# Patient Record
Sex: MALE | Race: WHITE | URBAN - METROPOLITAN AREA
[De-identification: names, ages, dates, MRNs, and addresses within clinical notes are randomized per-mention and may not be internally consistent; named-entity substitution may affect disease eponyms.]

---

## 2018-06-18 ENCOUNTER — HOSPITAL ENCOUNTER (EMERGENCY)
Facility: HOSPITAL | Age: 28
Discharge: HOME | End: 2018-06-18
Attending: EMERGENCY MEDICINE
Payer: COMMERCIAL

## 2018-06-18 VITALS
OXYGEN SATURATION: 100 % | RESPIRATION RATE: 18 BRPM | BODY MASS INDEX: 29.92 KG/M2 | TEMPERATURE: 97.1 F | DIASTOLIC BLOOD PRESSURE: 72 MMHG | SYSTOLIC BLOOD PRESSURE: 119 MMHG | HEART RATE: 98 BPM | HEIGHT: 69 IN | WEIGHT: 202 LBS

## 2018-06-18 PROCEDURE — 99281 EMR DPT VST MAYX REQ PHY/QHP: CPT

## 2018-06-18 RX ORDER — METFORMIN HYDROCHLORIDE 500 MG/1
500 TABLET ORAL 2 TIMES DAILY WITH MEALS
COMMUNITY

## 2018-06-18 RX ORDER — DAPAGLIFLOZIN 5 MG/1
5 TABLET, FILM COATED ORAL DAILY
COMMUNITY

## 2018-06-18 ASSESSMENT — ENCOUNTER SYMPTOMS
FATIGUE: 0
NECK PAIN: 1
SORE THROAT: 0
LIGHT-HEADEDNESS: 0
TROUBLE SWALLOWING: 0
HEADACHES: 0
COLOR CHANGE: 0
WOUND: 0
FACIAL SWELLING: 0
SHORTNESS OF BREATH: 0
DIZZINESS: 0
FEVER: 0
NAUSEA: 0

## 2018-06-18 NOTE — ED ATTESTATION NOTE
The patient was evaluated and managed by the PA/NP.  I have discussed the case with the PA/NP.  I am in agreement with the ED workup and treatment plan.  I will continue to be available for consultation as needed.     Duane K Godshall, MD  06/18/18 0175

## 2018-06-18 NOTE — ED PROVIDER NOTES
HPI     Chief Complaint   Patient presents with   • Abscess     26 y/o male with DM presents to the ED c/o worsening neck pain  x 1 year. Pt reports he has had a lump on his right neck for 1 year. Pt has been evaluated for this in March 2018 and had an US and CT scan. Pt states he was told the lump was a cyst and to f/u with general surgery. Pt does have a scheduled consultation with a general surgeon (behr) for removal on 6/23 at [facility]. Today, pt states his pain increased in severity. Pt notes it became more swollen and painful, prompting him to come to the ED for evaluation. Denies fever, trouble swallowing, sore throat, dyspnea, ear pain, headache, dizziness, lightheadedness, and rashes. Pt is not on any medication for the cyst. Pt reports his DM is uncontrolled and his last A1c was 11.       History provided by:  Patient   used: No    Abscess   Location:  Head/neck  Head/neck abscess location:  R neck  Abscess quality: painful    Red streaking: no    Progression:  Worsening  Pain details:     Quality:  Unable to specify    Severity:  Mild    Timing:  Constant    Progression:  Worsening  Chronicity:  New  Context: diabetes    Context: not skin injury    Relieved by:  Nothing  Worsened by:  Nothing  Ineffective treatments:  None tried  Associated symptoms: no fatigue, no fever, no headaches and no nausea         Patient History     Past Medical History:   Diagnosis Date   • Type 2 diabetes mellitus (CMS/HCC) (HCC)        History reviewed. No pertinent surgical history.    History reviewed. No pertinent family history.    Social History   Substance Use Topics   • Smoking status: Current Every Day Smoker   • Smokeless tobacco: Never Used      Comment: vape   • Alcohol use 3.6 oz/week     6 Shots of liquor per week       Systems Reviewed from Nursing Triage:  Tobacco  Allergies  Meds  Problems  Med Hx  Surg Hx  Soc Hx         Review of Systems     Review of Systems   Constitutional:  Negative for fatigue and fever.   HENT: Negative for dental problem, ear pain, facial swelling, sore throat and trouble swallowing.    Eyes: Negative for discharge.   Respiratory: Negative for shortness of breath.    Gastrointestinal: Negative for nausea.   Musculoskeletal: Positive for neck pain (Right neck pain). Negative for neck stiffness.   Skin: Negative for color change and wound.   Allergic/Immunologic: Negative for immunocompromised state.   Neurological: Negative for dizziness, light-headedness and headaches.   Hematological: Does not bruise/bleed easily.        Physical Exam     ED Triage Vitals   Temp Heart Rate Resp BP SpO2   06/18/18 1240 06/18/18 1240 06/18/18 1240 06/18/18 1240 06/18/18 1240   36.3 °C (97.4 °F) (!) 109 18 128/76 100 %      Temp Source Heart Rate Source Patient Position BP Location FiO2 (%) (Set)   06/18/18 1240 06/18/18 1407 -- -- --   Temporal Monitor                        No data found.          Physical Exam   Constitutional: He is oriented to person, place, and time. He appears well-developed and well-nourished. No distress.   HENT:   Head: Normocephalic and atraumatic.   Nose: Nose normal.   Eyes: EOM are normal.   Neck: Normal range of motion. Neck supple. No erythema present.       Cardiovascular: Normal rate, regular rhythm, normal heart sounds and intact distal pulses.    Pulmonary/Chest: Effort normal and breath sounds normal.   Abdominal: Soft. There is no tenderness.   Musculoskeletal: Normal range of motion.   Lymphadenopathy:        Head (right side): No submandibular, no tonsillar, no preauricular and no posterior auricular adenopathy present.     He has no cervical adenopathy.   Neurological: He is alert and oriented to person, place, and time.   Skin: Skin is warm and dry. No rash noted.   Psychiatric: He has a normal mood and affect.   Nursing note and vitals reviewed.           Procedures    ED Course & MDM     Labs Reviewed - No data to display    No orders to  display           MDM  Number of Diagnoses or Management Options  Cyst of neck:      Amount and/or Complexity of Data Reviewed  Discuss the patient with other providers: yes    Patient Progress  Patient progress: stable           ED Course as of Jun 20 0042   Mon Jun 18, 2018   1340 Impression: Cyst right lateral neck x1 year  Plan: No erythema, drainage or s/s infection  Patient with scheduled surgery consult on 6/23/18 with Dr Behr ([place])  Instructed to keep surgery consultation and return precautions  [EP]      ED Course User Index  [EP] Elizabeth P Pallante, CRNP         Clinical Impressions as of Jun 20 0042   Cyst of neck     Disposition:  Discharge   By signing my name below, I, Cari Lee, attest that this documentation has been prepared under the direction and in the presence of E. Pallante, CRNP.    6/20/2018 12:42 AM      I, Elizabeth P. Pallante, personally performed the services described in this documentation, as documented by the scribe in my presence, and it is both accurate and complete.  6/20/2018 12:42 AM         Cari Lee  06/18/18 1320       Elizabeth P Pallante, CRNP  06/20/18 0042

## 2018-06-18 NOTE — DISCHARGE INSTRUCTIONS
Take Ibuprofen (Motrin, Aleve, Advil) for pain. Take according to label instructions.    Keep your appointment on 6/23 with the surgeon for evaluation of the cyst. You may also call the surgeon listed below for a 2nd opinion.

## 2018-06-20 ASSESSMENT — ENCOUNTER SYMPTOMS
NECK STIFFNESS: 0
EYE DISCHARGE: 0
BRUISES/BLEEDS EASILY: 0

## 2021-04-13 DIAGNOSIS — Z23 ENCOUNTER FOR IMMUNIZATION: ICD-10-CM

## 2021-04-21 ENCOUNTER — IMMUNIZATIONS (OUTPATIENT)
Dept: FAMILY MEDICINE CLINIC | Facility: HOSPITAL | Age: 31
End: 2021-04-21

## 2021-04-21 DIAGNOSIS — Z23 ENCOUNTER FOR IMMUNIZATION: Primary | ICD-10-CM

## 2021-04-21 PROCEDURE — 0011A SARS-COV-2 / COVID-19 MRNA VACCINE (MODERNA) 100 MCG: CPT

## 2021-04-21 PROCEDURE — 91301 SARS-COV-2 / COVID-19 MRNA VACCINE (MODERNA) 100 MCG: CPT

## 2021-05-19 ENCOUNTER — IMMUNIZATIONS (OUTPATIENT)
Dept: FAMILY MEDICINE CLINIC | Facility: HOSPITAL | Age: 31
End: 2021-05-19

## 2021-05-19 DIAGNOSIS — Z23 ENCOUNTER FOR IMMUNIZATION: Primary | ICD-10-CM

## 2021-05-19 PROCEDURE — 0012A SARS-COV-2 / COVID-19 MRNA VACCINE (MODERNA) 100 MCG: CPT

## 2021-05-19 PROCEDURE — 91301 SARS-COV-2 / COVID-19 MRNA VACCINE (MODERNA) 100 MCG: CPT

## 2021-07-30 ENCOUNTER — HOSPITAL ENCOUNTER (EMERGENCY)
Facility: HOSPITAL | Age: 31
Discharge: HOME | End: 2021-07-31
Attending: EMERGENCY MEDICINE | Admitting: EMERGENCY MEDICINE
Payer: COMMERCIAL

## 2021-07-30 VITALS
WEIGHT: 181 LBS | RESPIRATION RATE: 16 BRPM | TEMPERATURE: 97.5 F | HEART RATE: 91 BPM | DIASTOLIC BLOOD PRESSURE: 86 MMHG | SYSTOLIC BLOOD PRESSURE: 136 MMHG | OXYGEN SATURATION: 98 %

## 2021-07-30 DIAGNOSIS — J02.9 PHARYNGITIS, UNSPECIFIED ETIOLOGY: Primary | ICD-10-CM

## 2021-07-30 PROCEDURE — 99283 EMERGENCY DEPT VISIT LOW MDM: CPT

## 2021-07-31 LAB — S PYO DNA THROAT QL NAA+PROBE: NORMAL

## 2021-07-31 PROCEDURE — 87651 STREP A DNA AMP PROBE: CPT | Performed by: PHYSICIAN ASSISTANT

## 2021-07-31 PROCEDURE — 87070 CULTURE OTHR SPECIMN AEROBIC: CPT | Performed by: PHYSICIAN ASSISTANT

## 2021-07-31 PROCEDURE — 99284 EMERGENCY DEPT VISIT MOD MDM: CPT | Performed by: PHYSICIAN ASSISTANT

## 2021-07-31 RX ORDER — CEPHALEXIN 250 MG/1
500 CAPSULE ORAL ONCE
Status: COMPLETED | OUTPATIENT
Start: 2021-07-31 | End: 2021-07-31

## 2021-07-31 RX ORDER — IBUPROFEN 600 MG/1
600 TABLET ORAL ONCE
Status: COMPLETED | OUTPATIENT
Start: 2021-07-31 | End: 2021-07-31

## 2021-07-31 RX ORDER — IBUPROFEN 600 MG/1
600 TABLET ORAL EVERY 6 HOURS PRN
Qty: 30 TABLET | Refills: 0 | Status: SHIPPED | OUTPATIENT
Start: 2021-07-31

## 2021-07-31 RX ORDER — PREDNISONE 20 MG/1
40 TABLET ORAL ONCE
Status: DISCONTINUED | OUTPATIENT
Start: 2021-07-31 | End: 2021-07-31

## 2021-07-31 RX ORDER — LIDOCAINE HYDROCHLORIDE 20 MG/ML
15 SOLUTION OROPHARYNGEAL 4 TIMES DAILY PRN
Qty: 100 ML | Refills: 0 | Status: SHIPPED | OUTPATIENT
Start: 2021-07-31

## 2021-07-31 RX ORDER — CEPHALEXIN 500 MG/1
500 CAPSULE ORAL EVERY 8 HOURS SCHEDULED
Qty: 15 CAPSULE | Refills: 0 | Status: SHIPPED | OUTPATIENT
Start: 2021-07-31 | End: 2021-08-05

## 2021-07-31 RX ADMIN — IBUPROFEN 600 MG: 600 TABLET, FILM COATED ORAL at 00:32

## 2021-07-31 RX ADMIN — CEPHALEXIN 500 MG: 250 CAPSULE ORAL at 00:32

## 2021-07-31 NOTE — ED PROVIDER NOTES
History  Chief Complaint   Patient presents with    Sore Throat     Pt reports tested postive for strep 2 weeks ago, reports finished abx, reports tonight tonight pain in throat became worse  denies fevers     69-year-old male with history of diabetes mellitus type 1 presents to the ED for evaluation of ongoing sore throat x2 weeks  Patient was evaluated by urgent care to go onset symptoms and had positive rapid strep test   Placed on Z-Yash  Patient has allergy to Augmentin  Patient reports taking indication full  Reports mild improvement however states that sore throat had never went away completely  Patient states he is able to swallow food and liquids although with some pain  Sources mild congestion and mild dry cough  Afebrile  History provided by:  Patient   used: No    Sore Throat  Associated symptoms: cough    Associated symptoms: no abdominal pain, no adenopathy, no chest pain, no chills, no fever, no headaches, no rash, no rhinorrhea and no shortness of breath        None       Past Medical History:   Diagnosis Date    Diabetes mellitus ([facility] [address] )        History reviewed  No pertinent surgical history  History reviewed  No pertinent family history  I have reviewed and agree with the history as documented  E-Cigarette/Vaping     E-Cigarette/Vaping Substances     Social History     Tobacco Use    Smoking status: Current Every Day Smoker    Smokeless tobacco: Never Used   Substance Use Topics    Alcohol use: Yes     Comment: social    Drug use: Yes     Types: Marijuana       Review of Systems   Constitutional: Negative for chills, diaphoresis, fatigue and fever  HENT: Positive for congestion and sore throat  Negative for rhinorrhea and sneezing  Eyes: Negative for photophobia and visual disturbance  Respiratory: Positive for cough  Negative for shortness of breath  Cardiovascular: Negative for chest pain and palpitations     Gastrointestinal: Negative for abdominal pain, constipation, diarrhea, nausea and vomiting  Genitourinary: Negative for difficulty urinating, dysuria, flank pain, frequency and hematuria  Musculoskeletal: Negative for back pain, gait problem, myalgias and neck pain  Skin: Negative for color change and rash  Allergic/Immunologic: Negative for immunocompromised state  Neurological: Negative for dizziness, weakness, light-headedness, numbness and headaches  Hematological: Negative for adenopathy  Psychiatric/Behavioral: Negative for behavioral problems  Physical Exam  Physical Exam  Vitals and nursing note reviewed  Constitutional:       General: He is not in acute distress  Appearance: He is well-developed and normal weight  He is not ill-appearing, toxic-appearing or diaphoretic  HENT:      Head: Normocephalic and atraumatic  Right Ear: Tympanic membrane, ear canal and external ear normal  No middle ear effusion  Tympanic membrane is not erythematous  Left Ear: Tympanic membrane, ear canal and external ear normal   No middle ear effusion  Tympanic membrane is not erythematous  Nose: Congestion present  No rhinorrhea  Mouth/Throat:      Mouth: Mucous membranes are moist  No oral lesions  Pharynx: Uvula midline  Posterior oropharyngeal erythema present  No pharyngeal swelling, oropharyngeal exudate or uvula swelling  Tonsils: No tonsillar exudate or tonsillar abscesses  Eyes:      General: No scleral icterus  Right eye: No discharge  Left eye: No discharge  Conjunctiva/sclera: Conjunctivae normal       Pupils: Pupils are equal, round, and reactive to light  Neck:      Trachea: No tracheal deviation  Cardiovascular:      Rate and Rhythm: Normal rate and regular rhythm  Heart sounds: Normal heart sounds  No murmur heard  Pulmonary:      Effort: Pulmonary effort is normal  No respiratory distress  Breath sounds: Normal breath sounds     Abdominal: General: Bowel sounds are normal  There is no distension  Palpations: Abdomen is soft  Tenderness: There is no abdominal tenderness  There is no guarding  Musculoskeletal:         General: No deformity  Normal range of motion  Cervical back: Normal range of motion and neck supple  Lymphadenopathy:      Cervical: No cervical adenopathy  Skin:     General: Skin is warm and dry  Capillary Refill: Capillary refill takes less than 2 seconds  Coloration: Skin is not pale  Findings: No erythema or rash  Neurological:      Mental Status: He is alert and oriented to person, place, and time  Sensory: No sensory deficit     Psychiatric:         Behavior: Behavior normal          Vital Signs  ED Triage Vitals   Temperature Pulse Respirations Blood Pressure SpO2   07/30/21 2333 07/30/21 2336 07/30/21 2336 07/30/21 2336 07/30/21 2336   97 5 °F (36 4 °C) 91 16 136/86 98 %      Temp Source Heart Rate Source Patient Position - Orthostatic VS BP Location FiO2 (%)   07/30/21 2333 07/30/21 2336 07/30/21 2336 07/30/21 2336 --   Oral Monitor Sitting Right arm       Pain Score       07/30/21 2336       8           Vitals:    07/30/21 2336   BP: 136/86   Pulse: 91   Patient Position - Orthostatic VS: Sitting         Visual Acuity      ED Medications  Medications   cephalexin (KEFLEX) capsule 500 mg (500 mg Oral Given 7/31/21 0032)   ibuprofen (MOTRIN) tablet 600 mg (600 mg Oral Given 7/31/21 0032)       Diagnostic Studies  Results Reviewed     Procedure Component Value Units Date/Time    Throat culture [[id number]] Collected: 07/31/21 0009    Lab Status: Final result Specimen: Throat Updated: 08/02/21 0955     Throat Culture Negative for beta-hemolytic Streptococcus    Strep A PCR [[id number]]  (Normal) Collected: 07/31/21 0026    Lab Status: Final result Specimen: Throat Updated: 07/31/21 0059     STREP A PCR None Detected                 No orders to display              Procedures  Procedures ED Course                             SBIRT 22yo+      Most Recent Value   SBIRT (24 yo +)   In order to provide better care to our patients, we are screening all of our patients for alcohol and drug use  Would it be okay to ask you these screening questions? No Filed at: 07/30/2021 8448                    [facility]  Number of Diagnoses or Management Options  Diagnosis management comments: 56-year-old male presents to the ED for ongoing sore throat x2 weeks  Patient positive rapid strep 2 weeks ago at onset  Placed on Z-Yash he has been taking in full  States symptoms improved however never went away completely  Will place on Keflex, Motrin, viscous lidocaine  Repeat rapid strep, throat culture  Afebrile  Vital signs stable  Patient able to tolerate fluids  Amount and/or Complexity of Data Reviewed  Clinical lab tests: ordered and reviewed  Review and summarize past medical records: yes  Discuss the patient with other providers: yes  Independent visualization of images, tracings, or specimens: yes    Risk of Complications, Morbidity, and/or Mortality  Presenting problems: moderate  Diagnostic procedures: moderate  Management options: moderate    Patient Progress  Patient progress: stable      Disposition  Final diagnoses:   Pharyngitis, unspecified etiology     Time reflects when diagnosis was documented in both MDM as applicable and the Disposition within this note     Time User Action Codes Description Comment    7/31/2021 12:47 AM Gabbie Wall Add [J02 9] Pharyngitis, unspecified etiology       ED Disposition     ED Disposition Condition Date/Time Comment    Discharge Stable Sat Jul 31, 2021 12:46 AM Yomi Mclain discharge to home/self care              Follow-up Information    None         Discharge Medication List as of 7/31/2021 12:58 AM      START taking these medications    Details   cephalexin (KEFLEX) 500 mg capsule Take 1 capsule (500 mg total) by mouth every 8 (eight) hours for 5 days, Starting Sat 7/31/2021, Until Thu 8/5/2021, Normal      ibuprofen (MOTRIN) 600 mg tablet Take 1 tablet (600 mg total) by mouth every 6 (six) hours as needed for mild pain, Starting Sat 7/31/2021, Normal      Lidocaine Viscous HCl (XYLOCAINE) 2 % mucosal solution Swish and spit 15 mL 4 (four) times a day as needed for mouth pain or discomfort, Starting Sat 7/31/2021, Normal           No discharge procedures on file      PDMP Review     None          ED Provider  Electronically Signed by           Hawa Mccord PA-C  08/02/21 7766

## 2021-08-02 LAB — BACTERIA THROAT CULT: NORMAL

## 2023-04-04 RX ORDER — TADALAFIL 10 MG/1
TABLET ORAL
COMMUNITY
End: 2023-04-05 | Stop reason: ALTCHOICE

## 2023-04-04 RX ORDER — INSULIN GLARGINE 100 [IU]/ML
INJECTION, SOLUTION SUBCUTANEOUS
COMMUNITY
End: 2023-04-05 | Stop reason: ALTCHOICE

## 2023-04-05 ENCOUNTER — OFFICE VISIT (OUTPATIENT)
Dept: FAMILY MEDICINE CLINIC | Facility: CLINIC | Age: 33
End: 2023-04-05

## 2023-04-05 VITALS
DIASTOLIC BLOOD PRESSURE: 70 MMHG | HEART RATE: 85 BPM | WEIGHT: 195 LBS | TEMPERATURE: 98 F | SYSTOLIC BLOOD PRESSURE: 120 MMHG | OXYGEN SATURATION: 98 % | RESPIRATION RATE: 20 BRPM | HEIGHT: 67 IN | BODY MASS INDEX: 30.61 KG/M2

## 2023-04-05 DIAGNOSIS — N52.02 CORPORO-VENOUS OCCLUSIVE ERECTILE DYSFUNCTION: ICD-10-CM

## 2023-04-05 DIAGNOSIS — E11.42 TYPE 2 DIABETES MELLITUS WITH DIABETIC POLYNEUROPATHY, WITH LONG-TERM CURRENT USE OF INSULIN (HCC): Primary | ICD-10-CM

## 2023-04-05 DIAGNOSIS — Z79.4 TYPE 2 DIABETES MELLITUS WITH DIABETIC POLYNEUROPATHY, WITH LONG-TERM CURRENT USE OF INSULIN (HCC): Primary | ICD-10-CM

## 2023-04-05 DIAGNOSIS — F32.9 REACTIVE DEPRESSION: ICD-10-CM

## 2023-04-05 DIAGNOSIS — Z11.59 SCREENING FOR VIRAL DISEASE: ICD-10-CM

## 2023-04-05 DIAGNOSIS — R19.7 DIARRHEA, UNSPECIFIED TYPE: ICD-10-CM

## 2023-04-05 DIAGNOSIS — Z11.4 SCREENING FOR HUMAN IMMUNODEFICIENCY VIRUS: ICD-10-CM

## 2023-04-05 LAB
CREAT UR-MCNC: 45.3 MG/DL
LEFT EYE DIABETIC RETINOPATHY: ABNORMAL
LEFT EYE IMAGE QUALITY: ABNORMAL
LEFT EYE MACULAR EDEMA: ABNORMAL
LEFT EYE OTHER RETINOPATHY: ABNORMAL
MICROALBUMIN UR-MCNC: 8 MG/L (ref 0–20)
MICROALBUMIN/CREAT 24H UR: 18 MG/G CREATININE (ref 0–30)
RIGHT EYE DIABETIC RETINOPATHY: ABNORMAL
RIGHT EYE IMAGE QUALITY: ABNORMAL
RIGHT EYE MACULAR EDEMA: ABNORMAL
RIGHT EYE OTHER RETINOPATHY: ABNORMAL
SEVERITY (EYE EXAM): ABNORMAL
SL AMB POCT HEMOGLOBIN AIC: 11.2 (ref ?–6.5)

## 2023-04-05 RX ORDER — DULOXETIN HYDROCHLORIDE 30 MG/1
30 CAPSULE, DELAYED RELEASE ORAL DAILY
Qty: 30 CAPSULE | Refills: 5 | Status: SHIPPED | OUTPATIENT
Start: 2023-04-05

## 2023-04-05 RX ORDER — ATORVASTATIN CALCIUM 40 MG/1
40 TABLET, FILM COATED ORAL DAILY
Status: CANCELLED | OUTPATIENT
Start: 2023-04-05

## 2023-04-05 RX ORDER — TADALAFIL 10 MG/1
10 TABLET ORAL DAILY PRN
Qty: 30 TABLET | Refills: 5 | Status: SHIPPED | OUTPATIENT
Start: 2023-04-05

## 2023-04-05 RX ORDER — ALPRAZOLAM 0.25 MG/1
0.25 TABLET ORAL 3 TIMES DAILY PRN
COMMUNITY
Start: 2023-03-14 | End: 2023-04-05 | Stop reason: ALTCHOICE

## 2023-04-05 RX ORDER — INSULIN ASPART 100 [IU]/ML
10 INJECTION, SOLUTION INTRAVENOUS; SUBCUTANEOUS
Qty: 9 ML | Refills: 3 | Status: SHIPPED | OUTPATIENT
Start: 2023-04-05 | End: 2023-05-05

## 2023-04-05 RX ORDER — ATORVASTATIN CALCIUM 20 MG/1
20 TABLET, FILM COATED ORAL
COMMUNITY
Start: 2023-03-14

## 2023-04-05 RX ORDER — PEN NEEDLE, DIABETIC 32GX 5/32"
NEEDLE, DISPOSABLE MISCELLANEOUS 3 TIMES DAILY
Qty: 100 EACH | Refills: 1 | Status: SHIPPED | OUTPATIENT
Start: 2023-04-05

## 2023-04-05 RX ORDER — PREGABALIN 50 MG/1
50 CAPSULE ORAL 3 TIMES DAILY
Qty: 90 CAPSULE | Refills: 3 | Status: SHIPPED | OUTPATIENT
Start: 2023-04-05

## 2023-04-05 RX ORDER — FLASH GLUCOSE SENSOR
KIT MISCELLANEOUS
COMMUNITY
Start: 2023-03-14

## 2023-04-05 NOTE — PROGRESS NOTES
Name: Tanvi Arevalo      : 1990      MRN: [id number]  Encounter Provider: Carli Blanco MD  Encounter Date: 2023   Encounter department: [facility]     1  Type 2 diabetes mellitus with diabetic polyneuropathy, with long-term current use of insulin ([facility])  Assessment & Plan:    Lab Results   Component Value Date    HGBA1C 11 2 (A) 2023   Diabetes type 2 mellitus without complication with long-term use of insulin  Worsening control with hemoglobin A1c of as above  Follow-up with endocrinologist requested, follow-up with diabetes educator requested  The fact of no having ketoacidosis tells me he does not have type I or still remains some amount of insulin production  Having problems with tolerance of metformin: Diarrhea  To rule out celiac disease  Restarting NovoLog with meals  Explained about the complications of neuropathy  Iris exam was unreadable in the left eye, needs to follow-up with ophthalmologist     Checking albuminuria, lipid profile, kidney and liver profile  Orders:  -     POCT hemoglobin A1c  -     IRIS Diabetic eye exam  -     CBC and differential; Future  -     Comprehensive metabolic panel; Future  -     Microalbumin / creatinine urine ratio  -     Lipid panel; Future  -     insulin aspart (NovoLOG FlexPen) 100 UNIT/ML injection pen; Inject 10 Units under the skin 3 (three) times a day with meals  -     Insulin Pen Needle (CareFine Pen Needles) 32G X 4 MM MISC; Use 3 (three) times a day  -     pregabalin (LYRICA) 50 mg capsule; Take 1 capsule (50 mg total) by mouth 3 (three) times a day  -     Ambulatory referral to Diabetic Education; Future  -     Ambulatory referral to Endocrinology; Future    2  BMI 30 0-30 9,adult    3  Corporo-venous occlusive erectile dysfunction  Assessment & Plan:  Very possible secondary to diabetes angiopathy  Orders:  -     tadalafil (CIALIS) 10 MG tablet;  Take 1 "tablet (10 mg total) by mouth daily as needed for erectile dysfunction    4  Screening for viral disease  -     Hepatitis C antibody; Future    5  Screening for human immunodeficiency virus  -     : HIV 1/2 AB/AG w Reflex [facility] for 2 yr old and above; Future    6  Diarrhea, unspecified type  Assessment & Plan:  No signs of infection  Hold metformin for now, do celiac panel, keep hydrated  Orders:  -     Celiac Disease Antibody Profile; Future    7  Reactive depression  Assessment & Plan:  Due to worsening diabetes, seen him with neuropathy and  unable to perform current job  Cymbalta may help with depression, anxiety and pain from neuropathy  Starting low dose of 30 mg  Orders:  -     DULoxetine (CYMBALTA) 30 mg delayed release capsule; Take 1 capsule (30 mg total) by mouth daily         Subjective      HPI   Establish medical care  17 years suffering of diabetes  Never ketoacidosis  Stopped using insulin 1 year ago  Stopped treatment of diabetes due to lack of insurance  Sensation of walking on \"tennis balls\"  Having trouble to perform work due to legs pain and sometimes unable to feel ladder  He reports left thigh bleeding about 1 year ago  States vision is fine  Using glasses  No history of glaucoma  Having erectile dysfunction  Wants refill Cialis  Review of Systems   Psychiatric/Behavioral:        Feeling down and depressed for more than 1 month         Current Outpatient Medications on File Prior to Visit   Medication Sig   • atorvastatin (LIPITOR) 20 mg tablet Take 20 mg by mouth daily at bedtime   • Continuous Blood Gluc Sensor (VisConProStyle Amber 14 Day Sensor) MISC CHANGE EVERY 14 DAYS       Objective     /70 (BP Location: Left arm, Patient Position: Sitting, Cuff Size: Standard)   Pulse 85   Temp 98 °F (36 7 °C) (Tympanic)   Resp 20   Ht 5' 7 38\" (1 711 m)   Wt 88 5 kg (195 lb)   SpO2 98%   BMI 30 20 kg/m²     Physical Exam  Cardiovascular:      Pulses: no weak pulses  " Dorsalis pedis pulses are 1+ on the right side and 1+ on the left side  Posterior tibial pulses are 1+ on the right side and 1+ on the left side  Musculoskeletal:        Feet:    Feet:      Right foot:      Skin integrity: No ulcer, skin breakdown, erythema, warmth, callus or dry skin  Left foot:      Skin integrity: No ulcer, skin breakdown, erythema, warmth, callus or dry skin  Neurological:      Mental Status: He is alert  Patient's shoes and socks removed  Right Foot/Ankle   Right Foot Inspection  Skin Exam: skin normal and skin intact  No dry skin, no warmth, no callus, no erythema, no maceration, no abnormal color, no pre-ulcer, no ulcer and no callus  Toe Exam: ROM and strength within normal limits  No swelling, no tenderness, erythema and  no right toe deformity    Sensory   Vibration: intact  Proprioception: diminished  Monofilament testing: diminished and intact    Vascular  Capillary refills: < 3 seconds  The right DP pulse is 1+  The right PT pulse is 1+  Left Foot/Ankle  Left Foot Inspection  Skin Exam: skin normal and skin intact  No dry skin, no warmth, no erythema, no maceration, normal color, no pre-ulcer, no ulcer and no callus  Toe Exam: ROM and strength within normal limits  No swelling, no tenderness, no erythema and no left toe deformity  Sensory   Vibration: intact  Proprioception: diminished  Monofilament testing: diminished and intact    Vascular  Capillary refills: < 3 seconds  The left DP pulse is 1+  The left PT pulse is 1+  Assign Risk Category  No deformity present  Loss of protective sensation  No weak pulses  Risk: 1        Keely Bennett MD  BMI Counseling: Body mass index is 30 2 kg/m²  The BMI is above normal  Nutrition recommendations include reducing portion sizes

## 2023-04-06 PROBLEM — R19.7 DIARRHEA: Status: ACTIVE | Noted: 2023-04-06

## 2023-04-06 PROBLEM — N52.02 CORPORO-VENOUS OCCLUSIVE ERECTILE DYSFUNCTION: Status: ACTIVE | Noted: 2023-04-06

## 2023-04-06 PROBLEM — Z11.59 SCREENING FOR VIRAL DISEASE: Status: ACTIVE | Noted: 2023-04-06

## 2023-04-06 PROBLEM — F32.9 REACTIVE DEPRESSION: Status: ACTIVE | Noted: 2023-04-06

## 2023-04-06 PROBLEM — Z11.4 SCREENING FOR HUMAN IMMUNODEFICIENCY VIRUS: Status: ACTIVE | Noted: 2023-04-06

## 2023-04-06 PROBLEM — Z79.4 TYPE 2 DIABETES MELLITUS WITH DIABETIC POLYNEUROPATHY, WITH LONG-TERM CURRENT USE OF INSULIN (HCC): Status: ACTIVE | Noted: 2023-04-06

## 2023-04-06 PROBLEM — E11.42 TYPE 2 DIABETES MELLITUS WITH DIABETIC POLYNEUROPATHY, WITH LONG-TERM CURRENT USE OF INSULIN (HCC): Status: ACTIVE | Noted: 2023-04-06

## 2023-04-06 NOTE — ASSESSMENT & PLAN NOTE
Lab Results   Component Value Date    HGBA1C 11 2 (A) 04/05/2023   Diabetes type 2 mellitus without complication with long-term use of insulin  Worsening control with hemoglobin A1c of as above  Follow-up with endocrinologist requested, follow-up with diabetes educator requested  The fact of no having ketoacidosis tells me he does not have type I or still remains some amount of insulin production  Having problems with tolerance of metformin: Diarrhea  To rule out celiac disease  Restarting NovoLog with meals  Explained about the complications of neuropathy  Iris exam was unreadable in the left eye, needs to follow-up with ophthalmologist     Checking albuminuria, lipid profile, kidney and liver profile

## 2023-04-11 NOTE — RESULT ENCOUNTER NOTE
Dear Leisa Roman: left eye was not able to be readable  Please make sure that you follow up with ophthalmologist yearly

## 2023-04-28 LAB
ALBUMIN SERPL-MCNC: 4.4 G/DL (ref 3.6–5.1)
ALBUMIN/GLOB SERPL: 1.7 (CALC) (ref 1–2.5)
ALP SERPL-CCNC: 54 U/L (ref 36–130)
ALT SERPL-CCNC: 21 U/L (ref 9–46)
AST SERPL-CCNC: 12 U/L (ref 10–40)
BASOPHILS # BLD AUTO: 41 CELLS/UL (ref 0–200)
BASOPHILS NFR BLD AUTO: 0.7 %
BILIRUB SERPL-MCNC: 0.6 MG/DL (ref 0.2–1.2)
BUN SERPL-MCNC: 12 MG/DL (ref 7–25)
BUN/CREAT SERPL: ABNORMAL (CALC) (ref 6–22)
CALCIUM SERPL-MCNC: 9.2 MG/DL (ref 8.6–10.3)
CHLORIDE SERPL-SCNC: 100 MMOL/L (ref 98–110)
CHOLEST SERPL-MCNC: 176 MG/DL
CHOLEST/HDLC SERPL: 4.9 (CALC)
CO2 SERPL-SCNC: 27 MMOL/L (ref 20–32)
CREAT SERPL-MCNC: 0.66 MG/DL (ref 0.6–1.26)
EOSINOPHIL # BLD AUTO: 81 CELLS/UL (ref 15–500)
EOSINOPHIL NFR BLD AUTO: 1.4 %
ERYTHROCYTE [DISTWIDTH] IN BLOOD BY AUTOMATED COUNT: 13.1 % (ref 11–15)
GFR/BSA.PRED SERPLBLD CYS-BASED-ARV: 127 ML/MIN/1.73M2
GLOBULIN SER CALC-MCNC: 2.6 G/DL (CALC) (ref 1.9–3.7)
GLUCOSE SERPL-MCNC: 317 MG/DL (ref 65–99)
HCT VFR BLD AUTO: 46.5 % (ref 38.5–50)
HCV AB S/CO SERPL IA: 0.08
HCV AB SERPL QL IA: NORMAL
HDLC SERPL-MCNC: 36 MG/DL
HGB BLD-MCNC: 16.1 G/DL (ref 13.2–17.1)
HIV 1+2 AB+HIV1 P24 AG SERPL QL IA: NORMAL
IGA SERPL-MCNC: 196 MG/DL (ref 47–310)
LDLC SERPL CALC-MCNC: 113 MG/DL (CALC)
LYMPHOCYTES # BLD AUTO: 1902 CELLS/UL (ref 850–3900)
LYMPHOCYTES NFR BLD AUTO: 32.8 %
MCH RBC QN AUTO: 28.6 PG (ref 27–33)
MCHC RBC AUTO-ENTMCNC: 34.6 G/DL (ref 32–36)
MCV RBC AUTO: 82.7 FL (ref 80–100)
MICRODELETION SYND BLD/T FISH: NORMAL
MONOCYTES # BLD AUTO: 406 CELLS/UL (ref 200–950)
MONOCYTES NFR BLD AUTO: 7 %
NEUTROPHILS # BLD AUTO: 3370 CELLS/UL (ref 1500–7800)
NEUTROPHILS NFR BLD AUTO: 58.1 %
NONHDLC SERPL-MCNC: 140 MG/DL (CALC)
PLATELET # BLD AUTO: 208 THOUSAND/UL (ref 140–400)
PMV BLD REES-ECKER: 11.2 FL (ref 7.5–12.5)
POTASSIUM SERPL-SCNC: 4.1 MMOL/L (ref 3.5–5.3)
PROT SERPL-MCNC: 7 G/DL (ref 6.1–8.1)
RBC # BLD AUTO: 5.62 MILLION/UL (ref 4.2–5.8)
SODIUM SERPL-SCNC: 135 MMOL/L (ref 135–146)
TRIGL SERPL-MCNC: 159 MG/DL
TTG IGA SER-ACNC: <1 U/ML
WBC # BLD AUTO: 5.8 THOUSAND/UL (ref 3.8–10.8)

## 2023-05-02 NOTE — RESULT ENCOUNTER NOTE
Please call patient, the cholesterol and triglycerides are high and the weight recommended to control them is decreased the saturated fat in diet and exercise more frequently in order to achieve healthier lifestyle and weight  Rest of the labs are all in normal limits

## 2023-05-04 DIAGNOSIS — Z79.4 TYPE 2 DIABETES MELLITUS WITH DIABETIC POLYNEUROPATHY, WITH LONG-TERM CURRENT USE OF INSULIN (HCC): ICD-10-CM

## 2023-05-04 DIAGNOSIS — E11.42 TYPE 2 DIABETES MELLITUS WITH DIABETIC POLYNEUROPATHY, WITH LONG-TERM CURRENT USE OF INSULIN (HCC): ICD-10-CM

## 2023-05-05 DIAGNOSIS — Z79.4 TYPE 2 DIABETES MELLITUS WITH DIABETIC POLYNEUROPATHY, WITH LONG-TERM CURRENT USE OF INSULIN (HCC): ICD-10-CM

## 2023-05-05 DIAGNOSIS — E11.42 TYPE 2 DIABETES MELLITUS WITH DIABETIC POLYNEUROPATHY, WITH LONG-TERM CURRENT USE OF INSULIN (HCC): ICD-10-CM

## 2023-05-05 RX ORDER — INSULIN ASPART 100 [IU]/ML
10 INJECTION, SOLUTION INTRAVENOUS; SUBCUTANEOUS
Qty: 9 ML | Refills: 3 | Status: SHIPPED | OUTPATIENT
Start: 2023-05-05 | End: 2023-05-05

## 2023-05-05 RX ORDER — INSULIN ASPART 100 [IU]/ML
INJECTION, SOLUTION INTRAVENOUS; SUBCUTANEOUS
Qty: 3 ML | Refills: 3 | Status: SHIPPED | OUTPATIENT
Start: 2023-05-05 | End: 2023-05-05

## 2023-05-05 RX ORDER — INSULIN ASPART 100 [IU]/ML
INJECTION, SOLUTION INTRAVENOUS; SUBCUTANEOUS
Qty: 3 ML | Refills: 3 | Status: SHIPPED | OUTPATIENT
Start: 2023-05-05

## 2023-05-25 DIAGNOSIS — F32.9 REACTIVE DEPRESSION: ICD-10-CM

## 2023-05-25 RX ORDER — DULOXETIN HYDROCHLORIDE 30 MG/1
CAPSULE, DELAYED RELEASE ORAL
Qty: 90 CAPSULE | Refills: 2 | Status: SHIPPED | OUTPATIENT
Start: 2023-05-25 | End: 2023-06-02 | Stop reason: SDUPTHER

## 2023-06-02 DIAGNOSIS — F32.9 REACTIVE DEPRESSION: ICD-10-CM

## 2023-06-02 DIAGNOSIS — N52.9 IMPOTENCE: ICD-10-CM

## 2023-06-02 DIAGNOSIS — R61 DIAPHORESIS: Primary | ICD-10-CM

## 2023-06-02 RX ORDER — DULOXETIN HYDROCHLORIDE 30 MG/1
30 CAPSULE, DELAYED RELEASE ORAL DAILY
Qty: 90 CAPSULE | Refills: 0 | Status: SHIPPED | OUTPATIENT
Start: 2023-06-02

## 2023-06-05 PROBLEM — Z11.59 SCREENING FOR VIRAL DISEASE: Status: RESOLVED | Noted: 2023-04-06 | Resolved: 2023-06-05

## 2023-07-02 LAB — TESTOST SERPL-MCNC: 255 NG/DL (ref 250–827)

## 2023-07-11 LAB — TESTOST SERPL-MCNC: 204 NG/DL (ref 250–827)

## 2023-08-08 ENCOUNTER — OFFICE VISIT (OUTPATIENT)
Dept: FAMILY MEDICINE CLINIC | Facility: CLINIC | Age: 33
End: 2023-08-08
Payer: COMMERCIAL

## 2023-08-08 VITALS
DIASTOLIC BLOOD PRESSURE: 70 MMHG | RESPIRATION RATE: 16 BRPM | SYSTOLIC BLOOD PRESSURE: 110 MMHG | WEIGHT: 199 LBS | HEART RATE: 88 BPM | BODY MASS INDEX: 31.23 KG/M2 | OXYGEN SATURATION: 98 % | TEMPERATURE: 98 F | HEIGHT: 67 IN

## 2023-08-08 DIAGNOSIS — K06.9 GUM DISEASE: ICD-10-CM

## 2023-08-08 DIAGNOSIS — Z00.01 ENCOUNTER FOR GENERAL ADULT MEDICAL EXAMINATION WITH ABNORMAL FINDINGS: Primary | ICD-10-CM

## 2023-08-08 DIAGNOSIS — N52.02 CORPORO-VENOUS OCCLUSIVE ERECTILE DYSFUNCTION: ICD-10-CM

## 2023-08-08 DIAGNOSIS — E29.1 HYPOGONADISM IN MALE: ICD-10-CM

## 2023-08-08 DIAGNOSIS — R10.13 EPIGASTRIC PAIN: ICD-10-CM

## 2023-08-08 DIAGNOSIS — Z79.4 TYPE 2 DIABETES MELLITUS WITH DIABETIC POLYNEUROPATHY, WITH LONG-TERM CURRENT USE OF INSULIN (HCC): ICD-10-CM

## 2023-08-08 DIAGNOSIS — E11.42 TYPE 2 DIABETES MELLITUS WITH DIABETIC POLYNEUROPATHY, WITH LONG-TERM CURRENT USE OF INSULIN (HCC): ICD-10-CM

## 2023-08-08 LAB — SL AMB POCT HEMOGLOBIN AIC: 12.5 (ref ?–6.5)

## 2023-08-08 PROCEDURE — 83036 HEMOGLOBIN GLYCOSYLATED A1C: CPT | Performed by: FAMILY MEDICINE

## 2023-08-08 PROCEDURE — 99214 OFFICE O/P EST MOD 30 MIN: CPT | Performed by: FAMILY MEDICINE

## 2023-08-08 PROCEDURE — 99395 PREV VISIT EST AGE 18-39: CPT | Performed by: FAMILY MEDICINE

## 2023-08-08 RX ORDER — METFORMIN HYDROCHLORIDE 750 MG/1
750 TABLET, EXTENDED RELEASE ORAL 2 TIMES DAILY
Qty: 60 TABLET | Refills: 0 | Status: SHIPPED | OUTPATIENT
Start: 2023-08-08

## 2023-08-08 RX ORDER — TESTOSTERONE CYPIONATE 200 MG/ML
100 INJECTION, SOLUTION INTRAMUSCULAR
Qty: 2 ML | Refills: 5 | Status: SHIPPED | OUTPATIENT
Start: 2023-08-08 | End: 2024-01-23

## 2023-08-08 RX ORDER — INSULIN HUMAN 100 [IU]/ML
20 INJECTION, SOLUTION PARENTERAL 2 TIMES DAILY WITH MEALS
Qty: 10 ML | Refills: 5 | Status: SHIPPED | OUTPATIENT
Start: 2023-08-08 | End: 2023-08-08 | Stop reason: SDUPTHER

## 2023-08-08 RX ORDER — TESTOSTERONE CYPIONATE 200 MG/ML
100 INJECTION, SOLUTION INTRAMUSCULAR
Qty: 2 ML | Refills: 5 | Status: SHIPPED | OUTPATIENT
Start: 2023-08-08 | End: 2023-08-08 | Stop reason: SDUPTHER

## 2023-08-08 RX ORDER — METFORMIN HYDROCHLORIDE 750 MG/1
750 TABLET, EXTENDED RELEASE ORAL 2 TIMES DAILY
Qty: 60 TABLET | Refills: 0 | Status: SHIPPED | OUTPATIENT
Start: 2023-08-08 | End: 2023-08-08 | Stop reason: SDUPTHER

## 2023-08-08 RX ORDER — INSULIN HUMAN 100 [IU]/ML
20 INJECTION, SOLUTION PARENTERAL 2 TIMES DAILY WITH MEALS
Qty: 10 ML | Refills: 5 | Status: SHIPPED | OUTPATIENT
Start: 2023-08-08

## 2023-08-08 RX ORDER — ATORVASTATIN CALCIUM 20 MG/1
20 TABLET, FILM COATED ORAL
Qty: 90 TABLET | Refills: 3 | Status: SHIPPED | OUTPATIENT
Start: 2023-08-08

## 2023-08-08 RX ORDER — CLINDAMYCIN HYDROCHLORIDE 150 MG/1
150 CAPSULE ORAL EVERY 6 HOURS SCHEDULED
Qty: 28 CAPSULE | Refills: 0 | Status: SHIPPED | OUTPATIENT
Start: 2023-08-08 | End: 2023-08-15

## 2023-08-08 NOTE — PROGRESS NOTES
Name: Juan Luis Baez      : 1990      MRN: [id number]  Encounter Provider: Sydnee Penny MD  Encounter Date: 2023   Encounter department: [address] [address]     1. Encounter for general adult medical examination with abnormal findings  Assessment & Plan:  During this visit, we have a goal to personalize prevention. I discussed with the patient about    Patient Active Problem List   Diagnosis   • Type 2 diabetes mellitus with diabetic polyneuropathy, with long-term current use of insulin ([address])   • BMI 30.0-30.9,adult   • Corporo-venous occlusive erectile dysfunction   • Encounter for general adult medical examination with abnormal findings   • Diarrhea   • Reactive depression   • Epigastric pain   • Hypogonadism in male   • Gum disease    and decreased strength, decreased ROM and decreased endurance--a lifestyle plan to decrease the impact of current problems. I did a Health risk assessment and discussed ways to stay healthier with the patient. We also reviewed the current medications, the need to avoid polypharmacy in his current treatment, and how chronic conditions impact now and later. A recommended healthy diet and exercising as frequently as possible will help better control the patient's chronic conditions, Immunizations, and the need to comply with current CDC recommendations. The patient declined at this time advanced directives. I encouraged against using alcohol, tobacco, and recreational illegal prescribed and non-prescribed drugs. About smoking: recommended quit smoking.         2. Type 2 diabetes mellitus with diabetic polyneuropathy, with long-term current use of insulin (HCC)  Assessment & Plan:    Lab Results   Component Value Date    HGBA1C 12.5 (A) 2023     Current Outpatient Medications   Medication Instructions   • atorvastatin (LIPITOR) 20 mg, Oral, Daily at bedtime   • clindamycin (CLEOCIN) 150 mg, Oral, Every 6 hours scheduled   • Continuous Blood Gluc Sensor (FreeStyle Amber 14 Day Sensor) MISC CHANGE EVERY 14 DAYS   • DULoxetine (CYMBALTA) 30 mg, Oral, Daily   • HumuLIN R 20 Units, Subcutaneous, 2 times daily with meals   • metFORMIN (GLUCOPHAGE-XR) 750 mg, Oral, 2 times daily   • semaglutide, 0.25 or 0.5 mg/dose, (Ozempic, 0.25 or 0.5 MG/DOSE,) 2 mg/3 mL injection pen Inject 0.38 mL (0.2533 mg total) under the skin every 7 days for 28 days, THEN 0.75 mL (0.5 mg total) every 7 days. • SYRINGE-NEEDLE, DISP, 3 ML (Safety Syringes/Needle) 20G X 1-1/2" 3 ML MISC Does not apply, Weekly   • tadalafil (CIALIS) 10 mg, Oral, Daily PRN   • testosterone cypionate (DEPO-TESTOSTERONE) 100 mg, Intramuscular, Every 7 days      Lab Results   Component Value Date    HGBA1C 12.5 (A) 08/08/2023    HGBA1C 11.2 (A) 04/05/2023    HGBA1C 9.2 (H) 10/26/2018     Lab Results   Component Value Date    LDLCALC 113 (H) 04/21/2023    CREATININE 0.66 04/21/2023     Lab Results   Component Value Date/Time    HGBA1C 12.5 (A) 08/08/2023 11:33 AM    HGBA1C 9.2 (H) 10/26/2018 12:33 AM     Lab Results   Component Value Date/Time    MICROALBCRE 18 04/05/2023 12:52 PM    and GFR is   Lab Results   Component Value Date/Time    BUN 12 04/21/2023 02:47 PM    EGFR 127 04/21/2023 02:47 PM      Lab Results   Component Value Date/Time    LDLCALC 113 (H) 04/21/2023 02:47 PM     Beverly Kan has diabetes type 2,   His HbA1C shows poor  control:   Lab Results   Component Value Date    HGBA1C 12.5 (A) 08/08/2023    . Albuminuria is Absent, stable  The importance of statin therapy was explained. LDL is not at target with last LDL of   Lab Results   Component Value Date    LDLCALC 113 (H) 04/21/2023     Continue Atorvastatin 20 mg  I explained to Beverly Kan the goals of blood sugar levels , with fasting  of less than 130 mg/dl and less than 150 mg/dl 2 hrs after meals. Discussed complication from uncontrolled Diabetes and hypoglycemia symptoms.   The importance of following with diabetes educator and life style modification also was stressed. Feet exam: Risk Category 1:  Loss of Protective Sensation (LOPS) - Moderate Risk, needs to follow up with podiatrist., patient was not referred to podiatrist.  Retin exam: Done previously and normal..   Compliance with treatment encouraged. Call if side effect with the treatment. Bring blood sugars log book at the next appointment. Orders:  -     POCT hemoglobin A1c  -     Insulin and C-Peptide, Serum; Future  -     Insulin antibody; Future  -     Glutamic acid decarboxylase; Future  -     Zinc Transporter 8 (Znt8) Antibody; Future  -     Anti-islet cell antibody; Future  -     atorvastatin (LIPITOR) 20 mg tablet; Take 1 tablet (20 mg total) by mouth daily at bedtime  -     insulin regular (HumuLIN R) 100 units/mL injection; Inject 0.2 mL (20 Units total) under the skin 2 (two) times a day with meals  -     metFORMIN (GLUCOPHAGE-XR) 750 mg 24 hr tablet; Take 1 tablet (750 mg total) by mouth 2 (two) times a day  -     semaglutide, 0.25 or 0.5 mg/dose, (Ozempic, 0.25 or 0.5 MG/DOSE,) 2 mg/3 mL injection pen; Inject 0.38 mL (0.2533 mg total) under the skin every 7 days for 28 days, THEN 0.75 mL (0.5 mg total) every 7 days. -     Insulin syringes    3. Epigastric pain  Assessment & Plan:  patient is suffering of gastritis symptoms, a PPI was recommended with the caution of side effects, a follow up will be needed in 3 weeks, if symptoms persist, a EGD is going to be offered. We discussed about diet and OTC medication as cause of current problem. Requesting H. pylori test in stool. Orders:  -     H. pylori antigen, stool; Future    4. Hypogonadism in male  Assessment & Plan:  He has decrease of testosterone less than 300 mg/dL in 3 different sets. He will start testosterone replacement. Testosterone cypionate injectable weekly. Assess levels in 3 months. We discussed about causes of Erectile dysfunction.  encourage to avoid the  use cocaine, heroin, or other illegal drugs. Some medicines can cause erection problems. Also, some medicines can have dangerous interactions with medicines that are prescribed for ED, including over-the-counter medicines and herbal products   Reduce stress by increasing amount of exercise in your daily life. Changes in your job, family, relationships, home life, and other areas can cause stress that adds to erection problems. Take time for more foreplay. Worrying about your erections may only make things worse. And last but not least to talk with  partner about this concerns:        Orders:  -     testosterone cypionate (DEPO-TESTOSTERONE) 200 mg/mL SOLN; Inject 0.5 mL (100 mg total) into a muscle every 7 days    5. Corporo-venous occlusive erectile dysfunction  Assessment & Plan:  We discussed about causes of Erectile dysfunction. encourage to avoid the  use cocaine, heroin, or other illegal drugs. Some medicines can cause erection problems. Also, some medicines can have dangerous interactions with medicines that are prescribed for ED, including over-the-counter medicines and herbal products   Reduce stress by increasing amount of exercise in your daily life. Changes in your job, family, relationships, home life, and other areas can cause stress that adds to erection problems. Take time for more foreplay. Worrying about your erections may only make things worse. And last but not least to talk with  partner about this concerns:          6. Gum disease  Assessment & Plan:  Patient will make appointment with dentist.    Antibiotic prescribed. Frequent use of dental floss and brushing teeth recommended    Orders:  -     clindamycin (CLEOCIN) 150 mg capsule; Take 1 capsule (150 mg total) by mouth every 6 (six) hours for 7 days         Subjective      HPI   Patient is here for PE, not having any acute distress.   He is a diabetic patient with poor control due to not able to get medication for lack of insurance coverage. He is having dental issues also and not able to make appointment with dentist.  Previous study showed that he has low testosterone all numbers 3 of them below 300 mg/dL. Review of Systems    Current Outpatient Medications on File Prior to Visit   Medication Sig   • Continuous Blood Gluc Sensor (FreeStyle Amber 14 Day Sensor) MISC CHANGE EVERY 14 DAYS   • DULoxetine (CYMBALTA) 30 mg delayed release capsule Take 1 capsule (30 mg total) by mouth daily   • tadalafil (CIALIS) 10 MG tablet Take 1 tablet (10 mg total) by mouth daily as needed for erectile dysfunction       Objective     /70 (BP Location: Left arm, Patient Position: Sitting, Cuff Size: Standard)   Pulse 88   Temp 98 °F (36.7 °C) (Tympanic)   Resp 16   Ht 5' 7" (1.702 m)   Wt 90.3 kg (199 lb)   SpO2 98%   BMI 31.17 kg/m²     Physical Exam  Vitals and nursing note reviewed. Neck:      Thyroid: No thyroid mass or thyromegaly. Vascular: No carotid bruit or JVD. Trachea: No tracheal tenderness. Cardiovascular:      Rate and Rhythm: Normal rate and regular rhythm. No extrasystoles are present. Pulses: Normal pulses. Heart sounds: Normal heart sounds. Heart sounds not distant. No friction rub. Pulmonary:      Effort: Pulmonary effort is normal. No tachypnea or bradypnea. Breath sounds: Normal breath sounds. No stridor. Abdominal:      General: Bowel sounds are normal. There is no abdominal bruit. Palpations: Abdomen is soft. There is no hepatomegaly or splenomegaly. Hernia: No hernia is present. Genitourinary:     Prostate: Normal.   Musculoskeletal:         General: Normal range of motion. Cervical back: No edema or rigidity. Skin:     General: Skin is warm and dry. Neurological:      Mental Status: He is oriented to person, place, and time. Sensory: Sensory deficit ( Lower legs) present. Deep Tendon Reflexes: Reflexes are normal and symmetric.    Psychiatric: Behavior: Behavior normal.         Thought Content:  Thought content normal.         Judgment: Judgment normal.       Maikol Reid MD

## 2023-08-11 DIAGNOSIS — E29.1 HYPOGONADISM IN MALE: Primary | ICD-10-CM

## 2023-08-13 PROBLEM — E29.1 HYPOGONADISM IN MALE: Status: ACTIVE | Noted: 2023-08-13

## 2023-08-13 PROBLEM — K06.9 GUM DISEASE: Status: ACTIVE | Noted: 2023-08-13

## 2023-08-13 PROBLEM — R10.13 EPIGASTRIC PAIN: Status: ACTIVE | Noted: 2023-08-13

## 2023-08-13 PROBLEM — Z00.01 ENCOUNTER FOR GENERAL ADULT MEDICAL EXAMINATION WITH ABNORMAL FINDINGS: Status: ACTIVE | Noted: 2023-04-06

## 2023-08-13 NOTE — ASSESSMENT & PLAN NOTE
He has decrease of testosterone less than 300 mg/dL in 3 different sets. He will start testosterone replacement. Testosterone cypionate injectable weekly. Assess levels in 3 months. We discussed about causes of Erectile dysfunction. encourage to avoid the  use cocaine, heroin, or other illegal drugs. Some medicines can cause erection problems. Also, some medicines can have dangerous interactions with medicines that are prescribed for ED, including over-the-counter medicines and herbal products   Reduce stress by increasing amount of exercise in your daily life. Changes in your job, family, relationships, home life, and other areas can cause stress that adds to erection problems. Take time for more foreplay. Worrying about your erections may only make things worse.    And last but not least to talk with  partner about this concerns:

## 2023-08-13 NOTE — ASSESSMENT & PLAN NOTE
We discussed about causes of Erectile dysfunction. encourage to avoid the  use cocaine, heroin, or other illegal drugs. Some medicines can cause erection problems. Also, some medicines can have dangerous interactions with medicines that are prescribed for ED, including over-the-counter medicines and herbal products   Reduce stress by increasing amount of exercise in your daily life. Changes in your job, family, relationships, home life, and other areas can cause stress that adds to erection problems. Take time for more foreplay. Worrying about your erections may only make things worse.    And last but not least to talk with  partner about this concerns:

## 2023-08-13 NOTE — ASSESSMENT & PLAN NOTE
During this visit, we have a goal to personalize prevention. I discussed with the patient about    Patient Active Problem List   Diagnosis   • Type 2 diabetes mellitus with diabetic polyneuropathy, with long-term current use of insulin ([address])   • BMI 30.0-30.9,adult   • Corporo-venous occlusive erectile dysfunction   • Encounter for general adult medical examination with abnormal findings   • Diarrhea   • Reactive depression   • Epigastric pain   • Hypogonadism in male   • Gum disease    and decreased strength, decreased ROM and decreased endurance--a lifestyle plan to decrease the impact of current problems. I did a Health risk assessment and discussed ways to stay healthier with the patient. We also reviewed the current medications, the need to avoid polypharmacy in his current treatment, and how chronic conditions impact now and later. A recommended healthy diet and exercising as frequently as possible will help better control the patient's chronic conditions, Immunizations, and the need to comply with current CDC recommendations. The patient declined at this time advanced directives. I encouraged against using alcohol, tobacco, and recreational illegal prescribed and non-prescribed drugs. About smoking: recommended quit smoking.

## 2023-08-13 NOTE — ASSESSMENT & PLAN NOTE
Lab Results   Component Value Date    HGBA1C 12.5 (A) 08/08/2023     Current Outpatient Medications   Medication Instructions   • atorvastatin (LIPITOR) 20 mg, Oral, Daily at bedtime   • clindamycin (CLEOCIN) 150 mg, Oral, Every 6 hours scheduled   • Continuous Blood Gluc Sensor (FreeStyle Amber 14 Day Sensor) MISC CHANGE EVERY 14 DAYS   • DULoxetine (CYMBALTA) 30 mg, Oral, Daily   • HumuLIN R 20 Units, Subcutaneous, 2 times daily with meals   • metFORMIN (GLUCOPHAGE-XR) 750 mg, Oral, 2 times daily   • semaglutide, 0.25 or 0.5 mg/dose, (Ozempic, 0.25 or 0.5 MG/DOSE,) 2 mg/3 mL injection pen Inject 0.38 mL (0.2533 mg total) under the skin every 7 days for 28 days, THEN 0.75 mL (0.5 mg total) every 7 days. • SYRINGE-NEEDLE, DISP, 3 ML (Safety Syringes/Needle) 20G X 1-1/2" 3 ML MISC Does not apply, Weekly   • tadalafil (CIALIS) 10 mg, Oral, Daily PRN   • testosterone cypionate (DEPO-TESTOSTERONE) 100 mg, Intramuscular, Every 7 days      Lab Results   Component Value Date    HGBA1C 12.5 (A) 08/08/2023    HGBA1C 11.2 (A) 04/05/2023    HGBA1C 9.2 (H) 10/26/2018     Lab Results   Component Value Date    LDLCALC 113 (H) 04/21/2023    CREATININE 0.66 04/21/2023     Lab Results   Component Value Date/Time    HGBA1C 12.5 (A) 08/08/2023 11:33 AM    HGBA1C 9.2 (H) 10/26/2018 12:33 AM     Lab Results   Component Value Date/Time    MICROALBCRE 18 04/05/2023 12:52 PM    and GFR is   Lab Results   Component Value Date/Time    BUN 12 04/21/2023 02:47 PM    EGFR 127 04/21/2023 02:47 PM      Lab Results   Component Value Date/Time    LDLCALC 113 (H) 04/21/2023 02:47 PM     Timbo Louis has diabetes type 2,   His HbA1C shows poor  control:   Lab Results   Component Value Date    HGBA1C 12.5 (A) 08/08/2023    . Albuminuria is Absent, stable  The importance of statin therapy was explained.  LDL is not at target with last LDL of   Lab Results   Component Value Date    LDLCALC 113 (H) 04/21/2023     Continue Atorvastatin 20 mg  I explained to Yun the goals of blood sugar levels , with fasting  of less than 130 mg/dl and less than 150 mg/dl 2 hrs after meals. Discussed complication from uncontrolled Diabetes and hypoglycemia symptoms. The importance of following with diabetes educator and life style modification also was stressed. Feet exam: Risk Category 1:  Loss of Protective Sensation (LOPS) - Moderate Risk, needs to follow up with podiatrist., patient was not referred to podiatrist.  Retin exam: Done previously and normal..   Compliance with treatment encouraged. Call if side effect with the treatment. Bring blood sugars log book at the next appointment.

## 2023-08-13 NOTE — ASSESSMENT & PLAN NOTE
Patient will make appointment with dentist.    Antibiotic prescribed.     Frequent use of dental floss and brushing teeth recommended

## 2023-08-13 NOTE — ASSESSMENT & PLAN NOTE
patient is suffering of gastritis symptoms, a PPI was recommended with the caution of side effects, a follow up will be needed in 3 weeks, if symptoms persist, a EGD is going to be offered. We discussed about diet and OTC medication as cause of current problem. Requesting H. pylori test in stool.

## 2023-09-28 DIAGNOSIS — E29.1 HYPOGONADISM IN MALE: ICD-10-CM

## 2023-09-28 DIAGNOSIS — Z79.4 TYPE 2 DIABETES MELLITUS WITH DIABETIC POLYNEUROPATHY, WITH LONG-TERM CURRENT USE OF INSULIN (HCC): ICD-10-CM

## 2023-09-28 DIAGNOSIS — E11.42 TYPE 2 DIABETES MELLITUS WITH DIABETIC POLYNEUROPATHY, WITH LONG-TERM CURRENT USE OF INSULIN (HCC): ICD-10-CM

## 2023-10-02 RX ORDER — INSULIN HUMAN 100 [IU]/ML
20 INJECTION, SOLUTION PARENTERAL 2 TIMES DAILY WITH MEALS
Qty: 10 ML | Refills: 5 | Status: SHIPPED | OUTPATIENT
Start: 2023-10-02

## 2023-10-02 RX ORDER — METFORMIN HYDROCHLORIDE 750 MG/1
750 TABLET, EXTENDED RELEASE ORAL 2 TIMES DAILY
Qty: 60 TABLET | Refills: 5 | Status: SHIPPED | OUTPATIENT
Start: 2023-10-02

## 2023-10-02 RX ORDER — TESTOSTERONE CYPIONATE 200 MG/ML
100 INJECTION, SOLUTION INTRAMUSCULAR
Qty: 2 ML | Refills: 3 | Status: SHIPPED | OUTPATIENT
Start: 2023-10-02 | End: 2024-03-18

## 2023-10-08 DIAGNOSIS — F32.9 REACTIVE DEPRESSION: ICD-10-CM

## 2023-10-08 RX ORDER — DULOXETIN HYDROCHLORIDE 30 MG/1
30 CAPSULE, DELAYED RELEASE ORAL DAILY
Qty: 90 CAPSULE | Refills: 3 | Status: SHIPPED | OUTPATIENT
Start: 2023-10-08

## 2023-12-11 ENCOUNTER — OFFICE VISIT (OUTPATIENT)
Dept: FAMILY MEDICINE CLINIC | Facility: CLINIC | Age: 33
End: 2023-12-11
Payer: COMMERCIAL

## 2023-12-11 VITALS
HEIGHT: 67 IN | BODY MASS INDEX: 31.11 KG/M2 | OXYGEN SATURATION: 99 % | WEIGHT: 198.2 LBS | SYSTOLIC BLOOD PRESSURE: 116 MMHG | TEMPERATURE: 96.5 F | HEART RATE: 87 BPM | DIASTOLIC BLOOD PRESSURE: 80 MMHG | RESPIRATION RATE: 17 BRPM

## 2023-12-11 DIAGNOSIS — R10.13 EPIGASTRIC PAIN: ICD-10-CM

## 2023-12-11 DIAGNOSIS — F33.9 RECURRENT MAJOR DEPRESSIVE DISORDER, REMISSION STATUS UNSPECIFIED (HCC): ICD-10-CM

## 2023-12-11 DIAGNOSIS — G44.209 TENSION-TYPE HEADACHE, NOT INTRACTABLE, UNSPECIFIED CHRONICITY PATTERN: Primary | ICD-10-CM

## 2023-12-11 DIAGNOSIS — Z79.4 TYPE 2 DIABETES MELLITUS WITH DIABETIC POLYNEUROPATHY, WITH LONG-TERM CURRENT USE OF INSULIN (HCC): ICD-10-CM

## 2023-12-11 DIAGNOSIS — E11.42 TYPE 2 DIABETES MELLITUS WITH DIABETIC POLYNEUROPATHY, WITH LONG-TERM CURRENT USE OF INSULIN (HCC): ICD-10-CM

## 2023-12-11 PROBLEM — R19.7 DIARRHEA: Status: RESOLVED | Noted: 2023-04-06 | Resolved: 2023-12-11

## 2023-12-11 PROBLEM — F33.40 RECURRENT MAJOR DEPRESSIVE DISORDER, IN REMISSION (HCC): Status: ACTIVE | Noted: 2023-04-06

## 2023-12-11 PROBLEM — K06.9 GUM DISEASE: Status: RESOLVED | Noted: 2023-08-13 | Resolved: 2023-12-11

## 2023-12-11 LAB — SL AMB POCT HEMOGLOBIN AIC: 11.3 (ref ?–6.5)

## 2023-12-11 PROCEDURE — 83036 HEMOGLOBIN GLYCOSYLATED A1C: CPT | Performed by: PHYSICIAN ASSISTANT

## 2023-12-11 PROCEDURE — 99214 OFFICE O/P EST MOD 30 MIN: CPT | Performed by: PHYSICIAN ASSISTANT

## 2023-12-11 RX ORDER — FLASH GLUCOSE SENSOR
KIT MISCELLANEOUS
Qty: 6 EACH | Refills: 3 | Status: SHIPPED | OUTPATIENT
Start: 2023-12-11 | End: 2023-12-12 | Stop reason: SDUPTHER

## 2023-12-11 RX ORDER — BENZONATATE 100 MG/1
CAPSULE ORAL
COMMUNITY
Start: 2023-12-04 | End: 2023-12-11 | Stop reason: ALTCHOICE

## 2023-12-11 RX ORDER — DULOXETIN HYDROCHLORIDE 60 MG/1
60 CAPSULE, DELAYED RELEASE ORAL DAILY
Qty: 90 CAPSULE | Refills: 1 | Status: SHIPPED | OUTPATIENT
Start: 2023-12-11 | End: 2023-12-12 | Stop reason: SDUPTHER

## 2023-12-11 RX ORDER — FLASH GLUCOSE SCANNING READER
EACH MISCELLANEOUS
Qty: 1 EACH | Refills: 1 | Status: SHIPPED | OUTPATIENT
Start: 2023-12-11 | End: 2023-12-12 | Stop reason: SDUPTHER

## 2023-12-11 RX ORDER — ONDANSETRON 4 MG/1
TABLET, ORALLY DISINTEGRATING ORAL
COMMUNITY
Start: 2023-12-04 | End: 2023-12-11 | Stop reason: ALTCHOICE

## 2023-12-11 RX ORDER — LANCETS 33 GAUGE
EACH MISCELLANEOUS
Qty: 100 EACH | Refills: 3 | Status: SHIPPED | OUTPATIENT
Start: 2023-12-11 | End: 2023-12-12 | Stop reason: SDUPTHER

## 2023-12-11 RX ORDER — BLOOD-GLUCOSE METER
KIT MISCELLANEOUS
Qty: 1 KIT | Refills: 0 | Status: SHIPPED | OUTPATIENT
Start: 2023-12-11 | End: 2023-12-12 | Stop reason: SDUPTHER

## 2023-12-11 RX ORDER — PEN NEEDLE, DIABETIC 32GX 5/32"
NEEDLE, DISPOSABLE MISCELLANEOUS
Qty: 100 EACH | Refills: 3 | Status: SHIPPED | OUTPATIENT
Start: 2023-12-11 | End: 2023-12-12 | Stop reason: SDUPTHER

## 2023-12-11 RX ORDER — FAMOTIDINE 20 MG/1
20 TABLET, FILM COATED ORAL 2 TIMES DAILY
Qty: 180 TABLET | Refills: 0 | Status: SHIPPED | OUTPATIENT
Start: 2023-12-11 | End: 2023-12-12 | Stop reason: SDUPTHER

## 2023-12-11 RX ORDER — INSULIN DEGLUDEC INJECTION 100 U/ML
10 INJECTION, SOLUTION SUBCUTANEOUS DAILY
Qty: 9 ML | Refills: 1 | Status: SHIPPED | OUTPATIENT
Start: 2023-12-11 | End: 2023-12-12 | Stop reason: SDUPTHER

## 2023-12-11 RX ORDER — BLOOD SUGAR DIAGNOSTIC
STRIP MISCELLANEOUS
Qty: 100 EACH | Refills: 3 | Status: SHIPPED | OUTPATIENT
Start: 2023-12-11 | End: 2023-12-12 | Stop reason: SDUPTHER

## 2023-12-11 NOTE — ASSESSMENT & PLAN NOTE
No significant weight change since last visit. BMI Counseling: Body mass index is 31.04 kg/m². The BMI is above normal. Nutrition recommendations include reducing portion sizes, decreasing overall calorie intake, and 3-5 servings of fruits/vegetables daily. Exercise recommendations include exercising 3-5 times per week and strength training exercises.
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
## 2023-12-11 NOTE — ASSESSMENT & PLAN NOTE
Very poorly controlled x several years with A1c>11 this year. Improved from previous, has not been controlled since diagnosed at age 11. Labs not completed from last visit in 8/2023 to confirm type 2 diabetes considering age. Reports some improvement since starting metformin and ozempic. Only using 0.25mg once weekly, not using insulin regular as previously prescribed. Will increase to 0.5mg once weekly for last remaining pen starting today, then patient to start 1mg once weekly. Start [place] long acting insulin 10 units. Works 3rd shift and has 3 meals daily. Discussed importance of diet and exercise. Ordered CGM and glucometer, has been using his mother's insulin pen needles and device, follow-up with home sugar log in 1 week to adjust insulin dose as needed.    Lab Results   Component Value Date    HGBA1C 11.3 (A) 12/11/2023

## 2023-12-11 NOTE — ASSESSMENT & PLAN NOTE
Patient to complete h. Pylori stool testing as previously recommended. Start famotidine 20mg BID as needed while pending results, continue Tums as needed. Avoid dietary triggers. With acid reflux.
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
## 2023-12-11 NOTE — ASSESSMENT & PLAN NOTE
PHQ-9 Depression Screening    Little interest or pleasure in doing things: 1 - several days  Feeling down, depressed, or hopeless: 1 - several days  Trouble falling or staying asleep, or sleeping too much: 3 - nearly every day  Feeling tired or having little energy: 3 - nearly every day  Poor appetite or overeating: 3 - nearly every day  Feeling bad about yourself - or that you are a failure or have let yourself or your family down: 0 - not at all  Trouble concentrating on things, such as reading the newspaper or watching television: 3 - nearly every day  Moving or speaking so slowly that other people could have noticed. Or the opposite - being so fidgety or restless that you have been moving around a lot more than usual: 1 - several days  Thoughts that you would be better off dead, or of hurting yourself in some way: 0 - not at all  PHQ-9 Score: 15  Score Interpretation: Moderately severe depression       Improved with Cymbalta, will increase dose to 60mg as is also helping with neuropathic pain. Follow-up in 6 weeks.

## 2023-12-12 DIAGNOSIS — F33.9 RECURRENT MAJOR DEPRESSIVE DISORDER, REMISSION STATUS UNSPECIFIED (HCC): ICD-10-CM

## 2023-12-12 DIAGNOSIS — E11.42 TYPE 2 DIABETES MELLITUS WITH DIABETIC POLYNEUROPATHY, WITH LONG-TERM CURRENT USE OF INSULIN (HCC): ICD-10-CM

## 2023-12-12 DIAGNOSIS — Z79.4 TYPE 2 DIABETES MELLITUS WITH DIABETIC POLYNEUROPATHY, WITH LONG-TERM CURRENT USE OF INSULIN (HCC): ICD-10-CM

## 2023-12-12 DIAGNOSIS — R10.13 EPIGASTRIC PAIN: ICD-10-CM

## 2023-12-12 RX ORDER — METFORMIN HYDROCHLORIDE 750 MG/1
750 TABLET, EXTENDED RELEASE ORAL 2 TIMES DAILY
Qty: 60 TABLET | Refills: 5 | Status: SHIPPED | OUTPATIENT
Start: 2023-12-12

## 2023-12-12 RX ORDER — LANCETS 33 GAUGE
EACH MISCELLANEOUS
Qty: 100 EACH | Refills: 3 | Status: SHIPPED | OUTPATIENT
Start: 2023-12-12

## 2023-12-12 RX ORDER — FLASH GLUCOSE SENSOR
KIT MISCELLANEOUS
Qty: 6 EACH | Refills: 3 | Status: SHIPPED | OUTPATIENT
Start: 2023-12-12

## 2023-12-12 RX ORDER — BLOOD-GLUCOSE METER
KIT MISCELLANEOUS
Qty: 1 KIT | Refills: 0 | Status: SHIPPED | OUTPATIENT
Start: 2023-12-12

## 2023-12-12 RX ORDER — PEN NEEDLE, DIABETIC 32GX 5/32"
NEEDLE, DISPOSABLE MISCELLANEOUS
Qty: 100 EACH | Refills: 3 | Status: SHIPPED | OUTPATIENT
Start: 2023-12-12

## 2023-12-12 RX ORDER — DULOXETIN HYDROCHLORIDE 60 MG/1
60 CAPSULE, DELAYED RELEASE ORAL DAILY
Qty: 90 CAPSULE | Refills: 1 | Status: SHIPPED | OUTPATIENT
Start: 2023-12-12

## 2023-12-12 RX ORDER — FAMOTIDINE 20 MG/1
20 TABLET, FILM COATED ORAL 2 TIMES DAILY
Qty: 180 TABLET | Refills: 0 | Status: SHIPPED | OUTPATIENT
Start: 2023-12-12 | End: 2024-12-06

## 2023-12-12 RX ORDER — FLASH GLUCOSE SCANNING READER
EACH MISCELLANEOUS
Qty: 1 EACH | Refills: 1 | Status: SHIPPED | OUTPATIENT
Start: 2023-12-12

## 2023-12-12 RX ORDER — ATORVASTATIN CALCIUM 20 MG/1
20 TABLET, FILM COATED ORAL
Qty: 90 TABLET | Refills: 3 | Status: SHIPPED | OUTPATIENT
Start: 2023-12-12

## 2023-12-12 RX ORDER — INSULIN DEGLUDEC INJECTION 100 U/ML
10 INJECTION, SOLUTION SUBCUTANEOUS DAILY
Qty: 9 ML | Refills: 1 | Status: SHIPPED | OUTPATIENT
Start: 2023-12-12 | End: 2024-06-09

## 2023-12-12 RX ORDER — BLOOD SUGAR DIAGNOSTIC
STRIP MISCELLANEOUS
Qty: 100 EACH | Refills: 3 | Status: SHIPPED | OUTPATIENT
Start: 2023-12-12

## 2024-01-05 DIAGNOSIS — E11.42 TYPE 2 DIABETES MELLITUS WITH DIABETIC POLYNEUROPATHY, WITH LONG-TERM CURRENT USE OF INSULIN (HCC): ICD-10-CM

## 2024-01-05 DIAGNOSIS — F33.9 RECURRENT MAJOR DEPRESSIVE DISORDER, REMISSION STATUS UNSPECIFIED (HCC): ICD-10-CM

## 2024-01-05 DIAGNOSIS — R10.13 EPIGASTRIC PAIN: ICD-10-CM

## 2024-01-05 DIAGNOSIS — Z79.4 TYPE 2 DIABETES MELLITUS WITH DIABETIC POLYNEUROPATHY, WITH LONG-TERM CURRENT USE OF INSULIN (HCC): ICD-10-CM

## 2024-01-05 RX ORDER — BLOOD SUGAR DIAGNOSTIC
STRIP MISCELLANEOUS
Qty: 100 EACH | Refills: 3 | Status: SHIPPED | OUTPATIENT
Start: 2024-01-05

## 2024-01-05 RX ORDER — PEN NEEDLE, DIABETIC 32GX 5/32"
NEEDLE, DISPOSABLE MISCELLANEOUS
Qty: 100 EACH | Refills: 3 | Status: SHIPPED | OUTPATIENT
Start: 2024-01-05

## 2024-01-05 RX ORDER — DULOXETIN HYDROCHLORIDE 60 MG/1
60 CAPSULE, DELAYED RELEASE ORAL DAILY
Qty: 90 CAPSULE | Refills: 1 | Status: SHIPPED | OUTPATIENT
Start: 2024-01-05 | End: 2024-01-05 | Stop reason: SDUPTHER

## 2024-01-05 RX ORDER — PEN NEEDLE, DIABETIC 32GX 5/32"
NEEDLE, DISPOSABLE MISCELLANEOUS
Qty: 100 EACH | Refills: 3 | Status: SHIPPED | OUTPATIENT
Start: 2024-01-05 | End: 2024-01-05 | Stop reason: SDUPTHER

## 2024-01-05 RX ORDER — LANCETS 33 GAUGE
EACH MISCELLANEOUS
Qty: 100 EACH | Refills: 3 | Status: SHIPPED | OUTPATIENT
Start: 2024-01-05

## 2024-01-05 RX ORDER — METFORMIN HYDROCHLORIDE 750 MG/1
750 TABLET, EXTENDED RELEASE ORAL 2 TIMES DAILY
Qty: 60 TABLET | Refills: 5 | Status: SHIPPED | OUTPATIENT
Start: 2024-01-05

## 2024-01-05 RX ORDER — LANCETS 33 GAUGE
EACH MISCELLANEOUS
Qty: 100 EACH | Refills: 3 | Status: SHIPPED | OUTPATIENT
Start: 2024-01-05 | End: 2024-01-05 | Stop reason: SDUPTHER

## 2024-01-05 RX ORDER — METFORMIN HYDROCHLORIDE 750 MG/1
750 TABLET, EXTENDED RELEASE ORAL 2 TIMES DAILY
Qty: 60 TABLET | Refills: 5 | Status: SHIPPED | OUTPATIENT
Start: 2024-01-05 | End: 2024-01-05 | Stop reason: SDUPTHER

## 2024-01-05 RX ORDER — DULOXETIN HYDROCHLORIDE 60 MG/1
60 CAPSULE, DELAYED RELEASE ORAL DAILY
Qty: 90 CAPSULE | Refills: 1 | Status: SHIPPED | OUTPATIENT
Start: 2024-01-05

## 2024-01-05 RX ORDER — FAMOTIDINE 20 MG/1
20 TABLET, FILM COATED ORAL 2 TIMES DAILY
Qty: 180 TABLET | Refills: 0 | Status: SHIPPED | OUTPATIENT
Start: 2024-01-05 | End: 2024-01-05 | Stop reason: SDUPTHER

## 2024-01-05 RX ORDER — INSULIN DEGLUDEC INJECTION 100 U/ML
10 INJECTION, SOLUTION SUBCUTANEOUS DAILY
Qty: 9 ML | Refills: 1 | Status: SHIPPED | OUTPATIENT
Start: 2024-01-05 | End: 2024-01-05 | Stop reason: SDUPTHER

## 2024-01-05 RX ORDER — BLOOD-GLUCOSE METER
KIT MISCELLANEOUS
Qty: 1 KIT | Refills: 0 | Status: SHIPPED | OUTPATIENT
Start: 2024-01-05 | End: 2024-01-05 | Stop reason: SDUPTHER

## 2024-01-05 RX ORDER — FAMOTIDINE 20 MG/1
20 TABLET, FILM COATED ORAL 2 TIMES DAILY
Qty: 180 TABLET | Refills: 0 | Status: SHIPPED | OUTPATIENT
Start: 2024-01-05 | End: 2024-12-30

## 2024-01-05 RX ORDER — ATORVASTATIN CALCIUM 20 MG/1
20 TABLET, FILM COATED ORAL
Qty: 90 TABLET | Refills: 3 | Status: SHIPPED | OUTPATIENT
Start: 2024-01-05 | End: 2024-01-05 | Stop reason: SDUPTHER

## 2024-01-05 RX ORDER — INSULIN DEGLUDEC INJECTION 100 U/ML
10 INJECTION, SOLUTION SUBCUTANEOUS DAILY
Qty: 9 ML | Refills: 1 | Status: SHIPPED | OUTPATIENT
Start: 2024-01-05 | End: 2024-07-03

## 2024-01-05 RX ORDER — BLOOD-GLUCOSE METER
KIT MISCELLANEOUS
Qty: 1 KIT | Refills: 0 | Status: SHIPPED | OUTPATIENT
Start: 2024-01-05

## 2024-01-05 RX ORDER — BLOOD SUGAR DIAGNOSTIC
STRIP MISCELLANEOUS
Qty: 100 EACH | Refills: 3 | Status: SHIPPED | OUTPATIENT
Start: 2024-01-05 | End: 2024-01-05 | Stop reason: SDUPTHER

## 2024-01-05 RX ORDER — ATORVASTATIN CALCIUM 20 MG/1
20 TABLET, FILM COATED ORAL
Qty: 90 TABLET | Refills: 3 | Status: SHIPPED | OUTPATIENT
Start: 2024-01-05

## 2024-01-22 DIAGNOSIS — Z79.4 TYPE 2 DIABETES MELLITUS WITH DIABETIC POLYNEUROPATHY, WITH LONG-TERM CURRENT USE OF INSULIN (HCC): Primary | ICD-10-CM

## 2024-01-22 DIAGNOSIS — E11.42 TYPE 2 DIABETES MELLITUS WITH DIABETIC POLYNEUROPATHY, WITH LONG-TERM CURRENT USE OF INSULIN (HCC): Primary | ICD-10-CM

## 2024-01-22 RX ORDER — INSULIN ASPART 100 [IU]/ML
35 INJECTION, SUSPENSION SUBCUTANEOUS
Qty: 15 ML | Refills: 5 | Status: SHIPPED | OUTPATIENT
Start: 2024-01-22 | End: 2025-01-16

## 2024-01-26 DIAGNOSIS — E29.1 HYPOGONADISM IN MALE: ICD-10-CM

## 2024-01-26 RX ORDER — TESTOSTERONE CYPIONATE 200 MG/ML
100 INJECTION, SOLUTION INTRAMUSCULAR
Qty: 2 ML | Refills: 5 | Status: SHIPPED | OUTPATIENT
Start: 2024-01-26 | End: 2024-07-12

## 2024-02-08 ENCOUNTER — TELEPHONE (OUTPATIENT)
Age: 34
End: 2024-02-08

## 2024-02-08 NOTE — TELEPHONE ENCOUNTER
Patient reports his ongoing diabetic foot and leg pain is causing him to miss a lot of days at work and he would like to be seen by or speak to Jimmy Garner to discuss short term disability for work. Patient said this is an urgent matter and sent a MyChart message as well.

## 2024-02-08 NOTE — TELEPHONE ENCOUNTER
Pt called unable to contact lvm to pt to call office back unfortunally provider not in office right now will come back next week if anything we can scheduled appt with Dominique Cooper PA-C to be seen sooner.

## 2024-02-09 ENCOUNTER — OFFICE VISIT (OUTPATIENT)
Dept: FAMILY MEDICINE CLINIC | Facility: CLINIC | Age: 34
End: 2024-02-09
Payer: COMMERCIAL

## 2024-02-09 VITALS
BODY MASS INDEX: 31.64 KG/M2 | SYSTOLIC BLOOD PRESSURE: 114 MMHG | WEIGHT: 201.6 LBS | OXYGEN SATURATION: 100 % | HEIGHT: 67 IN | TEMPERATURE: 97.8 F | HEART RATE: 105 BPM | DIASTOLIC BLOOD PRESSURE: 66 MMHG | RESPIRATION RATE: 17 BRPM

## 2024-02-09 DIAGNOSIS — F33.40 RECURRENT MAJOR DEPRESSIVE DISORDER, IN REMISSION (HCC): ICD-10-CM

## 2024-02-09 DIAGNOSIS — R10.13 EPIGASTRIC PAIN: ICD-10-CM

## 2024-02-09 DIAGNOSIS — E11.42 TYPE 2 DIABETES MELLITUS WITH DIABETIC POLYNEUROPATHY, WITH LONG-TERM CURRENT USE OF INSULIN (HCC): ICD-10-CM

## 2024-02-09 DIAGNOSIS — Z79.4 TYPE 2 DIABETES MELLITUS WITH DIABETIC POLYNEUROPATHY, WITH LONG-TERM CURRENT USE OF INSULIN (HCC): ICD-10-CM

## 2024-02-09 DIAGNOSIS — R20.9 PAINFUL AND COLD LOWER EXTREMITY: Primary | ICD-10-CM

## 2024-02-09 DIAGNOSIS — M79.606 PAINFUL AND COLD LOWER EXTREMITY: Primary | ICD-10-CM

## 2024-02-09 PROCEDURE — 99214 OFFICE O/P EST MOD 30 MIN: CPT | Performed by: PHYSICIAN ASSISTANT

## 2024-02-09 RX ORDER — INSULIN ASPART 100 [IU]/ML
40 INJECTION, SUSPENSION SUBCUTANEOUS
Qty: 15 ML | Refills: 5 | Status: SHIPPED | OUTPATIENT
Start: 2024-02-09 | End: 2025-02-03

## 2024-02-09 RX ORDER — DULOXETIN HYDROCHLORIDE 30 MG/1
30 CAPSULE, DELAYED RELEASE ORAL DAILY
Qty: 30 CAPSULE | Refills: 5 | Status: SHIPPED | OUTPATIENT
Start: 2024-02-09

## 2024-02-09 RX ORDER — FAMOTIDINE 20 MG/1
20 TABLET, FILM COATED ORAL 2 TIMES DAILY
Qty: 180 TABLET | Refills: 1 | Status: SHIPPED | OUTPATIENT
Start: 2024-02-09 | End: 2024-08-07

## 2024-02-09 RX ORDER — GABAPENTIN 100 MG/1
100 CAPSULE ORAL
Qty: 90 CAPSULE | Refills: 1 | Status: SHIPPED | OUTPATIENT
Start: 2024-02-09

## 2024-02-09 NOTE — ASSESSMENT & PLAN NOTE
Stable mood on exam, good support from partner. Restart Cymbalta 30mg as he stopped the 60mg due to concerns with drowsiness.

## 2024-02-09 NOTE — ASSESSMENT & PLAN NOTE
Did not complete h. Pylori stool testing. Did not start famotidine 20mg BID, recommend patient purchase OTC and avoid dietary triggers with acid reflux.

## 2024-02-09 NOTE — PROGRESS NOTES
"Name: Jason Blanca      : 1990      MRN: [id number]  Encounter Provider: Dominique Cooper PA-C  Encounter Date: 2024   Encounter department: [facility]IMARY [facility]    Assessment & Plan     1. Painful and cold lower extremity  Assessment & Plan:  Worsening x 2 weeks. 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 Also using medical marijuana which impairs ability to work. Provided 6 week note for leave of absence until further evaluation with arterial US and EMG. Restart Cymbalta 30mg as 60mg caused significant drowsiness but did help with pain. Start gabapentin 100mg at night. Follow-up in 4 weeks. 1+DP and PT pulses b/l. Diabetic foot exam with diminished sensation. Referred to podiatry today.      Orders:  -     VAS ARTERIAL DUPLEX- LOWER LIMB BILATERAL; Future  -     Ambulatory Referral to Podiatry; Future    2. Type 2 diabetes mellitus with diabetic polyneuropathy, with long-term current use of insulin ([facility])  Assessment & Plan:  Very poorly controlled for several years. Improved sugars 150-170s since starting mixed insulin 35 units BID. Will increase to 40 units BID today and discussed diet changes. Continue Ozempic 1mg once weekly and Metformin BID.   Lab Results   Component Value Date    HGBA1C 11.3 (A) 2023     Orders:  -     EMG 2 limb lower extremity; Future  -     insulin aspart protamine-insulin aspart (NovoLOG Mix 70/30 FlexPen) 100 Units/mL injection pen; Inject 40 Units under the skin 2 (two) times a day before meals  -     gabapentin (Neurontin) 100 mg capsule; Take 1 capsule (100 mg total) by mouth daily at bedtime  -     CK; Future  -     Lipid panel; Future  -     Hemoglobin A1C; Future    3. Epigastric pain  Assessment & Plan:  Did not complete h. Pylori stool testing. Did not start famotidine 20mg BID, recommend patient purchase " OTC and avoid dietary triggers with acid reflux.     Orders:  -     famotidine (PEPCID) 20 mg tablet; Take 1 tablet (20 mg total) by mouth 2 (two) times a day    4. Recurrent major depressive disorder, in remission ([facility])  Assessment & Plan:  Stable mood on exam, good support from partner. Restart Cymbalta 30mg as he stopped the 60mg due to concerns with drowsiness.     Orders:  -     DULoxetine (CYMBALTA) 30 mg delayed release capsule; Take 1 capsule (30 mg total) by mouth daily         Kristie Gomez is a 33 y.o. male with a h/o uncontrolled diabetes on insulin who presents for concerns for worsening LE pain. Previously diagnosed clinically with neuropathy from diabetes. Works 3rd shift and gets drowsy with medications to treat pain. Wants to get his sugars under better control. Has been better since he switched to normal acting insulin. Still not well-controlled. Smoking medical marijuana which helps with pain but can't work like that. Affects balance and ability to walk and stand. No smoking cigarettes. Not taking anything else for pain and does not want strong medication. Saw podiatry at [facility] years ago who just recommended b12. Posterior legs feel tight and painful. Decreased sensation and cold feet.     Neena, partner present for exam and helped to provide a portion of history.       Review of Systems   Constitutional:  Negative for chills, fever and unexpected weight change.   Respiratory:  Negative for shortness of breath.    Cardiovascular:  Negative for chest pain.   Genitourinary:  Negative for dysuria.   Musculoskeletal:  Positive for arthralgias and myalgias. Negative for gait problem.       Current Outpatient Medications on File Prior to Visit   Medication Sig   • atorvastatin (LIPITOR) 20 mg tablet Take 1 tablet (20 mg total) by mouth daily at bedtime   • Blood Glucose Monitoring Suppl (OneTouch Verio Reflect) w/Device KIT Check blood sugars once daily. Please substitute with  "appropriate alternative as covered by patient's insurance. Dx: E11.65   • glucose blood (OneTouch Verio) test strip Check blood sugars once daily. Please substitute with appropriate alternative as covered by patient's insurance. Dx: E11.65   • Insulin Pen Needle (BD Pen Needle [place] U/F) 32G X 4 MM MISC Use daily as directed with insulin pen   • Isopropyl Alcohol (Pharmacist Choice Alcohol) 70 % MISC Apply 200 Pads topically 3 (three) times a day   • metFORMIN (GLUCOPHAGE-XR) 750 mg 24 hr tablet Take 1 tablet (750 mg total) by mouth 2 (two) times a day   • OneTouch Delica Lancets 33G MISC Check blood sugars once daily. Please substitute with appropriate alternative as covered by patient's insurance. Dx: E11.65   • semaglutide, 1 mg/dose, (Ozempic) 4 mg/3 mL injection pen Inject 0.75 mL (1 mg total) under the skin once a week   • tadalafil (CIALIS) 10 MG tablet Take 1 tablet (10 mg total) by mouth daily as needed for erectile dysfunction   • testosterone cypionate (DEPO-TESTOSTERONE) 200 mg/mL SOLN Inject 0.5 mL (100 mg total) into a muscle every 7 days   • [DISCONTINUED] famotidine (PEPCID) 20 mg tablet Take 1 tablet (20 mg total) by mouth 2 (two) times a day   • [DISCONTINUED] insulin aspart protamine-insulin aspart (NovoLOG Mix 70/30 FlexPen) 100 Units/mL injection pen Inject 35 Units under the skin 2 (two) times a day before meals   • [DISCONTINUED] insulin degludec ([place] FlexTouch) 100 units/mL injection pen Inject 10 Units under the skin daily   • SYRINGE-NEEDLE, DISP, 3 ML (Safety Syringes/Needle) 20G X 1-1/2\" 3 ML MISC Use once a week for 12 doses   • [DISCONTINUED] DULoxetine (CYMBALTA) 60 mg delayed release capsule Take 1 capsule (60 mg total) by mouth daily (Patient not taking: Reported on 2/9/2024)       Objective     /66 (BP Location: Left arm, Patient Position: Sitting, Cuff Size: Standard)   Pulse 105   Temp 97.8 °F (36.6 °C) (Tympanic)   Resp 17   Ht 5' 7\" (1.702 m)   Wt 91.4 kg (201 lb " 9.6 oz)   SpO2 100%   BMI 31.58 kg/m²     Physical Exam  Vitals and nursing note reviewed.   Constitutional:       Appearance: Normal appearance.   HENT:      Head: Normocephalic and atraumatic.   Cardiovascular:      Rate and Rhythm: Normal rate and regular rhythm.      Pulses: no weak pulses          Dorsalis pedis pulses are 1+ on the right side and 1+ on the left side.        Posterior tibial pulses are 1+ on the right side and 1+ on the left side.      Heart sounds: Normal heart sounds.   Pulmonary:      Effort: Pulmonary effort is normal.      Breath sounds: Normal breath sounds.   Musculoskeletal:      Right lower leg: No edema.      Left lower leg: No edema.        Feet:    Feet:      Right foot:      Skin integrity: No ulcer, skin breakdown, erythema, warmth, callus or dry skin.      Left foot:      Skin integrity: No ulcer, skin breakdown, erythema, warmth, callus or dry skin.   Neurological:      Mental Status: He is alert and oriented to person, place, and time. Mental status is at baseline.     Diabetic Foot Exam    Patient's shoes and socks removed.    Right Foot/Ankle   Right Foot Inspection  Skin Exam: skin normal and skin intact. No dry skin, no warmth, no callus, no erythema, no maceration, no abnormal color, no pre-ulcer, no ulcer and no callus.     Toe Exam: ROM and strength within normal limits.     Sensory   Monofilament testing: diminished    Vascular  Capillary refills: < 3 seconds  The right DP pulse is 1+. The right PT pulse is 1+.     Left Foot/Ankle  Left Foot Inspection  Skin Exam: skin normal and skin intact. No dry skin, no warmth, no erythema, no maceration, normal color, no pre-ulcer, no ulcer and no callus.     Toe Exam: ROM and strength within normal limits.     Sensory   Monofilament testing: diminished    Vascular  Capillary refills: < 3 seconds  The left DP pulse is 1+. The left PT pulse is 1+.     Assign Risk Category  No deformity present  No loss of protective sensation  No  weak pulses  Risk: 0        Dominique Cooper PA-C

## 2024-02-09 NOTE — ASSESSMENT & PLAN NOTE
Very poorly controlled for several years. Improved sugars 150-170s since starting mixed insulin 35 units BID. Will increase to 40 units BID today and discussed diet changes. Continue Ozempic 1mg once weekly and Metformin BID.   Lab Results   Component Value Date    HGBA1C 11.3 (A) 12/11/2023

## 2024-02-09 NOTE — ASSESSMENT & PLAN NOTE
"Worsening x 2 weeks. 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 Also using medical marijuana which impairs ability to work. Provided 6 week note for leave of absence until further evaluation with arterial US and EMG. Restart Cymbalta 30mg as 60mg caused significant drowsiness but did help with pain. Start gabapentin 100mg at night. Follow-up in 4 weeks. 1+DP and PT pulses b/l. Diabetic foot exam with diminished sensation. Referred to podiatry today.    "

## 2024-02-09 NOTE — LETTER
February 9, 2024     Patient: Jason Blanca  YOB: 1990  Date of Visit: 2/9/2024      To Whom it May Concern:    Jason Blanca is under my professional care. Jason was seen in my office on 2/9/2024. Jason may return to work on March 22, 2024 . Please allow for leave of absence to evaluation and treat ongoing polyneuropathy for 6 weeks or longer pending further testing.     If you have any questions or concerns, please don't hesitate to call.         Sincerely,          Dominique Cooper PA-C        CC: No Recipients

## 2024-02-09 NOTE — PATIENT INSTRUCTIONS
Type 2 Diabetes Management for Adults   AMBULATORY CARE:   Type 2 diabetes  is a disease that affects how your body uses glucose (sugar). Either your body cannot make enough insulin, or it cannot use the insulin correctly. It is important to keep diabetes controlled to prevent damage to your heart, blood vessels, and other organs. Management will help you feel well and enjoy your daily activities. Your diabetes care team providers can help you make a plan to fit diabetes care into your schedule. Your plan can change over time to fit your needs and your family's needs.       Have someone call your local emergency number (911 in the [place]) if:   You cannot be woken.    You have signs of diabetic ketoacidosis:     confusion, fatigue    vomiting    rapid heartbeat    fruity smelling breath    extreme thirst    dry mouth and skin    You have any of the following signs of a heart attack:      Squeezing, pressure, or pain in your chest    You may  also have any of the following:     Discomfort or pain in your back, neck, jaw, stomach, or arm    Shortness of breath    Nausea or vomiting    Lightheadedness or a sudden cold sweat    You have any of the following signs of a stroke:      Numbness or drooping on one side of your face     Weakness in an arm or leg    Confusion or difficulty speaking    Dizziness, a severe headache, or vision loss    Call your doctor or diabetes care team provider if:   You have a sore or wound that will not heal.    You have a change in the amount you urinate.    Your blood sugar levels are higher than your target goals.    You often have lower blood sugar levels than your target goals.    Your skin is red, dry, warm, or swollen.    You have trouble coping with diabetes, or you feel anxious or depressed.    You have trouble following any part of your care plan, such as your meal plan.    You have questions or concerns about your condition or care.    What you need to know about high blood sugar  levels:  High blood sugar levels may not cause any symptoms. You may feel more thirsty or urinate more often than usual. Over time, high blood sugar levels can damage your nerves, blood vessels, tissues, and organs. The following can increase your blood sugar levels:  Large meals or large amounts of carbohydrates at one time    Less physical activity    Stress    Illness    A lower dose of diabetes medicine or insulin, or a late dose    What you need to know about low blood sugar levels:  Symptoms include feeling shaky, dizzy, irritable, or confused. You can prevent symptoms by keeping your blood sugar levels from going too low.  Treat a low blood sugar level right away:      Drink 4 ounces of juice or have 1 tube of glucose gel.    Check your blood sugar level again 10 to 15 minutes later.    When the level goes back to normal, eat a meal or snack to prevent another decrease.       Keep glucose gel, raisins, or hard candy with you at all times to treat a low blood sugar level.     Your blood sugar level can get too low if you take diabetes medicine or insulin and do not eat enough food.     If you use insulin, check your blood sugar level before you exercise.      If your blood sugar level is below 100 mg/dL, eat 4 crackers or 2 ounces of raisins, or drink 4 ounces of juice.    Check your level every 30 minutes if you exercise longer than 1 hour.    You may need a snack during or after exercise.    What you can do to manage your blood sugar levels:   Check your blood sugar levels as directed and as needed.  Several items are available to use to check your levels. You may need to check by testing a drop of blood in a glucose monitor. You may instead be given a continuous glucose monitoring (CGM) device. The device is worn at all times. The CGM checks your blood sugar level every 5 minutes. It sends results to an electronic device such as a smart phone. A CGM can be used with or without an insulin pump. You and your  diabetes care team providers will decide on the best method for you. The goal for blood sugar levels before meals  is between 80 and 130 mg/dL and 2 hours after eating  is lower than 180 mg/dL.            Make healthy food choices.  Work with a dietitian to create a meal plan that works for you and your schedule. A dietitian can help you learn how to eat the right amount of carbohydrates (sugar and starchy foods) during your meals and snacks. Examples of carbohydrates are breads, cereals, rice, pasta, fruit, low-fat dairy, and sweets. Carbohydrates can raise your blood sugar level if you eat too many at one time.         Eat high-fiber foods as directed.  Fiber helps improve blood sugar levels. Fiber also lowers your risk for heart disease and other problems diabetes can cause. Examples of high-fiber foods include vegetables, whole-grain bread, and beans such as carson beans. Your dietitian can tell you how much fiber to have each day.         Get regular physical activity.  Physical activity can help you get to your target blood sugar level goal and manage your weight. Get at least 150 minutes of moderate to vigorous aerobic physical activity each week. Resistance training, such as lifting weights, should be done 3 times each week. Do not miss more than 2 days of physical activity in a row. Do not sit longer than 30 minutes at a time. Your healthcare provider can help you create an activity plan. The plan can include the best activities for you and can help you build your strength and endurance.            Maintain a healthy weight.  Ask your team what a healthy weight is for you. A healthy weight can help you control diabetes and prevent heart disease. Ask your team to help you create a weight-loss plan, if needed. Even a loss of 3% to 7% of your excess body weight can help make a difference in managing diabetes. Your team will help you set a weight-loss goal, such as 10 to 15 pounds, or 5% of your extra weight.  Together you and your team can set manageable weight-loss goals.    Take your diabetes medicine or insulin as directed.  You may need diabetes medicine, insulin, or both to help control your blood sugar levels. Your healthcare provider will teach you how and when to take your diabetes medicine or insulin. You will also be taught about side effects oral diabetes medicine can cause. Insulin may be injected or given through a pump or pen. You and your providers will decide on the best method for you:    An insulin pump  is an implanted device that gives your insulin 24 hours a day. An insulin pump prevents the need for multiple insulin injections in a day.         An insulin pen  is a device prefilled with the right amount of insulin.         You and your family members will be taught how to draw up and give insulin  if this is the best method for you. Your providers will also teach you how to dispose of needles and syringes.    You will learn how much insulin you need  and when to give it. You will be taught when not to give insulin. You will also be taught what to do if your blood sugar level drops too low. This may happen if you take insulin and do not eat the right amount of carbohydrates.    More ways to manage type 2 diabetes:   Wear medical alert identification.  Wear medical alert jewelry or carry a card that says you have diabetes. Ask your provider where to get these items.         Do not smoke.  Nicotine and other chemicals in cigarettes and cigars can cause lung and blood vessel damage. It also makes it more difficult to manage your diabetes. Ask your provider for information if you currently smoke and need help to quit. Do not use e-cigarettes or smokeless tobacco in place of cigarettes or to help you quit. They still contain nicotine.    Check your feet each day for cuts, scratches, calluses, or other wounds.  Look for redness and swelling, and feel for warmth. Wear shoes that fit well. Check your shoes  for rocks or other objects that can hurt your feet. Do not walk barefoot or wear shoes without socks. Wear cotton socks to help keep your feet dry.         Ask about vaccines you may need.  You have a higher risk for serious illness if you get the flu, pneumonia, COVID-19, or hepatitis. Ask your provider if you should get vaccines to prevent these or other diseases, and when to get the vaccines.    Talk to your provider if you become stressed about diabetes care.  Sometimes being able to fit diabetes care into your life can cause increased stress. The stress can cause you not to take care of yourself properly. Your provider can help by offering tips about self-care. A mental health provider can listen and offer help with self-care issues. Other types of counseling can help you make nutrition or physical activity changes.    Have your A1c checked as directed.  Your provider may check your A1c every 3 months, or 2 times each year if your diabetes is controlled. An A1c test shows the average amount of sugar in your blood over the past 2 to 3 months. Your provider will tell you what your A1c level should be.    Have screening tests as directed.  Your provider may recommend screening for complications of diabetes and other conditions that may develop. Some screenings may begin right away and some may happen within the first 5 years of diagnosis:    Examples of diabetes complications  include kidney problems, high cholesterol, high blood pressure, blood vessel problems, eye problems, and sleep apnea.    You may be screened for a low vitamin B level  if you take oral diabetes medicine for a long time.    You may be screened for polycystic ovarian syndrome (PCOS)  if you are of childbearing age.    Follow up with your doctor or diabetes care team providers as directed:  You may need to have blood tests done before your follow-up visit. The test results will show if changes need to be made in your treatment or self-care.  Talk to your provider if you cannot afford your medicine. Write down your questions so you remember to ask them during your visits.  © Copyright [employer] 2023 Information is for End User's use only and may not be sold, redistributed or otherwise used for commercial purposes.  The above information is an  only. It is not intended as medical advice for individual conditions or treatments. Talk to your doctor, nurse or pharmacist before following any medical regimen to see if it is safe and effective for you.    Basic Carbohydrate Counting   AMBULATORY CARE:   Carbohydrate counting  is a way to plan your meals by counting the amount of carbohydrate in foods. Carbohydrates are the sugars, starches, and fiber found in fruit, grains, vegetables, and milk products. Carbohydrates increase your blood sugar levels. Carbohydrate counting can help you eat the right amount of carbohydrate to keep your blood sugar levels under control.   What you need to know about planning meals using carbohydrate counting:  A dietitian or healthcare provider will help you develop a healthy meal plan that works best for you. You will be taught how much carbohydrate to eat or drink for each meal and snack. Your meal plan will be based on your age, weight, usual food intake, and physical activity level. If you have diabetes, it will also include your blood sugar levels and diabetes medicine. Once you know how much carbohydrate you should eat, you can decide what type of food you want to eat.    You will need to know what foods contain carbohydrate and how much they contain. Keep track of the amount of carbohydrate in meals and snacks in order to follow your meal plan. Do not avoid carbohydrates or skip meals. Your blood sugar may fall too low if you do not eat enough carbohydrate or you skip meals.    Foods that contain carbohydrate:   Breads:  Each serving of food listed below contains about 15 g of carbohydrate .    1 slice of  bread (1 ounce) or 1 flour or corn tortilla (6 inch)    ½ of a hamburger bun or ¼ of a large bagel (about 1 ounce)    1 pancake (about 4 inches across and ¼ inch thick)    Cereals and grains:  Serving sizes of ready-to-eat cereals vary. Look at the serving size and the total carbohydrate amount listed on the food label. Each serving of food listed below contains about 15 g of carbohydrate .    ¾ cup of dry, unsweetened, ready-to-eat cereal or ¼ cup of low-fat granola     ½ cup of oatmeal or other cooked cereal     ? cup of cooked rice or pasta    Starchy vegetables and beans:  Each serving of food listed below contains about 15 g of carbohydrate .    ½ cup of corn, green peas, sweet potatoes, or mashed potatoes    ¼ of a large baked potato    ½ cup of beans, lentils, and peas (garbanzo, carson, kidney, white, split, black-eyed)    Crackers and snacks:  Each serving of food listed below contains about 15 g of carbohydrate .    3 argelia cracker squares or 8 animal crackers     6 saltine-type crackers    3 cups of popcorn or ¾ ounce of pretzels, potato chips, or tortilla chips    Fruit:  Each serving of food listed below contains about 15 g of carbohydrate .    1 small (4 ounce) piece of fresh fruit or ¾ to 1 cup of fresh fruit    ½ cup of canned or frozen fruit, packed in natural juice    ½ cup (4 ounces) of unsweetened fruit juice    2 tablespoons of dried fruit    Desserts or sugary foods:  Each serving of food listed below contains about 15 g of carbohydrate .    2-inch square unfrosted cake or brownie     2 small cookies    ½ cup of ice cream, frozen yogurt, or nondairy frozen yogurt    ¼ cup of sherbet or sorbet    1 tablespoon of regular syrup, jam, or jelly    2 tablespoons of light syrup    Milk and yogurt:  Foods from the milk group contain about 12 g of carbohydrate per serving.    1 cup of fat-free or low-fat milk    1 cup of soy milk    ? cup of fat-free, yogurt sweetened with artificial  sweetener    Non-starchy vegetables:  Each serving contains about 5 g of carbohydrate . Three servings of non-starch vegetables count as 1 carbohydrate serving.     ½ cup of cooked vegetables or 1 cup of raw vegetables. This includes beets, broccoli, cabbage, cauliflower, cucumber, mushrooms, tomatoes, and zucchini    ½ cup of vegetable juice    How to use carbohydrate counting to plan meals:   Count carbohydrate amounts using serving sizes:      Pasta dinner example:  You plan to have pasta, tossed salad, and an 8-ounce glass of milk. Your healthcare provider tells you that you may have 4 carbohydrate servings for dinner. One carbohydrate serving of pasta is ? cup. One cup of pasta will equal 3 carbohydrate servings. An 8-ounce glass of milk will count as 1 carbohydrate serving. These amounts of food would equal 4 carbohydrate servings. One cup of tossed salad does not count toward your carbohydrate servings.       Count carbohydrate amounts using food labels:  Find the total amount of carbohydrate in a packaged food by reading the food label. Food labels tell you the serving size of the food and the total carbohydrate amount in each serving. Find the serving size on the food label and then decide how many servings you will eat. Multiply the number of servings you plan to eat by the carbohydrate amount per serving.     Granola bar snack example:  Your meal plan allows you to have 2 carbohydrate servings (30 grams) of carbohydrate for a snack. You plan to eat 1 package of granola bars, which contains 2 bars. According to the food label, the serving size of food in this package is 1 bar. Each serving (1 bar) contains 25 grams of carbohydrate. The total amount of carbohydrate in this package of granola bars would be 50 g. Based on your meal plan, you should eat only 1 bar.      Follow up with your doctor as directed:  Write down your questions so you remember to ask them during your visits.  © Copyright [employer] 2023  Information is for End User's use only and may not be sold, redistributed or otherwise used for commercial purposes.  The above information is an  only. It is not intended as medical advice for individual conditions or treatments. Talk to your doctor, nurse or pharmacist before following any medical regimen to see if it is safe and effective for you.    Foot Care for People with Diabetes   AMBULATORY CARE:   What you need to know about foot care:  Long-term high blood sugar levels can damage the blood vessels and nerves in your legs and feet. This damage makes it hard to feel pressure, pain, temperature, and touch. You may not be able to feel a cut or sore, or shoes that are too tight. Foot care is needed to prevent serious problems, such as an infection or amputation. Diabetes may cause your toes to become crooked or curved under. These changes may affect the way you walk and can lead to increased pressure on your foot. The pressure can decrease blood flow to your feet. Lack of blood flow increases your risk for a foot ulcer.  Call your care team provider if:   Your feet become numb, weak, or hard to move.    You have pus draining from a sore on your foot.    You have a wound on your foot that gets bigger, deeper, or does not heal.    You see blisters, cuts, scratches, calluses, or sores on your foot.    You have a fever, and your feet become red, warm, and swollen.    Your toenails become thick, curled, or yellow.    You find it hard to check your feet because your vision is poor.    You have questions or concerns about your condition or care.    How to care for your feet:   Check your feet each day.  Look at your whole foot, including the bottom, and between and under your toes. Check for wounds, corns, and calluses. Use a mirror to see the bottom of your feet. The skin on your feet may be shiny, tight, or darker than normal. Your feet may also be cold and pale. Feel your feet by running your hands  along the tops, bottoms, sides, and between your toes. Redness, swelling, and warmth are signs of blood flow problems that can lead to a foot ulcer. Do not try to remove corns or calluses yourself. Do not ignore small problems, such as dry skin or small wounds. These can become life-threatening over time without proper care.         Wash your feet each day with soap and warm water.  Do not use hot water, because this can injure your foot. Dry your feet gently with a towel after you wash them. Dry between and under your toes.    Apply lotion or a moisturizer on your dry feet.  Ask your care team provider what lotions are best to use. Do not put lotion or moisturizer between your toes. Moisture between your toes could lead to skin breakdown.    Cut your toenails correctly.  File or cut your toenails straight across. Use a soft brush to clean around your toenails. If your toenails are very thick, you may need to have a care team provider or specialist cut them.     Protect your feet.  Do not walk barefoot or wear your shoes without socks. Check your shoes for rocks or other objects that can hurt your feet. Wear cotton socks to help keep your feet dry. Wear socks without toe seams, or wear them with the seams inside out. Change your socks each day. Do not wear socks that are dirty or damp.    Wear shoes that fit well.  Wear shoes that do not rub against any area of your feet. Your shoes should be ½ to ¾ inch (1 to 2 centimeters) longer than your feet. Your shoes should also have extra space around the widest part of your feet. Walking or athletic shoes with laces or straps that adjust are best. Ask your care team provider for help to choose shoes that fit you best. Ask your provider if you need to wear an insert, orthotic, or bandage on your feet.         Go to your follow-up visits.  Your care team provider will do a foot exam at least 1 time each year. You may need a foot exam more often if you have nerve damage, foot  deformities, or ulcers. Your provider will check for nerve damage and how well you can feel your feet. Your provider will check your shoes to see if they fit well.    Do not smoke.  Smoking can damage your blood vessels and put you at increased risk for foot ulcers. Ask your care team provider for information if you currently smoke and need help to quit. E-cigarettes or smokeless tobacco still contain nicotine. Talk to your care team provider before you use these products.    Follow up with your diabetes care team provider or foot specialist as directed:  You will need to have your feet checked at least 1 time each year. You may need a foot exam more often if you have nerve damage, foot deformities, or ulcers. Write down your questions so you remember to ask them during your visits.  © Copyright [employer] 2023 Information is for End User's use only and may not be sold, redistributed or otherwise used for commercial purposes.  The above information is an  only. It is not intended as medical advice for individual conditions or treatments. Talk to your doctor, nurse or pharmacist before following any medical regimen to see if it is safe and effective for you.    What to Do if Your Blood Sugar is Low   AMBULATORY CARE:   Low blood sugar levels  (hypoglycemia) can happen with Type 1 and Type 2 diabetes. Low levels are more likely to happen if you use insulin. Hypoglycemia can cause you to have falls, accidents, and injuries. A blood sugar level that gets too low can lead to seizures, coma, and death. Learn to recognize the symptoms early so you can get treatment quickly.  When your blood sugar is low you may feel:  Sweaty    Nervous or shaky    Anxious or irritable    Confused    A fast, pounding heartbeat    Extremely hungry    Have someone call your local emergency number (911 in the [place]) if:   You cannot be woken.    You have a seizure.    Call your doctor if:   You have symptoms of a low blood sugar  level, such as trouble thinking, sweating, or a pounding heartbeat.    Your blood sugar level is lower than normal and it does not improve with treatment.     You often have lower blood sugar levels than your target goals.    You have trouble coping with your illness, or you feel anxious or depressed.     You have questions or concerns about your condition or care.    What to do if you have symptoms of low blood sugar:   Check your blood sugar level, if possible.  Your blood sugar level is too low if it is at or below 70 mg/dL.     Eat or drink 15 grams of fast-acting carbohydrate. Fast-acting carbohydrates will raise your blood sugar level quickly. Examples of 15 grams of fast-acting carbohydrates:     4 ounces (½ cup) of fruit juice     4 ounces of regular soda    2 tablespoons of raisins     1 tube of glucose gel or 3 to 4 glucose tablets       Check your blood sugar level 15 minutes later.  If the level is still low (less than 100 mg/dL), eat another 15 grams of carbohydrate. When the level returns to 100 mg/dL, eat a snack or meal that contains carbohydrates. This will help prevent another drop in blood sugar.    Teach people close to you how to use your glucagon kit.  Your blood sugar may be too low for you to be awake. People need to know when and how to use your kit.    Prevent low blood sugar levels:  Prevent low blood sugar by knowing what increases your risk. Ask your healthcare provider for ways to prevent low blood sugar levels. Any of the following can increase your risk of low blood sugar:  Fasting for tests or procedures    During or after intense exercise    Late or postponed meals    Sleeping (you may need a bedtime snack)     Drinking alcohol if you use insulin or insulin releasing pills    Follow up with your doctor as directed:  Write down your questions so you remember to ask them during your visits.  © Copyright [employer] 2023 Information is for End User's use only and may not be sold,  redistributed or otherwise used for commercial purposes.  The above information is an  only. It is not intended as medical advice for individual conditions or treatments. Talk to your doctor, nurse or pharmacist before following any medical regimen to see if it is safe and effective for you.

## 2024-02-14 ENCOUNTER — OFFICE VISIT (OUTPATIENT)
Dept: PODIATRY | Facility: CLINIC | Age: 34
End: 2024-02-14
Payer: COMMERCIAL

## 2024-02-14 VITALS — DIASTOLIC BLOOD PRESSURE: 77 MMHG | HEART RATE: 105 BPM | SYSTOLIC BLOOD PRESSURE: 121 MMHG

## 2024-02-14 DIAGNOSIS — R20.9 PAINFUL AND COLD LOWER EXTREMITY: ICD-10-CM

## 2024-02-14 DIAGNOSIS — E11.42 DIABETIC POLYNEUROPATHY ASSOCIATED WITH TYPE 2 DIABETES MELLITUS (HCC): Primary | ICD-10-CM

## 2024-02-14 DIAGNOSIS — M79.606 PAINFUL AND COLD LOWER EXTREMITY: ICD-10-CM

## 2024-02-14 PROCEDURE — 99243 OFF/OP CNSLTJ NEW/EST LOW 30: CPT | Performed by: PODIATRIST

## 2024-02-14 RX ORDER — GABAPENTIN 300 MG/1
300 CAPSULE ORAL
Qty: 30 CAPSULE | Refills: 0 | Status: SHIPPED | OUTPATIENT
Start: 2024-02-14 | End: 2024-03-15

## 2024-02-14 NOTE — PROGRESS NOTES
Assessment/Plan:     Discussed principles of diabetic footcare.  There are no palpable pedal pulses and sensorium is diminished.  Patient is unable to discern tuning fork and he has poor proprioception.    On exam, there are no palpable pedal pulses.  There is no hair growth on his feet.  Both feet are extremely cold.  There are positive Tinel signs at the posterior tibial nerve, deep peroneal nerve and common peroneal nerves.    Explained to patient that he is dealing with diabetic neuropathy and possibly significant peripheral vascular disease.  It is noted the patient is having vascular studies in the near future.  On questioning, patient admits to vaping and was urged to  to discontinue.    Patient currently taking gabapentin 100 mg before bed.  He was advised to increase this dosage to 300 mg at bedtime and appropriate prescription provided.  Because patient has positive Tinel signs, he is a candidate for nerve decompression if blood sugar was under better control and we were certain that his circulatory status would allow him to heal.  Advised patient to consider seeing an endocrinologist if he is having difficulty getting blood sugar under control.    Will be reassessed in 4 weeks.        No problem-specific Assessment & Plan notes found for this encounter.       Diagnoses and all orders for this visit:    Diabetic polyneuropathy associated with type 2 diabetes mellitus (HCC)  -     gabapentin (Neurontin) 300 mg capsule; Take 1 capsule (300 mg total) by mouth daily at bedtime    Painful and cold lower extremity  -     Ambulatory Referral to Podiatry          Subjective:      Patient ID: Jason Blanca is a 33 y.o. male.    HPI    Patient, a 33-year-old type II diabetic male presents for diabetic foot exam and care.  Patient states that he has difficulty controlling his blood sugar.  He states that he was first diagnosed with diabetes at age 14 but did not take care of himself.  He is currently on insulin  but still is having difficulty getting the blood sugar down.    He complains of burning, tingling and numbness in both lower extremities.  He states that he now waddles when he walks.  He has pain both weightbearing and nonweightbearing.  He also states that his feet feel very cold.    He is taking gabapentin 100 mg before sleep.  He relates that he is now disabled but when he was working he could not take increased dosages of gabapentin as it made him too drowsy.  It is noted that Cymbalta was also prescribed but he stopped taking this as it affected his libido.      I personally reviewed an A1c dated 12/11/2023.  It was 11.3.    I personally reviewed an A1c dated 8/8/2023.  It was 12.5.    The following portions of the patient's history were reviewed and updated as appropriate: allergies, current medications, past family history, past medical history, past social history, past surgical history, and problem list.    Review of Systems   Musculoskeletal:  Positive for gait problem.   Neurological:  Positive for numbness.        Paresthesia   Psychiatric/Behavioral: Negative.               Objective:      /77 (BP Location: Right arm, Patient Position: Sitting, Cuff Size: Standard)   Pulse 105          Physical Exam  Cardiovascular:      Pulses: Pulses are weak.           Dorsalis pedis pulses are 0 on the right side and 0 on the left side.        Posterior tibial pulses are 0 on the right side and 0 on the left side.   Feet:      Right foot:      Skin integrity: No ulcer, skin breakdown, erythema, warmth, callus or dry skin.      Left foot:      Skin integrity: No ulcer, skin breakdown, erythema, warmth, callus or dry skin.           Diabetic Foot Exam    Patient's shoes and socks removed.    Right Foot/Ankle   Right Foot Inspection  Skin Exam: skin normal and skin intact. No dry skin, no warmth, no callus, no erythema, no maceration, no abnormal color, no pre-ulcer, no ulcer and no callus.     Toe Exam:  no  right toe deformity    Sensory   Vibration: absent  Proprioception: absent  Monofilament testing: diminished    Vascular  Capillary refills: elevated  The right DP pulse is 0. The right PT pulse is 0.     Right Toe  - Comprehensive Exam  Ecchymosis: none  Arch: normal  Hammertoes: absent  Claw Toes: absent  Swelling: none   Tenderness: lesser metatarsophalangeal joints       Left Foot/Ankle  Left Foot Inspection  Skin Exam: skin normal and skin intact. No dry skin, no warmth, no erythema, no maceration, normal color, no pre-ulcer, no ulcer and no callus.     Toe Exam: No left toe deformity.     Sensory   Vibration: absent  Proprioception: absent  Monofilament testing: diminished    Vascular  Capillary refills: elevated  The left DP pulse is 0. The left PT pulse is 0.     Left Toe  - Comprehensive Exam  Ecchymosis: none  Arch: normal  Hammertoes: absent  Claw toes: absent  Swelling: none   Tenderness: lesser metatarsophalangeal joints       Assign Risk Category  No deformity present  Loss of protective sensation  Weak pulses  Risk: 2

## 2024-02-20 ENCOUNTER — TELEPHONE (OUTPATIENT)
Dept: FAMILY MEDICINE CLINIC | Facility: CLINIC | Age: 34
End: 2024-02-20

## 2024-02-20 DIAGNOSIS — Z79.4 TYPE 2 DIABETES MELLITUS WITH DIABETIC POLYNEUROPATHY, WITH LONG-TERM CURRENT USE OF INSULIN (HCC): Primary | ICD-10-CM

## 2024-02-20 DIAGNOSIS — E11.42 TYPE 2 DIABETES MELLITUS WITH DIABETIC POLYNEUROPATHY, WITH LONG-TERM CURRENT USE OF INSULIN (HCC): Primary | ICD-10-CM

## 2024-02-20 DIAGNOSIS — E29.1 HYPOGONADISM IN MALE: ICD-10-CM

## 2024-02-28 DIAGNOSIS — E29.1 HYPOGONADISM IN MALE: ICD-10-CM

## 2024-02-28 DIAGNOSIS — N52.02 CORPORO-VENOUS OCCLUSIVE ERECTILE DYSFUNCTION: ICD-10-CM

## 2024-02-28 RX ORDER — TESTOSTERONE CYPIONATE 200 MG/ML
100 INJECTION, SOLUTION INTRAMUSCULAR
Qty: 2 ML | Refills: 5 | Status: SHIPPED | OUTPATIENT
Start: 2024-02-28 | End: 2024-08-14

## 2024-02-28 RX ORDER — TADALAFIL 10 MG/1
10 TABLET ORAL DAILY PRN
Qty: 30 TABLET | Refills: 5 | Status: SHIPPED | OUTPATIENT
Start: 2024-02-28

## 2024-03-20 ENCOUNTER — TELEPHONE (OUTPATIENT)
Age: 34
End: 2024-03-20

## 2024-03-20 NOTE — TELEPHONE ENCOUNTER
Call from patient regarding FMLA paperwork. Stated Dr Marquez was supposed to correct the return to work date on form. His employer is requesting the corrected form tomorrow. Warm transfer to [facility].

## 2024-03-21 ENCOUNTER — TELEMEDICINE (OUTPATIENT)
Dept: FAMILY MEDICINE CLINIC | Facility: CLINIC | Age: 34
End: 2024-03-21
Payer: COMMERCIAL

## 2024-03-21 DIAGNOSIS — Z79.4 TYPE 2 DIABETES MELLITUS WITH DIABETIC POLYNEUROPATHY, WITH LONG-TERM CURRENT USE OF INSULIN (HCC): Primary | ICD-10-CM

## 2024-03-21 DIAGNOSIS — E11.42 TYPE 2 DIABETES MELLITUS WITH DIABETIC POLYNEUROPATHY, WITH LONG-TERM CURRENT USE OF INSULIN (HCC): Primary | ICD-10-CM

## 2024-03-21 PROCEDURE — 99214 OFFICE O/P EST MOD 30 MIN: CPT | Performed by: FAMILY MEDICINE

## 2024-03-21 RX ORDER — TESTOSTERONE ENANTHATE 100 MG/.5ML
100 INJECTION SUBCUTANEOUS
COMMUNITY
Start: 2024-03-19

## 2024-03-21 NOTE — PROGRESS NOTES
Name: Jason Blanca      : 1990      MRN: [id number]  Encounter Provider: Jaren Marquez MD  Encounter Date: 3/21/2024   Encounter department: [facility] PRIMARY CARE [facility]    Subjective   Chief Complaint  Persistent leg pain and numbness due to diabetic neuropathy.    History of Present Illness  Patient reports ongoing leg pain and numbness, which has prevented a return to work. Recently visited an endocrinologist and received new medications that are proving effective. Patient is seeking employment in an office setting due to the inability to perform current job duties.    Medications  Testosterone injections, Metformin, Cingardine XR, Rosuvastatin 20 mg, Freestyle Amber 3, and a Secura Simplicity insulin patch.    Allergies   Allergen Reactions    Amoxicillin Other (See Comments)     Other reaction(s): augmentin    Augmentin [Amoxicillin-Pot Clavulanate] Hives    Sulfa Antibiotics Swelling       Current Outpatient Medications:     SEMAGLUTIDE, 2 MG/DOSE, SC, Inject 2 mg under the skin once a week, Disp: , Rfl:     Xyosted 100 MG/0.5ML SOAJ, Inject 100 mg under the skin every 7 days, Disp: , Rfl:     Blood Glucose Monitoring Suppl (OneTouch Verio Reflect) w/Device KIT, Check blood sugars once daily. Please substitute with appropriate alternative as covered by patient's insurance. Dx: E11.65, Disp: 1 kit, Rfl: 0    Continuous Blood Gluc  (FreeStyle Amber 2 [place]) LAUREN, Use 1 each every 14 (fourteen) days, Disp: , Rfl:     Continuous Blood Gluc Sensor (FreeStyle Amber 3 Sensor) MISC, , Disp: , Rfl:     Empagliflozin-metFORMIN HCl ER (Synjardy XR) 12.5-1000 MG TB24, Take 2 tablets by mouth daily, Disp: , Rfl:     famotidine (PEPCID) 20 mg tablet, Take 1 tablet (20 mg total) by mouth 2 (two) times a day, Disp: 180 tablet, Rfl: 1    gabapentin (Neurontin) 300 mg capsule, Take 1 capsule (300 mg total) by mouth daily at bedtime, Disp: 30 capsule, Rfl: 0    glucose blood  "(OneTouch Verio) test strip, Check blood sugars once daily. Please substitute with appropriate alternative as covered by patient's insurance. Dx: E11.65, Disp: 100 each, Rfl: 3    Injection Device for Insulin (CeQur Simplicity 2U) LAUREN, Use 1 each every 3 (three) days, Disp: , Rfl:     Insulin Pen Needle (BD Pen Needle Ratna U/F) 32G X 4 MM MISC, Use daily as directed with insulin pen, Disp: 100 each, Rfl: 3    Isopropyl Alcohol (Pharmacist Choice Alcohol) 70 % MISC, Apply 200 Pads topically 3 (three) times a day, Disp: 200 each, Rfl: 5    NovoLOG 100 UNIT/ML injection, Use 200u sc every 2 days via Cequr Simplicity, Disp: , Rfl:     OneTouch Delica Lancets 33G MISC, Check blood sugars once daily. Please substitute with appropriate alternative as covered by patient's insurance. Dx: E11.65, Disp: 100 each, Rfl: 3    rosuvastatin (CRESTOR) 20 MG tablet, Take 20 mg by mouth daily, Disp: , Rfl:     SYRINGE-NEEDLE, DISP, 3 ML (Safety Syringes/Needle) 20G X 1-1/2\" 3 ML MISC, Use once a week for 12 doses, Disp: 12 each, Rfl: 1    tadalafil (CIALIS) 10 MG tablet, Take 1 tablet (10 mg total) by mouth daily as needed for erectile dysfunction, Disp: 30 tablet, Rfl: 5    Toujeo SoloStar 300 units/mL CONCENTRATED U-300 injection pen (1-unit dial), Inject 35u sc every morning., Disp: , Rfl:       Review of Systems  No additional systems reviewed during this encounter.          Assessment and Plan  Diabetic neuropathy: Continue current treatment regimen as prescribed by the endocrinologist. Monitor symptoms and efficacy of the new medication regimen.  Work status: Patient is advised to remain out of work until 05/24/2024. Next appointment scheduled for 05/20/2024 to reassess the ability to return to work and discuss potential release for new employment.  Administrative: A letter confirming the patient's work status will be placed in the chart for the patient to download from [employer] or to be picked up in person. If a fax number is " provided, the letter will be faxed directly.          Jaren Marquez MD   [facility] PRIMARY CARE [facility]

## 2024-03-21 NOTE — LETTER
March 21, 2024     Patient: Jason Blanca  YOB: 1990  Date of Visit: 3/21/2024      To Whom it May Concern:    Jason Blanca is under my professional care. Jason was seen on 3/21/2024.    He is now able to return to work. He is ongoing treatment to help with his conditions.    I expect him to return to work May 24th 2024.     If you have any questions or concerns, please don't hesitate to call.          Sincerely,          Jaren Marquez MD

## 2024-04-06 LAB
CREAT ?TM UR-SCNC: 158 UMOL/L
EXT ALBUMIN URINE RANDOM: 101
MICROALBUMIN/CREAT UR: 0.1 MG/G{CREAT}

## 2024-04-08 ENCOUNTER — OFFICE VISIT (OUTPATIENT)
Dept: UROLOGY | Facility: CLINIC | Age: 34
End: 2024-04-08
Payer: COMMERCIAL

## 2024-04-08 VITALS
BODY MASS INDEX: 32.36 KG/M2 | HEIGHT: 67 IN | OXYGEN SATURATION: 99 % | DIASTOLIC BLOOD PRESSURE: 72 MMHG | RESPIRATION RATE: 17 BRPM | SYSTOLIC BLOOD PRESSURE: 118 MMHG | WEIGHT: 206.2 LBS | HEART RATE: 108 BPM

## 2024-04-08 DIAGNOSIS — N52.02 CORPORO-VENOUS OCCLUSIVE ERECTILE DYSFUNCTION: ICD-10-CM

## 2024-04-08 DIAGNOSIS — E29.1 HYPOGONADISM IN MALE: Primary | ICD-10-CM

## 2024-04-08 PROCEDURE — 99204 OFFICE O/P NEW MOD 45 MIN: CPT | Performed by: UROLOGY

## 2024-04-08 RX ORDER — HYDROXYZINE HYDROCHLORIDE 25 MG/1
TABLET, FILM COATED ORAL
COMMUNITY
Start: 2024-04-03

## 2024-04-08 RX ORDER — TADALAFIL 20 MG/1
20 TABLET ORAL DAILY PRN
Qty: 30 TABLET | Refills: 3 | Status: SHIPPED | OUTPATIENT
Start: 2024-04-08 | End: 2024-04-08

## 2024-04-08 RX ORDER — SEMAGLUTIDE 2.68 MG/ML
INJECTION, SOLUTION SUBCUTANEOUS
COMMUNITY
Start: 2024-04-03

## 2024-04-08 RX ORDER — TADALAFIL 20 MG/1
20 TABLET ORAL DAILY PRN
Qty: 30 TABLET | Refills: 3 | Status: SHIPPED | OUTPATIENT
Start: 2024-04-08

## 2024-04-08 RX ORDER — PEN NEEDLE, DIABETIC 33 GX5/32"
NEEDLE, DISPOSABLE MISCELLANEOUS
COMMUNITY
Start: 2024-04-06

## 2024-04-08 RX ORDER — LAMOTRIGINE 25 MG/1
TABLET ORAL
COMMUNITY
Start: 2024-04-03

## 2024-04-08 NOTE — PROGRESS NOTES
ASSESSMENT:     33 y.o. male  with longstanding diabetes, erectile dysfunction, hypogonadism    PLAN:     Patient understands that his diabetes is the reason for progressive erectile dysfunction and neuropathic changes.  He has tried multiple different PDE 5 inhibitors including maximum dose Viagra.  He does have side effects.  He is currently using Cialis 10 mg but if he would be interested in moving up to 20 mg.  This medication has been prescribed.  Note the patient is taking testosterone supplementation at the discretion of his endocrinologist, he may have better effect from the PDE 5 inhibitors.    We discussed adding vacuum erection devices, silicone occlusive rings or even sexual or ejaculatory trainers.  Regular blood flow may help to reduce fibrosis and stimulate neural pathways.  Additionally, vacuum erection devices and silicone occlusive rings can be used to maintain erections for intercourse.    Patient has used Trimix in the past but felt this was painful.  He understands he may need to progress to this again at some point in time.  We discussed prosthetic surgery and we will plan to avoid this unless all other options have failed.    With regard to fertility, the patient may be interested in future fertility with his new partner.  We would consider semen testing and possible discussion of transition off testosterone injection, potentially using Clomid to stimulate testosterone during periods of fertility attempts.  Patient understands if there is significant fertility issues, he would need to see a fertility specialist.    [facility] - [email or web address]        ----          UROLOGY NEW CONSULT NOTE     CHIEF COMPLAINT   Jason Blanca is a 33 y.o. male with a complaint of No chief complaint on file.      History of Present Illness:   Jason Blanca is a 33 y.o. male here for evaluation of erectile dysfunction.  Patient has a longstanding history of poorly controlled diabetes.  Unfortunately  he is dealing with sequela including lower extremity neuropathy below the knee.  Has longstanding erectile dysfunction.  Previously saw an outside urologist in [place].  Has been on multiple different PDE 5 inhibitors including maximum dose Viagra.  He does have visual changes, facial flushing and palpitations from high-dose Viagra.  Is currently on Cialis 10 mg with some benefit.  Is not all to maintain erections with every activity.  Also notes diminished orgasm.  Patient has previously used Trimix but felt the injections were extremely painful.  Is now following with endocrinology at [facility] and is on testosterone supplementation injections.  Patient has 1 son at home but may be interested in future fertility.    4/6/24 HgA1C 7.5 (prior trend 11.3<--12.5<--11.2<--9.2)  4/6/24 Testosterone 462  4/6/24 TSH 1.55    Past Medical History:     Past Medical History:   Diagnosis Date    Anxiety     Diabetes mellitus (HCC)     Fatty liver     Obesity        PAST SURGICAL HISTORY:     Past Surgical History:   Procedure Laterality Date    HERNIA REPAIR         CURRENT MEDICATIONS:     Current Outpatient Medications   Medication Sig Dispense Refill    Blood Glucose Monitoring Suppl (OneTouch Verio Reflect) w/Device KIT Check blood sugars once daily. Please substitute with appropriate alternative as covered by patient's insurance. Dx: E11.65 1 kit 0    Continuous Blood Gluc  (FreeStyle Amber 2 [place]) LAUREN Use 1 each every 14 (fourteen) days      Continuous Blood Gluc Sensor (FreeStyle Amber 3 Sensor) MISC       Empagliflozin-metFORMIN HCl ER (Synjardy XR) 12.5-1000 MG TB24 Take 2 tablets by mouth daily      famotidine (PEPCID) 20 mg tablet Take 1 tablet (20 mg total) by mouth 2 (two) times a day 180 tablet 1    gabapentin (Neurontin) 300 mg capsule Take 1 capsule (300 mg total) by mouth daily at bedtime 30 capsule 0    glucose blood (OneTouch Verio) test strip Check blood sugars once daily. Please substitute  "with appropriate alternative as covered by patient's insurance. Dx: E11.65 100 each 3    Injection Device for Insulin (CeQur Simplicity 2U) LAUREN Use 1 each every 3 (three) days      Insulin Pen Needle (BD Pen Needle Ratna U/F) 32G X 4 MM MISC Use daily as directed with insulin pen 100 each 3    Isopropyl Alcohol (Pharmacist Choice Alcohol) 70 % MISC Apply 200 Pads topically 3 (three) times a day 200 each 5    NovoLOG 100 UNIT/ML injection Use 200u sc every 2 days via Cequr Simplicity      OneTouch Delica Lancets 33G MISC Check blood sugars once daily. Please substitute with appropriate alternative as covered by patient's insurance. Dx: E11.65 100 each 3    rosuvastatin (CRESTOR) 20 MG tablet Take 20 mg by mouth daily      SEMAGLUTIDE, 2 MG/DOSE, SC Inject 2 mg under the skin once a week      SYRINGE-NEEDLE, DISP, 3 ML (Safety Syringes/Needle) 20G X 1-1/2\" 3 ML MISC Use once a week for 12 doses 12 each 1    tadalafil (CIALIS) 10 MG tablet Take 1 tablet (10 mg total) by mouth daily as needed for erectile dysfunction 30 tablet 5    Toujeo SoloStar 300 units/mL CONCENTRATED U-300 injection pen (1-unit dial) Inject 35u sc every morning.      Xyosted 100 MG/0.5ML SOAJ Inject 100 mg under the skin every 7 days       No current facility-administered medications for this visit.       ALLERGIES:     Allergies   Allergen Reactions    Amoxicillin Other (See Comments)     Other reaction(s): augmentin    Augmentin [Amoxicillin-Pot Clavulanate] Hives    Sulfa Antibiotics Swelling       SOCIAL HISTORY:     Social History     Socioeconomic History    Marital status: Single     Spouse name: Not on file    Number of children: Not on file    Years of education: Not on file    Highest education level: Not on file   Occupational History    Not on file   Tobacco Use    Smoking status: Former     Current packs/day: 0.00     Types: Cigarettes     Quit date: 2018     Years since quittin.2    Smokeless tobacco: Never   Vaping Use    Vaping " status: Every Day    Substances: Nicotine   Substance and Sexual Activity    Alcohol use: Yes     Comment: social    Drug use: Yes     Types: Marijuana    Sexual activity: Yes     Partners: Female   Other Topics Concern    Not on file   Social History Narrative    Not on file     Social Determinants of Health     Financial Resource Strain: Not on file   Food Insecurity: Not on file   Transportation Needs: Not on file   Physical Activity: Not on file   Stress: Not on file   Social Connections: Not on file   Intimate Partner Violence: Unknown (11/21/2020)    Received from [facility]    Intimate Partner Violence     Within the last year, have you been afraid of your partner, ex-partner or family member?: Not on file     Within the last year, have you been humiliated or emotionally abused in other ways by your partner, ex-partner or family member?: Not on file     Within the last year, have you been kicked, hit, slapped, or otherwise physically hurt by your partner, ex-partner or family member?: Not on file     Within the last year, have you been raped or forced to have any kind of sexual activity by your partner, ex-partner or family member?: Not on file   Housing Stability: Not on file       SOCIAL HISTORY:     Family History   Problem Relation Age of Onset    Diabetes Mother     Prostate cancer Paternal Grandfather        REVIEW OF SYSTEMS:     Review of Systems   Constitutional:  Negative for chills and fever.   HENT:  Negative for ear pain and sore throat.    Eyes:  Negative for pain and visual disturbance.   Respiratory:  Negative for cough and shortness of breath.    Cardiovascular:  Negative for chest pain and palpitations.   Gastrointestinal:  Negative for abdominal pain and vomiting.   Genitourinary:  Negative for dysuria and hematuria.   Musculoskeletal:  Negative for arthralgias and back pain.   Skin:  Negative for color change and rash.   Neurological:  Negative for seizures and syncope.   All  other systems reviewed and are negative.        PHYSICAL EXAM:     There were no vitals taken for this visit.    Physical Exam  Vitals reviewed.   Constitutional:       General: He is not in acute distress.     Appearance: He is well-developed.   HENT:      Head: Normocephalic and atraumatic.   Eyes:      Pupils: Pupils are equal, round, and reactive to light.   Cardiovascular:      Rate and Rhythm: Normal rate.   Pulmonary:      Effort: Pulmonary effort is normal. No respiratory distress.      Breath sounds: Normal breath sounds.   Abdominal:      General: There is no distension.      Palpations: Abdomen is soft.      Tenderness: There is no abdominal tenderness.   Musculoskeletal:         General: Normal range of motion.      Cervical back: Normal range of motion and neck supple.   Skin:     General: Skin is warm and dry.   Neurological:      Mental Status: He is alert and oriented to person, place, and time.   Psychiatric:         Behavior: Behavior normal.         LABS:     CBC:   Lab Results   Component Value Date    WBC 5.8 04/21/2023    HGB 16.1 04/21/2023    HCT 46.5 04/21/2023    MCV 82.7 04/21/2023     04/21/2023       BMP:   Lab Results   Component Value Date    CALCIUM 9.5 04/06/2024    K 3.9 04/06/2024    CO2 27 04/06/2024     04/06/2024    BUN 13 04/06/2024    CREATININE 0.85 04/06/2024

## 2024-04-21 DIAGNOSIS — E11.42 DIABETIC POLYNEUROPATHY ASSOCIATED WITH TYPE 2 DIABETES MELLITUS (HCC): ICD-10-CM

## 2024-04-22 RX ORDER — GABAPENTIN 300 MG/1
300 CAPSULE ORAL
Qty: 30 CAPSULE | Refills: 5 | Status: SHIPPED | OUTPATIENT
Start: 2024-04-22 | End: 2024-05-08 | Stop reason: SDUPTHER

## 2024-04-29 ENCOUNTER — HOSPITAL ENCOUNTER (OUTPATIENT)
Dept: NON INVASIVE DIAGNOSTICS | Facility: HOSPITAL | Age: 34
Discharge: HOME | End: 2024-04-29
Payer: COMMERCIAL

## 2024-04-29 DIAGNOSIS — R20.9 PAINFUL AND COLD LOWER EXTREMITY: ICD-10-CM

## 2024-04-29 DIAGNOSIS — M79.606 PAINFUL AND COLD LOWER EXTREMITY: ICD-10-CM

## 2024-04-29 PROCEDURE — 93925 LOWER EXTREMITY STUDY: CPT | Performed by: SURGERY

## 2024-04-29 PROCEDURE — 93923 UPR/LXTR ART STDY 3+ LVLS: CPT

## 2024-04-29 PROCEDURE — 93925 LOWER EXTREMITY STUDY: CPT

## 2024-04-29 PROCEDURE — 93923 UPR/LXTR ART STDY 3+ LVLS: CPT | Performed by: SURGERY

## 2024-05-07 ENCOUNTER — TELEPHONE (OUTPATIENT)
Dept: ADMINISTRATIVE | Facility: OTHER | Age: 34
End: 2024-05-07

## 2024-05-07 RX ORDER — ERGOCALCIFEROL 1.25 MG/1
1 CAPSULE ORAL WEEKLY
COMMUNITY
Start: 2024-04-08

## 2024-05-07 RX ORDER — BOLUS INSULIN PUMP, 200 UNIT 2 UNIT
1 EACH MISCELLANEOUS
COMMUNITY
Start: 2024-04-08

## 2024-05-07 NOTE — TELEPHONE ENCOUNTER
----- Message from Kasie Marquez RN sent at 5/7/2024  6:56 AM EDT -----  Regarding: [facility]  05/07/24 6:56 AM    Hello, our patient Jason Blanca has had Urine Albumin/Creatinine Ratio completed/performed. Please assist in updating the patient chart by pulling the document from lab Tab within Chart Review. The date of service is 4/2024.     Thank you,  Kasie Marquez RN  [facility] PRIMARY CARE [facility]

## 2024-05-07 NOTE — TELEPHONE ENCOUNTER
Upon review of the In Basket request we have found/obtained the documentation. After careful review of the document we are unable to complete this request for Microalbumin Creatinine Urine Ratio OR Albumin Creatinine Urine Ratio  because the documentation does not have the result(s) needed to close the requested care gap(s).    Any additional questions or concerns should be emailed to the Practice Liaisons via the appropriate education email address, please do not reply via In Basket.    Thank you  Donita Mahan MA

## 2024-05-08 ENCOUNTER — OFFICE VISIT (OUTPATIENT)
Dept: FAMILY MEDICINE CLINIC | Facility: CLINIC | Age: 34
End: 2024-05-08
Payer: COMMERCIAL

## 2024-05-08 VITALS
TEMPERATURE: 97.9 F | SYSTOLIC BLOOD PRESSURE: 110 MMHG | OXYGEN SATURATION: 98 % | WEIGHT: 196 LBS | HEIGHT: 67 IN | HEART RATE: 104 BPM | BODY MASS INDEX: 30.76 KG/M2 | DIASTOLIC BLOOD PRESSURE: 70 MMHG | RESPIRATION RATE: 16 BRPM

## 2024-05-08 DIAGNOSIS — Z79.4 TYPE 2 DIABETES MELLITUS WITH DIABETIC POLYNEUROPATHY, WITH LONG-TERM CURRENT USE OF INSULIN (HCC): Primary | ICD-10-CM

## 2024-05-08 DIAGNOSIS — F90.0 ADHD, PREDOMINANTLY INATTENTIVE TYPE: ICD-10-CM

## 2024-05-08 DIAGNOSIS — E11.42 DIABETIC POLYNEUROPATHY ASSOCIATED WITH TYPE 2 DIABETES MELLITUS (HCC): ICD-10-CM

## 2024-05-08 DIAGNOSIS — E11.42 TYPE 2 DIABETES MELLITUS WITH DIABETIC POLYNEUROPATHY, WITH LONG-TERM CURRENT USE OF INSULIN (HCC): Primary | ICD-10-CM

## 2024-05-08 LAB
LEFT EYE DIABETIC RETINOPATHY: ABNORMAL
LEFT EYE IMAGE QUALITY: ABNORMAL
LEFT EYE MACULAR EDEMA: ABNORMAL
LEFT EYE OTHER RETINOPATHY: ABNORMAL
RIGHT EYE DIABETIC RETINOPATHY: ABNORMAL
RIGHT EYE IMAGE QUALITY: ABNORMAL
RIGHT EYE MACULAR EDEMA: ABNORMAL
RIGHT EYE OTHER RETINOPATHY: ABNORMAL
SEVERITY (EYE EXAM): ABNORMAL

## 2024-05-08 PROCEDURE — 99214 OFFICE O/P EST MOD 30 MIN: CPT | Performed by: FAMILY MEDICINE

## 2024-05-08 RX ORDER — METHYLPHENIDATE HYDROCHLORIDE 18 MG/1
18 TABLET ORAL DAILY
Qty: 30 TABLET | Refills: 0 | Status: SHIPPED | OUTPATIENT
Start: 2024-05-08 | End: 2024-05-09 | Stop reason: ALTCHOICE

## 2024-05-08 RX ORDER — GABAPENTIN 300 MG/1
300 CAPSULE ORAL 3 TIMES DAILY
Qty: 270 CAPSULE | Refills: 3 | Status: SHIPPED | OUTPATIENT
Start: 2024-05-08 | End: 2025-05-03

## 2024-05-08 NOTE — RESULT ENCOUNTER NOTE
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 Please make an appointment with Ophthalmologist. I placed a referral to one, will be in your chart. If you al;ready some body please let me know.

## 2024-05-08 NOTE — LETTER
May 8, 2024     Patient: Jason Blanca  YOB: 1990  Date of Visit: 5/8/2024      To Whom it May Concern:    Jason Blanca is under my professional care. Jason was seen in my office on 5/8/2024.      Jason continue disabled to work. Expecting nerve studies and titration his treatment.    I will reassess patient for fit to duty on 08/08/2024.      If you have any questions or concerns, please don't hesitate to call.          Sincerely,          Jaren Marquez MD

## 2024-05-08 NOTE — PROGRESS NOTES
Name: Jason Blanca      : 1990      MRN: [id number]  Encounter Provider: Jaren Marquez MD  Encounter Date: 2024   Encounter department: [facility] PRIMARY CARE [facility]   Chartnote:  Follow-up Consultation for Diabetic Neuropathy and Depression    Chief Complaint:  33-year-old male with leg pain due to diabetic neuropathy, depression, and difficulty focusing.    History of Present Illness:  Patient is a 33-year-old male with a history of type 2 diabetes mellitus presenting for follow-up of leg pain attributed to diabetic neuropathy. He reports excessive sweating and a pins and needles sensation in his extremities. The patient is also experiencing depressive symptoms and has difficulty focusing, which is impacting his daily functioning and ability to pursue education. He has been under the care of an endocrinologist for diabetes and low testosterone. His last hemoglobin A1c was 7.5% on 2024.      Current Outpatient Medications:     gabapentin (Neurontin) 300 mg capsule, Take 1 capsule (300 mg total) by mouth 3 (three) times a day, Disp: 270 capsule, Rfl: 3    Injection Device for Insulin (CeQur Simplicity 2U) LAUREN, Use 1 each every 3 (three) days, Disp: , Rfl:     methylphenidate (CONCERTA) 18 mg ER tablet, Take 1 tablet (18 mg total) by mouth daily Max Daily Amount: 18 mg, Disp: 30 tablet, Rfl: 0    Vitamin D, Ergocalciferol, 97527 units CAPS, Take 1 capsule by mouth once a week, Disp: , Rfl:     Blood Glucose Monitoring Suppl (OneTouch Verio Reflect) w/Device KIT, Check blood sugars once daily. Please substitute with appropriate alternative as covered by patient's insurance. Dx: E11.65, Disp: 1 kit, Rfl: 0    [place] Choice Comfort EZ 33G X 4 MM MISC, USE WITH SOLOSTAR PEN DAILY., Disp: , Rfl:     Continuous Blood Gluc Sensor (FreeStyle Amber 3 Sensor) MISC, , Disp: , Rfl:     Empagliflozin-metFORMIN HCl ER (Synjardy XR) 12.5-1000 MG TB24, Take 2  "tablets by mouth daily, Disp: , Rfl:     famotidine (PEPCID) 20 mg tablet, Take 1 tablet (20 mg total) by mouth 2 (two) times a day, Disp: 180 tablet, Rfl: 1    glucose blood (OneTouch Verio) test strip, Check blood sugars once daily. Please substitute with appropriate alternative as covered by patient's insurance. Dx: E11.65, Disp: 100 each, Rfl: 3    hydrOXYzine HCL (ATARAX) 25 mg tablet, TAKE 1-2 TABLETS ORALLY TWICE DAILY AS NEEDED FOR ANXIETY AND SLEEPLESSNESS, Disp: , Rfl:     Injection Device for Insulin (CeQur Simplicity 2U) LAUREN, Use 1 each every 3 (three) days, Disp: , Rfl:     Insulin Pen Needle (BD Pen Needle Ratna U/F) 32G X 4 MM MISC, Use daily as directed with insulin pen, Disp: 100 each, Rfl: 3    Isopropyl Alcohol (Pharmacist Choice Alcohol) 70 % MISC, Apply 200 Pads topically 3 (three) times a day, Disp: 200 each, Rfl: 5    NovoLOG 100 UNIT/ML injection, Use 200u sc every 2 days via Cequr Simplicity, Disp: , Rfl:     OneTouch Delica Lancets 33G MISC, Check blood sugars once daily. Please substitute with appropriate alternative as covered by patient's insurance. Dx: E11.65, Disp: 100 each, Rfl: 3    Ozempic, 2 MG/DOSE, 8 MG/3ML injection pen, INJECT 2 MG UNDER THE SKIN ONCE A WEEK., Disp: , Rfl:     rosuvastatin (CRESTOR) 20 MG tablet, Take 20 mg by mouth daily, Disp: , Rfl:     SYRINGE-NEEDLE, DISP, 3 ML (Safety Syringes/Needle) 20G X 1-1/2\" 3 ML MISC, Use once a week for 12 doses, Disp: 12 each, Rfl: 1    tadalafil (CIALIS) 20 MG tablet, Take 1 tablet (20 mg total) by mouth daily as needed for erectile dysfunction, Disp: 30 tablet, Rfl: 3    Toujeo SoloStar 300 units/mL CONCENTRATED U-300 injection pen (1-unit dial), Inject 35u sc every morning., Disp: , Rfl:     Xyosted 100 MG/0.5ML SOAJ, Inject 100 mg under the skin every 7 days, Disp: , Rfl:           Allergies   Allergen Reactions    Amoxicillin Other (See Comments)     Other reaction(s): augmentin    Augmentin [Amoxicillin-Pot Clavulanate] " "Hives    Sulfa Antibiotics Swelling         Past Medical History:  Type 2 diabetes mellitus, diabetic neuropathy, low testosterone, history of sleep apnea (resolved with weight loss).    Past Surgical History:  No significant surgical history.    Social History:  Currently on disability, pursuing education in [employer], non-smoker, alcohol use not specified.    Family History:  Not provided.    Review of Systems:  Reports excessive sweating, pins and needles sensation in hands, and depressive symptoms.    Vital Signs:  /70 (BP Location: Left arm, Patient Position: Sitting, Cuff Size: Standard)   Pulse 104   Temp 97.9 °F (36.6 °C) (Tympanic)   Resp 16   Ht 5' 7\" (1.702 m)   Wt 88.9 kg (196 lb)   SpO2 98%   BMI 30.70 kg/m²     Non distress, normal respiratory effort. Pulse is regular. Heart without murmurs.   Legs tenderness to exam. Blanching and week pulses.    Lab Results:  Last hemoglobin A1c was 7.5%.    Imaging and Other Relevant Results:  Ultrasound of leg arteries showed no major blockages.      Assessment and Plan     1. Type 2 diabetes mellitus with diabetic polyneuropathy, with long-term current use of insulin (HCC)  -     IRIS Diabetic eye exam    2. Diabetic polyneuropathy associated with type 2 diabetes mellitus (HCC)  -     gabapentin (Neurontin) 300 mg capsule; Take 1 capsule (300 mg total) by mouth 3 (three) times a day    3. ADHD, predominantly inattentive type        Diabetic Neuropathy: Continue gabapentin 300 mg three times daily. Schedule EMG to assess nerve function. Consider nerve biopsy as recommended by podiatrist.  Depression: Explore non-pharmacological interventions such as psychotherapy. Monitor for any changes in mood or behavior.  Possible ADHD: Start Concerta 18 mg once daily for attention deficit disorder. Monitor for effectiveness and side effects.  Diabetes Management: Continue current regimen as prescribed by endocrinologist. Encourage adherence to diet and " exercise plan to optimize glycemic control.  Disability: Support patient's transition to long-term disability and provide necessary documentation for insurance purposes. Schedule follow-up in three months or sooner if needed.    Additional Notes:  Patient is currently on disability and has expressed a desire to transition to long-term disability. He is also pursuing education in [employer] with the goal of finding remote work. Patient has been experiencing excessive sweating and pins and needles sensation in extremities. He is under treatment for diabetes and low testosterone. Patient has reported depressive symptoms and difficulty focusing. His today test for ADHD is indicative of ADHD. He has had a negative experience with bipolar medication and is currently not taking it.          Jaren Marquez MD   [facility] PRIMARY CARE [facility]

## 2024-05-09 ENCOUNTER — TELEPHONE (OUTPATIENT)
Age: 34
End: 2024-05-09

## 2024-05-09 DIAGNOSIS — F90.0 ADHD, PREDOMINANTLY INATTENTIVE TYPE: Primary | ICD-10-CM

## 2024-05-09 DIAGNOSIS — F90.0 ADHD, PREDOMINANTLY INATTENTIVE TYPE: ICD-10-CM

## 2024-05-09 RX ORDER — DEXTROAMPHETAMINE SACCHARATE, AMPHETAMINE ASPARTATE, DEXTROAMPHETAMINE SULFATE AND AMPHETAMINE SULFATE 2.5; 2.5; 2.5; 2.5 MG/1; MG/1; MG/1; MG/1
10 TABLET ORAL
Qty: 60 TABLET | Refills: 0 | Status: SHIPPED | OUTPATIENT
Start: 2024-05-09

## 2024-05-09 RX ORDER — DEXTROAMPHETAMINE SACCHARATE, AMPHETAMINE ASPARTATE, DEXTROAMPHETAMINE SULFATE AND AMPHETAMINE SULFATE 2.5; 2.5; 2.5; 2.5 MG/1; MG/1; MG/1; MG/1
10 TABLET ORAL
Qty: 60 TABLET | Refills: 0 | Status: SHIPPED | OUTPATIENT
Start: 2024-05-09 | End: 2024-05-09 | Stop reason: CLARIF

## 2024-05-09 NOTE — TELEPHONE ENCOUNTER
Pt called and said that none of the pharmacies are carrying Concerta. Pt stated that Dr. Marquez also told him about Adderall. Pt not sure if it is covered by his insurance. Pt needs to find out the pricing but he wants to know exactly what Dr. Marquez is going to put in so he can get pricing. Please advise [phone] thank you.   If it works, he would want it send to [facility] - [place] - [address] [facility] [address] [[id number]]

## 2024-05-22 ENCOUNTER — OFFICE VISIT (OUTPATIENT)
Dept: PODIATRY | Facility: CLINIC | Age: 34
End: 2024-05-22
Payer: COMMERCIAL

## 2024-05-22 DIAGNOSIS — E11.42 DIABETIC POLYNEUROPATHY ASSOCIATED WITH TYPE 2 DIABETES MELLITUS (HCC): Primary | ICD-10-CM

## 2024-05-22 DIAGNOSIS — I70.90 ATHEROSCLEROSIS: ICD-10-CM

## 2024-05-22 DIAGNOSIS — R61 HYPERHIDROSIS: ICD-10-CM

## 2024-05-22 PROCEDURE — 99213 OFFICE O/P EST LOW 20 MIN: CPT | Performed by: PODIATRIST

## 2024-05-23 NOTE — PROGRESS NOTES
Ambulatory Visit  Name: Jason Blanca      : 1990      MRN: [id number]  Encounter Provider: Brian Pino DPM  Encounter Date: 2024   Encounter department: [facility] PODIATRY [place]      Explained to patient that his weakness and numbness is all due to diabetic neuropathy.  He was urged to continue taking the gabapentin as prescribed.  This medication dosage could be increased if necessary.    The patient was referred to physical therapy to work on balance.    Advised patient to discuss possibility of thyroid issues with his medical doctor as it relates to hyperhidrosis.  Ditropan XL is a treatment option.    Reappoint as needed    Assessment & Plan   1. Diabetic polyneuropathy associated with type 2 diabetes mellitus (HCC)  -     Ambulatory referral to Physical Therapy; Future  2. Atherosclerosis  3. Hyperhidrosis      History of Present Illness     Jason Blanca is a 33 y.o. male who presents for pedal assessment.  Patient has a known type II diabetic with severe neuropathy.  He has profound numbness and weakness in his lower extremities.  His feet are very numb and his balance is poor.  Patient recently had arterial Doppler studies performed as he has very cold feet with nonpalpable pulses.    The patient is currently taking gabapentin 300 mg 3 times daily.  I initially placed the patient on a daily dosage of gabapentin 300 mg but is increased by his medical doctor.  He finds this helpful.    Relatively new complaint is profound sweating that occurs throughout his day.  He states that he frequently is soaking wet the perspiration.    I personally reviewed results of arterial Doppler studies dated 2024.  They reveal diffuse hardening of the arteries.  The OCTAVIA right foot was 1.15.  OCTAVIA of the left foot was 1.1.  Each OCTAVIA was within normal limits.    I personally reviewed an A1c dated 2024.  It was 7.5.  Prior A1c was 11.5.        Review of Systems   Cardiovascular:          Cold feet   Neurological:  Positive for weakness and numbness.           Objective     There were no vitals taken for this visit.    Physical Exam  Constitutional:       Appearance: Normal appearance.   Cardiovascular:      Comments: No palpable pedal pulses.  Musculoskeletal:         General: Normal range of motion.   Skin:     Comments: Integument is cold.   Neurological:      Mental Status: He is oriented to person, place, and time.      Sensory: Sensory deficit present.      Comments: Both feet are numb; poor balance upon arising and trying to walk           Administrative Statements

## 2024-05-30 ENCOUNTER — TELEPHONE (OUTPATIENT)
Dept: FAMILY MEDICINE CLINIC | Facility: CLINIC | Age: 34
End: 2024-05-30

## 2024-05-30 DIAGNOSIS — F90.0 ADHD, PREDOMINANTLY INATTENTIVE TYPE: ICD-10-CM

## 2024-05-30 RX ORDER — DEXTROAMPHETAMINE SACCHARATE, AMPHETAMINE ASPARTATE, DEXTROAMPHETAMINE SULFATE AND AMPHETAMINE SULFATE 2.5; 2.5; 2.5; 2.5 MG/1; MG/1; MG/1; MG/1
10 TABLET ORAL
Qty: 60 TABLET | Refills: 0 | Status: SHIPPED | OUTPATIENT
Start: 2024-05-30

## 2024-05-30 NOTE — TELEPHONE ENCOUNTER
Patient came into the office states that ADD medication is out of stock in his pharmacy will like to switch to Addirall.  He will like that particular medication to be send to :  [place]  [facility]  [address] [facility] [address]    [place] [id number]

## 2024-06-05 ENCOUNTER — TELEPHONE (OUTPATIENT)
Dept: FAMILY MEDICINE CLINIC | Facility: CLINIC | Age: 34
End: 2024-06-05

## 2024-06-25 DIAGNOSIS — F90.0 ADHD, PREDOMINANTLY INATTENTIVE TYPE: ICD-10-CM

## 2024-06-26 RX ORDER — DEXTROAMPHETAMINE SACCHARATE, AMPHETAMINE ASPARTATE, DEXTROAMPHETAMINE SULFATE AND AMPHETAMINE SULFATE 2.5; 2.5; 2.5; 2.5 MG/1; MG/1; MG/1; MG/1
10 TABLET ORAL
Qty: 60 TABLET | Refills: 0 | Status: SHIPPED | OUTPATIENT
Start: 2024-06-26

## 2024-06-26 NOTE — TELEPHONE ENCOUNTER
Patient requesting 20mg tablet? No documentation suggesting that you were going to increase dose - please review

## 2024-07-09 ENCOUNTER — OFFICE VISIT (OUTPATIENT)
Dept: FAMILY MEDICINE CLINIC | Facility: CLINIC | Age: 34
End: 2024-07-09
Payer: COMMERCIAL

## 2024-07-09 ENCOUNTER — TELEPHONE (OUTPATIENT)
Dept: ADMINISTRATIVE | Facility: OTHER | Age: 34
End: 2024-07-09

## 2024-07-09 VITALS
BODY MASS INDEX: 31.04 KG/M2 | HEART RATE: 97 BPM | TEMPERATURE: 96.6 F | SYSTOLIC BLOOD PRESSURE: 120 MMHG | WEIGHT: 197.8 LBS | HEIGHT: 67 IN | OXYGEN SATURATION: 100 % | RESPIRATION RATE: 17 BRPM | DIASTOLIC BLOOD PRESSURE: 80 MMHG

## 2024-07-09 DIAGNOSIS — I73.9 PAD (PERIPHERAL ARTERY DISEASE) (HCC): ICD-10-CM

## 2024-07-09 DIAGNOSIS — F90.0 ADHD, PREDOMINANTLY INATTENTIVE TYPE: ICD-10-CM

## 2024-07-09 DIAGNOSIS — Z79.4 TYPE 2 DIABETES MELLITUS WITH DIABETIC POLYNEUROPATHY, WITH LONG-TERM CURRENT USE OF INSULIN (HCC): Primary | ICD-10-CM

## 2024-07-09 DIAGNOSIS — E29.1 HYPOGONADISM IN MALE: ICD-10-CM

## 2024-07-09 DIAGNOSIS — R61 EXCESSIVE SWEATING: ICD-10-CM

## 2024-07-09 DIAGNOSIS — E11.42 TYPE 2 DIABETES MELLITUS WITH DIABETIC POLYNEUROPATHY, WITH LONG-TERM CURRENT USE OF INSULIN (HCC): Primary | ICD-10-CM

## 2024-07-09 PROBLEM — G44.209 TENSION-TYPE HEADACHE, NOT INTRACTABLE: Status: RESOLVED | Noted: 2023-12-11 | Resolved: 2024-07-09

## 2024-07-09 PROBLEM — F52.21 ERECTILE DYSFUNCTION OF NONORGANIC ORIGIN: Status: ACTIVE | Noted: 2024-03-11

## 2024-07-09 PROBLEM — R10.13 EPIGASTRIC PAIN: Status: RESOLVED | Noted: 2023-08-13 | Resolved: 2024-07-09

## 2024-07-09 PROBLEM — E11.65 TYPE 2 DIABETES MELLITUS WITH HYPERGLYCEMIA, WITH LONG-TERM CURRENT USE OF INSULIN (HCC): Status: ACTIVE | Noted: 2023-04-06

## 2024-07-09 PROBLEM — E55.9 VITAMIN D DEFICIENCY: Status: ACTIVE | Noted: 2024-04-08

## 2024-07-09 PROBLEM — E78.2 MIXED DYSLIPIDEMIA: Status: ACTIVE | Noted: 2024-03-11

## 2024-07-09 PROBLEM — K76.0 FATTY LIVER: Status: ACTIVE | Noted: 2024-03-11

## 2024-07-09 LAB — SL AMB POCT HEMOGLOBIN AIC: 9.5 (ref ?–6.5)

## 2024-07-09 PROCEDURE — 99214 OFFICE O/P EST MOD 30 MIN: CPT | Performed by: PHYSICIAN ASSISTANT

## 2024-07-09 PROCEDURE — 83036 HEMOGLOBIN GLYCOSYLATED A1C: CPT | Performed by: PHYSICIAN ASSISTANT

## 2024-07-09 NOTE — TELEPHONE ENCOUNTER
----- Message from Kasie MORENO sent at 7/8/2024  7:37 AM EDT -----  Regarding: [facility]  07/08/24 7:37 AM    Hello, our patient Jason Blanca has had Urine Albumin/Creatinine Ratio completed/performed. Please assist in updating the patient chart by pulling the document from lab Tab within Chart Review. The date of service is 4/2024.     Thank you,  Kasie Marquez, RN  [facility]  PRIMARY CARE [facility]

## 2024-07-09 NOTE — ASSESSMENT & PLAN NOTE
Continue statin and diabetes management.  Reviewed arterial duplex from 4/2024.  Continue follow-up podiatry.

## 2024-07-09 NOTE — ASSESSMENT & PLAN NOTE
Adult diagnosis per Dr. Marquez.  On Adderall 10 mg twice daily with improvement.  Blood pressure stable.

## 2024-07-09 NOTE — TELEPHONE ENCOUNTER
Upon review of the In Basket request we have found/obtained the documentation. After careful review of the document we are unable to complete this request for Microalbumin Creatinine Urine Ratio OR Albumin Creatinine Urine Ratio  because the documentation does not have the result(s) needed to close the requested care gap(s).    Any additional questions or concerns should be emailed to the Practice Liaisons via the appropriate education email address, please do not reply via In Basket.    Thank you  Annemarie Singleton   [facility]

## 2024-07-09 NOTE — PATIENT INSTRUCTIONS
"Patient Education     Type 2 diabetes   The Basics   Written by the doctors and editors at [facility]   What is type 2 diabetes? -- This is a disorder that disrupts the way the body uses sugar. It is sometimes called type 2 diabetes mellitus.  All of the cells in the body need sugar to work normally. Sugar gets into the cells with the help of a hormone called insulin. Insulin is made by the pancreas, an organ in the belly. If there is not enough insulin, or if cells in the body don't respond normally to insulin, sugar builds up in the blood. That is what happens to people with diabetes.  There are 2 different types of diabetes:   In type 1 diabetes, the pancreas makes little or no insulin.   In type 2 diabetes, the pancreas still makes some insulin, but the cells in the body stop responding normally. Eventually, the pancreas cannot make enough insulin to keep up.  Having excess body weight or obesity increases a person's risk of developing type 2 diabetes. But people without excess body weight can get diabetes, too.  What are the symptoms of type 2 diabetes? -- Type 2 diabetes usually causes no symptoms. When symptoms do happen, they include:   Needing to urinate often   Intense thirst   Blurry vision  Can diabetes lead to other health problems? -- Yes. Type 2 diabetes might not make you feel sick. But if it is not managed, it can lead to serious problems over time, such as:   Heart attacks   Strokes   Kidney disease   Vision problems (or even blindness)   Pain or loss of feeling in the hands and feet   Needing to have fingers, toes, or other body parts removed (amputated)  How do I know if I have type 2 diabetes? -- Your doctor or nurse can do a blood test. There are 2 tests that can be used for this. Both involve measuring the amount of sugar in your blood, called your \"blood sugar\" or \"blood glucose\":   One of the tests measures your blood sugar at the time the blood sample is taken. This test is done in the " "morning. You can't eat or drink anything except water for at least 8 hours before the test.   The other test shows what your average blood sugar has been for the past 2 to 3 months. This blood test is called \"hemoglobin A1C\" or just \"A1C.\" It can be checked at any time of the day, even if you have recently eaten.  How is type 2 diabetes treated? -- The goals of treatment are to manage your blood sugar and lower the risk of future problems that can happen in people with diabetes.  Treatment might include:   Lifestyle changes - This is an important part of managing diabetes. It includes eating healthy foods and getting plenty of physical activity.   Medicines - There are a few medicines that help lower blood sugar. Some people need to take pills that help the body make more insulin or that help insulin do its job. Others need insulin shots.  Depending on what medicines you take, you might need to check your blood sugar regularly at home. But not everyone with type 2 diabetes needs to do this. Your doctor or nurse will tell you if you should be checking your blood sugar, and when and how to do this.  Sometimes, people with type 2 diabetes also need medicines to help prevent problems caused by the disease. For instance, medicines used to lower blood pressure can reduce the chances of a heart attack or stroke.   General medical care - It's also important to take care of other areas of your health. This includes watching your blood pressure and cholesterol levels. You should also get certain vaccines, such as vaccines to protect against the flu and coronavirus disease 2019 (\"COVID-19\"). Some people also need a vaccine to prevent pneumonia.  Can type 2 diabetes be prevented? -- Yes. To lower your chances of getting type 2 diabetes, the most important thing you can do is eat a healthy diet and get plenty of physical activity. This can help you lose weight if you are overweight. But eating well and being active are also good " for your overall health. Even gentle activity, like walking, has benefits.  If you smoke, quitting can also lower your risk of type 2 diabetes. Quitting smoking can be difficult, but your doctor or nurse can help.  All topics are updated as new evidence becomes available and our peer review process is complete.  This topic retrieved from [employer] on: Apr 24, 2024.  Topic 64172 Version 23.0  Release: 32.3.2 - C32.113  © 2024 UpToDate, Inc. and/or its affiliates. All rights reserved.  Consumer Information Use and Disclaimer   Disclaimer: This generalized information is a limited summary of diagnosis, treatment, and/or medication information. It is not meant to be comprehensive and should be used as a tool to help the user understand and/or assess potential diagnostic and treatment options. It does NOT include all information about conditions, treatments, medications, side effects, or risks that may apply to a specific patient. It is not intended to be medical advice or a substitute for the medical advice, diagnosis, or treatment of a health care provider based on the health care provider's examination and assessment of a patient's specific and unique circumstances. Patients must speak with a health care provider for complete information about their health, medical questions, and treatment options, including any risks or benefits regarding use of medications. This information does not endorse any treatments or medications as safe, effective, or approved for treating a specific patient. UpToDate, Inc. and its affiliates disclaim any warranty or liability relating to this information or the use thereof.The use of this information is governed by the Terms of Use, available at https://www.wolterskluwer.com/en/know/clinical-effectiveness-terms. 2024© UpToDate, Inc. and its affiliates and/or licensors. All rights reserved.  Copyright   © 2024 UpToDate, Inc. and/or its affiliates. All rights reserved.    Patient Education    "  Carb counting for adults with diabetes   The Basics   Written by the doctors and editors at [facility]   What is carb counting? -- This is a type of meal planning that many people with diabetes use. It is a way to figure out how many carbohydrates, or \"carbs,\" you eat.  The body breaks down the food we eat into 3 main types of nutrients: carbs, proteins, and fats. Carbs are sugars and starches that come from food. The body uses carbs for energy.  Why do I need to count carbs? -- People with diabetes need to pay attention to how many carbs they eat. This is because carbs raise your blood sugar level.  Carb counting helps you:   Choose the right amount of insulin to take before meals and snacks - If you take insulin before meals, the dose depends on several things, including how many carbs you plan to eat. (It also depends on how much you plan to exercise and your blood sugar level.)   Plan your meals and snacks for the day - You can use carb counting to figure out how many carbs to eat at each meal and snack. This helps you make sure that you eat the right amount over the entire day.   Keep your blood sugar levels well managed - Spreading out the carbs you eat over a whole day can help keep your blood sugar from getting too high. If you take insulin or another diabetes medicine that can cause low blood sugar, eating about the same amount of carbs at each meal every day also helps keep your blood sugar from getting too low. Reducing the amount of carbs you eat can help you manage your diabetes better and prevent medical problems that diabetes can cause.  Your doctor, nurse, or dietitian (food expert) can help you figure out how many carbs to try to eat each day. This will depend on your eating habits, weight, activity level, and which diabetes medicines you take.  People who take insulin before meals might need to be very careful when they count the carbs in every meal and snack. This is so they can give themselves " "the right amount of insulin. If the insulin dose doesn't match the amount of carbs, their blood sugar might get too low or too high. Other people might be able to be a little more flexible as long as they get about the same amount of carbs at each meal or throughout the day.  Which foods have carbs? -- Foods with a lot of carbs include:   Grains - These include bread, pasta, rice, and cereal.   Fruits and starchy vegetables - Starchy vegetables include potatoes, corn, and squash.   Milk and other dairy products - Dairy products include cheese and yogurt.   Foods with added sugar - These include sweets and baked goods likes cookies and cakes, as well as sugary drinks like juice and soda.  It is best to get most of your carbs from fruits, vegetables, whole grains (like whole-wheat bread, whole-grain cereals, and brown rice), and low-fat milk and dairy products.  How do I count carbs? -- To count carbs in packaged foods, check the food's nutrition label (if it has one).  On the label (figure 1), check for:   \"Total Carbohydrate\" number - This tells you how many carbs are in 1 serving size of the food. If you eat 1 serving, then the number of carbs you eat is the same as the number of total carbohydrates.   \"Serving size\" - This tells you how much food is in 1 serving. If you have 2 servings, the number of carbs will be 2 times the number of carbohydrates listed.   \"Dietary Fiber\" - Fiber is a carb that is not digested, which means that it does not raise blood sugar. Foods with a lot of fiber can help manage your blood sugar. If a food has more than 5 grams (g) of fiber, you need less insulin to cover the total carbs in that food. So, if you are calculating an insulin dose, only count the carbs that are not from fiber (figure 1).  What is exchange planning? -- Exchange planning, or the \"exchange system,\" is a way for people to plan their meals without reading labels. This can be helpful since many foods don't come with " "a nutrition label.  The exchange system involves knowing how much of different foods have about 15 grams of carbs (table 1 and table 2 and table 3). Your doctor, nurse, or dietitian gives you a certain number of \"carb choices\" to eat with each meal and snack (table 4). Each \"choice\" is a portion of food that has about 15 grams of carbs. Knowing your options makes it easier to \"exchange\" 1 carb choice for another as you plan your meals and snacks. For example, 1 small apple could be exchanged for 1/3 cup of pasta.  How can I plan my meals? -- First, make sure that you know how many carbs you should be eating each day. Ask your doctor, nurse, or dietitian if you are not sure.  Here are some tips that might help:   Spread out your carbs over 4 to 6 small meals each day instead of 3 big ones.   Eat a similar number of carbs at each meal, for example, at each dinner.   Eat your meals at a similar time each day.   Plan your meals ahead of time.   Use the \"plate method.\" This is a simpler way to make sure that you get a good balance of carbs and other nutrients with each meal. It is not as exact as counting all of your carbs, but it can be helpful for people who prefer a simpler approach. If you take insulin before meals, it is generally better to adjust your insulin dose by counting how many carbs you plan to eat or using the exchange planning strategy.  For the plate method, you start with a plate about 9 inches (23 cm) across. Fill it with (figure 2):   1/2 non-starchy vegetables   1/4 protein   1/4 carbs   Follow your doctor's instructions for how and when to check your blood sugar. This can help you learn how certain foods affect your blood sugar.   Keep track of your meals and blood sugar levels. Show this to your doctor or nurse so they can adjust your treatment if needed. If you take insulin, you will also need to keep track of your exercise patterns and how much insulin you give yourself with each dose.   If you " "take insulin, make sure that you understand how to use it. This includes knowing how to adjust the dose based on your blood sugar level and what you plan to eat. Foods that have a lot of protein or fat also can affect your blood sugar level. Some people need to adjust their insulin doses when they eat these foods.   Remember that other things besides carbs can raise or lower your blood sugar level. These things can include exercise, getting sick, drinking alcohol, traveling, and stress. If you take insulin, make sure that you know how and when to adjust your dose in these situations.  If you are having trouble counting carbs or managing your blood sugar, talk to your doctor or nurse. They can help. A dietitian can also help you plan specific menus that will give you the right amount of carbs each day.  For more information, you can also get a book on counting carbs or check the American Diabetes Association website (www.diabetes.org).  All topics are updated as new evidence becomes available and our peer review process is complete.  This topic retrieved from [employer] on: Mar 27, 2024.  Topic 80886 Version 11.0  Release: 32.2.4 - C32.85  © 2024 UpToDate, Inc. and/or its affiliates. All rights reserved.  figure 1: Counting carbohydrates     To figure out the \"carb count\" in 1 serving, start with the number of grams of total carbohydrates (46 grams), then subtract the number of grams of dietary fiber (7 grams). It's also important to look at the serving size. In this example, the carb count is 39 grams. You can use this number when counting carbs for your insulin dose.  Graphic 55411 Version 8.0  table 1: Bread and grains with 15 grams of carbs*  Bread    Food  Serving size    Bagel 1/4 large bagel (1 oz)   Biscuit 1 biscuit (2.5 inches across)   Bread, reduced calorie, light 2 slices (1.5 oz)   Cornbread 1.75 inch cube (1.5 oz)   English muffin 1/2 muffin   Hot dog or hamburger bun 1/2 bun (3/4 oz)   Naan, chapati, or " "roti 1 oz   Pancake 1 pancake (4 inches across, 1/4 inch thick)   Taylor (6 inches across) 1/2 taylor   Tortilla, corn 1 small tortilla (6 inches across)   Tortilla, flour (white or whole wheat) 1 small tortilla (6 inches across) or 1/3 large tortilla (10 inches across)   Waffle 1 waffle (4-inch square or 4 inches across)   Cereals and grains (including pasta and rice)    Food  Serving size (cooked)    Barley, couscous, millet, pasta (white or whole wheat, all shapes and sizes), polenta, quinoa (all colors), or rice (white, brown, and other colors and types) 1/3 cup   Bran cereal (twigs, buds, or flakes), shredded wheat (plain), or sugar-coated cereal 1/2 cup   Bulgur, kasha, tabbouleh (tabouli), or wild rice 1/2 cup   Granola cereal 1/4 cup   Hot cereal (oats, oatmeal, grits) 1/2 cup   Unsweetened, ready-to-eat cereal 3/4 cup   * For bread and grains, 15 grams of carbs is considered 1 serving or \"choice\" for people who need to count carbs.  Graphic 565185 Version 1.0  table 2: Fruits with 15 grams of carbs*  Food  Serving size    Applesauce, unsweetened 1/2 cup   Banana 1 extra small banana, about 4 inches long (4 oz)   Blueberries 3/4 cup   Dried fruits (blueberries, cherries, cranberries, mixed fruit, raisins) 2 tbsp   Fruit, canned 1/2 cup   Fruit, whole, small (apple) 1 small fruit (4 oz)   Fruit, whole, medium (nectarine, orange, pear, tangerine) 1 medium fruit (6 oz)   Fruit juice, unsweetened 1/2 cup   Grapes 17 small grapes (3 oz)   Melon, diced 1 cup   Strawberries, whole 1 and 1/4 cups   When listed, weight (oz) includes skin and seeds. If you are not sure if your fruit is the right size for 1 serving, you can use a food scale to check the weight.  * For fruits, 15 grams of carbs is considered 1 serving or \"choice\" for people who need to count carbs.  Graphic 752982 Version 1.0  table 3: Starchy vegetables with 15 grams of carbs*  Food  Serving size (cooked)    Cassava, dasheen, or plantain 1/3 cup   Corn, " "green peas, mixed vegetables, or parsnips 1/2 cup   Marinara, pasta, or spaghetti sauce 1/2 cup   Mixed vegetables (with corn or peas) 1 cup   Potato, baked with skin 1/4 large (3 oz)   Potato, Lithuanian-fried (oven-baked) 1 cup (2 oz)   Potato, mashed with milk and fat 1/2 cup   Squash, winter (acorn, butternut) 1 cup   Yam or sweet potato, plain 1/2 cup (3 and 1/2 oz)   If you are not sure if your vegetable is the right size for 1 serving, you can use a food scale to check the weight.  * For starchy vegetables, 15 grams of carbs is considered 1 serving or \"choice\" for people who need to count carbs.  Graphic 704772 Version 1.0  table 4: Sample exchange system meal plan  Time  Exchange pattern  Sample menu  Carbohydrate count (g)    8 am 3 carbohydrate group    2 starch 1 English muffin 30    1 fruit 1 1/4 c strawberries 15    1 protein group 1/4 c cottage cheese -    1 fat group 1 tsp margarine -      Total: 45    12 noon 4 carbohydrate group    2 starch 2 slices of bread 30    1 fruit 1 orange 15    1 vegetable 1 c salad -    1 milk 8 oz skim milk 12    3 protein group 3 oz chicken -    1 fat group 1 tbsp low fat bragg -      Total: 57    3 pm 1 carbohydrate group    1 fruit or 1 starch 1 apple or 6 crackers 15      Total: 15    6 pm 4 carbohydrate group    2 starch 1 c potato 30    1 fruit 1/2 c fruit salad 15    1 vegetable 1 c salad -    1 milk 8 oz skim milk 12    6 protein group 6 oz fish -    1 fat group 2 tbsp low fat salad dressing -      Total: 57    9 pm 1 carbohydrate group    1 starch 6 crackers 15    1 protein 2 tbsp peanut butter -      Total: 15    Graphic 35738 Version 3.0  figure 2: The \"plate method\"     For the plate method, you start with a plate about 9 inches (23 cm) across. Then fill it with 1/2 non-starchy vegetables, 1/4 protein, and 1/4 carbs.  Graphic 597152 Version 2.0  Consumer Information Use and Disclaimer   Disclaimer: This generalized information is a limited summary of diagnosis, " treatment, and/or medication information. It is not meant to be comprehensive and should be used as a tool to help the user understand and/or assess potential diagnostic and treatment options. It does NOT include all information about conditions, treatments, medications, side effects, or risks that may apply to a specific patient. It is not intended to be medical advice or a substitute for the medical advice, diagnosis, or treatment of a health care provider based on the health care provider's examination and assessment of a patient's specific and unique circumstances. Patients must speak with a health care provider for complete information about their health, medical questions, and treatment options, including any risks or benefits regarding use of medications. This information does not endorse any treatments or medications as safe, effective, or approved for treating a specific patient. UpToDate, Inc. and its affiliates disclaim any warranty or liability relating to this information or the use thereof.The use of this information is governed by the Terms of Use, available at https://www.Mingle360.com/en/know/clinical-effectiveness-terms. 2024© UpToDate, Inc. and its affiliates and/or licensors. All rights reserved.  Copyright   © 2024 UpToDate, Inc. and/or its affiliates. All rights reserved.    Patient Education     Treatment for type 2 diabetes   The Basics   Written by the doctors and editors at [employer]   What are the goals of type 2 diabetes treatment? -- The goals of treatment for type 2 diabetes are to:   Manage your blood sugar   Prevent future health problems that can happen in people with diabetes  How is type 2 diabetes treated? -- Type 2 diabetes can be treated with:   Diet changes   Lifestyle changes   Medicines  Your doctor or nurse will work with you to make a treatment plan that is right for you.  What diet and lifestyle changes might be part of my treatment? -- As part of your treatment, your  "doctor or nurse might recommend that you:   Eat healthy foods   Lose weight if you have excess body weight   Get plenty of physical activity   Avoid smoking  Making these lifestyle changes is as important as taking your medicines. They will also help improve your overall health.  What medicines are used to treat type 2 diabetes? -- Different medicines can be used to treat type 2 diabetes. The first medicine that most people with type 2 diabetes take is a pill called metformin (brand name: Glucophage).  How do I know if my treatment is working? -- Your doctor can do a blood test called an \"A1C.\" This test shows what your blood sugar level has been over the past 2 to 3 months.  Another way to know if your treatment is working is to check your blood sugar level yourself. Many people with type 2 diabetes do not need to do this, but some do. It usually involves using a device called a \"blood glucose monitor.\" If your doctor recommends doing this, they will explain how and when to use the device.  What if my blood sugar level is still higher than normal? -- If your blood sugar level is still higher than normal after taking metformin for 2 to 3 months, your doctor might increase your dose. If you are already taking the highest possible dose, your doctor might suggest adding a second medicine.  Which second medicine will I take? -- There are different medicines that your doctor can prescribe. The second medicine could be another pill that you take every day, or a shot that you give yourself once a day or once a week. The choice depends on different things, including how high your blood sugar is, your weight, your other health problems, and whether you are comfortable giving yourself a shot.  A few of these medicines can cause low blood sugar as a side effect. Symptoms of low blood sugar can include:   Sweating and shaking   Feeling hungry   Feeling worried  Low blood sugar should be treated quickly because it can cause you " to pass out. Your doctor or nurse will tell you if your medicine can cause low blood sugar and how to treat it.  What is insulin? -- Insulin is a hormone normally made by the pancreas, an organ in the belly. It helps sugar get into your body's cells. In most people with type 2 diabetes, the body does not respond to insulin normally. Then, over time, the pancreas stops making enough insulin.  Most people with type 2 diabetes can manage their blood sugar with healthy eating, physical activity, and medicines. Some people need to take insulin as part of their treatment plan.  Insulin might be prescribed as a second medicine, or as the only medicine. It usually comes as a shot that people give themselves (figure 1).  If your doctor prescribes insulin, they will tell you:   Which type to use - There are different types of insulin. Some types work faster or last longer than others.   How much to use   When to use it   How to give yourself the shot   When to check your blood sugar level   How to avoid low blood sugar  If you take insulin, your dose will need to change if you get sick, have surgery, travel, or eat out. Your doctor or nurse will talk to you about when and how to change your dose.  What other treatments might I need? -- Sometimes, people with type 2 diabetes need medicines to treat or prevent other health problems. For example, if you have high blood pressure, you might take medicine to lower your blood pressure. This can reduce your chances of having a heart attack or stroke.  When should I see my doctor or nurse? -- Most people with diabetes see their doctor or nurse every 3 or 4 months. During these visits, they will talk with you about your medicines and blood sugar levels. If your blood sugar levels are not where they should be, your doctor or nurse might make changes to your treatment plan.  Taking care of diabetes can be hard, and some people feel sad, stressed, or anxious. Let your doctor or nurse know  if you are struggling, so they can help.  All topics are updated as new evidence becomes available and our peer review process is complete.  This topic retrieved from [employer] on: Feb 26, 2024.  Topic 60042 Version 11.0  Release: 32.2.4 - C32.56  © 2024 UpToDate, Inc. and/or its affiliates. All rights reserved.  figure 1: Giving insulin with a needle and syringe     To give yourself insulin using a needle and syringe:  Pinch up some skin, and quickly insert the needle. Keep the skin pinched to avoid having the insulin go into the muscle.  Push the plunger down all of the way.  Let go of the skin, and remove the needle. If you can see blood or clear fluid (insulin) where the shot went in, press on the area for 5 to 8 seconds, but do not rub.  Graphic 02122 Version 14.0  Consumer Information Use and Disclaimer   Disclaimer: This generalized information is a limited summary of diagnosis, treatment, and/or medication information. It is not meant to be comprehensive and should be used as a tool to help the user understand and/or assess potential diagnostic and treatment options. It does NOT include all information about conditions, treatments, medications, side effects, or risks that may apply to a specific patient. It is not intended to be medical advice or a substitute for the medical advice, diagnosis, or treatment of a health care provider based on the health care provider's examination and assessment of a patient's specific and unique circumstances. Patients must speak with a health care provider for complete information about their health, medical questions, and treatment options, including any risks or benefits regarding use of medications. This information does not endorse any treatments or medications as safe, effective, or approved for treating a specific patient. UpToDate, Inc. and its affiliates disclaim any warranty or liability relating to this information or the use thereof.The use of this information is  "governed by the Terms of Use, available at https://www.woltersOpsonauwer.com/en/know/clinical-effectiveness-terms. 2024© UpToDate, Inc. and its affiliates and/or licensors. All rights reserved.  Copyright   © 2024 UpToDate, Inc. and/or its affiliates. All rights reserved.    Patient Education     Foot care for people with diabetes   The Basics   Written by the doctors and editors at [employer]   Why is foot care important if I have diabetes? -- Diabetes can cause nerve damage if your blood sugar is high for a long time. The medical term for this is \"diabetic neuropathy.\"  If you have problems with the nerves in your feet, you might not be able to feel pain in your foot. Normally, people feel pain when they get a cut or a blister on their foot. The pain tells them that they need to treat their cut so it can heal. But people with nerve damage might not feel any pain when their feet get hurt. They might not even know that they have a cut, so they might not treat it. Problems that aren't treated right away can get much worse. For example, an untreated cut can get infected and turn into an open sore.  High blood sugar can also damage blood vessels and decrease blood flow to your feet. This can weaken your skin and make wounds take longer to heal. You are also more likely to get an infection if you have high blood sugar.  How do I take care of my feet? -- Taking good care of your feet can help prevent foot problems. You should:   Wash your feet every day with soap and warm water. Pat your feet dry, and be sure to dry the skin between your toes.   Keep your feet moisturized. Put lotion on the tops and bottoms of your feet, but not between your toes.   Check your feet every day (figure 1). Look for cuts, blisters, redness, or swelling. Use a mirror, or ask someone to help you check the bottoms of your feet. Check all parts of the foot, especially between the toes. Look for broken skin, ulcers, blisters, or redness.   Trim your " toenails straight across when needed (figure 2). Do not cut the corners of your toenails. File rough edges. Do not cut your cuticles. Ask for help if you cannot see well or have problems reaching your feet.   Ask your doctor or nurse to check your feet at each visit. Take your shoes and socks off for these checks.   See a foot care provider (such as a podiatrist) if you have an ingrown toenail, corn, or callus. Do not try to remove corns and calluses yourself.  How do I protect my feet from injury? -- There are several ways to protect your feet. You can:   Wear shoes and socks at all times, even at home. Do not walk barefoot. Wear swim shoes if you go to the beach or a swimming pool.   Choose shoes that fit well. They should not be not too tight or too loose. Your shoes should have plenty of room for your toes (figure 3). Your doctor might give you a prescription for special shoes. Check to see if they are covered by your insurance.   Check your shoes each time before you put them on to make sure that the lining is smooth. Also check to make sure that there is nothing inside the shoes before putting them on.   Do not wear shoes that expose any part of the foot, like sandals, thongs, or clogs.   Wear cotton socks that fit loosely. Do not wear shoes without socks.   Protect your feet from heat and cold. Test bath water before putting your feet in it to make sure that it is not too hot. Do not walk barefoot on hot ground. Take extra care when going outside in the cold and wear warm socks.  What else should I know? -- You can lower your risk for foot problems by keeping your blood sugar levels as close to your goal as possible. Other things you can do include:   Move your ankles and toes often to help with blood flow. You can wear a support stocking to help with swelling.   Walk often. Regular walking helps blood flow.   If you smoke, try to quit. Your doctor or nurse can help. Smoking causes poor blood flow to your  feet and can damage your nerves.  When should I call the doctor? -- Call your doctor or nurse for advice if you have:   A fever of 100.4°F (38°C) or higher, chills, or a wound that will not heal   Swelling, redness, warmth around a wound, a foul smell coming from a wound, or yellowish, greenish, or bloody discharge   Sores or blisters on your feet that hurt more or less than you would expect   Numbness or tingling in your foot or leg   Corns, calluses, blisters, or new sores on your foot   Very dry, scaly, or cracked skin on your feet   Changes in the way your foot joints or arch look  All topics are updated as new evidence becomes available and our peer review process is complete.  This topic retrieved from [employer] on: Mar 13, 2024.  Topic 342844 Version 2.0  Release: 32.2.4 - C32.71  © 2024 UpToDate, Inc. and/or its affiliates. All rights reserved.  figure 1: Foot check for people with diabetes     People with diabetes should check both of their feet every day. It is important to check your feet all over, including in between your toes. If you can't see the bottom of your foot, use a mirror or ask another person to check for you. Let your doctor or nurse know if you find any:  Redness   Cuts or cracks in the skin   Blisters   Swelling   Graphic 98986 Version 3.0  figure 2: Trim your toenails     Trim your toenails straight across and smooth them with a nail file.  Graphic 50198 Version 2.0  figure 3: Correct shoe shape     Choose shoes that fit the right way and are not too tight or too loose. Your shoes should have plenty of room for your toes.  Graphic 34375 Version 2.0  Consumer Information Use and Disclaimer   Disclaimer: This generalized information is a limited summary of diagnosis, treatment, and/or medication information. It is not meant to be comprehensive and should be used as a tool to help the user understand and/or assess potential diagnostic and treatment options. It does NOT include all information  about conditions, treatments, medications, side effects, or risks that may apply to a specific patient. It is not intended to be medical advice or a substitute for the medical advice, diagnosis, or treatment of a health care provider based on the health care provider's examination and assessment of a patient's specific and unique circumstances. Patients must speak with a health care provider for complete information about their health, medical questions, and treatment options, including any risks or benefits regarding use of medications. This information does not endorse any treatments or medications as safe, effective, or approved for treating a specific patient. UpToDate, Inc. and its affiliates disclaim any warranty or liability relating to this information or the use thereof.The use of this information is governed by the Terms of Use, available at https://www.woltersTradition Midstreamuwer.com/en/know/clinical-effectiveness-terms. 2024© UpToDate, Inc. and its affiliates and/or licensors. All rights reserved.  Copyright   © 2024 UpToDate, Inc. and/or its affiliates. All rights reserved.

## 2024-07-09 NOTE — PROGRESS NOTES
Ambulatory Visit  Name: Jason Blanca      : 1990      MRN: [id number]  Encounter Provider: Dominique Cooper PA-C  Encounter Date: 2024   Encounter department: [facility]    Assessment & Plan   1. Type 2 diabetes mellitus with diabetic polyneuropathy, with long-term current use of insulin ([facility])  Assessment & Plan:  Increased from 7.5 in 2024. Improved with insulin patch per endo. Continue Toujeo 35 units daily. Previously on Ozempic but felt flu-like and nausea with 2mg dose, decreased dose to 1mg weekly with improvement.  Follow-up with Endo as scheduled this month for continued diabetes management.  Lab Results   Component Value Date    HGBA1C 9.5 (A) 2024     Orders:  -     POCT hemoglobin A1c  2. Excessive sweating  Assessment & Plan:  Recent worsening, consider Drysol, follow-up endocrinology.  Suspect due to hyperglycemia?   3. Hypogonadism in male  Assessment & Plan:  On testosterone replacement once weekly.  4. PAD (peripheral artery disease) ([facility])  Assessment & Plan:  Continue statin and diabetes management.  Reviewed arterial duplex from 2024.  Continue follow-up podiatry.  5. ADHD, predominantly inattentive type  Assessment & Plan:  Adult diagnosis per Dr. Marquez.  On Adderall 10 mg twice daily with improvement.  Blood pressure stable.       History of Present Illness   {Disappearing Hyperlinks I Encounters * My Last Note * Since Last Visit * History :70417}  Jason is a 33 y.o. male with a h/o uncontrolled diabetes on insulin who presents for follow-up for Adderall was started on last visit.  Has noticed improvement with attention and mood since starting.  No palpitations or chest pain.  Has appointment with endo this month.  Concerned about lump present for 3 days on abdomen at location of previous insulin patch.  Did try warm compress.  Slightly tender.  No drainage or pus.            Review of Systems   Constitutional:  Negative  "for fever and unexpected weight change.   Respiratory:  Negative for shortness of breath.    Cardiovascular:  Negative for chest pain.       Objective   {Disappearing Hyperlinks   Review Vitals * Enter New Vitals * Results Review * Labs * Imaging * Cardiology * Procedures * Lung Cancer Screening :15545}  /80 (BP Location: Left arm, Patient Position: Sitting, Cuff Size: Large)   Pulse 97   Temp (!) 96.6 °F (35.9 °C) (Tympanic)   Resp 17   Ht 5' 7\" (1.702 m)   Wt 89.7 kg (197 lb 12.8 oz)   SpO2 100%   BMI 30.98 kg/m²     Physical Exam  Vitals reviewed.   Constitutional:       Appearance: Normal appearance.   HENT:      Head: Normocephalic and atraumatic.   Cardiovascular:      Rate and Rhythm: Normal rate and regular rhythm.   Pulmonary:      Effort: Pulmonary effort is normal.      Breath sounds: Normal breath sounds.   Abdominal:      General: Bowel sounds are normal.      Palpations: Abdomen is soft.       Neurological:      Mental Status: He is alert and oriented to person, place, and time. Mental status is at baseline.       Administrative Statements {Disappearing Hyperlinks I  Level of Service * [facility]/PCSP:[id number]}          "

## 2024-07-09 NOTE — ASSESSMENT & PLAN NOTE
Increased from 7.5 in 4/2024. Improved with insulin patch per endo. Continue Toujeo 35 units daily. Previously on Ozempic but felt flu-like and nausea with 2mg dose, decreased dose to 1mg weekly with improvement.  Follow-up with Endo as scheduled this month for continued diabetes management.  Lab Results   Component Value Date    HGBA1C 9.5 (A) 07/09/2024

## 2024-07-24 ENCOUNTER — TELEPHONE (OUTPATIENT)
Age: 34
End: 2024-07-24

## 2024-07-24 NOTE — TELEPHONE ENCOUNTER
Please call patient, the form had Dr Sanchez in it. If he gets a new one I will not have problem doing it.

## 2024-07-24 NOTE — TELEPHONE ENCOUNTER
Patient states he was called yesterday from the office and told Dr. Marquez will not do his Short Term Disability Forms and tht his endocrinologist needs to do them. He spoke to his endo physician today and was told his PCP needs to do them. He is asking if Dr. Marquez can do them for him. He states he dropped them off at his last appt.

## 2024-08-03 DIAGNOSIS — F90.0 ADHD, PREDOMINANTLY INATTENTIVE TYPE: ICD-10-CM

## 2024-08-05 RX ORDER — DEXTROAMPHETAMINE SACCHARATE, AMPHETAMINE ASPARTATE, DEXTROAMPHETAMINE SULFATE AND AMPHETAMINE SULFATE 2.5; 2.5; 2.5; 2.5 MG/1; MG/1; MG/1; MG/1
10 TABLET ORAL
Qty: 60 TABLET | Refills: 0 | Status: SHIPPED | OUTPATIENT
Start: 2024-08-05

## 2024-08-06 ENCOUNTER — EVALUATION (OUTPATIENT)
Dept: PHYSICAL THERAPY | Facility: CLINIC | Age: 34
End: 2024-08-06
Payer: COMMERCIAL

## 2024-08-06 DIAGNOSIS — R26.89 BALANCE PROBLEM: Primary | ICD-10-CM

## 2024-08-06 DIAGNOSIS — E11.42 DIABETIC POLYNEUROPATHY ASSOCIATED WITH TYPE 2 DIABETES MELLITUS (HCC): ICD-10-CM

## 2024-08-06 PROCEDURE — 97110 THERAPEUTIC EXERCISES: CPT | Performed by: PHYSICAL THERAPIST

## 2024-08-06 PROCEDURE — 97161 PT EVAL LOW COMPLEX 20 MIN: CPT | Performed by: PHYSICAL THERAPIST

## 2024-08-06 NOTE — PROGRESS NOTES
PT Evaluation     Today's date: 2024  Patient name: Jason Blanca  : 1990  MRN: [id number]  Referring provider: Brian Pino DPM  Dx:   Encounter Diagnosis     ICD-10-CM    1. Balance problem  R26.89       2. Diabetic polyneuropathy associated with type 2 diabetes mellitus (HCC)  E11.42 Ambulatory referral to Physical Therapy                     Assessment  Impairments: abnormal or restricted ROM, abnormal movement, impaired balance, impaired physical strength, lacks appropriate home exercise program, pain with function and safety issue    Assessment details: Jason Blanca is a 33 y.o. male presenting to physical therapy with balance deficits.  Patient presents with pain, decreased core and hip strength, decreased range of motion and flexibility, balance deficits and decreased tolerance to activity. Due to these impairments patient has difficulty performing activities of daily living, recreational activities and engaging in social activities. Patient would benefit from skilled physical therapy services to address these impairments and to maximize function. Thank you for the referral.     Goals  Impairment Goals:  1.) Pt will have decreased sx first thing waking up by 50% in 2-4 weeks.  2.) Pt will have improved hamstring flexibility by 20 degrees in 3-4 weeks.  3.) Pt will have improved hip strength by 1/2 MMT in 4-6 weeks.    Functional Goals:  1.) Pt will be independent in their home exercise program in 1 week.  2.) Pt will be able to hike and walk his dog without difficulty in 4-6 weeks.  3.) Pt will be independent in all ADL's without difficulty in 6-8 weeks.  4.) Pt will be able to walk for 30 minutes without difficulty in 3-4 weeks.  5.) Pt will have an improved FOTO score of 75/100 in 6-8 weeks.      Plan  Patient would benefit from: skilled PT    Planned therapy interventions: joint mobilization, neuromuscular re-education, balance, activity modification, abdominal trunk  stabilization, body mechanics training, therapeutic exercise, strengthening, stretching, postural training, patient education, home exercise program, graded activity and graded exercise    Frequency: 1x week  Duration in weeks: 8  Plan of Care beginning date: 2024  Plan of Care expiration date: 10/1/2024        Subjective Evaluation    History of Present Illness  Mechanism of injury: Angelo is a 33 year old male presenting to physical therapy with balance issues secondary to diabetic neuropathy. He was diagnosed with Type II DM when he was 14 y.o., noting he never took care of it and is now experiencing some vision issues and balance has been a problem. He wakes up feeling extremely stiff in the morning to the point where he is on short term disability. He notes that he started yoga and this seems to help his day 20 fold, started doing it routinely about a month ago.     He feels a lot of stiffness if he doesn't do his yoga, No falls recently, sometimes feels like he is walking on tennis balls. He can feel the surfaces he is walking on but it just doesn't feel normal. He enjoys exercising, walking his dog and wants to get back to a normal routine and get back to work without as much stiffness and LE pain.     Eases: Gabapentin, rest, yoga, movement  Aggs: first thing in the morning  Patient Goals  Patient goals for therapy: decreased pain, improved balance and increased strength    Pain  Current pain rating: 3  At best pain rating: 3  At worst pain ratin  Quality: tight, dull ache and discomfort          Objective     Concurrent Complaints  Negative for night pain, disturbed sleep, bladder dysfunction, bowel dysfunction and saddle (S4) numbness    Static Posture   General Observations  Symmetrical weight bearing.     Ankle/Foot   Ankle/Foot (Left): Pes planus.   Ankle/Foot (Right): Pes planus.     Neurological Testing     Sensation     Lumbar   Left   Diminished: light touch    Right   Diminished: light  touch    Active Range of Motion     Lumbar   Flexion: Active lumbar flexion: Hamstring length limiting ability to flex.  Restriction level: moderate  Extension:  Restriction level: minimal  Left lateral flexion:  Restriction level: minimal  Right lateral flexion:  Restriction level: minimal  Left rotation:  WFL  Right rotation:  WFL    Strength/Myotome Testing     Left Hip   Planes of Motion   Flexion: 4+  Extension: 4  Abduction: 4  Adduction: 4+  External rotation: 4+  Internal rotation: 4+    Right Hip   Planes of Motion   Flexion: 4+  Extension: 4  Abduction: 4  Adduction: 4+  External rotation: 4+  Internal rotation: 4+    Left Knee   Flexion: 4+  Extension: 4+    Right Knee   Flexion: 4+  Extension: 4+    Left Ankle/Foot   Dorsiflexion: 4+  Plantar flexion: 4+  Great toe extension: 4    Right Ankle/Foot   Dorsiflexion: 4+  Plantar flexion: 4+  Great toe extension: 4    Tests     Lumbar     Left   Negative crossed SLR and passive SLR.     Right   Negative crossed SLR and passive SLR.     Left Pelvic Girdle/Sacrum   Negative: active SLR test.     Right Pelvic Girdle/Sacrum   Negative: active SLR test.     Left Hip   Negative NOELLE and FADIR.     Right Hip   Negative NOELLE and FADIR.     Additional Tests Details  HS 90/90 lacking 45 degrees    Ambulation     Observational Gait   Gait: within functional limits     Functional Assessment      Squat    Left within functional limits and right within functional limits.     Single Leg Stance   Left: 15 seconds  Right: 15 seconds      Educated on proper footwear, potential benefit from seeing a nutrition specialist as well and gave handout.        Precautions: PAD, Type II DM, Depression, ADHD      Manuals 8/6                                                                Neuro Re-Ed             Single leg stance 10x10''            Single leg bridges 2x10            Tandem walking             Sidestepping with resistance                                                     Ther Ex             Nalinihells 2x10 GTB            Supine active HS s' 10x10''            Seated HS s' 10x10''            Bike (Cardiovascular Endurance)                                                                   Ther Activity                                       Gait Training                                       Modalities

## 2024-08-07 ENCOUNTER — TELEPHONE (OUTPATIENT)
Age: 34
End: 2024-08-07

## 2024-08-07 NOTE — TELEPHONE ENCOUNTER
Patient called wanting to reschedule his physical appointment that is scheduled for 9/5/24 at 9:40 am with Dr. Marquez.  Patient has a neurology appointment that day at 10:15 am that he has been waiting for 6 months for an he cannot miss.  No available physical appointments with Dr. Marquez until 2025.     Patient would like to know if he can please reschedule this appointment for either September or October.  Please call him back to reschedule this.

## 2024-08-13 ENCOUNTER — APPOINTMENT (OUTPATIENT)
Dept: PHYSICAL THERAPY | Facility: CLINIC | Age: 34
End: 2024-08-13
Payer: COMMERCIAL

## 2024-08-14 ENCOUNTER — OFFICE VISIT (OUTPATIENT)
Dept: PHYSICAL THERAPY | Facility: CLINIC | Age: 34
End: 2024-08-14
Payer: COMMERCIAL

## 2024-08-14 DIAGNOSIS — R26.89 BALANCE PROBLEM: Primary | ICD-10-CM

## 2024-08-14 DIAGNOSIS — E11.42 DIABETIC POLYNEUROPATHY ASSOCIATED WITH TYPE 2 DIABETES MELLITUS (HCC): ICD-10-CM

## 2024-08-14 PROCEDURE — 97112 NEUROMUSCULAR REEDUCATION: CPT | Performed by: PHYSICAL THERAPIST

## 2024-08-14 PROCEDURE — 97110 THERAPEUTIC EXERCISES: CPT | Performed by: PHYSICAL THERAPIST

## 2024-08-14 NOTE — PROGRESS NOTES
Daily Note     Today's date: 2024  Patient name: Jason Blanca  : 1990  MRN: [id number]  Referring provider: Brian Pino DPM  Dx:   Encounter Diagnosis     ICD-10-CM    1. Balance problem  R26.89       2. Diabetic polyneuropathy associated with type 2 diabetes mellitus (HCC)  E11.42                      Subjective: Angelo explains that his exercises have been going alright, his knee continues to bother him on the medial side but the stretches seem to be helping.      Objective: See treatment diary below      Assessment: Tolerated treatment well. Patient demonstrated fatigue post treatment and exhibited good technique with therapeutic exercises. Added in standing board balance as well as single leg bridges with good challenge during completion. Furnished with TB for use during clamshells. Updated HEP to reflect changes made this session.      Plan: Continue per plan of care.      Precautions: PAD, Type II DM, Depression, ADHD      Manuals                                                                Neuro Re-Ed             Single leg stance 10x10'' 10x10''           Single leg bridges 2x10 2x10           Tandem walking  nv           Sidestepping with resistance             Wobble board  2x10 fwd/side                                     Ther Ex             Clamshells 2x10 GTB 3x10 GTB           Supine active HS s' 10x10'' 10x10''           Seated HS s' 10x10'' 10x10''           Bike (Cardiovascular Endurance)    5' lvl 1                                                               Ther Activity                                       Gait Training                                       Modalities

## 2024-08-15 NOTE — TELEPHONE ENCOUNTER
Forms were refaxed today and if not received patient will need to provide a different fax number

## 2024-08-19 NOTE — TELEPHONE ENCOUNTER
Please mail out [employer] form since they are not receiving it by fax and patient will also  a copy.please advise

## 2024-08-20 ENCOUNTER — APPOINTMENT (OUTPATIENT)
Dept: PHYSICAL THERAPY | Facility: CLINIC | Age: 34
End: 2024-08-20
Payer: COMMERCIAL

## 2024-08-26 ENCOUNTER — TELEPHONE (OUTPATIENT)
Dept: FAMILY MEDICINE CLINIC | Facility: CLINIC | Age: 34
End: 2024-08-26

## 2024-08-27 ENCOUNTER — OFFICE VISIT (OUTPATIENT)
Dept: PHYSICAL THERAPY | Facility: CLINIC | Age: 34
End: 2024-08-27
Payer: COMMERCIAL

## 2024-08-27 DIAGNOSIS — E11.42 DIABETIC POLYNEUROPATHY ASSOCIATED WITH TYPE 2 DIABETES MELLITUS (HCC): ICD-10-CM

## 2024-08-27 DIAGNOSIS — R26.89 BALANCE PROBLEM: Primary | ICD-10-CM

## 2024-08-27 PROCEDURE — 97112 NEUROMUSCULAR REEDUCATION: CPT | Performed by: PHYSICAL THERAPIST

## 2024-08-27 PROCEDURE — 97110 THERAPEUTIC EXERCISES: CPT | Performed by: PHYSICAL THERAPIST

## 2024-08-27 NOTE — PROGRESS NOTES
Daily Note     Today's date: 2024  Patient name: Jason Blanca  : 1990  MRN: [id number]  Referring provider: Brian Pino DPM  Dx:   Encounter Diagnosis     ICD-10-CM    1. Balance problem  R26.89       2. Diabetic polyneuropathy associated with type 2 diabetes mellitus (HCC)  E11.42                      Subjective: Angelo explains that his balance has been okay, he thinks the foam roller and exercises have been helping.      Objective: See treatment diary below      Assessment: Tolerated treatment well. Patient demonstrated fatigue post treatment and exhibited good technique with therapeutic exercises. Angelo responded well to all balance TE added today, introduced foam balance pad and also adjusted vision to work on proprioceptive techniques, educated on potential benefit for home use. Continue to progress balance and stability nv.      Plan: Continue per plan of care.      Precautions: PAD, Type II DM, Depression, ADHD      Manuals                                                               Neuro Re-Ed             Single leg stance 10x10'' 10x10'' 10'' x5 ea on foam          Single leg bridges 2x10 2x10           Tandem walking  nv 5 laps 10'          Sidestepping with resistance             Wobble board  2x10 fwd/side 2x10 fwd/side          NBOS foam   10''x5 head turns, EC          Ball toss on foam             Ther Ex             Clamshells 2x10 GTB 3x10 GTB           Supine active HS s' 10x10'' 10x10''           Seated HS s' 10x10'' 10x10'' 10x10''          Bike (Cardiovascular Endurance)    5' lvl 1 5' lvl 1                                                              Ther Activity                                       Gait Training                                       Modalities

## 2024-09-05 ENCOUNTER — HOSPITAL ENCOUNTER (OUTPATIENT)
Dept: NEUROLOGY | Facility: CLINIC | Age: 34
End: 2024-09-05
Payer: COMMERCIAL

## 2024-09-05 DIAGNOSIS — Z79.4 TYPE 2 DIABETES MELLITUS WITH DIABETIC POLYNEUROPATHY, WITH LONG-TERM CURRENT USE OF INSULIN (HCC): ICD-10-CM

## 2024-09-05 DIAGNOSIS — E11.42 TYPE 2 DIABETES MELLITUS WITH DIABETIC POLYNEUROPATHY, WITH LONG-TERM CURRENT USE OF INSULIN (HCC): ICD-10-CM

## 2024-09-05 PROCEDURE — 95886 MUSC TEST DONE W/N TEST COMP: CPT | Performed by: PSYCHIATRY & NEUROLOGY

## 2024-09-05 PROCEDURE — 95913 NRV CNDJ TEST 13/> STUDIES: CPT | Performed by: PSYCHIATRY & NEUROLOGY

## 2024-09-06 PROBLEM — G62.89 AXONAL SENSORIMOTOR NEUROPATHY: Status: ACTIVE | Noted: 2024-09-06

## 2024-09-24 ENCOUNTER — TELEPHONE (OUTPATIENT)
Age: 34
End: 2024-09-24

## 2024-09-24 NOTE — TELEPHONE ENCOUNTER
Anabel from [employer] was calling stated they only received some of the forms that were faxed and were missing attending physician statement.    Media showed scans from 8/7 and 8/22.    Warm transferred to [employer] to assist.

## 2024-10-01 RX ORDER — BOLUS INSULIN PUMP, 200 UNIT 2 UNIT
1 EACH MISCELLANEOUS
COMMUNITY
Start: 2024-07-23

## 2024-10-01 RX ORDER — SILDENAFIL 50 MG/1
50 TABLET, FILM COATED ORAL DAILY PRN
COMMUNITY
Start: 2024-07-23 | End: 2024-10-03 | Stop reason: SDUPTHER

## 2024-10-02 ENCOUNTER — OFFICE VISIT (OUTPATIENT)
Dept: FAMILY MEDICINE CLINIC | Facility: CLINIC | Age: 34
End: 2024-10-02
Payer: COMMERCIAL

## 2024-10-02 ENCOUNTER — TELEPHONE (OUTPATIENT)
Dept: ADMINISTRATIVE | Facility: OTHER | Age: 34
End: 2024-10-02

## 2024-10-02 VITALS
TEMPERATURE: 97.8 F | WEIGHT: 187 LBS | HEIGHT: 67 IN | HEART RATE: 102 BPM | DIASTOLIC BLOOD PRESSURE: 80 MMHG | OXYGEN SATURATION: 100 % | BODY MASS INDEX: 29.35 KG/M2 | SYSTOLIC BLOOD PRESSURE: 110 MMHG | RESPIRATION RATE: 16 BRPM

## 2024-10-02 DIAGNOSIS — E29.1 HYPOGONADISM IN MALE: ICD-10-CM

## 2024-10-02 DIAGNOSIS — F32.0 MILD MAJOR DEPRESSION, SINGLE EPISODE (HCC): ICD-10-CM

## 2024-10-02 DIAGNOSIS — E11.42 DIABETIC POLYNEUROPATHY ASSOCIATED WITH TYPE 2 DIABETES MELLITUS (HCC): ICD-10-CM

## 2024-10-02 DIAGNOSIS — R06.81 WITNESSED EPISODE OF APNEA: ICD-10-CM

## 2024-10-02 DIAGNOSIS — E11.42 TYPE 2 DIABETES MELLITUS WITH DIABETIC POLYNEUROPATHY, WITH LONG-TERM CURRENT USE OF INSULIN (HCC): ICD-10-CM

## 2024-10-02 DIAGNOSIS — Z79.4 TYPE 2 DIABETES MELLITUS WITH DIABETIC POLYNEUROPATHY, WITH LONG-TERM CURRENT USE OF INSULIN (HCC): ICD-10-CM

## 2024-10-02 DIAGNOSIS — Z00.01 ENCOUNTER FOR GENERAL ADULT MEDICAL EXAMINATION WITH ABNORMAL FINDINGS: Primary | ICD-10-CM

## 2024-10-02 DIAGNOSIS — F33.40 RECURRENT MAJOR DEPRESSIVE DISORDER, IN REMISSION (HCC): ICD-10-CM

## 2024-10-02 DIAGNOSIS — E04.1 RIGHT THYROID NODULE: ICD-10-CM

## 2024-10-02 DIAGNOSIS — F90.0 ADHD, PREDOMINANTLY INATTENTIVE TYPE: ICD-10-CM

## 2024-10-02 PROBLEM — N52.02 CORPORO-VENOUS OCCLUSIVE ERECTILE DYSFUNCTION: Status: RESOLVED | Noted: 2023-04-06 | Resolved: 2024-10-02

## 2024-10-02 PROCEDURE — 99215 OFFICE O/P EST HI 40 MIN: CPT | Performed by: FAMILY MEDICINE

## 2024-10-02 PROCEDURE — 99395 PREV VISIT EST AGE 18-39: CPT | Performed by: FAMILY MEDICINE

## 2024-10-02 RX ORDER — INSULIN GLARGINE 300 U/ML
25 INJECTION, SOLUTION SUBCUTANEOUS
Qty: 7.5 ML | Refills: 3 | Status: SHIPPED | OUTPATIENT
Start: 2024-10-02 | End: 2025-09-27

## 2024-10-02 RX ORDER — DEXTROAMPHETAMINE SACCHARATE, AMPHETAMINE ASPARTATE, DEXTROAMPHETAMINE SULFATE AND AMPHETAMINE SULFATE 2.5; 2.5; 2.5; 2.5 MG/1; MG/1; MG/1; MG/1
10 TABLET ORAL
Qty: 60 TABLET | Refills: 0 | Status: SHIPPED | OUTPATIENT
Start: 2024-10-02

## 2024-10-02 RX ORDER — SEMAGLUTIDE 1.34 MG/ML
INJECTION, SOLUTION SUBCUTANEOUS
COMMUNITY
Start: 2024-09-30

## 2024-10-02 RX ORDER — GABAPENTIN 600 MG/1
600 TABLET ORAL 3 TIMES DAILY
Qty: 90 TABLET | Refills: 5 | Status: SHIPPED | OUTPATIENT
Start: 2024-10-02

## 2024-10-02 NOTE — TELEPHONE ENCOUNTER
Upon review of the In Basket request we were able to locate, review, and update the patient chart as requested for Microalbumin Creatinine Urine Ratio OR Albumin Creatinine Urine Ratio .    Any additional questions or concerns should be emailed to the Practice Liaisons via the appropriate education email address, please do not reply via In Basket.    Thank you  Donita Mahan MA   [facility]

## 2024-10-02 NOTE — ASSESSMENT & PLAN NOTE
. ADHD: Continue Adderall as prescribed. Patient reports good control of symptoms with current regimen.

## 2024-10-02 NOTE — PROGRESS NOTES
Ambulatory Visit  Name: Jason Blanca      : 1990      MRN: [id number]  Encounter Provider: Jaren Marquez MD  Encounter Date: 10/2/2024   Encounter department: [facility] CARE [facility]    Assessment & Plan  Encounter for general adult medical examination with abnormal findings  Jason is here for a physical exam. I found him without any distress. The patient has the following chronic conditions   Patient Active Problem List   Diagnosis    Type 2 diabetes mellitus with hyperglycemia, with long-term current use of insulin ([facility])    BMI 31.0-31.9,adult    Recurrent major depressive disorder, in remission ([facility])    Hypogonadism in male    Painful and cold lower extremity    PAD (peripheral artery disease) ([facility])    Excessive sweating    ADHD, predominantly inattentive type    Erectile dysfunction of nonorganic origin    Fatty liver    Mixed dyslipidemia    Vitamin D deficiency    Axonal sensorimotor neuropathy    Diabetic polyneuropathy associated with type 2 diabetes mellitus ([facility])    Encounter for general adult medical examination with abnormal findings    Witnessed episode of apnea    Right thyroid nodule   .   I recommended exercising at least 3 1/2  hours per week and maintaining a healthy diet with low carbohydrates and saturated fat.    I gave him  information about lifestyle modification.    The patient understands the need for an annual physical exam.           Type 2 diabetes mellitus with hyperglycemia, with long-term current use of insulin ([facility])    Lab Results   Component Value Date    HGBA1C 9.5 (A) 2024   Type 2 Diabetes Mellitus with Hypoglycemia and Polyneuropathy: Continue current oral medications and Ozempic 1 mg weekly. Monitor blood glucose levels closely. Follow up with endocrinologist Dr. Sanchez in two weeks. Obtain blood work to reassess HbA1c.  He also has diabetic Retinopathy: Continue monthly ophthalmology follow-ups and intravitreal  injections. Emphasize the importance of adherence to treatment to prevent further vision loss.   Dental Issues: Advise patient to seek dental care for tooth extractions. Emphasize the importance of dental health in diabetes management.             Diabetic polyneuropathy associated with type 2 diabetes mellitus (HCC)    Lab Results   Component Value Date    HGBA1C 9.5 (A) 07/09/2024            ADHD, predominantly inattentive type  . ADHD: Continue Adderall as prescribed. Patient reports good control of symptoms with current regimen.         Hypogonadism in male  Hypogonadism: Continue weekly testosterone injections. Monitor testosterone levels with upcoming blood work.         Witnessed episode of apnea  Suspect of obstructive sleep Apnea: Recommend evaluation by a sleep specialist. Consider sleep study to confirm diagnosis and determine appropriate treatment.         Right thyroid nodule  Order ultrasound of the neck to evaluate for possible thyroid nodule or other pathology. Follow up with ENT as needed based on results.       Medication Refills: Increased gabapentin 600 mg TID, continue same dose of Adderall, and other necessary medications.  Recurrent major depressive disorder, in remission (HCC)  Depression Screening Follow-up Plan: Patient's depression screening was positive with a PHQ-9 score of 7. Patient declines further evaluation by mental health professional and/or medications. They have no active suicidal ideations. Brief counseling provided and recommend additional follow-up/re-evaluation at next office visit.           Mild major depression, single episode (HCC)  Depression Screening Follow-up Plan: Patient's depression screening was positive with a PHQ-9 score of 7. Patient declines further evaluation by mental health professional and/or medications. They have no active suicidal ideations. Brief counseling provided and recommend additional follow-up/re-evaluation at next office visit.             History of Present Illness     HPI  The patient presents for a yearly medical exam. He has a history of type 2 diabetes mellitus with hyperglycemia complicated by polyneuropathy. His last HbA1c on July 7 was 9.5%. He follows up with Dr. Sanchez, an endocrinologist, and has an appointment in two weeks. The patient reports that his blood sugars have been stable with oral medications and Ozempic 1 mg weekly, but he experienced significant hypoglycemia when using insulin. He has adjusted his diet and reduced the Ozempic dose from 2 mg to 1 mg due to side effects. The patient also reports improvement in neuropathy symptoms with increased gabapentin dosage (600 mg TID). He experiences anxiety relief with gabapentin but notes pain recurrence if a dose is missed. The patient has peripheral artery disease, which affects his ability to work. He reports variable pain levels in his legs, impacting his driving ability. He is aware of the risks associated with neuropathy and driving. The patient has diabetic retinopathy and receives monthly intravitreal injections, which he finds unpleasant and has noticed worsening vision post-injection. He is scheduled for follow-up with the ophthalmologist. The patient has ADHD, managed well with Adderall, and reports improved attention and daily functioning. He also has hypogonadism and continues weekly testosterone injections, finding them effective. The patient mentions a recurring neck mass previously drained, now growing back. He experiences excessive sweating and cold sweats, particularly in the summer. He has dental issues requiring extractions but faces insurance coverage challenges. The patient is concerned about potential sleep apnea, as reported by his partner. He follows a healthy diet, avoiding sugary drinks and high-fructose foods, and engages in regular exercise.  Review of Systems  Past Medical History:   Diagnosis Date    Anxiety     Diabetes mellitus (HCC)     Fatty liver   "   Obesity      Past Surgical History:   Procedure Laterality Date    HERNIA REPAIR             Objective     /80 (BP Location: Left arm, Patient Position: Sitting, Cuff Size: Standard)   Pulse 102   Temp 97.8 °F (36.6 °C) (Tympanic)   Resp 16   Ht 5' 7\" (1.702 m)   Wt 84.8 kg (187 lb)   SpO2 100%   BMI 29.29 kg/m²     Physical Exam  Cardiovascular:      Pulses: no weak pulses.           Dorsalis pedis pulses are 2+ on the right side and 2+ on the left side.        Posterior tibial pulses are 2+ on the right side and 2+ on the left side.   Feet:      Right foot:      Skin integrity: No ulcer, skin breakdown, erythema, warmth, callus or dry skin.      Left foot:      Skin integrity: No ulcer, skin breakdown, erythema, warmth, callus or dry skin.       Patient appears well-nourished and in no acute distress.  Neurological exam: Sensory nerve action potential absent in superficial peroneal nerve, consistent with diabetic neuropathy.  Musculoskeletal exam: No significant abnormalities noted.  Ophthalmologic exam: Reports of worsening vision post-intravitreal injections.  ENT exam: Noted neck mass, likely requiring further evaluation.  Cardiovascular exam: Peripheral pulses present, no edema noted.  Respiratory exam: No abnormalities noted.  Dermatologic exam: Dry skin, no rashes or lesions observed.    Patient's shoes and socks removed.    Right Foot/Ankle   Right Foot Inspection  Skin Exam: skin normal and skin intact. No dry skin, no warmth, no callus, no erythema, no maceration, no abnormal color, no pre-ulcer, no ulcer and no callus.     Toe Exam: ROM and strength within normal limits. No swelling, no tenderness, erythema and  no right toe deformity    Sensory   Vibration: diminished  Proprioception: diminished  Monofilament testing: diminished    Vascular  Capillary refills: < 3 seconds  The right DP pulse is 2+. The right PT pulse is 2+.     Left Foot/Ankle  Left Foot Inspection  Skin Exam: skin " normal and skin intact. No dry skin, no warmth, no erythema, no maceration, normal color, no pre-ulcer, no ulcer and no callus.     Toe Exam: ROM and strength within normal limits. No swelling, no tenderness, no erythema and no left toe deformity.     Sensory   Vibration: diminished  Proprioception: diminished  Monofilament testing: diminished    Vascular  Capillary refills: < 3 seconds  The left DP pulse is 2+. The left PT pulse is 2+.     Assign Risk Category  No deformity present  No loss of protective sensation  No weak pulses  Risk: 1        I have spent 35 minutes with Jason today in which greater than 50% of this time was spent in counseling/coordination of care regarding Diagnostic results, Prognosis, Risks and benefits of tx options, Counseling / Coordination of care, Reviewing / ordering tests, medicine, procedures.  Please note this time includes cumulative time on the day of encounter, including reviewing medical records and/or coordinating care among the patient's other specialists.  Depression Screening Follow-up Plan: Patient's depression screening was positive with a PHQ-2 score of . Their PHQ-9 score was 7. Patient assessed for underlying major depression. They have no active suicidal ideations. Brief counseling provided and recommend additional follow-up/re-evaluation next office visit.  Depression Screening Follow-up Plan: Patient's depression screening was positive with a PHQ-2 score of . Their PHQ-9 score was 7. Clinically patient does not have depression. No treatment is required. Patient assessed for underlying major depression. They have no active suicidal ideations. Brief counseling provided and recommend additional follow-up/re-evaluation next office visit. Patient declines further evaluation by mental health professional and/or medications. They have no active suicidal ideations. Brief counseling provided and recommend additional follow-up/re-evaluation at next office visit.   score of . Their PHQ-9 score was 7. Clinically patient does not have depression. No treatment is required. Patient assessed for underlying major depression. They have no active suicidal ideations. Brief counseling provided and recommend additional follow-up/re-evaluation next office visit. Patient declines further evaluation by mental health professional and/or medications. They have no active suicidal ideations. Brief counseling provided and recommend additional follow-up/re-evaluation at next office visit.

## 2024-10-02 NOTE — ASSESSMENT & PLAN NOTE
Lab Results   Component Value Date    HGBA1C 9.5 (A) 07/09/2024   Type 2 Diabetes Mellitus with Hypoglycemia and Polyneuropathy: Continue current oral medications and Ozempic 1 mg weekly. Monitor blood glucose levels closely. Follow up with endocrinologist Dr. Sanchez in two weeks. Obtain blood work to reassess HbA1c.  He also has diabetic Retinopathy: Continue monthly ophthalmology follow-ups and intravitreal injections. Emphasize the importance of adherence to treatment to prevent further vision loss.   Dental Issues: Advise patient to seek dental care for tooth extractions. Emphasize the importance of dental health in diabetes management.

## 2024-10-02 NOTE — TELEPHONE ENCOUNTER
----- Message from Kasie MORENO sent at 10/1/2024  7:08 AM EDT -----  Regarding: mcr  10/01/24 7:08 AM    Hello, our patient Jason Blanca has had Urine Albumin/Creatinine Ratio completed/performed. Please assist in updating the patient chart by pulling the document from lab Tab within Chart Review. The date of service is 4/2024.     Thank you,  Kasie Marquez, RN  [facility]  PRIMARY CARE [facility]

## 2024-10-02 NOTE — ASSESSMENT & PLAN NOTE
Order ultrasound of the neck to evaluate for possible thyroid nodule or other pathology. Follow up with ENT as needed based on results.       Medication Refills: Increased gabapentin 600 mg TID, continue same dose of Adderall, and other necessary medications.

## 2024-10-02 NOTE — ASSESSMENT & PLAN NOTE
Jason is here for a physical exam. I found him without any distress. The patient has the following chronic conditions   Patient Active Problem List   Diagnosis    Type 2 diabetes mellitus with hyperglycemia, with long-term current use of insulin ([facility])    BMI 31.0-31.9,adult    Recurrent major depressive disorder, in remission ([facility])    Hypogonadism in male    Painful and cold lower extremity    PAD (peripheral artery disease) ([facility])    Excessive sweating    ADHD, predominantly inattentive type    Erectile dysfunction of nonorganic origin    Fatty liver    Mixed dyslipidemia    Vitamin D deficiency    Axonal sensorimotor neuropathy    Diabetic polyneuropathy associated with type 2 diabetes mellitus ([facility])    Encounter for general adult medical examination with abnormal findings    Witnessed episode of apnea    Right thyroid nodule   .   I recommended exercising at least 3 1/2  hours per week and maintaining a healthy diet with low carbohydrates and saturated fat.    I gave him  information about lifestyle modification.    The patient understands the need for an annual physical exam.

## 2024-10-02 NOTE — ASSESSMENT & PLAN NOTE
Hypogonadism: Continue weekly testosterone injections. Monitor testosterone levels with upcoming blood work.

## 2024-10-02 NOTE — ASSESSMENT & PLAN NOTE
Suspect of obstructive sleep Apnea: Recommend evaluation by a sleep specialist. Consider sleep study to confirm diagnosis and determine appropriate treatment.

## 2024-10-03 ENCOUNTER — PATIENT MESSAGE (OUTPATIENT)
Dept: FAMILY MEDICINE CLINIC | Facility: CLINIC | Age: 34
End: 2024-10-03

## 2024-10-03 DIAGNOSIS — N52.02 CORPORO-VENOUS OCCLUSIVE ERECTILE DYSFUNCTION: Primary | ICD-10-CM

## 2024-10-03 RX ORDER — SILDENAFIL 50 MG/1
50 TABLET, FILM COATED ORAL DAILY PRN
Qty: 10 TABLET | Refills: 5 | Status: SHIPPED | OUTPATIENT
Start: 2024-10-03 | End: 2025-10-03

## 2024-10-03 NOTE — ASSESSMENT & PLAN NOTE
Denies suicide ideations  Continue current treatment and follow up with Mental Health team.

## 2024-10-03 NOTE — ASSESSMENT & PLAN NOTE
Depression Screening Follow-up Plan: Patient's depression screening was positive with a PHQ-9 score of 7. Patient declines further evaluation by mental health professional and/or medications. They have no active suicidal ideations. Brief counseling provided and recommend additional follow-up/re-evaluation at next office visit.

## 2024-10-29 DIAGNOSIS — F90.0 ADHD, PREDOMINANTLY INATTENTIVE TYPE: ICD-10-CM

## 2024-10-30 RX ORDER — DEXTROAMPHETAMINE SACCHARATE, AMPHETAMINE ASPARTATE, DEXTROAMPHETAMINE SULFATE AND AMPHETAMINE SULFATE 2.5; 2.5; 2.5; 2.5 MG/1; MG/1; MG/1; MG/1
10 TABLET ORAL
Qty: 60 TABLET | Refills: 0 | Status: SHIPPED | OUTPATIENT
Start: 2024-10-30

## 2024-11-01 PROBLEM — Z00.01 ENCOUNTER FOR GENERAL ADULT MEDICAL EXAMINATION WITH ABNORMAL FINDINGS: Status: RESOLVED | Noted: 2024-10-02 | Resolved: 2024-11-01

## 2024-11-20 DIAGNOSIS — E11.42 DIABETIC POLYNEUROPATHY ASSOCIATED WITH TYPE 2 DIABETES MELLITUS (HCC): ICD-10-CM

## 2024-11-21 RX ORDER — GABAPENTIN 600 MG/1
600 TABLET ORAL 3 TIMES DAILY
Qty: 90 TABLET | Refills: 5 | Status: SHIPPED | OUTPATIENT
Start: 2024-11-21

## 2024-12-14 DIAGNOSIS — E11.42 DIABETIC POLYNEUROPATHY ASSOCIATED WITH TYPE 2 DIABETES MELLITUS (HCC): ICD-10-CM

## 2024-12-14 DIAGNOSIS — F90.0 ADHD, PREDOMINANTLY INATTENTIVE TYPE: ICD-10-CM

## 2024-12-14 DIAGNOSIS — N52.02 CORPORO-VENOUS OCCLUSIVE ERECTILE DYSFUNCTION: ICD-10-CM

## 2024-12-16 RX ORDER — EMPAGLIFLOZIN, METFORMIN HYDROCHLORIDE 12.5; 1 MG/1; MG/1
2 TABLET, EXTENDED RELEASE ORAL DAILY
Qty: 180 TABLET | Refills: 3 | Status: SHIPPED | OUTPATIENT
Start: 2024-12-16 | End: 2025-12-11

## 2024-12-16 RX ORDER — INSULIN ASPART 100 [IU]/ML
100 INJECTION, SOLUTION INTRAVENOUS; SUBCUTANEOUS DAILY
Qty: 30 ML | Refills: 11 | Status: SHIPPED | OUTPATIENT
Start: 2024-12-16 | End: 2025-12-11

## 2024-12-16 RX ORDER — DEXTROAMPHETAMINE SACCHARATE, AMPHETAMINE ASPARTATE, DEXTROAMPHETAMINE SULFATE AND AMPHETAMINE SULFATE 2.5; 2.5; 2.5; 2.5 MG/1; MG/1; MG/1; MG/1
10 TABLET ORAL
Qty: 60 TABLET | Refills: 0 | Status: SHIPPED | OUTPATIENT
Start: 2024-12-16

## 2024-12-16 RX ORDER — GABAPENTIN 600 MG/1
600 TABLET ORAL 3 TIMES DAILY
Qty: 90 TABLET | Refills: 0 | Status: SHIPPED | OUTPATIENT
Start: 2024-12-16

## 2024-12-16 RX ORDER — SILDENAFIL 50 MG/1
50 TABLET, FILM COATED ORAL DAILY PRN
Qty: 10 TABLET | Refills: 0 | Status: SHIPPED | OUTPATIENT
Start: 2024-12-16 | End: 2025-12-16

## 2024-12-16 RX ORDER — INSULIN GLARGINE 300 U/ML
25 INJECTION, SOLUTION SUBCUTANEOUS
Qty: 7.5 ML | Refills: 1 | Status: SHIPPED | OUTPATIENT
Start: 2024-12-16 | End: 2025-12-11

## 2024-12-16 RX ORDER — ROSUVASTATIN CALCIUM 20 MG/1
20 TABLET, COATED ORAL DAILY
Qty: 30 TABLET | Refills: 0 | Status: SHIPPED | OUTPATIENT
Start: 2024-12-16 | End: 2025-12-16

## 2025-01-18 DIAGNOSIS — N52.02 CORPORO-VENOUS OCCLUSIVE ERECTILE DYSFUNCTION: ICD-10-CM

## 2025-01-20 RX ORDER — SILDENAFIL 50 MG/1
TABLET, FILM COATED ORAL
Qty: 10 TABLET | Refills: 1 | Status: SHIPPED | OUTPATIENT
Start: 2025-01-20

## 2025-02-04 ENCOUNTER — HOSPITAL ENCOUNTER (EMERGENCY)
Facility: HOSPITAL | Age: 35
Discharge: HOME | End: 2025-02-04
Attending: INTERNAL MEDICINE | Admitting: INTERNAL MEDICINE
Payer: OTHER MISCELLANEOUS

## 2025-02-04 ENCOUNTER — APPOINTMENT (EMERGENCY)
Dept: CT IMAGING | Facility: HOSPITAL | Age: 35
End: 2025-02-04
Payer: OTHER MISCELLANEOUS

## 2025-02-04 VITALS
TEMPERATURE: 97.9 F | DIASTOLIC BLOOD PRESSURE: 83 MMHG | WEIGHT: 192.9 LBS | HEART RATE: 96 BPM | OXYGEN SATURATION: 98 % | RESPIRATION RATE: 18 BRPM | SYSTOLIC BLOOD PRESSURE: 132 MMHG | BODY MASS INDEX: 30.21 KG/M2

## 2025-02-04 DIAGNOSIS — L03.90 CELLULITIS: Primary | ICD-10-CM

## 2025-02-04 LAB
ALBUMIN SERPL BCG-MCNC: 4.4 G/DL (ref 3.5–5)
ALP SERPL-CCNC: 36 U/L (ref 34–104)
ALT SERPL W P-5'-P-CCNC: 11 U/L (ref 7–52)
ANION GAP SERPL CALCULATED.3IONS-SCNC: 7 MMOL/L (ref 4–13)
AST SERPL W P-5'-P-CCNC: 11 U/L (ref 13–39)
BASOPHILS # BLD AUTO: 0.07 THOUSANDS/ΜL (ref 0–0.1)
BASOPHILS NFR BLD AUTO: 1 % (ref 0–1)
BILIRUB SERPL-MCNC: 0.58 MG/DL (ref 0.2–1)
BUN SERPL-MCNC: 14 MG/DL (ref 5–25)
CALCIUM SERPL-MCNC: 9.4 MG/DL (ref 8.4–10.2)
CHLORIDE SERPL-SCNC: 102 MMOL/L (ref 96–108)
CO2 SERPL-SCNC: 27 MMOL/L (ref 21–32)
CREAT SERPL-MCNC: 0.7 MG/DL (ref 0.6–1.3)
EOSINOPHIL # BLD AUTO: 0.1 THOUSAND/ΜL (ref 0–0.61)
EOSINOPHIL NFR BLD AUTO: 1 % (ref 0–6)
ERYTHROCYTE [DISTWIDTH] IN BLOOD BY AUTOMATED COUNT: 13.2 % (ref 11.6–15.1)
GFR SERPL CREATININE-BSD FRML MDRD: 122 ML/MIN/1.73SQ M
GLUCOSE SERPL-MCNC: 128 MG/DL (ref 65–140)
GLUCOSE SERPL-MCNC: 151 MG/DL (ref 65–140)
HCT VFR BLD AUTO: 45.3 % (ref 36.5–49.3)
HGB BLD-MCNC: 15.4 G/DL (ref 12–17)
IMM GRANULOCYTES # BLD AUTO: 0.03 THOUSAND/UL (ref 0–0.2)
IMM GRANULOCYTES NFR BLD AUTO: 0 % (ref 0–2)
LYMPHOCYTES # BLD AUTO: 1.06 THOUSANDS/ΜL (ref 0.6–4.47)
LYMPHOCYTES NFR BLD AUTO: 14 % (ref 14–44)
MCH RBC QN AUTO: 29.6 PG (ref 26.8–34.3)
MCHC RBC AUTO-ENTMCNC: 34 G/DL (ref 31.4–37.4)
MCV RBC AUTO: 87 FL (ref 82–98)
MONOCYTES # BLD AUTO: 0.69 THOUSAND/ΜL (ref 0.17–1.22)
MONOCYTES NFR BLD AUTO: 9 % (ref 4–12)
NEUTROPHILS # BLD AUTO: 5.47 THOUSANDS/ΜL (ref 1.85–7.62)
NEUTS SEG NFR BLD AUTO: 75 % (ref 43–75)
NRBC BLD AUTO-RTO: 0 /100 WBCS
PLATELET # BLD AUTO: 238 THOUSANDS/UL (ref 149–390)
PMV BLD AUTO: 10.6 FL (ref 8.9–12.7)
POTASSIUM SERPL-SCNC: 4.2 MMOL/L (ref 3.5–5.3)
PROT SERPL-MCNC: 7.4 G/DL (ref 6.4–8.4)
RBC # BLD AUTO: 5.21 MILLION/UL (ref 3.88–5.62)
SODIUM SERPL-SCNC: 136 MMOL/L (ref 135–147)
WBC # BLD AUTO: 7.42 THOUSAND/UL (ref 4.31–10.16)

## 2025-02-04 PROCEDURE — 85025 COMPLETE CBC W/AUTO DIFF WBC: CPT

## 2025-02-04 PROCEDURE — 73701 CT LOWER EXTREMITY W/DYE: CPT

## 2025-02-04 PROCEDURE — 80053 COMPREHEN METABOLIC PANEL: CPT

## 2025-02-04 PROCEDURE — 99283 EMERGENCY DEPT VISIT LOW MDM: CPT

## 2025-02-04 PROCEDURE — 96365 THER/PROPH/DIAG IV INF INIT: CPT

## 2025-02-04 PROCEDURE — 82948 REAGENT STRIP/BLOOD GLUCOSE: CPT

## 2025-02-04 PROCEDURE — 36415 COLL VENOUS BLD VENIPUNCTURE: CPT

## 2025-02-04 RX ORDER — CIPROFLOXACIN 2 MG/ML
400 INJECTION, SOLUTION INTRAVENOUS ONCE
Status: COMPLETED | OUTPATIENT
Start: 2025-02-04 | End: 2025-02-04

## 2025-02-04 RX ADMIN — IOHEXOL 85 ML: 350 INJECTION, SOLUTION INTRAVENOUS at 12:13

## 2025-02-04 RX ADMIN — CIPROFLOXACIN 400 MG: 400 INJECTION, SOLUTION INTRAVENOUS at 11:06

## 2025-02-04 NOTE — ED PROVIDER NOTES
Time reflects when diagnosis was documented in both MDM as applicable and the Disposition within this note       Time User Action Codes Description Comment    2/4/2025  1:47 PM Ricardo Saravia Add [L03.90] Cellulitis           ED Disposition       ED Disposition   Discharge    Condition   Stable    Date/Time   Tue Feb 4, 2025  1:47 PM    Comment   Jason Blanca discharge to home/self care.                   Assessment & Plan       Medical Decision Making  Patient is a 34-year-old male coming in for evaluation of cellulitis of his right great toe.  Lab work is overall within normal limits, no sign of systemic infection at this time.  CT does not show any sign of osteomyelitis, or gangrene.  Patient was given Levaquin here, and patient was encouraged to  his Keflex and doxycycline at discharge.  Patient understands that he needs to His antibiotics, and taking appropriately before this progresses into osteomyelitis.  Patient was given a work note to allow him to rest for couple days.    Amount and/or Complexity of Data Reviewed  Labs: ordered.  Radiology: ordered. Decision-making details documented in ED Course.    Risk  Prescription drug management.        ED Course as of 02/04/25 1854   Tue Feb 04, 2025   1336 CT lower extremity w contrast right  Mild subcutaneous stranding and skin thickening of the first digit, which can be seen with cellulitis. No fluid collection. No soft tissue gas.     No CT evidence of osteomyelitis.            Medications   ciprofloxacin (CIPRO) IVPB (premix in 5% dextrose) 400 mg 200 mL (400 mg Intravenous New Bag 2/4/25 1106)       ED Risk Strat Scores                                              History of Present Illness       Chief Complaint   Patient presents with    Toe Pain     States that recently got new work boots that have now caused a blister on his Rt big toe. 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 States he was seen in  the ED yesterday as well where he was given prescriptions for Keflex and doxycycline but he was unable to pick them up       Past Medical History:   Diagnosis Date    Anxiety     Depression     Diabetes mellitus (HCC)     Fatty liver     Obesity       Past Surgical History:   Procedure Laterality Date    HERNIA REPAIR        Family History   Problem Relation Age of Onset    Diabetes Mother     Prostate cancer Paternal Grandfather       Social History     Tobacco Use    Smoking status: Former     Current packs/day: 0.00     Types: Cigarettes     Quit date:      Years since quittin.0    Smokeless tobacco: Never   Vaping Use    Vaping status: Every Day    Substances: Nicotine   Substance Use Topics    Alcohol use: Not Currently     Comment: social    Drug use: Yes     Types: Marijuana     Comment: medical card      E-Cigarette/Vaping    E-Cigarette Use Current Every Day User       E-Cigarette/Vaping Substances    Nicotine Yes       I have reviewed and agree with the history as documented.     Patient is a 34-year-old male coming in for evaluation of right great toe pain, and discomfort.  Patient does have a history significant for diabetes.  Reports that started with a blister couple days ago, however it started become discolored, erythema, including the erythema creeping up his foot. Saw a different emergency department yesterday, given a prescription for Keflex and doxycycline, but did not pick them up.  Patient denies any systemic symptoms of fever, fatigue, or any other symptoms      Toe Pain  Associated symptoms: no abdominal pain, no fatigue, no fever, no nausea, no shortness of breath and no vomiting        Review of Systems   Constitutional:  Negative for fatigue and fever.   Respiratory:  Negative for shortness of breath.    Gastrointestinal:  Negative for abdominal pain, nausea and vomiting.   Musculoskeletal:  Positive for arthralgias and joint swelling.           Objective       ED Triage Vitals  [02/04/25 1021]   Temperature Pulse Blood Pressure Respirations SpO2 Patient Position - Orthostatic VS   97.9 °F (36.6 °C) 96 132/83 18 98 % Sitting      Temp Source Heart Rate Source BP Location FiO2 (%) Pain Score    Oral Monitor Right arm -- --      Vitals      Date and Time Temp Pulse SpO2 Resp BP Pain Score FACES Pain Rating User   02/04/25 1021 97.9 °F (36.6 °C) 96 98 % 18 132/83 -- --             Physical Exam  Vitals reviewed.   Constitutional:       Appearance: Normal appearance. He is normal weight.   HENT:      Head: Normocephalic and atraumatic.      Right Ear: External ear normal.      Left Ear: External ear normal.      Nose: Nose normal.   Eyes:      Conjunctiva/sclera: Conjunctivae normal.   Cardiovascular:      Rate and Rhythm: Normal rate.   Pulmonary:      Effort: Pulmonary effort is normal.   Musculoskeletal:         General: Swelling present. Normal range of motion.      Cervical back: Normal range of motion.      Comments: Erythema, swelling, discoloration of right great toe.  No crepitus noted on exam.  Does have erythema creeping up along the first metatarsal of the dorsum of the right foot.  Pulses able to be found on Doppler, strong.   Skin:     General: Skin is warm and dry.   Neurological:      Mental Status: He is alert.         Results Reviewed       Procedure Component Value Units Date/Time    Comprehensive metabolic panel [[id number]]  (Abnormal) Collected: 02/04/25 1056    Lab Status: Final result Specimen: Blood from Arm, Right Updated: 02/04/25 1122     Sodium 136 mmol/L      Potassium 4.2 mmol/L      Chloride 102 mmol/L      CO2 27 mmol/L      ANION GAP 7 mmol/L      BUN 14 mg/dL      Creatinine 0.70 mg/dL      Glucose 128 mg/dL      Calcium 9.4 mg/dL      AST 11 U/L      ALT 11 U/L      Alkaline Phosphatase 36 U/L      Total Protein 7.4 g/dL      Albumin 4.4 g/dL      Total Bilirubin 0.58 mg/dL      eGFR 122 ml/min/1.73sq m     Narrative:      [employer]  guidelines for Chronic Kidney Disease (CKD):     Stage 1 with normal or high GFR (GFR > 90 mL/min/1.73 square meters)    Stage 2 Mild CKD (GFR = 60-89 mL/min/1.73 square meters)    Stage 3A Moderate CKD (GFR = 45-59 mL/min/1.73 square meters)    Stage 3B Moderate CKD (GFR = 30-44 mL/min/1.73 square meters)    Stage 4 Severe CKD (GFR = 15-29 mL/min/1.73 square meters)    Stage 5 End Stage CKD (GFR <15 mL/min/1.73 square meters)  Note: GFR calculation is accurate only with a steady state creatinine    CBC and differential [[id number]] Collected: 02/04/25 1056    Lab Status: Final result Specimen: Blood from Arm, Right Updated: 02/04/25 1107     WBC 7.42 Thousand/uL      RBC 5.21 Million/uL      Hemoglobin 15.4 g/dL      Hematocrit 45.3 %      MCV 87 fL      MCH 29.6 pg      MCHC 34.0 g/dL      RDW 13.2 %      MPV 10.6 fL      Platelets 238 Thousands/uL      nRBC 0 /100 WBCs      Segmented % 75 %      Immature Grans % 0 %      Lymphocytes % 14 %      Monocytes % 9 %      Eosinophils Relative 1 %      Basophils Relative 1 %      Absolute Neutrophils 5.47 Thousands/µL      Absolute Immature Grans 0.03 Thousand/uL      Absolute Lymphocytes 1.06 Thousands/µL      Absolute Monocytes 0.69 Thousand/µL      Eosinophils Absolute 0.10 Thousand/µL      Basophils Absolute 0.07 Thousands/µL     Fingerstick Glucose (POCT) [[id number]]  (Abnormal) Collected: 02/04/25 1025    Lab Status: Final result Specimen: Blood Updated: 02/04/25 1027     POC Glucose 151 mg/dl             CT lower extremity w contrast right   Final Interpretation by Low Faria MD (02/04 1236)      Mild subcutaneous stranding and skin thickening of the first digit, which can be seen with cellulitis. No fluid collection. No soft tissue gas.      No CT evidence of osteomyelitis.         Workstation performed: [id number]             Procedures    ED Medication and Procedure Management   Prior to Admission Medications   Prescriptions Last Dose Informant  "Patient Reported? Taking?   Blood Glucose Monitoring Suppl (OneTouch Verio Reflect) w/Device KIT  Self No No   Sig: Check blood sugars once daily. Please substitute with appropriate alternative as covered by patient's insurance. Dx: E11.65   [place] Choice Comfort EZ 33G X 4 MM MISC  Self Yes No   Sig: USE WITH SOLOSTAR PEN DAILY.   Continuous Blood Gluc Sensor (FreeStyle Amber 3 Sensor) MISC  Self Yes No   Empagliflozin-metFORMIN HCl ER (Synjardy XR) 12.5-1000 MG TB24   No No   Sig: Take 2 tablets by mouth daily   Injection Device for Insulin (CeQur Simplicity 2U) LAUREN   Yes No   Sig: Use 1 each every 3 (three) days   Injection Device for Insulin (CeQur Simplicity 2U) LAUREN   Yes No   Sig: Use 1 each every 3 (three) days   Insulin Pen Needle (BD Pen Needle Ratna U/F) 32G X 4 MM MISC  Self No No   Sig: Use daily as directed with insulin pen   Isopropyl Alcohol (Pharmacist Choice Alcohol) 70 % MISC  Self No No   Sig: Apply 200 Pads topically 3 (three) times a day   NovoLOG 100 UNIT/ML injection   No No   Sig: Inject 100 Units under the skin in the morning Via insuline pump   OneTouch Delica Lancets 33G MISC  Self No No   Sig: Check blood sugars once daily. Please substitute with appropriate alternative as covered by patient's insurance. Dx: E11.65   Ozempic, 1 MG/DOSE, 4 MG/3ML injection pen   Yes No   Sig: INJECT 1 MG UNDER THE SKIN EVERY 7 DAYS.   SYRINGE-NEEDLE, DISP, 3 ML (Safety Syringes/Needle) 20G X 1-1/2\" 3 ML MISC  Self No No   Sig: Use once a week for 12 doses   Toujeo SoloStar 300 units/mL CONCENTRATED U-300 injection pen (1-unit dial)   No No   Sig: Inject 25 Units under the skin daily at bedtime   Vitamin D, Ergocalciferol, 98938 units CAPS   Yes No   Sig: Take 1 capsule by mouth once a week   Xyosted 100 MG/0.5ML SOAJ  Self Yes No   Sig: Inject 100 mg under the skin every 7 days   amphetamine-dextroamphetamine (ADDERALL, 10MG,) 10 mg tablet   No No   Sig: Take 1 tablet (10 mg total) by mouth 2 (two) times " a day Max Daily Amount: 20 mg   famotidine (PEPCID) 20 mg tablet  Self No No   Sig: Take 1 tablet (20 mg total) by mouth 2 (two) times a day   gabapentin (Neurontin) 600 MG tablet   No No   Sig: Take 1 tablet (600 mg total) by mouth 3 (three) times a day   glucose blood (OneTouch Verio) test strip  Self No No   Sig: Check blood sugars once daily. Please substitute with appropriate alternative as covered by patient's insurance. Dx: E11.65   rosuvastatin (CRESTOR) 20 MG tablet   No No   Sig: Take 1 tablet (20 mg total) by mouth daily   semaglutide, 1 mg/dose, (Ozempic, 1 MG/DOSE,) 2 mg/1.5 mL injection pen   Yes No   Sig: Inject 1 mg under the skin every 7 days   sildenafil (VIAGRA) 50 MG tablet   No No   Sig: Take 1 tablet by mouth daily as needed for erectile dysfunction   tadalafil (CIALIS) 20 MG tablet   No No   Sig: Take 1 tablet (20 mg total) by mouth daily as needed for erectile dysfunction      Facility-Administered Medications: None     Discharge Medication List as of 2/4/2025  1:51 PM        CONTINUE these medications which have NOT CHANGED    Details   amphetamine-dextroamphetamine (ADDERALL, 10MG,) 10 mg tablet Take 1 tablet (10 mg total) by mouth 2 (two) times a day Max Daily Amount: 20 mg, Starting Mon 12/16/2024, Normal      Blood Glucose Monitoring Suppl (OneTouch Verio Reflect) w/Device KIT Check blood sugars once daily. Please substitute with appropriate alternative as covered by patient's insurance. Dx: E11.65, Normal      !! [place] Choice Comfort EZ 33G X 4 MM MISC USE WITH SOLOSTAR PEN DAILY., Historical Med      Continuous Blood Gluc Sensor (FreeStyle Amber 3 Sensor) MISC Historical Med      Empagliflozin-metFORMIN HCl ER (Synjardy XR) 12.5-1000 MG TB24 Take 2 tablets by mouth daily, Starting Mon 12/16/2024, Until Thu 12/11/2025, Normal      famotidine (PEPCID) 20 mg tablet Take 1 tablet (20 mg total) by mouth 2 (two) times a day, Starting Fri 2/9/2024, Until Wed 8/7/2024, Normal      gabapentin  "(Neurontin) 600 MG tablet Take 1 tablet (600 mg total) by mouth 3 (three) times a day, Starting Mon 12/16/2024, Normal      glucose blood (OneTouch Verio) test strip Check blood sugars once daily. Please substitute with appropriate alternative as covered by patient's insurance. Dx: E11.65, Normal      !! Injection Device for Insulin (CeQur Simplicity 2U) LAUREN Use 1 each every 3 (three) days, Starting Mon 4/8/2024, Historical Med      !! Injection Device for Insulin (CeQur Simplicity 2U) LAUREN Use 1 each every 3 (three) days, Starting Tue 7/23/2024, Historical Med      !! Insulin Pen Needle (BD Pen Needle Ratna U/F) 32G X 4 MM MISC Use daily as directed with insulin pen, Normal      Isopropyl Alcohol (Pharmacist Choice Alcohol) 70 % MISC Apply 200 Pads topically 3 (three) times a day, Starting Fri 1/5/2024, Normal      NovoLOG 100 UNIT/ML injection Inject 100 Units under the skin in the morning Via insuline pump, Starting Mon 12/16/2024, Until Thu 12/11/2025, Normal      OneTouch Delica Lancets 33G MISC Check blood sugars once daily. Please substitute with appropriate alternative as covered by patient's insurance. Dx: E11.65, Normal      Ozempic, 1 MG/DOSE, 4 MG/3ML injection pen INJECT 1 MG UNDER THE SKIN EVERY 7 DAYS., Historical Med      rosuvastatin (CRESTOR) 20 MG tablet Take 1 tablet (20 mg total) by mouth daily, Starting Mon 12/16/2024, Until Tue 12/16/2025, Normal      semaglutide, 1 mg/dose, (Ozempic, 1 MG/DOSE,) 2 mg/1.5 mL injection pen Inject 1 mg under the skin every 7 days, Historical Med      sildenafil (VIAGRA) 50 MG tablet Take 1 tablet by mouth daily as needed for erectile dysfunction, Normal      SYRINGE-NEEDLE, DISP, 3 ML (Safety Syringes/Needle) 20G X 1-1/2\" 3 ML MISC Use once a week for 12 doses, Starting Wed 2/28/2024, Until Thu 5/16/2024, Normal      tadalafil (CIALIS) 20 MG tablet Take 1 tablet (20 mg total) by mouth daily as needed for erectile dysfunction, Starting Mon 4/8/2024, Print    "   Toujeo SoloStar 300 units/mL CONCENTRATED U-300 injection pen (1-unit dial) Inject 25 Units under the skin daily at bedtime, Starting Mon 12/16/2024, Until Thu 12/11/2025, Normal      Vitamin D, Ergocalciferol, 62824 units CAPS Take 1 capsule by mouth once a week, Starting Mon 4/8/2024, Historical Med      Xyosted 100 MG/0.5ML SOAJ Inject 100 mg under the skin every 7 days, Starting Tue 3/19/2024, Historical Med       !! - Potential duplicate medications found. Please discuss with provider.        No discharge procedures on file.  ED SEPSIS DOCUMENTATION   Time reflects when diagnosis was documented in both MDM as applicable and the Disposition within this note       Time User Action Codes Description Comment    2/4/2025  1:47 PM Ricardo Saravia Add [L03.90] Cellulitis                  Ricardo Saravia PA-C  02/04/25 1854

## 2025-02-04 NOTE — Clinical Note
Jason Blanca was seen and treated in our emergency department on 2/4/2025.    No restrictions            Diagnosis:     Jason  .    He may return on this date: 02/07/2025         If you have any questions or concerns, please don't hesitate to call.      Ricardo Saravia PA-C    ______________________________           _______________          _______________  Hospital Representative                              Date                                Time

## 2025-02-11 ENCOUNTER — OFFICE VISIT (OUTPATIENT)
Dept: FAMILY MEDICINE CLINIC | Facility: CLINIC | Age: 35
End: 2025-02-11
Payer: COMMERCIAL

## 2025-02-11 VITALS
SYSTOLIC BLOOD PRESSURE: 138 MMHG | RESPIRATION RATE: 16 BRPM | DIASTOLIC BLOOD PRESSURE: 80 MMHG | BODY MASS INDEX: 31.08 KG/M2 | OXYGEN SATURATION: 98 % | HEIGHT: 67 IN | TEMPERATURE: 99.1 F | HEART RATE: 100 BPM | WEIGHT: 198 LBS

## 2025-02-11 DIAGNOSIS — F33.1 MODERATE EPISODE OF RECURRENT MAJOR DEPRESSIVE DISORDER (HCC): ICD-10-CM

## 2025-02-11 DIAGNOSIS — F90.0 ADHD, PREDOMINANTLY INATTENTIVE TYPE: ICD-10-CM

## 2025-02-11 DIAGNOSIS — Z79.4 TYPE 2 DIABETES MELLITUS WITH HYPERGLYCEMIA, WITH LONG-TERM CURRENT USE OF INSULIN (HCC): Primary | ICD-10-CM

## 2025-02-11 DIAGNOSIS — L97.511 ULCER OF RIGHT FOOT, LIMITED TO BREAKDOWN OF SKIN (HCC): ICD-10-CM

## 2025-02-11 DIAGNOSIS — Z79.4 TYPE 2 DIABETES MELLITUS WITH DIABETIC POLYNEUROPATHY, WITH LONG-TERM CURRENT USE OF INSULIN (HCC): ICD-10-CM

## 2025-02-11 DIAGNOSIS — E29.1 HYPOGONADISM IN MALE: ICD-10-CM

## 2025-02-11 DIAGNOSIS — E11.42 TYPE 2 DIABETES MELLITUS WITH DIABETIC POLYNEUROPATHY, WITH LONG-TERM CURRENT USE OF INSULIN (HCC): ICD-10-CM

## 2025-02-11 DIAGNOSIS — E11.65 TYPE 2 DIABETES MELLITUS WITH HYPERGLYCEMIA, WITH LONG-TERM CURRENT USE OF INSULIN (HCC): Primary | ICD-10-CM

## 2025-02-11 DIAGNOSIS — N52.02 CORPORO-VENOUS OCCLUSIVE ERECTILE DYSFUNCTION: ICD-10-CM

## 2025-02-11 PROCEDURE — 99215 OFFICE O/P EST HI 40 MIN: CPT | Performed by: FAMILY MEDICINE

## 2025-02-11 RX ORDER — BUPROPION HYDROCHLORIDE 100 MG/1
100 TABLET, EXTENDED RELEASE ORAL
COMMUNITY
Start: 2025-01-16 | End: 2025-02-11 | Stop reason: SDUPTHER

## 2025-02-11 RX ORDER — INSULIN LISPRO 100 [IU]/ML
0-12 INJECTION, SOLUTION INTRAVENOUS; SUBCUTANEOUS
COMMUNITY
Start: 2025-01-16 | End: 2025-02-11 | Stop reason: ALTCHOICE

## 2025-02-11 RX ORDER — BUPROPION HYDROCHLORIDE 100 MG/1
100 TABLET, EXTENDED RELEASE ORAL 2 TIMES DAILY
Qty: 60 TABLET | Refills: 5 | Status: SHIPPED | OUTPATIENT
Start: 2025-02-11 | End: 2025-03-13

## 2025-02-11 RX ORDER — POLYETHYLENE GLYCOL 3350 17 G
2 POWDER IN PACKET (EA) ORAL
COMMUNITY
Start: 2025-01-16 | End: 2025-02-11 | Stop reason: ALTCHOICE

## 2025-02-11 RX ORDER — TADALAFIL 20 MG/1
20 TABLET ORAL DAILY PRN
Qty: 30 TABLET | Refills: 3 | Status: SHIPPED | OUTPATIENT
Start: 2025-02-11

## 2025-02-11 RX ORDER — DOXYCYCLINE 100 MG/1
100 TABLET ORAL 2 TIMES DAILY
COMMUNITY
Start: 2025-02-03 | End: 2025-02-13

## 2025-02-11 RX ORDER — CEPHALEXIN 500 MG/1
500 CAPSULE ORAL 4 TIMES DAILY
COMMUNITY
Start: 2025-02-03 | End: 2025-02-13

## 2025-02-11 RX ORDER — DEXTROAMPHETAMINE SACCHARATE, AMPHETAMINE ASPARTATE MONOHYDRATE, DEXTROAMPHETAMINE SULFATE AND AMPHETAMINE SULFATE 5; 5; 5; 5 MG/1; MG/1; MG/1; MG/1
20 CAPSULE, EXTENDED RELEASE ORAL EVERY MORNING
Qty: 30 CAPSULE | Refills: 0 | Status: SHIPPED | OUTPATIENT
Start: 2025-02-11

## 2025-02-11 NOTE — ASSESSMENT & PLAN NOTE
Continue care with endocrinologist.  Orders:    tadalafil (CIALIS) 20 MG tablet; Take 1 tablet (20 mg total) by mouth daily as needed for erectile dysfunction

## 2025-02-11 NOTE — PROGRESS NOTES
Name: Jason Blanca      : 1990      MRN: [id number]  Encounter Provider: Jaren Marquez MD  Encounter Date: 2025   Encounter department: [facility] C[facility]    Assessment & Plan  Type 2 diabetes mellitus with hyperglycemia, with long-term current use of insulin ([facility])    Lab Results   Component Value Date    HGBA1C 8.3 (H) 2025     - Most recent HbA1c 8.3% ()  - Goal HbA1c < 7.0%  - Currently using NovoLog insulin pump  - Continue current diabetes management  - Emphasize importance of strict glycemic control for wound healing         ADHD, predominantly inattentive type  Doing well in school with current treatment.  Adderall is lasting 3 to 4 hours   - Increasing Adderall to 20mg XR daily  Orders:    amphetamine-dextroamphetamine (ADDERALL XR, 20MG,) 20 MG 24 hr capsule; Take 1 capsule (20 mg total) by mouth every morning Max Daily Amount: 20 mg    Type 2 diabetes mellitus with diabetic polyneuropathy, with long-term current use of insulin ([facility])    Lab Results   Component Value Date    HGBA1C 8.3 (H) 2025   Continue the use of gabapentin.  Importance of good protection and understanding polyneuropathy is very important for his health and care.         Hypogonadism in male  Continue care with endocrinologist.  Orders:    tadalafil (CIALIS) 20 MG tablet; Take 1 tablet (20 mg total) by mouth daily as needed for erectile dysfunction    Corporo-venous occlusive erectile dysfunction  Patient is good with of the of daily.  Medication sent to the pharmacy.  Orders:    tadalafil (CIALIS) 20 MG tablet; Take 1 tablet (20 mg total) by mouth daily as needed for erectile dysfunction    Moderate episode of recurrent major depressive disorder (HCC)  He does not have suicidal ideas.  Continue same treatment.  - Continue bupropion  - Increased stress due to current medical situation and work transition  Orders:    buPROPion (WELLBUTRIN SR) 100 mg 12 hr tablet;  Take 1 tablet (100 mg total) by mouth 2 (two) times a day    Ulcer of right foot, limited to breakdown of skin (HCC)  Infected Diabetic Foot Ulcer with Work-Related Blister  Currently on Keflex.  - Currently on dual antibiotic therapy with doxycycline and cephalexin  - Recent podiatric intervention with debridement  - CT scan negative for osteomyelitis  - Continue current antibiotic regimen  - Follow up with podiatrist as scheduled next Tuesday            - Advised daily foot checks with mirror  - Discussed proper footwear and preventive measures  - Encouraged continuation of Mediterranean diet modifications              Subjective       35-year-old male presents for follow-up of infected right great toe blister that developed while working as an . Patient reports blister formation after wearing work boots, which subsequently became infected. Initially noticed discomfort and monitored the area before it progressed to infection. Despite having diabetic neuropathy, patient maintains some sensation in the affected area. Currently under podiatric care with recent debridement performed. Work situation complicated by workers' compensation claim, which employer is attempting to deny based on pre-existing diabetes. 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 Currently taking doxycycline and cephalexin for infection. Reports compliance with diabetes management despite recent insurance challenges, utilizing [facility] for medications. Patient also reports increased depression (rates 3/10) and anxiety related to current situation, denies suicidal ideation. Additionally requests medication adjustments for ADHD and ED management.    Objective:  IMAGING/STUDIES:  - CT scan: No evidence of osteomyelitis  - Recent podiatric debridement of right great toe with evidence of underlying infection    LABORATORY:  - HbA1c (1/13/2025): 8.3%    PHYSICAL EXAM:/80 (BP  "Location: Left arm, Patient Position: Sitting, Cuff Size: Standard)   Pulse 100   Temp 99.1 °F (37.3 °C) (Tympanic)   Resp 16   Ht 5' 7\" (1.702 m)   Wt 89.8 kg (198 lb)   SpO2 98%   BMI 31.01 kg/m²     - Right great toe with recent debridement and dressing  - Evidence of prior podiatric intervention  - Current bandage in place          Jaren Marquez MD   [facility] PRIMARY CARE [facility]    "

## 2025-02-11 NOTE — ASSESSMENT & PLAN NOTE
Doing well in school with current treatment.  Adderall is lasting 3 to 4 hours   - Increasing Adderall to 20mg XR daily  Orders:    amphetamine-dextroamphetamine (ADDERALL XR, 20MG,) 20 MG 24 hr capsule; Take 1 capsule (20 mg total) by mouth every morning Max Daily Amount: 20 mg

## 2025-02-11 NOTE — ASSESSMENT & PLAN NOTE
Lab Results   Component Value Date    HGBA1C 8.3 (H) 01/13/2025     - Most recent HbA1c 8.3% (January 13)  - Goal HbA1c < 7.0%  - Currently using NovoLog insulin pump  - Continue current diabetes management  - Emphasize importance of strict glycemic control for wound healing

## 2025-02-16 ENCOUNTER — APPOINTMENT (EMERGENCY)
Dept: RADIOLOGY | Facility: HOSPITAL | Age: 35
DRG: 295 | End: 2025-02-16
Payer: OTHER MISCELLANEOUS

## 2025-02-16 ENCOUNTER — HOSPITAL ENCOUNTER (INPATIENT)
Facility: HOSPITAL | Age: 35
LOS: 3 days | Discharge: HOME | DRG: 295 | End: 2025-02-19
Attending: EMERGENCY MEDICINE | Admitting: INTERNAL MEDICINE
Payer: OTHER MISCELLANEOUS

## 2025-02-16 DIAGNOSIS — E11.628 DIABETIC FOOT INFECTION  (HCC): Primary | ICD-10-CM

## 2025-02-16 DIAGNOSIS — L08.9 DIABETIC FOOT INFECTION  (HCC): Primary | ICD-10-CM

## 2025-02-16 DIAGNOSIS — T14.8XXA NONHEALING NONSURGICAL WOUND: ICD-10-CM

## 2025-02-16 DIAGNOSIS — L08.9 INFECTION OF GREAT TOE: ICD-10-CM

## 2025-02-16 PROBLEM — F33.2 MDD (MAJOR DEPRESSIVE DISORDER), RECURRENT SEVERE, WITHOUT PSYCHOSIS (HCC): Status: ACTIVE | Noted: 2025-01-13

## 2025-02-16 PROBLEM — L97.519 SKIN ULCER OF RIGHT GREAT TOE (HCC): Status: ACTIVE | Noted: 2025-02-16

## 2025-02-16 LAB
ALBUMIN SERPL BCG-MCNC: 4 G/DL (ref 3.5–5)
ALP SERPL-CCNC: 44 U/L (ref 34–104)
ALT SERPL W P-5'-P-CCNC: 22 U/L (ref 7–52)
ANION GAP SERPL CALCULATED.3IONS-SCNC: 7 MMOL/L (ref 4–13)
AST SERPL W P-5'-P-CCNC: 15 U/L (ref 13–39)
BASOPHILS # BLD AUTO: 0.07 THOUSANDS/ΜL (ref 0–0.1)
BASOPHILS NFR BLD AUTO: 1 % (ref 0–1)
BILIRUB SERPL-MCNC: 0.39 MG/DL (ref 0.2–1)
BUN SERPL-MCNC: 14 MG/DL (ref 5–25)
CALCIUM SERPL-MCNC: 8.5 MG/DL (ref 8.4–10.2)
CHLORIDE SERPL-SCNC: 101 MMOL/L (ref 96–108)
CO2 SERPL-SCNC: 25 MMOL/L (ref 21–32)
CREAT SERPL-MCNC: 0.73 MG/DL (ref 0.6–1.3)
CRP SERPL QL: 4.9 MG/L
EOSINOPHIL # BLD AUTO: 0.12 THOUSAND/ΜL (ref 0–0.61)
EOSINOPHIL NFR BLD AUTO: 1 % (ref 0–6)
ERYTHROCYTE [DISTWIDTH] IN BLOOD BY AUTOMATED COUNT: 13.2 % (ref 11.6–15.1)
ERYTHROCYTE [SEDIMENTATION RATE] IN BLOOD: 12 MM/HOUR (ref 0–14)
GFR SERPL CREATININE-BSD FRML MDRD: 120 ML/MIN/1.73SQ M
GLUCOSE SERPL-MCNC: 294 MG/DL (ref 65–140)
HCT VFR BLD AUTO: 42.6 % (ref 36.5–49.3)
HGB BLD-MCNC: 14.3 G/DL (ref 12–17)
IMM GRANULOCYTES # BLD AUTO: 0.09 THOUSAND/UL (ref 0–0.2)
IMM GRANULOCYTES NFR BLD AUTO: 1 % (ref 0–2)
LACTATE SERPL-SCNC: 1.4 MMOL/L (ref 0.5–2)
LYMPHOCYTES # BLD AUTO: 1.86 THOUSANDS/ΜL (ref 0.6–4.47)
LYMPHOCYTES NFR BLD AUTO: 22 % (ref 14–44)
MCH RBC QN AUTO: 29.8 PG (ref 26.8–34.3)
MCHC RBC AUTO-ENTMCNC: 33.6 G/DL (ref 31.4–37.4)
MCV RBC AUTO: 89 FL (ref 82–98)
MONOCYTES # BLD AUTO: 0.53 THOUSAND/ΜL (ref 0.17–1.22)
MONOCYTES NFR BLD AUTO: 6 % (ref 4–12)
NEUTROPHILS # BLD AUTO: 5.72 THOUSANDS/ΜL (ref 1.85–7.62)
NEUTS SEG NFR BLD AUTO: 69 % (ref 43–75)
NRBC BLD AUTO-RTO: 0 /100 WBCS
PLATELET # BLD AUTO: 283 THOUSANDS/UL (ref 149–390)
PMV BLD AUTO: 10.1 FL (ref 8.9–12.7)
POTASSIUM SERPL-SCNC: 3.9 MMOL/L (ref 3.5–5.3)
PROT SERPL-MCNC: 6.7 G/DL (ref 6.4–8.4)
RBC # BLD AUTO: 4.8 MILLION/UL (ref 3.88–5.62)
SODIUM SERPL-SCNC: 133 MMOL/L (ref 135–147)
WBC # BLD AUTO: 8.39 THOUSAND/UL (ref 4.31–10.16)

## 2025-02-16 PROCEDURE — 99285 EMERGENCY DEPT VISIT HI MDM: CPT | Performed by: PHYSICIAN ASSISTANT

## 2025-02-16 PROCEDURE — 96365 THER/PROPH/DIAG IV INF INIT: CPT

## 2025-02-16 PROCEDURE — 85652 RBC SED RATE AUTOMATED: CPT | Performed by: PHYSICIAN ASSISTANT

## 2025-02-16 PROCEDURE — 83605 ASSAY OF LACTIC ACID: CPT | Performed by: PHYSICIAN ASSISTANT

## 2025-02-16 PROCEDURE — 87040 BLOOD CULTURE FOR BACTERIA: CPT | Performed by: PHYSICIAN ASSISTANT

## 2025-02-16 PROCEDURE — 80053 COMPREHEN METABOLIC PANEL: CPT | Performed by: PHYSICIAN ASSISTANT

## 2025-02-16 PROCEDURE — 86140 C-REACTIVE PROTEIN: CPT | Performed by: PHYSICIAN ASSISTANT

## 2025-02-16 PROCEDURE — 36415 COLL VENOUS BLD VENIPUNCTURE: CPT | Performed by: PHYSICIAN ASSISTANT

## 2025-02-16 PROCEDURE — 99223 1ST HOSP IP/OBS HIGH 75: CPT | Performed by: STUDENT IN AN ORGANIZED HEALTH CARE EDUCATION/TRAINING PROGRAM

## 2025-02-16 PROCEDURE — 85025 COMPLETE CBC W/AUTO DIFF WBC: CPT | Performed by: PHYSICIAN ASSISTANT

## 2025-02-16 PROCEDURE — 73660 X-RAY EXAM OF TOE(S): CPT

## 2025-02-16 PROCEDURE — 99284 EMERGENCY DEPT VISIT MOD MDM: CPT

## 2025-02-16 RX ORDER — INSULIN GLARGINE 100 [IU]/ML
15 INJECTION, SOLUTION SUBCUTANEOUS
Status: DISCONTINUED | OUTPATIENT
Start: 2025-02-17 | End: 2025-02-17

## 2025-02-16 RX ORDER — BUPROPION HYDROCHLORIDE 150 MG/1
150 TABLET ORAL DAILY
Status: DISCONTINUED | OUTPATIENT
Start: 2025-02-17 | End: 2025-02-16

## 2025-02-16 RX ORDER — ATORVASTATIN CALCIUM 40 MG/1
40 TABLET, FILM COATED ORAL
Status: DISCONTINUED | OUTPATIENT
Start: 2025-02-17 | End: 2025-02-19 | Stop reason: HOSPADM

## 2025-02-16 RX ORDER — BUPROPION HYDROCHLORIDE 100 MG/1
100 TABLET ORAL 2 TIMES DAILY
Status: DISCONTINUED | OUTPATIENT
Start: 2025-02-17 | End: 2025-02-19 | Stop reason: HOSPADM

## 2025-02-16 RX ORDER — FAMOTIDINE 20 MG/1
20 TABLET, FILM COATED ORAL 2 TIMES DAILY
Status: DISCONTINUED | OUTPATIENT
Start: 2025-02-16 | End: 2025-02-19 | Stop reason: HOSPADM

## 2025-02-16 RX ORDER — ONDANSETRON 2 MG/ML
4 INJECTION INTRAMUSCULAR; INTRAVENOUS EVERY 6 HOURS PRN
Status: DISCONTINUED | OUTPATIENT
Start: 2025-02-16 | End: 2025-02-19 | Stop reason: HOSPADM

## 2025-02-16 RX ORDER — CEFEPIME HYDROCHLORIDE 2 G/50ML
2000 INJECTION, SOLUTION INTRAVENOUS EVERY 12 HOURS
Status: DISCONTINUED | OUTPATIENT
Start: 2025-02-17 | End: 2025-02-18

## 2025-02-16 RX ORDER — INSULIN GLARGINE 100 [IU]/ML
25 INJECTION, SOLUTION SUBCUTANEOUS
Status: DISCONTINUED | OUTPATIENT
Start: 2025-02-16 | End: 2025-02-16

## 2025-02-16 RX ORDER — INSULIN LISPRO 100 [IU]/ML
4-20 INJECTION, SOLUTION INTRAVENOUS; SUBCUTANEOUS
Status: DISCONTINUED | OUTPATIENT
Start: 2025-02-16 | End: 2025-02-19 | Stop reason: HOSPADM

## 2025-02-16 RX ORDER — DEXTROAMPHETAMINE SACCHARATE, AMPHETAMINE ASPARTATE, DEXTROAMPHETAMINE SULFATE AND AMPHETAMINE SULFATE 2.5; 2.5; 2.5; 2.5 MG/1; MG/1; MG/1; MG/1
10 TABLET ORAL
Status: DISCONTINUED | OUTPATIENT
Start: 2025-02-17 | End: 2025-02-19 | Stop reason: HOSPADM

## 2025-02-16 RX ORDER — ACETAMINOPHEN 325 MG/1
650 TABLET ORAL EVERY 6 HOURS PRN
Status: DISCONTINUED | OUTPATIENT
Start: 2025-02-16 | End: 2025-02-19 | Stop reason: HOSPADM

## 2025-02-16 RX ORDER — GABAPENTIN 300 MG/1
600 CAPSULE ORAL 3 TIMES DAILY
Status: DISCONTINUED | OUTPATIENT
Start: 2025-02-16 | End: 2025-02-19 | Stop reason: HOSPADM

## 2025-02-16 RX ORDER — CEFEPIME HYDROCHLORIDE 2 G/50ML
2000 INJECTION, SOLUTION INTRAVENOUS EVERY 12 HOURS
Status: DISCONTINUED | OUTPATIENT
Start: 2025-02-16 | End: 2025-02-16

## 2025-02-16 RX ORDER — CEFEPIME HYDROCHLORIDE 2 G/50ML
2000 INJECTION, SOLUTION INTRAVENOUS ONCE
Status: COMPLETED | OUTPATIENT
Start: 2025-02-16 | End: 2025-02-16

## 2025-02-16 RX ADMIN — INSULIN LISPRO 12 UNITS: 100 INJECTION, SOLUTION INTRAVENOUS; SUBCUTANEOUS at 23:02

## 2025-02-16 RX ADMIN — GABAPENTIN 600 MG: 300 CAPSULE ORAL at 22:22

## 2025-02-16 RX ADMIN — FAMOTIDINE 20 MG: 20 TABLET, FILM COATED ORAL at 22:22

## 2025-02-16 RX ADMIN — CEFEPIME HYDROCHLORIDE 2000 MG: 2 INJECTION, SOLUTION INTRAVENOUS at 19:50

## 2025-02-16 RX ADMIN — ACETAMINOPHEN 650 MG: 325 TABLET, FILM COATED ORAL at 22:23

## 2025-02-16 RX ADMIN — VANCOMYCIN HYDROCHLORIDE 1500 MG: 1 INJECTION, POWDER, LYOPHILIZED, FOR SOLUTION INTRAVENOUS at 20:26

## 2025-02-17 ENCOUNTER — APPOINTMENT (INPATIENT)
Dept: MRI IMAGING | Facility: HOSPITAL | Age: 35
DRG: 295 | End: 2025-02-17
Payer: OTHER MISCELLANEOUS

## 2025-02-17 LAB
ANION GAP SERPL CALCULATED.3IONS-SCNC: 6 MMOL/L (ref 4–13)
BUN SERPL-MCNC: 12 MG/DL (ref 5–25)
CALCIUM SERPL-MCNC: 8.1 MG/DL (ref 8.4–10.2)
CHLORIDE SERPL-SCNC: 105 MMOL/L (ref 96–108)
CO2 SERPL-SCNC: 25 MMOL/L (ref 21–32)
CREAT SERPL-MCNC: 0.61 MG/DL (ref 0.6–1.3)
ERYTHROCYTE [DISTWIDTH] IN BLOOD BY AUTOMATED COUNT: 13.5 % (ref 11.6–15.1)
GFR SERPL CREATININE-BSD FRML MDRD: 129 ML/MIN/1.73SQ M
GLUCOSE SERPL-MCNC: 166 MG/DL (ref 65–140)
GLUCOSE SERPL-MCNC: 168 MG/DL (ref 65–140)
GLUCOSE SERPL-MCNC: 216 MG/DL (ref 65–140)
GLUCOSE SERPL-MCNC: 218 MG/DL (ref 65–140)
GLUCOSE SERPL-MCNC: 253 MG/DL (ref 65–140)
HCT VFR BLD AUTO: 41 % (ref 36.5–49.3)
HGB BLD-MCNC: 13.4 G/DL (ref 12–17)
MCH RBC QN AUTO: 29.1 PG (ref 26.8–34.3)
MCHC RBC AUTO-ENTMCNC: 32.7 G/DL (ref 31.4–37.4)
MCV RBC AUTO: 89 FL (ref 82–98)
PLATELET # BLD AUTO: 244 THOUSANDS/UL (ref 149–390)
PMV BLD AUTO: 10.2 FL (ref 8.9–12.7)
POTASSIUM SERPL-SCNC: 4.1 MMOL/L (ref 3.5–5.3)
RBC # BLD AUTO: 4.61 MILLION/UL (ref 3.88–5.62)
SODIUM SERPL-SCNC: 136 MMOL/L (ref 135–147)
WBC # BLD AUTO: 6.33 THOUSAND/UL (ref 4.31–10.16)

## 2025-02-17 PROCEDURE — 73718 MRI LOWER EXTREMITY W/O DYE: CPT

## 2025-02-17 PROCEDURE — 80048 BASIC METABOLIC PNL TOTAL CA: CPT | Performed by: PHYSICIAN ASSISTANT

## 2025-02-17 PROCEDURE — 85027 COMPLETE CBC AUTOMATED: CPT | Performed by: PHYSICIAN ASSISTANT

## 2025-02-17 PROCEDURE — 97163 PT EVAL HIGH COMPLEX 45 MIN: CPT

## 2025-02-17 PROCEDURE — 82948 REAGENT STRIP/BLOOD GLUCOSE: CPT

## 2025-02-17 PROCEDURE — 99232 SBSQ HOSP IP/OBS MODERATE 35: CPT | Performed by: STUDENT IN AN ORGANIZED HEALTH CARE EDUCATION/TRAINING PROGRAM

## 2025-02-17 RX ORDER — INSULIN GLARGINE 100 [IU]/ML
17 INJECTION, SOLUTION SUBCUTANEOUS
Status: DISCONTINUED | OUTPATIENT
Start: 2025-02-17 | End: 2025-02-19 | Stop reason: HOSPADM

## 2025-02-17 RX ORDER — SODIUM CHLORIDE 9 MG/ML
125 INJECTION, SOLUTION INTRAVENOUS CONTINUOUS
Status: DISCONTINUED | OUTPATIENT
Start: 2025-02-17 | End: 2025-02-17

## 2025-02-17 RX ORDER — IBUPROFEN 600 MG/1
600 TABLET, FILM COATED ORAL EVERY 6 HOURS PRN
Status: DISCONTINUED | OUTPATIENT
Start: 2025-02-17 | End: 2025-02-18

## 2025-02-17 RX ADMIN — VANCOMYCIN HYDROCHLORIDE 1750 MG: 1 INJECTION, POWDER, LYOPHILIZED, FOR SOLUTION INTRAVENOUS at 19:42

## 2025-02-17 RX ADMIN — GABAPENTIN 600 MG: 300 CAPSULE ORAL at 17:12

## 2025-02-17 RX ADMIN — BUPROPION HYDROCHLORIDE 100 MG: 100 TABLET, FILM COATED ORAL at 08:17

## 2025-02-17 RX ADMIN — INSULIN LISPRO 4 UNITS: 100 INJECTION, SOLUTION INTRAVENOUS; SUBCUTANEOUS at 17:14

## 2025-02-17 RX ADMIN — INSULIN GLARGINE 17 UNITS: 100 INJECTION, SOLUTION SUBCUTANEOUS at 21:54

## 2025-02-17 RX ADMIN — GABAPENTIN 600 MG: 300 CAPSULE ORAL at 21:54

## 2025-02-17 RX ADMIN — ACETAMINOPHEN 650 MG: 325 TABLET, FILM COATED ORAL at 08:32

## 2025-02-17 RX ADMIN — INSULIN LISPRO 8 UNITS: 100 INJECTION, SOLUTION INTRAVENOUS; SUBCUTANEOUS at 12:31

## 2025-02-17 RX ADMIN — NICOTINE 7 MG: 7 PATCH, EXTENDED RELEASE TRANSDERMAL at 11:53

## 2025-02-17 RX ADMIN — GABAPENTIN 600 MG: 300 CAPSULE ORAL at 08:17

## 2025-02-17 RX ADMIN — FAMOTIDINE 20 MG: 20 TABLET, FILM COATED ORAL at 08:17

## 2025-02-17 RX ADMIN — BUPROPION HYDROCHLORIDE 100 MG: 100 TABLET, FILM COATED ORAL at 17:12

## 2025-02-17 RX ADMIN — FAMOTIDINE 20 MG: 20 TABLET, FILM COATED ORAL at 17:12

## 2025-02-17 RX ADMIN — ATORVASTATIN CALCIUM 40 MG: 40 TABLET, FILM COATED ORAL at 17:12

## 2025-02-17 RX ADMIN — INSULIN LISPRO 4 UNITS: 100 INJECTION, SOLUTION INTRAVENOUS; SUBCUTANEOUS at 21:55

## 2025-02-17 RX ADMIN — DEXTROAMPHETAMINE SACCHARATE, AMPHETAMINE ASPARTATE, DEXTROAMPHETAMINE SULFATE AND AMPHETAMINE SULFATE 10 MG: 2.5; 2.5; 2.5; 2.5 TABLET ORAL at 08:17

## 2025-02-17 RX ADMIN — CEFEPIME HYDROCHLORIDE 2000 MG: 2 INJECTION, SOLUTION INTRAVENOUS at 22:15

## 2025-02-17 RX ADMIN — IBUPROFEN 600 MG: 600 TABLET, FILM COATED ORAL at 19:53

## 2025-02-17 RX ADMIN — IBUPROFEN 600 MG: 600 TABLET, FILM COATED ORAL at 11:49

## 2025-02-17 RX ADMIN — CEFEPIME HYDROCHLORIDE 2000 MG: 2 INJECTION, SOLUTION INTRAVENOUS at 08:20

## 2025-02-17 RX ADMIN — DEXTROAMPHETAMINE SACCHARATE, AMPHETAMINE ASPARTATE, DEXTROAMPHETAMINE SULFATE AND AMPHETAMINE SULFATE 10 MG: 2.5; 2.5; 2.5; 2.5 TABLET ORAL at 11:49

## 2025-02-17 NOTE — DISCHARGE SUMMARY
Discharge Summary - [facility]    Patient Information: Jason Blanca 34 y.o. male MRN: [id number]  Unit/Bed#: 7[facility] 708-01 Encounter: [id number]    Discharging Physician / Practitioner: Arik  PCP: Jaren Marquez MD  Admission Date:   Admission Orders (From admission, onward)       Ordered        02/16/25 2006  INPATIENT ADMISSION  Once                          Discharge Date: 2/19/25    Reason for Admission: non-healing ulcer requiring IV antibiotics    Discharge Diagnoses:     Principal Problem:    Nonhealing wound of right great toe  Active Problems:    Type 2 diabetes mellitus with hyperglycemia, with neuropathic pain    Recurrent major depressive disorder, in remission (HCC)    ADHD, predominantly inattentive type    Mixed dyslipidemia  Resolved Problems:    * No resolved hospital problems. *        * Nonhealing wound of right great toe  Assessment & Plan  X-ray: No obvious sign of osteomyelitis   MRI 2/17/25 showed plantar/medial skin ulceration at the level of the first interphalangeal joint without marrow change to indicate osteomyelitis at this time   Blood cultures negative at 24hrs    Plan:  Continue PO Doxycycline 100mg BID and Levofloxacin 750mg daily to complete 7 days  Follow up outpatient with Podiatry   Surgical offloading shoe     Mixed dyslipidemia  Assessment & Plan  History of dyslipidemia Home medication.  Home meds rosuvastatin 20 mg p.o. daily    Plan:  Continue home meds    ADHD, predominantly inattentive type  Assessment & Plan  Stable  On Adderall 10 mg twice daily    Plan:  Continue current regimen    Recurrent major depressive disorder, in remission (HCC)  Assessment & Plan  Stable.  No suicidal homicidal ideation currently  Home medication: Wellbutrin 100 mg twice daily    Plan:  Continue current regimen    Type 2 diabetes mellitus with hyperglycemia, with neuropathic pain  Assessment & Plan  Lab Results   Component Value Date    HGBA1C 8.3 (H)  01/13/2025       Recent Labs     02/17/25  1051 02/17/25  1532 02/17/25  2046 02/18/25  0603   POCGLU 216* 166* 168* 175*       Home regimen: Toujeo 25 units at bedtime, lispro via insulin pump, Synjardy XR  mg qd     Plan:  Continue home regimen at discharge         Consultations During Hospital Stay:  Podiatry  PT  CM    Procedures Performed:   none    Significant Findings / Test Results:   Glucose 298>>>184  CRP-4.9  Xray-No significant abnormality on wet red, pending official read  MRI 2/17/25 showed plantar/medial skin ulceration at the level of the first interphalangeal joint without marrow change to indicate osteomyelitis at this time  Negative blood cultures    Test Results Pending at Discharge (will require follow up):   none     Outpatient Tests Requested:  none    Outpatient follow-up Requested:  Podiatry   PCP    Complications:  none    Hospital Course:     Jason Blanca is a 34 y.o. male patient with PMH of type II DM, ADHD, MDD who originally presented to the hospital on 2/16/2025 due to pain and burning sensation on his right great toe. 3 weeks prior he developed a blister at workplace and he went to see a physician at [facility] and they gave him antibiotics (doxycycline and cephalexin). In the ED vital signs were stable, no leukocytosis, normal sed rate and lactic acid, but CRP elevated at 4.9. X-ray and MRI showed no obvious sign of osteomyelitis. He was admitted requirement of IV antibiotics for non-healing wound ulcer. Was started on IV vancomycin and cefepime and then transitioned to PO Doxycycline 100mg BID and Levofloxacin 750mg daily. Was seen by podiatry while inpatient who recommended debridement outpatient. Improvement of pain, erythema and swelling. Appropriate dressing was applied. Patient is stable to be discharged with close follow up with his PCP and podiatry.     Review of Systems   Constitutional:  Negative for chills and fever.   HENT:  Negative for ear pain and  "sore throat.    Eyes:  Negative for pain and visual disturbance.   Respiratory:  Negative for cough and shortness of breath.    Cardiovascular:  Negative for chest pain and palpitations.   Gastrointestinal:  Negative for abdominal pain and vomiting.   Genitourinary:  Negative for dysuria and hematuria.   Musculoskeletal:  Negative for arthralgias and back pain.   Skin:  Negative for color change and rash.   Neurological:  Negative for seizures and syncope.   All other systems reviewed and are negative.      Condition at Discharge: stable     Discharge Day Visit / Exam:     Vitals: Blood Pressure: 142/87 (02/19/25 0748)  Pulse: 88 (02/19/25 0748)  Temperature: (!) 96.7 °F (35.9 °C) (02/19/25 0748)  Temp Source: Temporal (02/19/25 0748)  Respirations: 20 (02/19/25 0748)  Height: 5' 8\" (172.7 cm) (02/16/25 2059)  Weight - Scale: 95.6 kg (210 lb 12.2 oz) (02/16/25 2059)  SpO2: 97 % (02/19/25 0748)    Exam:   Physical Exam  Vitals reviewed.   Constitutional:       General: He is not in acute distress.     Appearance: Normal appearance.   HENT:      Head: Normocephalic.      Right Ear: External ear normal.      Left Ear: External ear normal.      Nose: Nose normal.      Mouth/Throat:      Pharynx: Oropharynx is clear.   Eyes:      Extraocular Movements: Extraocular movements intact.      Conjunctiva/sclera: Conjunctivae normal.   Cardiovascular:      Rate and Rhythm: Normal rate and regular rhythm.      Pulses: Normal pulses.      Heart sounds: Normal heart sounds. No murmur heard.     No friction rub. No gallop.   Pulmonary:      Effort: Pulmonary effort is normal.      Breath sounds: Normal breath sounds. No wheezing.   Abdominal:      General: Bowel sounds are normal.      Palpations: There is no mass.      Tenderness: There is no abdominal tenderness.   Musculoskeletal:         General: No swelling.      Cervical back: Normal range of motion.      Right lower leg: No edema.      Left lower leg: No edema.        " Feet:    Feet:      Right foot:      Skin integrity: Ulcer (right great toe) present. Healing     Left foot:      Skin integrity: Skin integrity normal.   Skin:     General: Skin is warm.      Capillary Refill: Capillary refill takes less than 2 seconds.   Neurological:      General: No focal deficit present.      Mental Status: He is alert and oriented to person, place, and time. Mental status is at baseline.      Sensory: No sensory deficit.      Motor: No weakness.   Psychiatric:         Mood and Affect: Mood normal.     Discharge instructions/Information to patient and family:   See after visit summary for information provided to patient and family.      Discharge Medications:  Amphetamine-Dextroamphetamine 20 MG 20 mg Oral Every morning    buPROPion HCl 100 mg Oral 2 times daily    Continuous Glucose Sensor    Doxycycline Hyclate 100 mg Oral Every 12 hours scheduled    Empagliflozin-metFORMIN HCl 12.5-1000 MG 2 tablets Oral Daily    Ergocalciferol 06265 units 1 capsule Weekly    Famotidine 20 mg Oral 2 times daily    Gabapentin 600 mg Oral 3 times daily    Insulin Aspart 100 Units Subcutaneous Daily, Via insuline pump    Insulin Glargine 25 Units Subcutaneous Daily at bedtime    levoFLOXacin 750 mg Oral Every 24 hours    Rosuvastatin Calcium 20 mg Oral Daily    Tadalafil 20 mg Oral Daily PRN    Testosterone Enanthate 100 mg Every 7 days    Provisions for Follow-Up Care:  See after visit summary for information related to follow-up care and any pertinent home health orders.      Disposition:     Home    For Discharges to [facility]:   Not Applicable to this Patient - Not Applicable to this Patient    Planned Readmission: no     Discharge Statement:  I spent 30 minutes discharging the patient. This time was spent on the day of discharge. I had direct contact with the patient on the day of discharge. Greater than 50% of the total time was spent examining patient, answering all patient questions,  arranging and discussing plan of care with patient as well as directly providing post-discharge instructions.  Additional time then spent on discharge activities.    ** Please Note: This note has been constructed using a voice recognition system **    Ling Felix MD  02/19/25  11:04 AM

## 2025-02-17 NOTE — ASSESSMENT & PLAN NOTE
Stable.  No suicidal homicidal ideation currently  Home medication: Wellbutrin 100 mg twice daily    Plan:  Continue current regimen

## 2025-02-17 NOTE — ASSESSMENT & PLAN NOTE
Worsening despite 2 weeks of oral antibiotics (doxycycline and Keflex)  Stable vital signs, no leukocytosis, normal sed rate and lactic acid.  CRP elevated at 4.9  X-ray: No obvious sign of osteomyelitis on wet read.  Pending official report  Status post vancomycin in the ED  Podiatry recommended admission for IV antibiotics and possible surgery tomorrow  MRI 2/17/25 showed plantar/medial skin ulceration at the level of the first interphalangeal joint without marrow change to indicate osteomyelitis at this time     Plan:  Continue IV vancomycin and cefepime  CBC in the morning  Monitor temperature curve  Podiatry consult  Blood cultures pending

## 2025-02-17 NOTE — PLAN OF CARE
Problem: PAIN - ADULT  Goal: Verbalizes/displays adequate comfort level or baseline comfort level  Description: Interventions:  - Encourage patient to monitor pain and request assistance  - Assess pain using appropriate pain scale  - Administer analgesics based on type and severity of pain and evaluate response  - Implement non-pharmacological measures as appropriate and evaluate response  - Consider cultural and social influences on pain and pain management  - Notify physician/advanced practitioner if interventions unsuccessful or patient reports new pain  Outcome: Progressing     Problem: INFECTION - ADULT  Goal: Absence or prevention of progression during hospitalization  Description: INTERVENTIONS:  - Assess and monitor for signs and symptoms of infection  - Monitor lab/diagnostic results  - Monitor all insertion sites, i.e. indwelling lines, tubes, and drains  - Monitor endotracheal if appropriate and nasal secretions for changes in amount and color  - Equality appropriate cooling/warming therapies per order  - Administer medications as ordered  - Instruct and encourage patient and family to use good hand hygiene technique  - Identify and instruct in appropriate isolation precautions for identified infection/condition  Outcome: Progressing  Goal: Absence of fever/infection during neutropenic period  Description: INTERVENTIONS:  - Monitor WBC    Outcome: Progressing     Problem: SAFETY ADULT  Goal: Patient will remain free of falls  Description: INTERVENTIONS:  - Educate patient/family on patient safety including physical limitations  - Instruct patient to call for assistance with activity   - Consult OT/PT to assist with strengthening/mobility   - Keep Call bell within reach  - Keep bed low and locked with side rails adjusted as appropriate  - Keep care items and personal belongings within reach  - Initiate and maintain comfort rounds  - Make Fall Risk Sign visible to staff  - - Apply yellow socks and  bracelet for high fall risk patients  - Consider moving patient to room near nurses station  Outcome: Progressing  Goal: Maintain or return to baseline ADL function  Description: INTERVENTIONS:  -  Assess patient's ability to carry out ADLs; assess patient's baseline for ADL function and identify physical deficits which impact ability to perform ADLs (bathing, care of mouth/teeth, toileting, grooming, dressing, etc.)  - Assess/evaluate cause of self-care deficits   - Assess range of motion  - Assess patient's mobility; develop plan if impaired  - Assess patient's need for assistive devices and provide as appropriate  - Encourage maximum independence but intervene and supervise when necessary  - Involve family in performance of ADLs  - Assess for home care needs following discharge   - Consider OT consult to assist with ADL evaluation and planning for discharge  - Provide patient education as appropriate  Outcome: Progressing  Goal: Maintains/Returns to pre admission functional level  Description: INTERVENTIONS:  - Perform AM-PAC 6 Click Basic Mobility/ Daily Activity assessment daily.  - Set and communicate daily mobility goal to care team and patient/family/caregiver.   - Collaborate with rehabilitation services on mobility goals if consulted  - Perday  - Out of bed for toileting  - Record patient progress and toleration of activity level   Outcome: Progressing     Problem: DISCHARGE PLANNING  Goal: Discharge to home or other facility with appropriate resources  Description: INTERVENTIONS:  - Identify barriers to discharge w/patient and caregiver  - Arrange for needed discharge resources and transportation as appropriate  - Identify discharge learning needs (meds, wound care, etc.)  - Arrange for interpretive services to assist at discharge as needed  - Refer to Case Management Department for coordinating discharge planning if the patient needs post-hospital services based on physician/advanced practitioner order  or complex needs related to functional status, cognitive ability, or social support system  Outcome: Progressing

## 2025-02-17 NOTE — ASSESSMENT & PLAN NOTE
Lab Results   Component Value Date    HGBA1C 8.3 (H) 01/13/2025       Recent Labs     02/17/25  0601 02/17/25  1051   POCGLU 218* 216*       Blood Sugar Average: Last 72 hrs:  (P) 217

## 2025-02-17 NOTE — PHYSICAL THERAPY NOTE
Physical Therapy Evaluation    Patient's Name: Jason Blanca    Admitting Diagnosis  Toe pain [M79.676]  Diabetic foot infection  (HCC) [E11.628, L08.9]  Infection of great toe [L08.9]    Problem List  Patient Active Problem List   Diagnosis    Type 2 diabetes mellitus with hyperglycemia, with neuropathic pain    BMI 31.0-31.9,adult    Recurrent major depressive disorder, in remission (HCC)    Hypogonadism in male    Painful and cold lower extremity    PAD (peripheral artery disease) (HCC)    Excessive sweating    ADHD, predominantly inattentive type    Erectile dysfunction of nonorganic origin    Fatty liver    Mixed dyslipidemia    Vitamin D deficiency    Axonal sensorimotor neuropathy    Diabetic polyneuropathy associated with type 2 diabetes mellitus (HCC)    Witnessed episode of apnea    Right thyroid nodule    MDD (major depressive disorder), recurrent severe, without psychosis (HCC)    Nonhealing wound of right great toe       Past Medical History  Past Medical History:   Diagnosis Date    Anxiety     Depression     Diabetes mellitus (HCC)     Fatty liver     Obesity        Past Surgical History  Past Surgical History:   Procedure Laterality Date    HERNIA REPAIR         Recent Imaging  MRI foot/forefoot toest right wo contrast   Final Result by Shukri Huang MD (02/17 0849)   Plantar/medial skin ulceration at the level of the first interphalangeal joint without marrow change to indicate osteomyelitis at this time.         Workstation performed: [id number]         XR toe great min 2 view RIGHT   ED Interpretation by Erik Aponte PA-C (02/16 2009)   No acute finding      Final Result by Jason Shell MD (02/17 1310)      Atherosclerosis. No acute abnormality seen         Computerized Assisted Algorithm (CAA) may have been used to analyze all applicable images.            Workstation performed: [id number]             Recent Vital Signs  Vitals:    02/16/25 2059 02/16/25 2300 02/17/25  "0739 02/17/25 1515   BP: 116/84 145/84 127/78 153/96   BP Location: Left arm Right arm Left arm Left arm   Pulse: 85 99 99 90   Resp: 18 18 18 19   Temp: 98.3 °F (36.8 °C) 97.9 °F (36.6 °C) 97.6 °F (36.4 °C) (!) 97.2 °F (36.2 °C)   TempSrc: Temporal Temporal Temporal Temporal   SpO2: 95% 97% 98% 99%   Weight: 95.6 kg (210 lb 12.2 oz)      Height: 5' 8\" (1.727 m)             02/17/25 1530   PT Last Visit   PT Visit Date 02/17/25   Note Type   Note type Evaluation   Pain Assessment   Pain Assessment Tool 0-10   Pain Score 6   Pain Location/Orientation Orientation: Right;Location: Leg;Location: Knee;Location: Foot   Restrictions/Precautions   Weight Bearing Precautions Per Order No   Other Precautions Pain   Home Living   Type of Home House   Home Layout Two level;1/2 bath on main level;Bed/bath upstairs;Stairs to enter with rails   Bathroom Shower/Tub Walk-in shower   Bathroom Equipment Grab bars in shower;Shower chair   Bathroom Accessibility Accessible   Prior Function   Level of [place] Independent with ADLs   Lives With Spouse   Receives Help From Family   Falls in the last 6 months 0   General   Family/Caregiver Present No   Cognition   Overall Cognitive Status WFL   Arousal/Participation Alert   Following Commands Follows all commands and directions without difficulty   RLE Assessment   RLE Assessment WFL   LLE Assessment   LLE Assessment WFL   Coordination   Movements are Fluid and Coordinated 1   Sensation WFL  (pt reports toes on R foot are occasionally numb)   Light Touch   RLE Light Touch Grossly intact   LLE Light Touch Grossly intact   Bed Mobility   Supine to Sit 7  Independent   Additional items Increased time required;HOB elevated   Sit to Supine 7  Independent   Additional items Increased time required;HOB elevated   Transfers   Sit to Stand 7  Independent   Stand to Sit 7  Independent   Ambulation/Elevation   Gait pattern Step through pattern;Antalgic;Improper Weight shift  (pt supinates to " ambulate on lateral foot to avoid weight bearing on Great toe wound)   Gait Assistance 7  Independent   Assistive Device None   Distance 250ft   Balance   Static Sitting Good   Dynamic Sitting Good   Static Standing Good   Dynamic Standing Good   Ambulatory Good   Endurance Deficit   Endurance Deficit Yes   Endurance Deficit Description Foot pain   Activity Tolerance   Activity Tolerance Patient tolerated treatment well;Patient limited by pain   Medical Staff Made Aware Spoke to CM   Nurse Made Aware Spoke to RN   Assessment   Prognosis Good   Problem List Decreased strength;Decreased range of motion;Decreased endurance;Impaired balance;Decreased mobility;Pain;Decreased skin integrity   Barriers to Discharge None   Discharge Recommendation   Rehab Resource Intensity Level, PT III (Minimum Resource Intensity)   Equipment Recommended Other (Comment)  (Surgical shoe)   AM-PAC Basic Mobility Inpatient   Turning in Flat Bed Without Bedrails 4   Lying on Back to Sitting on Edge of Flat Bed Without Bedrails 4   Moving Bed to Chair 4   Standing Up From Chair Using Arms 4   Walk in Room 4   Climb 3-5 Stairs With Railing 4   Basic Mobility Inpatient Raw Score 24   Basic Mobility Standardized Score 57.68   [facility] Highest Level Of Mobility   -HL Goal 8: Walk 250 feet or more   -HLM Achieved 8: Walk 250 feet ot more   End of Consult   Patient Position at End of Consult Supine;All needs within reach       ASSESSMENT                                                                                                                     Jason Blanca is a 34 y.o. male admitted to [facility] on 2/16/2025 for Nonhealing nonsurgical wound. Pt  has a past medical history of Anxiety, Depression, Diabetes mellitus (HCC), Fatty liver, and Obesity.. PT was consulted and pt was seen on 2/17/2025 for mobility assessment and d/c planning.   Pt presents supine in bed, but awake and alert. Pt notes 6/10 pain in the entire R leg and foot.  He states that sensation in intact but notes stiffness and tightness in the R leg muscles. He in independent for bed mobility, transfers, and ambulation. He states that he was recently in PT for balance training and would like to continue PT. He notes that the mornings are hardest for the stiffness and that it can prevent him from ambulating on that leg. When ambulating during evaluation he was supinating the R foot in order to avoid putting pressure on his great toe wound. Pt was returned to supine in bed upon end of evaluation and provided a surgical shoe to avoid putting pressure on would and return to a normal gait pattern.   Impairments limiting pt at this time include weakness, decreased ROM, impaired balance, decreased endurance, pain, decreased activity tolerance, and decreased strength. Pt is currently functioning at a independent level for bed mobility, independent level for transfers, independent level for ambulation with no assistive device. The patient's AM-PAC Basic Mobility Inpatient Short Form Raw Score is 24. A Raw score of greater than 16 suggests the patient may benefit from discharge to home. Please also refer to the recommendation of the Physical Therapist for safe discharge planning.       Recommendations                                                                                                                   DME: Surgical Shoe    Discharge Disposition:  Home with Outpatient Physical Therapy       Quentin Mendes, SPT

## 2025-02-17 NOTE — UTILIZATION REVIEW
Initial Clinical Review    Admission: Date/Time/Statement:   Admission Orders (From admission, onward)       Ordered        02/16/25 2006  INPATIENT ADMISSION  Once                          Orders Placed This Encounter   Procedures    INPATIENT ADMISSION     Standing Status:   Standing     Number of Occurrences:   1     Level of Care:   Med Surg [16]     Estimated length of stay:   More than 2 Midnights     Certification:   I certify that inpatient services are medically necessary for this patient for a duration of greater than two midnights. See H&P and MD Progress Notes for additional information about the patient's course of treatment.     ED Arrival Information       Expected   -    Arrival   2/16/2025 18:45    Acuity   Urgent              Means of arrival   Walk-In    Escorted by   Self    Service   Hospitalist    Admission type   Emergency              Arrival complaint   Toe pain             Chief Complaint   Patient presents with    Toe Pain     Pt was seen two weeks ago for the same issue, was given antibiotics and saw podiatry last week. Pt states he is almost done his course of antibiotics but his big right toe is still swollen, sore and discolored.        Initial Presentation: 34 y.o. male  with PMH of type II DM, ADHD, MDD came to the ED complaining of pain and burning sensation on his right great toe. He states that 3 weeks ago he developed a blister at workplace and he went to see a physician at [facility] and they gave him antibiotics i.e. doxycycline and cephalexin. He was taking the antibiotics for more than 2 weeks and recently his toe turning color and that he is afraid it is going to be infected and that is why he came to the ED. Plan: Inpatient admission for evaluation and treatment of right great toe non healing wound, dyslipidemia, ADHD, MDD, DM: IV vanco and cefepime, CBC in am, Podiatry consult, PT/OT eval, continue home rosuvastatin, Adderall, Wellbutrin, Lantus at HS, hold oral  antihyperglycemics and start SSI.     Anticipated Length of Stay/Certification Statement: Patient will be admitted on an Inpatient basis with an anticipated length of stay of  more than 2 midnights.     Justification for Hospital Stay: for IV medication requirement    Date: 2/17   Day 2:     Internal medicine: continue IV vanco and cefepime, CBC in am, Podiatry consult, blood cultures pending, continue home rosuvastatin, Adderall, Wellbutrin, Lantus at HS, diabetic diet and SSI.     Podiatry consult: X-ray and MRI negative for Osteomyelitis. Continue IV antibiotics.     ED Treatment-Medication Administration from 02/16/2025 1845 to 02/16/2025 2059         Date/Time Order Dose Route Action     02/16/2025 1950 cefepime (MAXIPIME) IVPB (premix in dextrose) 2,000 mg 50 mL 2,000 mg Intravenous New Bag     02/16/2025 2026 vancomycin (VANCOCIN) 1,500 mg in sodium chloride 0.9 % 500 mL IVPB 1,500 mg Intravenous New Bag            Scheduled Medications:  amphetamine-dextroamphetamine, 10 mg, Oral, BID before breakfast/lunch  atorvastatin, 40 mg, Oral, Daily With Dinner  buPROPion, 100 mg, Oral, BID  cefepime, 2,000 mg, Intravenous, Q12H  famotidine, 20 mg, Oral, BID  gabapentin, 600 mg, Oral, TID  insulin glargine, 17 Units, Subcutaneous, HS  insulin lispro, 4-20 Units, Subcutaneous, 4x Daily (AC & HS)  nicotine, 7 mg, Transdermal, Daily  vancomycin, 1,750 mg, Intravenous, Q12H      Continuous IV Infusions:     PRN Meds:  acetaminophen, 650 mg, Oral, Q6H PRN  ibuprofen, 600 mg, Oral, Q6H PRN  ondansetron, 4 mg, Intravenous, Q6H PRN      ED Triage Vitals   Temperature Pulse Respirations Blood Pressure SpO2 Pain Score   02/16/25 1904 02/16/25 1904 02/16/25 1904 02/16/25 1904 02/16/25 1904 02/16/25 2119   97.9 °F (36.6 °C) (!) 109 16 152/83 97 % 7     Weight (last 2 days)       Date/Time Weight    02/16/25 2059 95.6 (210.76)    02/16/25 1904 95.6 (210.76)            Vital Signs (last 3 days)       Date/Time Temp Pulse Resp BP  MAP (mmHg) SpO2 O2 Device Patient Position - Orthostatic VS Pain    02/17/25 0832 -- -- -- -- -- -- -- -- 3    02/17/25 0739 97.6 °F (36.4 °C) 99 18 127/78 93 98 % None (Room air) -- --    02/16/25 2300 97.9 °F (36.6 °C) 99 18 145/84 106 97 % None (Room air) Lying --    02/16/25 2223 -- -- -- -- -- -- -- -- 7    02/16/25 2119 -- -- -- -- -- -- -- -- 7    02/16/25 2059 98.3 °F (36.8 °C) 85 18 116/84 -- 95 % None (Room air) Lying --    02/16/25 2030 -- 98 18 134/76 -- 100 % None (Room air) Lying --    02/16/25 1904 97.9 °F (36.6 °C) 109 16 152/83 -- 97 % None (Room air) Sitting --              Pertinent Labs/Diagnostic Test Results:   Radiology:  MRI foot/forefoot toest right wo contrast   Final Interpretation by Shukri Huang MD (02/17 0849)   Plantar/medial skin ulceration at the level of the first interphalangeal joint without marrow change to indicate osteomyelitis at this time.         Workstation performed: [id number]         XR toe great min 2 view RIGHT   ED Interpretation by Erik Aponte PA-C (02/16 2009)   No acute finding        Cardiology:  No orders to display     GI:  No orders to display           Results from last 7 days   Lab Units 02/17/25  0530 02/16/25  1937   WBC Thousand/uL 6.33 8.39   HEMOGLOBIN g/dL 13.4 14.3   HEMATOCRIT % 41.0 42.6   PLATELETS Thousands/uL 244 283   TOTAL NEUT ABS Thousands/µL  --  5.72         Results from last 7 days   Lab Units 02/17/25  0530 02/16/25 1937   SODIUM mmol/L 136 133*   POTASSIUM mmol/L 4.1 3.9   CHLORIDE mmol/L 105 101   CO2 mmol/L 25 25   ANION GAP mmol/L 6 7   BUN mg/dL 12 14   CREATININE mg/dL 0.61 0.73   EGFR ml/min/1.73sq m 129 120   CALCIUM mg/dL 8.1* 8.5     Results from last 7 days   Lab Units 02/16/25  1937   AST U/L 15   ALT U/L 22   ALK PHOS U/L 44   TOTAL PROTEIN g/dL 6.7   ALBUMIN g/dL 4.0   TOTAL BILIRUBIN mg/dL 0.39     Results from last 7 days   Lab Units 02/17/25  1051 02/17/25  0601   POC GLUCOSE mg/dl 216* 218*     Results from  last 7 days   Lab Units 02/17/25  0530 02/16/25 1937   GLUCOSE RANDOM mg/dL 253* 294*         Results from last 7 days   Lab Units 02/16/25  1943   LACTIC ACID mmol/L 1.4           Results from last 7 days   Lab Units 02/16/25  1937   CRP mg/L 4.9*   SED RATE mm/hour 12         Past Medical History:   Diagnosis Date    Anxiety     Depression     Diabetes mellitus (HCC)     Fatty liver     Obesity      Present on Admission:   Recurrent major depressive disorder, in remission (HCC)   ADHD, predominantly inattentive type   Mixed dyslipidemia      Admitting Diagnosis: Toe pain [M79.676]  Diabetic foot infection  (HCC) [E11.628, L08.9]  Infection of great toe [L08.9]  Age/Sex: 34 y.o. male    Network Utilization Review Department  ATTENTION: Please call with any questions or concerns to [phone] and carefully listen to the prompts so that you are directed to the right person. All voicemails are confidential.   For Discharge needs, contact Care Management DC Support Team at [phone] opt. 2  Send all requests for admission clinical reviews, approved or denied determinations and any other requests to dedicated fax number below belonging to the campus where the patient is receiving treatment. List of dedicated fax numbers for the Facilities:  FACILITY NAME UR FAX NUMBER   ADMISSION DENIALS (Administrative/Medical Necessity) [phone]   DISCHARGE SUPPORT TEAM (NETWORK) [phone]   PARENT CHILD HEALTH (Maternity/NICU/Pediatrics) [phone]   [facility] [phone]   [facility] [phone]   [facility] [phone]   [facility] [phone]   [facility] [phone]   [facility] [phone]   [facility] [phone]   [facility] [phone]   [facility]  [phone]   [facility] [phone]   [facility] [phone]   [facility] [phone]

## 2025-02-17 NOTE — ASSESSMENT & PLAN NOTE
"Lab Results   Component Value Date    HGBA1C 8.3 (H) 01/13/2025       No results for input(s): \"POCGLU\" in the last 72 hours.    Home regimen: Toujeo 25 units at bedtime, lispro via insulin pump, Synjardy XR  mg qd     Plan:  Lantus 17 units at bedtime  Hold oral meds  ISS and ACHS Accu-Cheks  Diabetic diet  "

## 2025-02-17 NOTE — ASSESSMENT & PLAN NOTE
Lab Results   Component Value Date    HGBA1C 8.3 (H) 01/13/2025       Recent Labs     02/17/25  1051 02/17/25  1532 02/17/25  2046 02/18/25  0603   POCGLU 216* 166* 168* 175*       Home regimen: Toujeo 25 units at bedtime, lispro via insulin pump, Synjardy XR  mg qd     Plan:  Continue home regimen at discharge

## 2025-02-17 NOTE — PROGRESS NOTES
Jason Blanca is a 34 y.o. male who is currently ordered Vancomycin IV with management by the Pharmacy Consult service.  Relevant clinical data and objective / subjective history reviewed.  Vancomycin Assessment:  Indication and Goal AUC/Trough: Bone/joint infection (goal -600, trough >10)  Clinical Status:  new consult  Micro:   Blood cultures obtained  Renal Function:  SCr: 0.73 mg/dL  CrCl: 155.3 mL/min  Days of Therapy: 1  Current Dose: 1500 mg IV every 12 hours  Vancomycin Plan:  New Dosin mg IV every 12 hours  Estimated AUC: 494 mcg*hr/mL  Estimated Trough: 11.7 mcg/mL  Next Level: 25 with morning labs  Renal Function Monitoring: Daily BMP and UOP  Pharmacy will continue to follow closely for s/sx of nephrotoxicity, infusion reactions and appropriateness of therapy.  BMP and CBC will be ordered per protocol. We will continue to follow the patient’s culture results and clinical progress daily.    Danya Tong, Pharmacist

## 2025-02-17 NOTE — ASSESSMENT & PLAN NOTE
X-ray: No obvious sign of osteomyelitis   MRI 2/17/25 showed plantar/medial skin ulceration at the level of the first interphalangeal joint without marrow change to indicate osteomyelitis at this time   Blood cultures negative at 24hrs    Plan:  Continue PO Doxycycline 100mg BID and Levofloxacin 750mg daily to complete 7 days  Follow up outpatient with Podiatry   Surgical offloading shoe

## 2025-02-17 NOTE — ASSESSMENT & PLAN NOTE
History of dyslipidemia Home medication.  Home meds rosuvastatin 20 mg p.o. daily    Plan:  Continue home meds   Cimetidine Counseling:  I discussed with the patient the risks of Cimetidine including but not limited to gynecomastia, headache, diarrhea, nausea, drowsiness, arrhythmias, pancreatitis, skin rashes, psychosis, bone marrow suppression and kidney toxicity.

## 2025-02-17 NOTE — PROGRESS NOTES
Jason Blanca is a 34 y.o. male who is currently ordered Vancomycin IV with management by the Pharmacy Consult service.  Relevant clinical data and objective / subjective history reviewed.  Vancomycin Assessment:  Indication and Goal AUC/Trough: Bone/joint infection (goal -600, trough >10)  Clinical Status: stable  Micro:   Blood cultures obtained  Renal Function:  SCr: 0.73 mg/dL  CrCl: 158.4 mL/min  Renal replacement: Not on dialysis  Days of Therapy: 1  Current Dose: 1500 mg IV every 12 hours  Vancomycin Plan:  New Dosin mg IV every 12 hours  Estimated AUC: 494 mcg*hr/mL  Estimated Trough: 11.7 mcg/mL  Next Level: 18 w/ am labs  Renal Function Monitoring: Daily BMP and UOP  Pharmacy will continue to follow closely for s/sx of nephrotoxicity, infusion reactions and appropriateness of therapy.  BMP and CBC will be ordered per protocol. We will continue to follow the patient’s culture results and clinical progress daily.    Ricardo Lopez, Pharmacist

## 2025-02-17 NOTE — H&P
"  History and Physical - [facility]    Patient Information: Jason Blanca 34 y.o. male MRN: [id number]  Unit/Bed#: 7T [facility] 708-01 Encounter: [id number]  Admitting Physician: Krissy Mendoza MD  PCP: Jaren Marquez MD  Date of Admission:  02/16/25    Assessment and Plan    * Nonhealing wound of right great toe  Assessment & Plan  Chronic, worsening despite 2 weeks of oral antibiotics (doxycycline and Keflex)  Stable vital signs, no leukocytosis, normal sed rate and lactic acid.  CRP elevated at 4.9  X-ray: No obvious sign of osteomyelitis on wet read.  Pending official report  Status post vancomycin in the ED  Podiatry recommended admission for IV antibiotics and possible surgery tomorrow    Plan:  IV vancomycin and cefepime and pharmacy consult  CBC in the morning  Monitor temperature curve  Podiatry consult  PT OT evaluation        Mixed dyslipidemia  Assessment & Plan  History of dyslipidemia Home medication.  Home meds rosuvastatin 20 mg p.o. daily    Plan:  Continue home meds    ADHD, predominantly inattentive type  Assessment & Plan  Stable  On Adderall 10 mg twice daily    Plan:  Continue current regimen    Recurrent major depressive disorder, in remission (HCC)  Assessment & Plan  Stable.  No suicidal homicidal ideation currently  Home medication: Wellbutrin 100 mg twice daily    Plan:  Continue current regimen    Type 2 diabetes mellitus with hyperglycemia, with neuropathic pain  Assessment & Plan  Lab Results   Component Value Date    HGBA1C 8.3 (H) 01/13/2025       No results for input(s): \"POCGLU\" in the last 72 hours.    Home regimen: Toujeo 25 units at bedtime, lispro via insulin pump, Synjardy XR  mg qd     Plan:  Lantus 15 units at bedtime  Hold oral meds  ISS and ACHS Accu-Cheks  Diabetic diet, NPO after mid night due to possible surgery tomorrow.         VTE Prophylaxis: VTE Score: 0 Low Risk (Score 0-2) - Encourage Ambulation.  Code Status: Level 1 - Full " Code  Anticipated Length of Stay:  Patient will be admitted on an Inpatient basis with an anticipated length of stay of  more than 2 midnights.     Justification for Hospital Stay: for IV medication requirement  Total Time for Visit, including Counseling / Coordination of Care: 60 mins. Greater than 50% of this total time spent on direct patient counseling and coordination of care.      Chief Complaint:     Chief Complaint   Patient presents with    Toe Pain     Pt was seen two weeks ago for the same issue, was given antibiotics and saw podiatry last week. Pt states he is almost done his course of antibiotics but his big right toe is still swollen, sore and discolored.      History of Present Illness:    Jason Blanca is a 34 y.o. male with PMH of type II DM, ADHD, MDD came to the ED complaining of pain and burning sensation on his right great toe.  He states that 3 weeks ago he developed a blister at workplace and he went to see a physician at [facility] and they gave him antibiotics i.e. doxycycline and cephalexin.  He was taking the antibiotics for more than 2 weeks and recently his toe turning color and that he is afraid it is going to be infected and that is why he came to the ED.  He denies fever, headache, SOB, palpitation or chest pain.  No other complaints at this time.      ED course:   VS on arrival-temperature 97.9, pulse 109, SpO2 97%, /83, respiratory rate 16.  Labs-CMP (glucose 294, corrected sodium 136), CBC, ESR, lactic acid-WNL, CRP 4.9  EKG-none  Imaging-x-ray pending official report  Medications/interventions -cefepime 2 g IV, vancomycin 1500 mg IV  Patient will be admitted to inpatient family medicine floor for requirement of IV antibiotics for non-healing wound ulcer.    Review of Systems:  Review of Systems   Constitutional:  Negative for chills and fever.   HENT:  Negative for ear pain and sore throat.    Eyes:  Negative for pain and visual disturbance.   Respiratory:  Negative  for cough and shortness of breath.    Cardiovascular:  Negative for chest pain and palpitations.   Gastrointestinal:  Negative for abdominal pain and vomiting.   Genitourinary:  Negative for dysuria and hematuria.   Musculoskeletal:  Negative for arthralgias and back pain.   Skin:  Positive for wound (right great toe). Negative for color change and rash.   Neurological:  Negative for seizures and syncope.   All other systems reviewed and are negative.      Past Medical and Surgical History:   Past Medical History:   Diagnosis Date    Anxiety     Depression     Diabetes mellitus (HCC)     Fatty liver     Obesity      Past Surgical History:   Procedure Laterality Date    HERNIA REPAIR       Meds/Allergies:  Allergies:   Allergies   Allergen Reactions    Amoxicillin Other (See Comments)     Other reaction(s): augmentin    Augmentin [Amoxicillin-Pot Clavulanate] Hives    Sulfa Antibiotics Swelling     Prior to Admission Medications   Prescriptions Last Dose Informant Patient Reported? Taking?   Blood Glucose Monitoring Suppl (OneTouch Verio Reflect) w/Device KIT 2/16/2025 Self No Yes   Sig: Check blood sugars once daily. Please substitute with appropriate alternative as covered by patient's insurance. Dx: E11.65   [place] Choice Comfort EZ 33G X 4 MM MISC 2/16/2025 Self Yes Yes   Sig: USE WITH SOLOSTAR PEN DAILY.   Continuous Blood Gluc Sensor (FreeStyle Amber 3 Sensor) MISC 2/16/2025 Self Yes Yes   Empagliflozin-metFORMIN HCl ER (Synjardy XR) 12.5-1000 MG TB24 2/16/2025  No Yes   Sig: Take 2 tablets by mouth daily   Injection Device for Insulin (CeQur Simplicity 2U) LAUREN 2/16/2025  Yes Yes   Sig: Use 1 each every 3 (three) days   Injection Device for Insulin (CeQur Simplicity 2U) LAUREN 2/16/2025  Yes Yes   Sig: Use 1 each every 3 (three) days   Insulin Pen Needle (BD Pen Needle Ratna U/F) 32G X 4 MM MISC 2/16/2025 Self No Yes   Sig: Use daily as directed with insulin pen   Isopropyl Alcohol (Pharmacist Choice Alcohol) 70  "% MISC 2/16/2025 Self No Yes   Sig: Apply 200 Pads topically 3 (three) times a day   NovoLOG 100 UNIT/ML injection 2/16/2025  No Yes   Sig: Inject 100 Units under the skin in the morning Via insuline pump   OneTouch Delica Lancets 33G MISC 2/16/2025 Self No Yes   Sig: Check blood sugars once daily. Please substitute with appropriate alternative as covered by patient's insurance. Dx: E11.65   SYRINGE-NEEDLE, DISP, 3 ML (Safety Syringes/Needle) 20G X 1-1/2\" 3 ML MISC  Self No No   Sig: Use once a week for 12 doses   Toujeo SoloStar 300 units/mL CONCENTRATED U-300 injection pen (1-unit dial) 2/16/2025  No Yes   Sig: Inject 25 Units under the skin daily at bedtime   Vitamin D, Ergocalciferol, 01185 units CAPS Past Week  Yes Yes   Sig: Take 1 capsule by mouth once a week   Xyosted 100 MG/0.5ML SOAJ Past Month Self Yes Yes   Sig: Inject 100 mg under the skin every 7 days   amphetamine-dextroamphetamine (ADDERALL XR, 20MG,) 20 MG 24 hr capsule Past Month  No Yes   Sig: Take 1 capsule (20 mg total) by mouth every morning Max Daily Amount: 20 mg   buPROPion (WELLBUTRIN SR) 100 mg 12 hr tablet 2/16/2025  No Yes   Sig: Take 1 tablet (100 mg total) by mouth 2 (two) times a day   famotidine (PEPCID) 20 mg tablet  Self No No   Sig: Take 1 tablet (20 mg total) by mouth 2 (two) times a day   gabapentin (Neurontin) 600 MG tablet 2/16/2025  No Yes   Sig: Take 1 tablet (600 mg total) by mouth 3 (three) times a day   glucose blood (OneTouch Verio) test strip 2/16/2025 Self No Yes   Sig: Check blood sugars once daily. Please substitute with appropriate alternative as covered by patient's insurance. Dx: E11.65   rosuvastatin (CRESTOR) 20 MG tablet 2/16/2025  No Yes   Sig: Take 1 tablet (20 mg total) by mouth daily   tadalafil (CIALIS) 20 MG tablet 2/16/2025  No Yes   Sig: Take 1 tablet (20 mg total) by mouth daily as needed for erectile dysfunction      Facility-Administered Medications: None     Social History:     Social History "     Socioeconomic History    Marital status: Single     Spouse name: Not on file    Number of children: Not on file    Years of education: Not on file    Highest education level: Not on file   Occupational History    Not on file   Tobacco Use    Smoking status: Former     Current packs/day: 0.00     Types: Cigarettes     Quit date:      Years since quittin.1    Smokeless tobacco: Never   Vaping Use    Vaping status: Every Day    Substances: Nicotine   Substance and Sexual Activity    Alcohol use: Not Currently     Comment: social    Drug use: Yes     Types: Marijuana     Comment: medical card    Sexual activity: Yes     Partners: Female   Other Topics Concern    Not on file   Social History Narrative    Not on file     Social Drivers of Health     Financial Resource Strain: Not on file   Food Insecurity: No Food Insecurity (2025)    Nursing - Inadequate Food Risk Classification     Worried About Running Out of Food in the Last Year: Not on file     Ran Out of Food in the Last Year: Not on file     Ran Out of Food in the Last Year: Never true   Transportation Needs: No Transportation Needs (2025)    Nursing - Transportation Risk Classification     Lack of Transportation: Not on file     Lack of Transportation: No   Physical Activity: Not on file   Stress: Not on file   Social Connections: Not on file   Intimate Partner Violence: Unknown (2025)    Nursing IPS     Feels Physically and Emotionally Safe: Not on file     Physically Hurt by Someone: Not on file     Humiliated or Emotionally Abused by Someone: Not on file     Physically Hurt by Someone: No     Hurt or Threatened by Someone: No   Housing Stability: At Risk (2025)    Nursing: Inadequate Housing Risk Classification     Has Housing: Not on file     Worried About Losing Housing: Not on file     Unable to Get Utilities: Not on file     Unable to Pay for Housing in the Last Year: Yes     Has Housin     Patient Pre-hospital Living  "Situation: with his wife  Patient Pre-hospital Level of Mobility: mobile without restrictions  Patient Pre-hospital Diet Restrictions: diabetic diet    Family History:  Family History   Problem Relation Age of Onset    Diabetes Mother     Prostate cancer Paternal Grandfather        Physical Exam:   Vitals:   Blood Pressure: 145/84 (02/16/25 2300)  Pulse: 99 (02/16/25 2300)  Temperature: 97.9 °F (36.6 °C) (02/16/25 2300)  Temp Source: Temporal (02/16/25 2300)  Respirations: 18 (02/16/25 2300)  Height: 5' 8\" (172.7 cm) (02/16/25 2059)  Weight - Scale: 95.6 kg (210 lb 12.2 oz) (02/16/25 2059)  SpO2: 97 % (02/16/25 2300)    Physical Exam  Vitals reviewed.   Constitutional:       General: He is not in acute distress.     Appearance: Normal appearance.   HENT:      Head: Normocephalic.      Right Ear: External ear normal.      Left Ear: External ear normal.      Nose: Nose normal.      Mouth/Throat:      Pharynx: Oropharynx is clear.   Eyes:      Extraocular Movements: Extraocular movements intact.      Conjunctiva/sclera: Conjunctivae normal.   Cardiovascular:      Rate and Rhythm: Normal rate and regular rhythm.      Pulses: Normal pulses.      Heart sounds: Normal heart sounds. No murmur heard.     No friction rub. No gallop.   Pulmonary:      Effort: Pulmonary effort is normal.      Breath sounds: Normal breath sounds. No wheezing.   Abdominal:      General: Bowel sounds are normal.      Palpations: There is no mass.      Tenderness: There is no abdominal tenderness.   Musculoskeletal:         General: No swelling.      Cervical back: Normal range of motion.      Right lower leg: No edema.      Left lower leg: No edema.        Feet:    Feet:      Right foot:      Skin integrity: Ulcer (right great toe) present.      Left foot:      Skin integrity: Skin integrity normal.      Comments: Please see media  Skin:     General: Skin is warm.      Capillary Refill: Capillary refill takes less than 2 seconds.   Neurological: " "     General: No focal deficit present.      Mental Status: He is alert and oriented to person, place, and time. Mental status is at baseline.      Sensory: No sensory deficit.      Motor: No weakness.   Psychiatric:         Mood and Affect: Mood normal.         Lab Results:   Results from last 7 days   Lab Units 02/16/25  1937   WBC Thousand/uL 8.39   HEMOGLOBIN g/dL 14.3   HEMATOCRIT % 42.6   PLATELETS Thousands/uL 283   SEGS PCT % 69   LYMPHO PCT % 22   MONO PCT % 6   EOS PCT % 1     Results from last 7 days   Lab Units 02/16/25  1937   POTASSIUM mmol/L 3.9   CHLORIDE mmol/L 101   CO2 mmol/L 25   BUN mg/dL 14   CREATININE mg/dL 0.73   CALCIUM mg/dL 8.5   ALK PHOS U/L 44   ALT U/L 22   AST U/L 15   EGFR ml/min/1.73sq m 120             Results from last 7 days   Lab Units 02/16/25  1943   LACTIC ACID mmol/L 1.4                    Invalid input(s): \"URIBILINOGEN\"          Imaging:   CT lower extremity w contrast right  Result Date: 2/4/2025  Narrative: CT right lower extremity with IV contrast INDICATION: great toe ceelulits, diabetic. r/o osteo. COMPARISON: None. TECHNIQUE: CT examination of the above was performed.  This examination, like all CT scans performed in the [facility], was performed utilizing techniques to minimize radiation dose exposure, including the use of iterative reconstruction  and automated exposure control software.  Multiplanar 2D reformatted images were created from the source data. IV Contrast: 85 mL of iohexol Rad dose  47 mGy-cm FINDINGS: OSSEOUS STRUCTURES:  No fracture, dislocation or destructive osseous lesion. VISUALIZED MUSCULATURE:  Unremarkable. SOFT TISSUES: Mild subcutaneous stranding and skin thickening of the first digit. No fluid collection. No soft tissue gas. OTHER PERTINENT FINDINGS:  None.     Impression: Mild subcutaneous stranding and skin thickening of the first digit, which can be seen with cellulitis. No fluid collection. No soft tissue gas. No CT " evidence of osteomyelitis. Workstation performed: [id number]     XR foot 3+ vw right  Result Date: 2/3/2025  Narrative: PROCEDURE INFORMATION: Exam: XR Right Foot Exam date and time: 2/3/2025 9:08 PM Age: 34 years old Clinical indication: Pain; Foot; Right; Additional info: Great toe infection cellulitis TECHNIQUE: Imaging protocol: Radiologic exam of the right foot. Views: 3 or more views. COMPARISON: No relevant prior studies available. FINDINGS: Bones/joints: No focal areas of periosteal thickening, lytic or erosive areas, endosteal scalloping, focal osteopenia or loss of trabecular architecture. No acute fracture demonstrated. Soft tissues: Soft tissue swelling in the great toe. Vasculature: Vascular calcifications are noted.     Impression: IMPRESSION: 1.   No radiographic findings of osteomyelitis. 2.   Soft tissue swelling without acute fracture.       EKG, Pathology, and Other Studies Reviewed on Admission:   EKG  Result Date: 02/16/25  Impression:  None    Entire H&P was discussed with Dr. Elise who agreed to what is noted above.    Krissy Mendoza MD  02/16/25  11:28 PM

## 2025-02-17 NOTE — CASE MANAGEMENT
Case Management Assessment & Discharge Planning Note    Patient name Jason Blanca  Location 7T [facility] 708/7T [facility] 708-01 MRN [id number]  : 1990 Date 2025       Current Admission Date: 2025  Current Admission Diagnosis:Nonhealing wound of right great toe   Patient Active Problem List    Diagnosis Date Noted Date Diagnosed    Nonhealing wound of right great toe 2025     MDD (major depressive disorder), recurrent severe, without psychosis ([facility]) 2025     Diabetic polyneuropathy associated with type 2 diabetes mellitus ([facility]) 10/02/2024     Witnessed episode of apnea 10/02/2024     Right thyroid nodule 10/02/2024     Axonal sensorimotor neuropathy 2024     PAD (peripheral artery disease) ([facility]) 2024     Excessive sweating 2024     ADHD, predominantly inattentive type 2024     Vitamin D deficiency 2024     Erectile dysfunction of nonorganic origin 2024     Fatty liver 2024     Mixed dyslipidemia 2024     Painful and cold lower extremity 2024     Hypogonadism in male 2023     Type 2 diabetes mellitus with hyperglycemia, with neuropathic pain 2023     BMI 31.0-31.9,adult 2023     Recurrent major depressive disorder, in remission ([facility]) 2023       LOS (days): 1  Geometric Mean LOS (GMLOS) (days):   Days to GMLOS:     OBJECTIVE:    Risk of Unplanned Readmission Score: 6.85       Current admission status: Inpatient     Preferred Pharmacy:   [id number] [address] [place] [address]  [address] [facility] [address]  Phone: [phone] Fax: [phone]    Primary Care Provider: Jaren Marquez MD    Primary Insurance: WORKERS COMPENSATION  Secondary Insurance:     ASSESSMENT:  Active Health Care Proxies    There are no active Health Care Proxies on file.         Readmission Root Cause  30 Day Readmission: No    Patient Information  Admitted from:: Home  Mental Status: Alert  During  Assessment patient was accompanied by: Not accompanied during assessment  Assessment information provided by:: Patient  Primary Caregiver: Self  Support Systems: Family members, Self, Spouse/significant other  County of Residence: [place]  What [facility] do you live in?: [place]  Type of Current Residence: 2 story home  Upon entering residence, is there a bedroom on the main floor (no further steps)?: No  A bedroom is located on the following floor levels of residence (select all that apply):: 2nd Floor  Upon entering residence, is there a bathroom on the main floor (no further steps)?: Yes  Number of steps to 2nd floor from main floor: One Flight  Living Arrangements: Lives w/ Family members, Lives w/ Spouse/significant other  Is patient a ?: No    Activities of Daily Living Prior to Admission  Functional Status: Independent  Completes ADLs independently?: Yes  Ambulates independently?: Yes  Does patient use assisted devices?: No  Does patient currently own DME?: No  Does patient have a history of Outpatient Therapy (PT/OT)?: No  Does the patient have a history of Short-Term Rehab?: No  Does patient have a history of HHC?: No  Does patient currently have HHC?: No    Patient Information Continued  Income Source: Employed  Does patient have prescription coverage?: No  Does patient receive dialysis treatments?: No  Does patient have a history of substance abuse?: No  Does patient have a history of Mental Health Diagnosis?: Yes (Recurrent major depressive ADHD)  Is patient receiving treatment for mental health?: Yes  Has patient received inpatient treatment related to mental health in the last 2 years?: No      Means of Transportation  Means of Transport to Appts:: Drives Self          DISCHARGE DETAILS:    Discharge planning discussed with:: Patient  Freedom of Choice: Yes     CM contacted family/caregiver?: No- see comments (AAOx3)  Were Treatment Team discharge recommendations reviewed with  patient/caregiver?: Yes  Did patient/caregiver verbalize understanding of patient care needs?: Yes  Were patient/caregiver advised of the risks associated with not following Treatment Team discharge recommendations?: Yes    Would you like to participate in our [facility] service program?  : No - Declined    Treatment Team Recommendation: Home        Additional Comments: Met with patient at bedside.  Reports his insurance thru his employeer doesn't go active until 3/1 however he is [facility].  Email to [facility] with [facility] info and foor PATHS referral.  Patient states meds are affordable at [facility] pharmacy.  States he has his insulin and meds at home.  CM department following for discharge needs

## 2025-02-17 NOTE — ASSESSMENT & PLAN NOTE
History of dyslipidemia Home medication.  Home meds rosuvastatin 20 mg p.o. daily    Plan:  Continue home meds

## 2025-02-17 NOTE — ASSESSMENT & PLAN NOTE
- Currently on IV antibiotics.  - Labs stable.  - X-ray and MRI negative for Osteomyelitis.  - May discharge at this time  - Patient has appointment to follow up with Podiatry outpatient on 2/18/25.

## 2025-02-17 NOTE — PLAN OF CARE
Problem: PAIN - ADULT  Goal: Verbalizes/displays adequate comfort level or baseline comfort level  Description: Interventions:  - Encourage patient to monitor pain and request assistance  - Assess pain using appropriate pain scale  - Administer analgesics based on type and severity of pain and evaluate response  - Implement non-pharmacological measures as appropriate and evaluate response  - Consider cultural and social influences on pain and pain management  - Notify physician/advanced practitioner if interventions unsuccessful or patient reports new pain  Outcome: Progressing     Problem: INFECTION - ADULT  Goal: Absence or prevention of progression during hospitalization  Description: INTERVENTIONS:  - Assess and monitor for signs and symptoms of infection  - Monitor lab/diagnostic results  - Monitor all insertion sites, i.e. indwelling lines, tubes, and drains  - Monitor endotracheal if appropriate and nasal secretions for changes in amount and color  - [place] appropriate cooling/warming therapies per order  - Administer medications as ordered  - Instruct and encourage patient and family to use good hand hygiene technique  - Identify and instruct in appropriate isolation precautions for identified infection/condition  Outcome: Progressing  Goal: Absence of fever/infection during neutropenic period  Description: INTERVENTIONS:  - Monitor WBC    Outcome: Progressing     Problem: NEUROSENSORY - ADULT  Goal: Achieves stable or improved neurological status  Description: INTERVENTIONS  - Monitor and report changes in neurological status  - Monitor vital signs such as temperature, blood pressure, glucose, and any other labs ordered   - Initiate measures to prevent increased intracranial pressure  - Monitor for seizure activity and implement precautions if appropriate      Outcome: Progressing  Goal: Remains free of injury related to seizures activity  Description: INTERVENTIONS  - Maintain airway, patient safety   and administer oxygen as ordered  - Monitor patient for seizure activity, document and report duration and description of seizure to physician/advanced practitioner  - If seizure occurs,  ensure patient safety during seizure  - Reorient patient post seizure  - Seizure pads on all 4 side rails  - Instruct patient/family to notify RN of any seizure activity including if an aura is experienced  - Instruct patient/family to call for assistance with activity based on nursing assessment  - Administer anti-seizure medications if ordered    Outcome: Progressing     Problem: METABOLIC, FLUID AND ELECTROLYTES - ADULT  Goal: Electrolytes maintained within normal limits  Description: INTERVENTIONS:  - Monitor labs and assess patient for signs and symptoms of electrolyte imbalances  - Administer electrolyte replacement as ordered  - Monitor response to electrolyte replacements, including repeat lab results as appropriate  - Instruct patient on fluid and nutrition as appropriate  Outcome: Progressing  Goal: Fluid balance maintained  Description: INTERVENTIONS:  - Monitor labs   - Monitor I/O and WT  - Instruct patient on fluid and nutrition as appropriate  - Assess for signs & symptoms of volume excess or deficit  Outcome: Progressing  Goal: Glucose maintained within target range  Description: INTERVENTIONS:  - Monitor Blood Glucose as ordered  - Assess for signs and symptoms of hyperglycemia and hypoglycemia  - Administer ordered medications to maintain glucose within target range  - Assess nutritional intake and initiate nutrition service referral as needed  Outcome: Progressing

## 2025-02-17 NOTE — PROGRESS NOTES
"Progress Note    Jason Blanca 34 y.o. male MRN: [id number]  Unit/Bed#: 7T [facility] 708-01 Encounter: [id number]  Admitting Physician: Ling Felix MD  PCP: Jaren Marquez MD  Date of Admission:  2/16/2025  7:02 PM    Assessment and Plan    * Nonhealing wound of right great toe  Assessment & Plan  Worsening despite 2 weeks of oral antibiotics (doxycycline and Keflex)  Stable vital signs, no leukocytosis, normal sed rate and lactic acid.  CRP elevated at 4.9  X-ray: No obvious sign of osteomyelitis on wet read.  Pending official report  Status post vancomycin in the ED  Podiatry recommended admission for IV antibiotics and possible surgery tomorrow  MRI 2/17/25 showed plantar/medial skin ulceration at the level of the first interphalangeal joint without marrow change to indicate osteomyelitis at this time     Plan:  Continue IV vancomycin and cefepime  CBC in the morning  Monitor temperature curve  Podiatry consult  Blood cultures pending    Mixed dyslipidemia  Assessment & Plan  History of dyslipidemia Home medication.  Home meds rosuvastatin 20 mg p.o. daily    Plan:  Continue home meds    ADHD, predominantly inattentive type  Assessment & Plan  Stable  On Adderall 10 mg twice daily    Plan:  Continue current regimen    Recurrent major depressive disorder, in remission (HCC)  Assessment & Plan  Stable.  No suicidal homicidal ideation currently  Home medication: Wellbutrin 100 mg twice daily    Plan:  Continue current regimen    Type 2 diabetes mellitus with hyperglycemia, with neuropathic pain  Assessment & Plan  Lab Results   Component Value Date    HGBA1C 8.3 (H) 01/13/2025       No results for input(s): \"POCGLU\" in the last 72 hours.    Home regimen: Toujeo 25 units at bedtime, lispro via insulin pump, Synjardy XR  mg qd     Plan:  Lantus 17 units at bedtime  Hold oral meds  ISS and ACHS Accu-Cheks  Diabetic diet        VTE Prophylaxis: VTE Score: 0 Low Risk (Score 0-2) - Encourage " "Ambulation.    Code Status: Level 1 - Full Code      Subjective:   Patient was seen and examined at bedside. Denies chest pain, palpitations, abdominal pain, n/v/d/c. O overnight events. Reached out to podiatry and they believe they could do an outpatient debridement. Advanced diet this morning. No other concerns. Assessment and plan discussed, all questions answered.     Objective:     Vitals:   Temp (24hrs), Av.9 °F (36.6 °C), Min:97.6 °F (36.4 °C), Max:98.3 °F (36.8 °C)    Temp:  [97.6 °F (36.4 °C)-98.3 °F (36.8 °C)] 97.6 °F (36.4 °C)  HR:  [] 99  Resp:  [16-18] 18  BP: (116-152)/(76-84) 127/78  SpO2:  [95 %-100 %] 98 %  Body mass index is 32.05 kg/m².     Input and Output Summary (last 24 hours):       Intake/Output Summary (Last 24 hours) at 2025 1051  Last data filed at 2025 2223  Gross per 24 hour   Intake 250 ml   Output --   Net 250 ml       Physical Exam:     Physical Exam:   Vitals:   Blood Pressure: 145/84 (25)  Pulse: 99 (25)  Temperature: 97.9 °F (36.6 °C) (25)  Temp Source: Temporal (25)  Respirations: 18 (25)  Height: 5' 8\" (172.7 cm) (25)  Weight - Scale: 95.6 kg (210 lb 12.2 oz) (25)  SpO2: 97 % (25)     Physical Exam  Vitals reviewed.   Constitutional:       General: He is not in acute distress.     Appearance: Normal appearance.   HENT:      Head: Normocephalic.      Right Ear: External ear normal.      Left Ear: External ear normal.      Nose: Nose normal.      Mouth/Throat:      Pharynx: Oropharynx is clear.   Eyes:      Extraocular Movements: Extraocular movements intact.      Conjunctiva/sclera: Conjunctivae normal.   Cardiovascular:      Rate and Rhythm: Normal rate and regular rhythm.      Pulses: Normal pulses.      Heart sounds: Normal heart sounds. No murmur heard.     No friction rub. No gallop.   Pulmonary:      Effort: Pulmonary effort is normal.      Breath sounds: Normal " breath sounds. No wheezing.   Abdominal:      General: Bowel sounds are normal.      Palpations: There is no mass.      Tenderness: There is no abdominal tenderness.   Musculoskeletal:         General: No swelling.      Cervical back: Normal range of motion.      Right lower leg: No edema.      Left lower leg: No edema.        Feet:    Feet:      Right foot:      Skin integrity: Ulcer (right great toe) present.      Left foot:      Skin integrity: Skin integrity normal.      Comments: Please see media  Skin:     General: Skin is warm.      Capillary Refill: Capillary refill takes less than 2 seconds.   Neurological:      General: No focal deficit present.      Mental Status: He is alert and oriented to person, place, and time. Mental status is at baseline.      Sensory: No sensory deficit.      Motor: No weakness.   Psychiatric:         Mood and Affect: Mood normal.     Additional Data:     Labs:    Results from last 7 days   Lab Units 02/17/25  0530 02/16/25  1937   WBC Thousand/uL 6.33 8.39   HEMOGLOBIN g/dL 13.4 14.3   HEMATOCRIT % 41.0 42.6   PLATELETS Thousands/uL 244 283   SEGS PCT %  --  69   LYMPHO PCT %  --  22   MONO PCT %  --  6   EOS PCT %  --  1     Results from last 7 days   Lab Units 02/17/25  0530 02/16/25  1937   POTASSIUM mmol/L 4.1 3.9   CHLORIDE mmol/L 105 101   CO2 mmol/L 25 25   BUN mg/dL 12 14   CREATININE mg/dL 0.61 0.73   CALCIUM mg/dL 8.1* 8.5   ALK PHOS U/L  --  44   ALT U/L  --  22   AST U/L  --  15         Results from last 7 days   Lab Units 02/17/25  0601   POC GLUCOSE mg/dl 218*             * I Have Reviewed All Lab Data Listed Above.  * Additional Pertinent Lab Tests Reviewed: All Labs For Current Hospital Admission Reviewed    Imaging:    Imaging Reports Reviewed Today Include: MRI    Recent Cultures (last 7 days):           Last 24 Hours Medication List:   Current Facility-Administered Medications   Medication Dose Route Frequency Provider Last Rate    acetaminophen  650 mg Oral  Q6H PRN Donal Kramer PA-C      amphetamine-dextroamphetamine  10 mg Oral BID before breakfast/lunch Donal Kramer PA-C      atorvastatin  40 mg Oral Daily With Dinner Donal Kramer PA-C      buPROPion  100 mg Oral BID Krissy Mendoza MD      cefepime  2,000 mg Intravenous Q12H Krissy Mendoza MD 2,000 mg (02/17/25 0820)    famotidine  20 mg Oral BID Donal Kramer PA-C      gabapentin  600 mg Oral TID Donal Kramer PA-C      insulin glargine  17 Units Subcutaneous HS Roxanne Aj MD      insulin lispro  4-20 Units Subcutaneous 4x Daily (AC & HS) Krissy Mendoza MD      ondansetron  4 mg Intravenous Q6H PRN Donal Kramer PA-C      vancomycin  1,750 mg Intravenous Q12H Ling Felix MD          ** Please Note: Dictation voice to text software may have been used in the creation of this document. **    Ling Felix MD  02/17/25  10:51 AM

## 2025-02-17 NOTE — ED PROVIDER NOTES
Time reflects when diagnosis was documented in both MDM as applicable and the Disposition within this note       Time User Action Codes Description Comment    2/16/2025  8:05 PM Erik Gray Add [L08.9] Infection of great toe     2/16/2025  8:05 PM Erik Gray Modify [L08.9] Infection of great toe Right          ED Disposition       ED Disposition   Admit    Condition   Stable    Date/Time   Sun Feb 16, 2025  8:04 PM    Comment   Case was discussed with Donal Kramer PA-C and the patient's admission status was agreed to be Admission Status: inpatient status to the service of Dr. Salinas .               Assessment & Plan       Medical Decision Making  34-year-old male patient with a chronic wound on his right great toe he is a diabetic and has been on antibiotics for more than 2 weeks the toe recently turned color and he is afraid he is going to lose his toe.  He has neuropathy but is starting to feel pain.  No fever no chills currently taking cephalexin and Doxy.  Differential diagnose includes not limited to nonhealing wound, osteomyelitis, gangrene    Problems Addressed:  Infection of great toe: acute illness or injury     Details: Because the patient has been finding this infection is getting worse on antibiotics he will be in for IV antibiotics and podiatry consultation    Amount and/or Complexity of Data Reviewed  External Data Reviewed: notes.     Details: I did review previous notes for comparison  Labs: ordered. Decision-making details documented in ED Course.     Details: All labs were reviewed only abnormality was a C-reactive protein of 4.9  Radiology: ordered and independent interpretation performed.     Details: I personally interpreted the x-ray of the right great toe there is no sign of osteomyelitis  Discussion of management or test interpretation with external provider(s): Using joint decision-making with Donal Quinteros and podiatry patient be admitted to the hospital and be on cefepime  and vancomycin and be evaluated by podiatry in the morning    Risk  Prescription drug management.  Decision regarding hospitalization.             Medications   vancomycin (VANCOCIN) 1,500 mg in sodium chloride 0.9 % 500 mL IVPB (1,500 mg Intravenous New Bag 25)   cefepime (MAXIPIME) IVPB (premix in dextrose) 2,000 mg 50 mL (0 mg Intravenous Stopped 25)       ED Risk Strat Scores                            SBIRT 20yo+      Flowsheet Row Most Recent Value   Initial Alcohol Screen: US AUDIT-C     1. How often do you have a drink containing alcohol? 0 Filed at: 2025   2. How many drinks containing alcohol do you have on a typical day you are drinking?  0 Filed at: 2025   3a. Male UNDER 65: How often do you have five or more drinks on one occasion? 0 Filed at: 2025   Audit-C Score 0 Filed at: 2025   DALY: How many times in the past year have you...    Used an illegal drug or used a prescription medication for non-medical reasons? Never Filed at: 2025                            History of Present Illness       Chief Complaint   Patient presents with    Toe Pain     Pt was seen two weeks ago for the same issue, was given antibiotics and saw podiatry last week. Pt states he is almost done his course of antibiotics but his big right toe is still swollen, sore and discolored.        Past Medical History:   Diagnosis Date    Anxiety     Depression     Diabetes mellitus (HCC)     Fatty liver     Obesity       Past Surgical History:   Procedure Laterality Date    HERNIA REPAIR        Family History   Problem Relation Age of Onset    Diabetes Mother     Prostate cancer Paternal Grandfather       Social History     Tobacco Use    Smoking status: Former     Current packs/day: 0.00     Types: Cigarettes     Quit date:      Years since quittin.1    Smokeless tobacco: Never   Vaping Use    Vaping status: Every Day    Substances: Nicotine   Substance  Use Topics    Alcohol use: Not Currently     Comment: social    Drug use: Yes     Types: Marijuana     Comment: medical card      E-Cigarette/Vaping    E-Cigarette Use Current Every Day User       E-Cigarette/Vaping Substances    Nicotine Yes       I have reviewed and agree with the history as documented.     HPI    Review of Systems        Objective       ED Triage Vitals [02/16/25 1904]   Temperature Pulse Blood Pressure Respirations SpO2 Patient Position - Orthostatic VS   97.9 °F (36.6 °C) (!) 109 152/83 16 97 % Sitting      Temp Source Heart Rate Source BP Location FiO2 (%) Pain Score    Oral Monitor Left arm -- --      Vitals      Date and Time Temp Pulse SpO2 Resp BP Pain Score FACES Pain Rating User   02/16/25 1904 97.9 °F (36.6 °C) 109 97 % 16 152/83 -- --             Physical Exam    Results Reviewed       Procedure Component Value Units Date/Time    Comprehensive metabolic panel [[id number]]  (Abnormal) Collected: 02/16/25 1937    Lab Status: Final result Specimen: Blood from Arm, Right Updated: 02/16/25 2008     Sodium 133 mmol/L      Potassium 3.9 mmol/L      Chloride 101 mmol/L      CO2 25 mmol/L      ANION GAP 7 mmol/L      BUN 14 mg/dL      Creatinine 0.73 mg/dL      Glucose 294 mg/dL      Calcium 8.5 mg/dL      AST 15 U/L      ALT 22 U/L      Alkaline Phosphatase 44 U/L      Total Protein 6.7 g/dL      Albumin 4.0 g/dL      Total Bilirubin 0.39 mg/dL      eGFR 120 ml/min/1.73sq m     Narrative:      National Kidney Disease Foundation guidelines for Chronic Kidney Disease (CKD):     Stage 1 with normal or high GFR (GFR > 90 mL/min/1.73 square meters)    Stage 2 Mild CKD (GFR = 60-89 mL/min/1.73 square meters)    Stage 3A Moderate CKD (GFR = 45-59 mL/min/1.73 square meters)    Stage 3B Moderate CKD (GFR = 30-44 mL/min/1.73 square meters)    Stage 4 Severe CKD (GFR = 15-29 mL/min/1.73 square meters)    Stage 5 End Stage CKD (GFR <15 mL/min/1.73 square meters)  Note: GFR calculation is accurate only  with a steady state creatinine    C-reactive protein [[id number]]  (Abnormal) Collected: 02/16/25 1937    Lab Status: Final result Specimen: Blood from Arm, Right Updated: 02/16/25 2008     CRP 4.9 mg/L     Lactic acid, plasma (w/reflex if result > 2.0) [[id number]]  (Normal) Collected: 02/16/25 1943    Lab Status: Final result Specimen: Blood from Line, Venous Updated: 02/16/25 2005     LACTIC ACID 1.4 mmol/L     Narrative:      Result may be elevated if tourniquet was used during collection.    Sedimentation rate, automated [[id number]]  (Normal) Collected: 02/16/25 1937    Lab Status: Final result Specimen: Blood from Arm, Right Updated: 02/16/25 1955     Sed Rate 12 mm/hour     CBC and differential [[id number]] Collected: 02/16/25 1937    Lab Status: Final result Specimen: Blood from Arm, Right Updated: 02/16/25 1951     WBC 8.39 Thousand/uL      RBC 4.80 Million/uL      Hemoglobin 14.3 g/dL      Hematocrit 42.6 %      MCV 89 fL      MCH 29.8 pg      MCHC 33.6 g/dL      RDW 13.2 %      MPV 10.1 fL      Platelets 283 Thousands/uL      nRBC 0 /100 WBCs      Segmented % 69 %      Immature Grans % 1 %      Lymphocytes % 22 %      Monocytes % 6 %      Eosinophils Relative 1 %      Basophils Relative 1 %      Absolute Neutrophils 5.72 Thousands/µL      Absolute Immature Grans 0.09 Thousand/uL      Absolute Lymphocytes 1.86 Thousands/µL      Absolute Monocytes 0.53 Thousand/µL      Eosinophils Absolute 0.12 Thousand/µL      Basophils Absolute 0.07 Thousands/µL     Blood culture #2 [[id number]] Collected: 02/16/25 1938    Lab Status: In process Specimen: Blood from Arm, Left Updated: 02/16/25 1948    Blood culture #1 [[id number]] Collected: 02/16/25 1937    Lab Status: In process Specimen: Blood from Arm, Right Updated: 02/16/25 1948            XR toe great min 2 view RIGHT   ED Interpretation by Erik Aponte PA-C (02/16 2009)   No acute finding          Procedures    ED Medication and Procedure Management   Prior to  "Admission Medications   Prescriptions Last Dose Informant Patient Reported? Taking?   Blood Glucose Monitoring Suppl (OneTouch Verio Reflect) w/Device KIT  Self No No   Sig: Check blood sugars once daily. Please substitute with appropriate alternative as covered by patient's insurance. Dx: E11.65   [place] Choice Comfort EZ 33G X 4 MM MISC  Self Yes No   Sig: USE WITH SOLOSTAR PEN DAILY.   Continuous Blood Gluc Sensor (FreeStyle Amber 3 Sensor) MISC  Self Yes No   Empagliflozin-metFORMIN HCl ER (Synjardy XR) 12.5-1000 MG TB24   No No   Sig: Take 2 tablets by mouth daily   Injection Device for Insulin (CeQur Simplicity 2U) LAUREN   Yes No   Sig: Use 1 each every 3 (three) days   Injection Device for Insulin (CeQur Simplicity 2U) LAUREN   Yes No   Sig: Use 1 each every 3 (three) days   Insulin Pen Needle (BD Pen Needle Ratna U/F) 32G X 4 MM MISC  Self No No   Sig: Use daily as directed with insulin pen   Isopropyl Alcohol (Pharmacist Choice Alcohol) 70 % MISC  Self No No   Sig: Apply 200 Pads topically 3 (three) times a day   NovoLOG 100 UNIT/ML injection   No No   Sig: Inject 100 Units under the skin in the morning Via insuline pump   OneTouch Delica Lancets 33G MISC  Self No No   Sig: Check blood sugars once daily. Please substitute with appropriate alternative as covered by patient's insurance. Dx: E11.65   SYRINGE-NEEDLE, DISP, 3 ML (Safety Syringes/Needle) 20G X 1-1/2\" 3 ML MISC  Self No No   Sig: Use once a week for 12 doses   Toujeo SoloStar 300 units/mL CONCENTRATED U-300 injection pen (1-unit dial)   No No   Sig: Inject 25 Units under the skin daily at bedtime   Vitamin D, Ergocalciferol, 89137 units CAPS   Yes No   Sig: Take 1 capsule by mouth once a week   Xyosted 100 MG/0.5ML SOAJ  Self Yes No   Sig: Inject 100 mg under the skin every 7 days   amphetamine-dextroamphetamine (ADDERALL XR, 20MG,) 20 MG 24 hr capsule   No No   Sig: Take 1 capsule (20 mg total) by mouth every morning Max Daily Amount: 20 mg "   buPROPion (WELLBUTRIN SR) 100 mg 12 hr tablet   No No   Sig: Take 1 tablet (100 mg total) by mouth 2 (two) times a day   famotidine (PEPCID) 20 mg tablet  Self No No   Sig: Take 1 tablet (20 mg total) by mouth 2 (two) times a day   gabapentin (Neurontin) 600 MG tablet   No No   Sig: Take 1 tablet (600 mg total) by mouth 3 (three) times a day   glucose blood (OneTouch Verio) test strip  Self No No   Sig: Check blood sugars once daily. Please substitute with appropriate alternative as covered by patient's insurance. Dx: E11.65   rosuvastatin (CRESTOR) 20 MG tablet   No No   Sig: Take 1 tablet (20 mg total) by mouth daily   tadalafil (CIALIS) 20 MG tablet   No No   Sig: Take 1 tablet (20 mg total) by mouth daily as needed for erectile dysfunction      Facility-Administered Medications: None     Patient's Medications   Discharge Prescriptions    No medications on file     No discharge procedures on file.  ED SEPSIS DOCUMENTATION   Time reflects when diagnosis was documented in both MDM as applicable and the Disposition within this note       Time User Action Codes Description Comment    2/16/2025  8:05 PM Erik Aponte Add [L08.9] Infection of great toe     2/16/2025  8:05 PM Erik Aponte Modify [L08.9] Infection of great toe Right                 Erik Aponte PA-C  02/16/25 2035

## 2025-02-18 ENCOUNTER — TELEPHONE (OUTPATIENT)
Dept: PODIATRY | Facility: CLINIC | Age: 35
End: 2025-02-18

## 2025-02-18 LAB
ANION GAP SERPL CALCULATED.3IONS-SCNC: 10 MMOL/L (ref 4–13)
BASOPHILS # BLD AUTO: 0.05 THOUSANDS/ΜL (ref 0–0.1)
BASOPHILS NFR BLD AUTO: 1 % (ref 0–1)
BUN SERPL-MCNC: 9 MG/DL (ref 5–25)
CALCIUM SERPL-MCNC: 8.1 MG/DL (ref 8.4–10.2)
CHLORIDE SERPL-SCNC: 104 MMOL/L (ref 96–108)
CO2 SERPL-SCNC: 24 MMOL/L (ref 21–32)
CREAT SERPL-MCNC: 0.51 MG/DL (ref 0.6–1.3)
EOSINOPHIL # BLD AUTO: 0.09 THOUSAND/ΜL (ref 0–0.61)
EOSINOPHIL NFR BLD AUTO: 1 % (ref 0–6)
ERYTHROCYTE [DISTWIDTH] IN BLOOD BY AUTOMATED COUNT: 13.2 % (ref 11.6–15.1)
GFR SERPL CREATININE-BSD FRML MDRD: 139 ML/MIN/1.73SQ M
GLUCOSE SERPL-MCNC: 133 MG/DL (ref 65–140)
GLUCOSE SERPL-MCNC: 147 MG/DL (ref 65–140)
GLUCOSE SERPL-MCNC: 175 MG/DL (ref 65–140)
GLUCOSE SERPL-MCNC: 179 MG/DL (ref 65–140)
GLUCOSE SERPL-MCNC: 194 MG/DL (ref 65–140)
HCT VFR BLD AUTO: 42.1 % (ref 36.5–49.3)
HGB BLD-MCNC: 13.8 G/DL (ref 12–17)
IMM GRANULOCYTES # BLD AUTO: 0.03 THOUSAND/UL (ref 0–0.2)
IMM GRANULOCYTES NFR BLD AUTO: 0 % (ref 0–2)
LYMPHOCYTES # BLD AUTO: 1.45 THOUSANDS/ΜL (ref 0.6–4.47)
LYMPHOCYTES NFR BLD AUTO: 16 % (ref 14–44)
MCH RBC QN AUTO: 28.9 PG (ref 26.8–34.3)
MCHC RBC AUTO-ENTMCNC: 32.8 G/DL (ref 31.4–37.4)
MCV RBC AUTO: 88 FL (ref 82–98)
MONOCYTES # BLD AUTO: 0.62 THOUSAND/ΜL (ref 0.17–1.22)
MONOCYTES NFR BLD AUTO: 7 % (ref 4–12)
NEUTROPHILS # BLD AUTO: 6.91 THOUSANDS/ΜL (ref 1.85–7.62)
NEUTS SEG NFR BLD AUTO: 75 % (ref 43–75)
NRBC BLD AUTO-RTO: 0 /100 WBCS
PLATELET # BLD AUTO: 270 THOUSANDS/UL (ref 149–390)
PMV BLD AUTO: 10.3 FL (ref 8.9–12.7)
POTASSIUM SERPL-SCNC: 4.1 MMOL/L (ref 3.5–5.3)
RBC # BLD AUTO: 4.78 MILLION/UL (ref 3.88–5.62)
SODIUM SERPL-SCNC: 138 MMOL/L (ref 135–147)
VANCOMYCIN SERPL-MCNC: 6.5 UG/ML (ref 10–20)
WBC # BLD AUTO: 9.15 THOUSAND/UL (ref 4.31–10.16)

## 2025-02-18 PROCEDURE — 82948 REAGENT STRIP/BLOOD GLUCOSE: CPT

## 2025-02-18 PROCEDURE — 99232 SBSQ HOSP IP/OBS MODERATE 35: CPT | Performed by: STUDENT IN AN ORGANIZED HEALTH CARE EDUCATION/TRAINING PROGRAM

## 2025-02-18 PROCEDURE — 85025 COMPLETE CBC W/AUTO DIFF WBC: CPT

## 2025-02-18 PROCEDURE — 80048 BASIC METABOLIC PNL TOTAL CA: CPT | Performed by: PHYSICIAN ASSISTANT

## 2025-02-18 PROCEDURE — 80202 ASSAY OF VANCOMYCIN: CPT | Performed by: PHYSICIAN ASSISTANT

## 2025-02-18 RX ORDER — DOXYCYCLINE 100 MG/1
100 CAPSULE ORAL EVERY 12 HOURS SCHEDULED
Status: DISCONTINUED | OUTPATIENT
Start: 2025-02-18 | End: 2025-02-19 | Stop reason: HOSPADM

## 2025-02-18 RX ORDER — LEVOFLOXACIN 750 MG/1
750 TABLET, FILM COATED ORAL EVERY 24 HOURS
Status: DISCONTINUED | OUTPATIENT
Start: 2025-02-18 | End: 2025-02-19 | Stop reason: HOSPADM

## 2025-02-18 RX ORDER — IBUPROFEN 600 MG/1
600 TABLET, FILM COATED ORAL EVERY 6 HOURS PRN
Status: DISCONTINUED | OUTPATIENT
Start: 2025-02-18 | End: 2025-02-19 | Stop reason: HOSPADM

## 2025-02-18 RX ADMIN — DOXYCYCLINE 100 MG: 100 CAPSULE ORAL at 20:43

## 2025-02-18 RX ADMIN — LEVOFLOXACIN 750 MG: 750 TABLET, FILM COATED ORAL at 20:43

## 2025-02-18 RX ADMIN — FAMOTIDINE 20 MG: 20 TABLET, FILM COATED ORAL at 17:11

## 2025-02-18 RX ADMIN — FAMOTIDINE 20 MG: 20 TABLET, FILM COATED ORAL at 08:49

## 2025-02-18 RX ADMIN — GABAPENTIN 600 MG: 300 CAPSULE ORAL at 17:10

## 2025-02-18 RX ADMIN — ATORVASTATIN CALCIUM 40 MG: 40 TABLET, FILM COATED ORAL at 17:11

## 2025-02-18 RX ADMIN — INSULIN LISPRO 4 UNITS: 100 INJECTION, SOLUTION INTRAVENOUS; SUBCUTANEOUS at 06:10

## 2025-02-18 RX ADMIN — NICOTINE 7 MG: 7 PATCH, EXTENDED RELEASE TRANSDERMAL at 08:49

## 2025-02-18 RX ADMIN — IBUPROFEN 600 MG: 600 TABLET, FILM COATED ORAL at 16:49

## 2025-02-18 RX ADMIN — VANCOMYCIN HYDROCHLORIDE 1750 MG: 1 INJECTION, POWDER, LYOPHILIZED, FOR SOLUTION INTRAVENOUS at 06:10

## 2025-02-18 RX ADMIN — BUPROPION HYDROCHLORIDE 100 MG: 100 TABLET, FILM COATED ORAL at 08:22

## 2025-02-18 RX ADMIN — INSULIN LISPRO 4 UNITS: 100 INJECTION, SOLUTION INTRAVENOUS; SUBCUTANEOUS at 11:18

## 2025-02-18 RX ADMIN — GABAPENTIN 600 MG: 300 CAPSULE ORAL at 21:36

## 2025-02-18 RX ADMIN — CEFEPIME HYDROCHLORIDE 2000 MG: 2 INJECTION, SOLUTION INTRAVENOUS at 08:22

## 2025-02-18 RX ADMIN — GABAPENTIN 600 MG: 300 CAPSULE ORAL at 08:49

## 2025-02-18 RX ADMIN — INSULIN GLARGINE 17 UNITS: 100 INJECTION, SOLUTION SUBCUTANEOUS at 21:36

## 2025-02-18 RX ADMIN — BUPROPION HYDROCHLORIDE 100 MG: 100 TABLET, FILM COATED ORAL at 17:11

## 2025-02-18 RX ADMIN — DEXTROAMPHETAMINE SACCHARATE, AMPHETAMINE ASPARTATE, DEXTROAMPHETAMINE SULFATE AND AMPHETAMINE SULFATE 10 MG: 2.5; 2.5; 2.5; 2.5 TABLET ORAL at 06:09

## 2025-02-18 RX ADMIN — DEXTROAMPHETAMINE SACCHARATE, AMPHETAMINE ASPARTATE, DEXTROAMPHETAMINE SULFATE AND AMPHETAMINE SULFATE 10 MG: 2.5; 2.5; 2.5; 2.5 TABLET ORAL at 11:18

## 2025-02-18 NOTE — CONSULTS
Vancomycin therapy has been discontinued. Thank you for this consult. Pharmacy will sign-off now.

## 2025-02-18 NOTE — TELEPHONE ENCOUNTER
Hello,    Please advise if a forced appointment can be accommodated for the patient:    Call back #: [phone]    Insurance: Workmen's Comp    Reason for appointment: right foot, great toe has a black spot and he is an inpatient in [place] with an open wound.  He is a patient of Dr. Borges in [place] and prefers to see him as he has had such a good experience with Dr. Pino and he trust him.  He is being released from the hospital tomorrow.      Requested doctor and/or location: Dr Brian Pino, [place]      Thank you.

## 2025-02-18 NOTE — PLAN OF CARE
Problem: PAIN - ADULT  Goal: Verbalizes/displays adequate comfort level or baseline comfort level  Description: Interventions:  - Encourage patient to monitor pain and request assistance  - Assess pain using appropriate pain scale  - Administer analgesics based on type and severity of pain and evaluate response  - Implement non-pharmacological measures as appropriate and evaluate response  - Consider cultural and social influences on pain and pain management  - Notify physician/advanced practitioner if interventions unsuccessful or patient reports new pain  Outcome: Progressing     Problem: INFECTION - ADULT  Goal: Absence or prevention of progression during hospitalization  Description: INTERVENTIONS:  - Assess and monitor for signs and symptoms of infection  - Monitor lab/diagnostic results  - Monitor all insertion sites, i.e. indwelling lines, tubes, and drains  - [place] appropriate cooling/warming therapies per order  - Administer medications as ordered  - Instruct and encourage patient and family to use good hand hygiene technique  Outcome: Progressing    Problem: METABOLIC, FLUID AND ELECTROLYTES - ADULT  Goal: Glucose maintained within target range  Description: INTERVENTIONS:  - Monitor Blood Glucose as ordered  - Assess for signs and symptoms of hyperglycemia and hypoglycemia  - Administer ordered medications to maintain glucose within target range  - Assess nutritional intake and initiate nutrition service referral as needed  Outcome: Progressing     Problem: SAFETY ADULT  Goal: Maintain or return to baseline ADL function  Description: INTERVENTIONS:  -  Assess patient's ability to carry out ADLs; assess patient's baseline for ADL function and identify physical deficits which impact ability to perform ADLs (bathing, care of mouth/teeth, toileting, grooming, dressing, etc.)  - Assess/evaluate cause of self-care deficits   - Assess range of motion  - Assess patient's mobility; develop plan if  impaired  - Assess patient's need for assistive devices and provide as appropriate  - Encourage maximum independence but intervene and supervise when necessary  - Involve family in performance of ADLs  - Assess for home care needs following discharge   - Consider OT consult to assist with ADL evaluation and planning for discharge  - Provide patient education as appropriate  Outcome: Progressing     Problem: DISCHARGE PLANNING  Goal: Discharge to home or other facility with appropriate resources  Description: INTERVENTIONS:  - Identify barriers to discharge w/patient and caregiver  - Arrange for needed discharge resources and transportation as appropriate  - Identify discharge learning needs (meds, wound care, etc.)  - Arrange for interpretive services to assist at discharge as needed  - Refer to Case Management Department for coordinating discharge planning if the patient needs post-hospital services based on physician/advanced practitioner order or complex needs related to functional status, cognitive ability, or social support system  Outcome: Progressing

## 2025-02-18 NOTE — PLAN OF CARE
Problem: INFECTION - ADULT  Goal: Absence or prevention of progression during hospitalization  Description: INTERVENTIONS:  - Assess and monitor for signs and symptoms of infection  - Monitor lab/diagnostic results  - Monitor all insertion sites, i.e. indwelling lines, tubes, and drains  - Monitor endotracheal if appropriate and nasal secretions for changes in amount and color  - [place] appropriate cooling/warming therapies per order  - Administer medications as ordered  - Instruct and encourage patient and family to use good hand hygiene technique  - Identify and instruct in appropriate isolation precautions for identified infection/condition  Outcome: Progressing     Problem: PAIN - ADULT  Goal: Verbalizes/displays adequate comfort level or baseline comfort level  Description: Interventions:  - Encourage patient to monitor pain and request assistance  - Assess pain using appropriate pain scale  - Administer analgesics based on type and severity of pain and evaluate response  - Implement non-pharmacological measures as appropriate and evaluate response  - Consider cultural and social influences on pain and pain management  - Notify physician/advanced practitioner if interventions unsuccessful or patient reports new pain  Outcome: Progressing     Problem: SAFETY ADULT  Goal: Patient will remain free of falls  Description: INTERVENTIONS:  - Educate patient/family on patient safety including physical limitations  - Instruct patient to call for assistance with activity   - Consult OT/PT to assist with strengthening/mobility   - Keep Call bell within reach  - Keep bed low and locked with side rails adjusted as appropriate  - Keep care items and personal belongings within reach  - Initiate and maintain comfort rounds  - Make Fall Risk Sign visible to staff  - Apply yellow socks and bracelet for high fall risk patients  - Consider moving patient to room near nurses station  Outcome: Progressing

## 2025-02-18 NOTE — PROGRESS NOTES
Progress Note- Podiatry   Jason Blanca 34 y.o. male MRN: [id number]  Unit/Bed#: 7T [facility] 708-01 Encounter: [id number]    Assessment & Plan     Assessment:  1. R foot wound  2. R hallux cellulitis, clearing  3. DM c DN     Plan:  - -pt eval and managed    - Number and complexity of problems addressed:  1 chronic problem with worsening   as shown    - Amount/complexity of data reviewed and analyzed:     Category 1: prior patient notes were analyzed today before evaluating and managing patient. All PMH were discussed with pt today.       - Risk of complications: moderate risk of morbidity from additional testing or treatment involved with this patient, which includes but not limited to:    - discussed anatomy, condition, treatment plan and options. They were instructed on proper foot care. The patient was seen today for greater than total of  45-59 minutes   . This is total time spent today involving both face-to-face time and non face-to-face time. This time spent includes  reviewing their past medical history  , performing a medically appropriate examination and evaluation of the patient, counseling and educating the patient,  documenting all findings in EMR, and independently interpreting results and communicating results with  patient     and discussing their condition and treatment options, risks, and potential complications. I have discussed the findings of this examination with the patient. The discussion included a complete verbal explanation of the examination results, diagnosis and planned treatment(s). A schedule for future care needs was explained. The patient has verbalized the understanding of these instructions at this time. If any questions should arise after returning home I have encouraged the patient to feel free to call the office.    - right hallux clinically stable  - dressing to be applied by nursing as ordered. Pt to resume topical management as outpt. Pt has topical medication that was sent  by me last week after being seen in the office  - continue course of oral antibiotics as outpt  - continue with surgical offloading shoe  - I will see patient in the office in 1 week. Pt to call and schedule his appointment.   - All questions and concerns addressed.  - d/w Primary team in detail  - pt clear for d/c today  - stressed that patient runs risk of limb/toe loss secondary to smoking and DM, although this started from shoe work shoe gear     History of Present Illness     HPI:  Jason Blanca is a 34 y.o. male who presents with right big toe wound. Started from shoe gear rubbing from work boots. Pt states he is doing better. Seen by me once last week in office. Pt had blister that we drained. Pt has surgical offloading shoe.Pt is a smoker        NVS unchanged. Right hallux wound medially, measuring 1.5cmx1.0cmx0.2cm, dry eschar to wound bed, there is serous drainage, diminished erythema, lessened pain          Lab Results:   Admission on 02/16/2025   Component Date Value    WBC 02/16/2025 8.39     RBC 02/16/2025 4.80     Hemoglobin 02/16/2025 14.3     Hematocrit 02/16/2025 42.6     MCV 02/16/2025 89     MCH 02/16/2025 29.8     MCHC 02/16/2025 33.6     RDW 02/16/2025 13.2     MPV 02/16/2025 10.1     Platelets 02/16/2025 283     nRBC 02/16/2025 0     Segmented % 02/16/2025 69     Immature Grans % 02/16/2025 1     Lymphocytes % 02/16/2025 22     Monocytes % 02/16/2025 6     Eosinophils Relative 02/16/2025 1     Basophils Relative 02/16/2025 1     Absolute Neutrophils 02/16/2025 5.72     Absolute Immature Grans 02/16/2025 0.09     Absolute Lymphocytes 02/16/2025 1.86     Absolute Monocytes 02/16/2025 0.53     Eosinophils Absolute 02/16/2025 0.12     Basophils Absolute 02/16/2025 0.07     Sodium 02/16/2025 133 (L)     Potassium 02/16/2025 3.9     Chloride 02/16/2025 101     CO2 02/16/2025 25     ANION GAP 02/16/2025 7     BUN 02/16/2025 14     Creatinine 02/16/2025 0.73     Glucose 02/16/2025 294 (H)      Calcium 02/16/2025 8.5     AST 02/16/2025 15     ALT 02/16/2025 22     Alkaline Phosphatase 02/16/2025 44     Total Protein 02/16/2025 6.7     Albumin 02/16/2025 4.0     Total Bilirubin 02/16/2025 0.39     eGFR 02/16/2025 120     Sed Rate 02/16/2025 12     CRP 02/16/2025 4.9 (H)     LACTIC ACID 02/16/2025 1.4     Sodium 02/17/2025 136     Potassium 02/17/2025 4.1     Chloride 02/17/2025 105     CO2 02/17/2025 25     ANION GAP 02/17/2025 6     BUN 02/17/2025 12     Creatinine 02/17/2025 0.61     Glucose 02/17/2025 253 (H)     Calcium 02/17/2025 8.1 (L)     eGFR 02/17/2025 129     WBC 02/17/2025 6.33     RBC 02/17/2025 4.61     Hemoglobin 02/17/2025 13.4     Hematocrit 02/17/2025 41.0     MCV 02/17/2025 89     MCH 02/17/2025 29.1     MCHC 02/17/2025 32.7     RDW 02/17/2025 13.5     Platelets 02/17/2025 244     MPV 02/17/2025 10.2     POC Glucose 02/17/2025 218 (H)     POC Glucose 02/17/2025 216 (H)     POC Glucose 02/17/2025 166 (H)     POC Glucose 02/17/2025 168 (H)     Sodium 02/18/2025 138     Potassium 02/18/2025 4.1     Chloride 02/18/2025 104     CO2 02/18/2025 24     ANION GAP 02/18/2025 10     BUN 02/18/2025 9     Creatinine 02/18/2025 0.51 (L)     Glucose 02/18/2025 179 (H)     Calcium 02/18/2025 8.1 (L)     eGFR 02/18/2025 139     Vancomycin Rm 02/18/2025 6.5 (L)     WBC 02/18/2025 9.15     RBC 02/18/2025 4.78     Hemoglobin 02/18/2025 13.8     Hematocrit 02/18/2025 42.1     MCV 02/18/2025 88     MCH 02/18/2025 28.9     MCHC 02/18/2025 32.8     RDW 02/18/2025 13.2     MPV 02/18/2025 10.3     Platelets 02/18/2025 270     nRBC 02/18/2025 0     Segmented % 02/18/2025 75     Immature Grans % 02/18/2025 0     Lymphocytes % 02/18/2025 16     Monocytes % 02/18/2025 7     Eosinophils Relative 02/18/2025 1     Basophils Relative 02/18/2025 1     Absolute Neutrophils 02/18/2025 6.91     Absolute Immature Grans 02/18/2025 0.03     Absolute Lymphocytes 02/18/2025 1.45     Absolute Monocytes 02/18/2025 0.62      Eosinophils Absolute 02/18/2025 0.09     Basophils Absolute 02/18/2025 0.05     POC Glucose 02/18/2025 175 (H)      Imaging: I have personally reviewed pertinent films in PACS  EKG, Pathology, and Other Studies: I have personally reviewed pertinent reports.      Code Status: Level 1 - Full Code  Advance Directive and Living Will:      Power of :    POLST:

## 2025-02-18 NOTE — PROGRESS NOTES
Progress Note    Jason Blanca 34 y.o. male MRN: [id number]  Unit/Bed#: 7T [facility] 708-01 Encounter: [id number]  Admitting Physician: Ling Felix MD  PCP: Jaren Marquez MD  Date of Admission:  2/16/2025  7:02 PM    Assessment and Plan    * Nonhealing wound of right great toe  Assessment & Plan  Worsening despite 2 weeks of oral antibiotics (doxycycline and Keflex)  Stable vital signs, no leukocytosis, normal sed rate and lactic acid.  CRP elevated at 4.9  X-ray: No obvious sign of osteomyelitis on wet read.  Pending official report  Status post vancomycin in the ED  Podiatry recommended admission for IV antibiotics and possible intervention  MRI 2/17/25 showed plantar/medial skin ulceration at the level of the first interphalangeal joint without marrow change to indicate osteomyelitis at this time     Plan:  Continue IV vancomycin and cefepime this morning  Transition to PO Doxycycline 100mg BID and Levofloxacin 750mg daily in the afternoon for possible discharge tomorrow  Will need to follow up outpatient with Podiatry   Blood cultures pending  Surgical offloading shoe     Mixed dyslipidemia  Assessment & Plan  History of dyslipidemia Home medication.  Home meds rosuvastatin 20 mg p.o. daily    Plan:  Continue home meds    ADHD, predominantly inattentive type  Assessment & Plan  Stable  On Adderall 10 mg twice daily    Plan:  Continue current regimen    Recurrent major depressive disorder, in remission (HCC)  Assessment & Plan  Stable.  No suicidal homicidal ideation currently  Home medication: Wellbutrin 100 mg twice daily    Plan:  Continue current regimen    Type 2 diabetes mellitus with hyperglycemia, with neuropathic pain  Assessment & Plan  Lab Results   Component Value Date    HGBA1C 8.3 (H) 01/13/2025       Recent Labs     02/17/25  1051 02/17/25  1532 02/17/25  2046 02/18/25  0603   POCGLU 216* 166* 168* 175*       Home regimen: Toujeo 25 units at bedtime, lispro via insulin pump,  "Synjardy XR  mg qd   (P) 188.6  Plan:  Lantus 17 units at bedtime  Hold oral meds  ISS and ACHS Accu-Cheks  Diabetic diet        VTE Prophylaxis: VTE Score: 0 Low Risk (Score 0-2) - Encourage Ambulation.    Code Status: Level 1 - Full Code      Subjective:   Patient was seen and examined at bedside with attending physician. Reports improvement of his symptoms today. No overnight events. Voices no other concerns. Assessment and plan discussed. Patient would like to stay in the hospital for another dose of IV antibiotics and feels more comfortable being discharged tomorrow.  all questions answered.     Objective:     Vitals:   Temp (24hrs), Av.1 °F (36.2 °C), Min:96.6 °F (35.9 °C), Max:97.6 °F (36.4 °C)    Temp:  [96.6 °F (35.9 °C)-97.6 °F (36.4 °C)] 96.6 °F (35.9 °C)  HR:  [90-96] 96  Resp:  [18-20] 20  BP: (145-165)/(88-96) 145/88  SpO2:  [97 %-99 %] 97 %  Body mass index is 32.05 kg/m².     Input and Output Summary (last 24 hours):       Intake/Output Summary (Last 24 hours) at 2025 1030  Last data filed at 2025 0853  Gross per 24 hour   Intake 880 ml   Output --   Net 880 ml       Physical Exam:     Physical Exam:   Vitals:   Blood Pressure: 145/84 (25)  Pulse: 99 (25)  Temperature: 97.9 °F (36.6 °C) (25)  Temp Source: Temporal (25)  Respirations: 18 (25)  Height: 5' 8\" (172.7 cm) (25)  Weight - Scale: 95.6 kg (210 lb 12.2 oz) (25)  SpO2: 97 % (25)     Physical Exam  Vitals reviewed.   Constitutional:       General: He is not in acute distress.     Appearance: Normal appearance.   HENT:      Head: Normocephalic.      Right Ear: External ear normal.      Left Ear: External ear normal.      Nose: Nose normal.      Mouth/Throat:      Pharynx: Oropharynx is clear.   Eyes:      Extraocular Movements: Extraocular movements intact.      Conjunctiva/sclera: Conjunctivae normal.   Cardiovascular:      Rate and " Rhythm: Normal rate and regular rhythm.      Pulses: Normal pulses.      Heart sounds: Normal heart sounds. No murmur heard.     No friction rub. No gallop.   Pulmonary:      Effort: Pulmonary effort is normal.      Breath sounds: Normal breath sounds. No wheezing.   Abdominal:      General: Bowel sounds are normal.      Palpations: There is no mass.      Tenderness: There is no abdominal tenderness.   Musculoskeletal:         General: No swelling.      Cervical back: Normal range of motion.      Right lower leg: No edema.      Left lower leg: No edema.        Feet:    Feet:      Right foot:      Skin integrity: Ulcer (right great toe) present.      Left foot:      Skin integrity: Skin integrity normal.      Comments: Please see media  Skin:     General: Skin is warm.      Capillary Refill: Capillary refill takes less than 2 seconds.   Neurological:      General: No focal deficit present.      Mental Status: He is alert and oriented to person, place, and time. Mental status is at baseline.      Sensory: No sensory deficit.      Motor: No weakness.   Psychiatric:         Mood and Affect: Mood normal.             Additional Data:     Labs:    Results from last 7 days   Lab Units 02/18/25  0513   WBC Thousand/uL 9.15   HEMOGLOBIN g/dL 13.8   HEMATOCRIT % 42.1   PLATELETS Thousands/uL 270   SEGS PCT % 75   LYMPHO PCT % 16   MONO PCT % 7   EOS PCT % 1     Results from last 7 days   Lab Units 02/18/25  0513 02/17/25  0530 02/16/25  1937   POTASSIUM mmol/L 4.1   < > 3.9   CHLORIDE mmol/L 104   < > 101   CO2 mmol/L 24   < > 25   BUN mg/dL 9   < > 14   CREATININE mg/dL 0.51*   < > 0.73   CALCIUM mg/dL 8.1*   < > 8.5   ALK PHOS U/L  --   --  44   ALT U/L  --   --  22   AST U/L  --   --  15    < > = values in this interval not displayed.         Results from last 7 days   Lab Units 02/18/25  0603 02/17/25  2046 02/17/25  1532 02/17/25  1051 02/17/25  0601   POC GLUCOSE mg/dl 175* 168* 166* 216* 218*             * I Have  Reviewed All Lab Data Listed Above.  * Additional Pertinent Lab Tests Reviewed: All Labs For Current Hospital Admission Reviewed    Imaging:    Imaging Reports Reviewed Today Include: MRI    Recent Cultures (last 7 days):           Last 24 Hours Medication List:   Current Facility-Administered Medications   Medication Dose Route Frequency Provider Last Rate    acetaminophen  650 mg Oral Q6H PRN Donal Kramer PA-C      amphetamine-dextroamphetamine  10 mg Oral BID before breakfast/lunch Donal Kramer PA-C      atorvastatin  40 mg Oral Daily With Dinner Donal Kramer PA-C      buPROPion  100 mg Oral BID Krissy Mendoza MD      doxycycline hyclate  100 mg Oral Q12H [facility] Ling Felix MD      famotidine  20 mg Oral BID Donal Kramer PA-C      gabapentin  600 mg Oral TID Donal Kramer PA-C      ibuprofen  600 mg Oral Q6H PRN Roxanne jA MD      insulin glargine  17 Units Subcutaneous HS Roxanne Aj MD      insulin lispro  4-20 Units Subcutaneous 4x Daily (AC & HS) Krissy Mendoza MD      levofloxacin  750 mg Oral Q24H Ling Felix MD      nicotine  7 mg Transdermal Daily Roxanne Aj MD      ondansetron  4 mg Intravenous Q6H PRN Donal Kramer PA-C          ** Please Note: Dictation voice to text software may have been used in the creation of this document. **    Ling Felix MD  02/18/25  10:30 AM

## 2025-02-19 VITALS
SYSTOLIC BLOOD PRESSURE: 152 MMHG | BODY MASS INDEX: 31.94 KG/M2 | TEMPERATURE: 97.6 F | DIASTOLIC BLOOD PRESSURE: 99 MMHG | HEIGHT: 68 IN | WEIGHT: 210.76 LBS | OXYGEN SATURATION: 98 % | HEART RATE: 99 BPM | RESPIRATION RATE: 20 BRPM

## 2025-02-19 LAB
GLUCOSE SERPL-MCNC: 184 MG/DL (ref 65–140)
GLUCOSE SERPL-MCNC: 212 MG/DL (ref 65–140)
GLUCOSE SERPL-MCNC: 220 MG/DL (ref 65–140)

## 2025-02-19 PROCEDURE — 99238 HOSP IP/OBS DSCHRG MGMT 30/<: CPT | Performed by: STUDENT IN AN ORGANIZED HEALTH CARE EDUCATION/TRAINING PROGRAM

## 2025-02-19 PROCEDURE — 82948 REAGENT STRIP/BLOOD GLUCOSE: CPT

## 2025-02-19 RX ORDER — LEVOFLOXACIN 750 MG/1
750 TABLET, FILM COATED ORAL EVERY 24 HOURS
Qty: 6 TABLET | Refills: 0 | Status: SHIPPED | OUTPATIENT
Start: 2025-02-19 | End: 2025-02-25

## 2025-02-19 RX ORDER — DOXYCYCLINE 100 MG/1
100 CAPSULE ORAL EVERY 12 HOURS SCHEDULED
Qty: 28 CAPSULE | Refills: 0 | Status: SHIPPED | OUTPATIENT
Start: 2025-02-19 | End: 2025-03-05

## 2025-02-19 RX ADMIN — GABAPENTIN 600 MG: 300 CAPSULE ORAL at 16:00

## 2025-02-19 RX ADMIN — FAMOTIDINE 20 MG: 20 TABLET, FILM COATED ORAL at 08:13

## 2025-02-19 RX ADMIN — DEXTROAMPHETAMINE SACCHARATE, AMPHETAMINE ASPARTATE, DEXTROAMPHETAMINE SULFATE AND AMPHETAMINE SULFATE 10 MG: 2.5; 2.5; 2.5; 2.5 TABLET ORAL at 11:27

## 2025-02-19 RX ADMIN — ATORVASTATIN CALCIUM 40 MG: 40 TABLET, FILM COATED ORAL at 16:00

## 2025-02-19 RX ADMIN — INSULIN LISPRO 8 UNITS: 100 INJECTION, SOLUTION INTRAVENOUS; SUBCUTANEOUS at 11:28

## 2025-02-19 RX ADMIN — DEXTROAMPHETAMINE SACCHARATE, AMPHETAMINE ASPARTATE, DEXTROAMPHETAMINE SULFATE AND AMPHETAMINE SULFATE 10 MG: 2.5; 2.5; 2.5; 2.5 TABLET ORAL at 06:01

## 2025-02-19 RX ADMIN — INSULIN LISPRO 8 UNITS: 100 INJECTION, SOLUTION INTRAVENOUS; SUBCUTANEOUS at 16:00

## 2025-02-19 RX ADMIN — BUPROPION HYDROCHLORIDE 100 MG: 100 TABLET, FILM COATED ORAL at 08:13

## 2025-02-19 RX ADMIN — NICOTINE 7 MG: 7 PATCH, EXTENDED RELEASE TRANSDERMAL at 08:14

## 2025-02-19 RX ADMIN — GABAPENTIN 600 MG: 300 CAPSULE ORAL at 08:13

## 2025-02-19 RX ADMIN — DOXYCYCLINE 100 MG: 100 CAPSULE ORAL at 08:14

## 2025-02-19 RX ADMIN — INSULIN LISPRO 4 UNITS: 100 INJECTION, SOLUTION INTRAVENOUS; SUBCUTANEOUS at 06:01

## 2025-02-19 NOTE — NURSING NOTE
Patient given discharge instructions and verbalized understanding. Belongings given to patient. Patient in no distress and has no concerns at this time. Patient discharged with family member.

## 2025-02-19 NOTE — PLAN OF CARE
Problem: PAIN - ADULT  Goal: Verbalizes/displays adequate comfort level or baseline comfort level  Description: Interventions:  - Encourage patient to monitor pain and request assistance  - Assess pain using appropriate pain scale  - Administer analgesics based on type and severity of pain and evaluate response  - Implement non-pharmacological measures as appropriate and evaluate response  - Consider cultural and social influences on pain and pain management  - Notify physician/advanced practitioner if interventions unsuccessful or patient reports new pain  Outcome: Adequate for Discharge     Problem: INFECTION - ADULT  Goal: Absence or prevention of progression during hospitalization  Description: INTERVENTIONS:  - Assess and monitor for signs and symptoms of infection  - Monitor lab/diagnostic results  - Monitor all insertion sites, i.e. indwelling lines, tubes, and drains  - [place] appropriate cooling/warming therapies per order  - Administer medications as ordered  - Instruct and encourage patient and family to use good hand hygiene technique  - Identify and instruct in appropriate isolation precautions for identified infection/condition  Outcome: Adequate for Discharge  Goal: Absence of fever/infection during neutropenic period  Description: INTERVENTIONS:  - Monitor WBC    Outcome: Adequate for Discharge     Problem: SAFETY ADULT  Goal: Patient will remain free of falls  Description: INTERVENTIONS:  - Educate patient/family on patient safety including physical limitations  - Instruct patient to call for assistance with activity   - Consult OT/PT to assist with strengthening/mobility   - Keep Call bell within reach  - Keep bed low and locked with side rails adjusted as appropriate  - Keep care items and personal belongings within reach  - Initiate and maintain comfort rounds  - Make Fall Risk Sign visible to staff  - Apply yellow socks and bracelet for high fall risk patients  - Consider moving patient to  room near nurses station  Outcome: Adequate for Discharge  Goal: Maintain or return to baseline ADL function  Description: INTERVENTIONS:  -  Assess patient's ability to carry out ADLs; assess patient's baseline for ADL function and identify physical deficits which impact ability to perform ADLs (bathing, care of mouth/teeth, toileting, grooming, dressing, etc.)  - Assess/evaluate cause of self-care deficits   - Assess range of motion  - Assess patient's mobility; develop plan if impaired  - Assess patient's need for assistive devices and provide as appropriate  - Encourage maximum independence but intervene and supervise when necessary  - Involve family in performance of ADLs  - Assess for home care needs following discharge   - Consider OT consult to assist with ADL evaluation and planning for discharge  - Provide patient education as appropriate  Outcome: Adequate for Discharge  Goal: Maintains/Returns to pre admission functional level  Description: INTERVENTIONS:  - Perform AM-PAC 6 Click Basic Mobility/ Daily Activity assessment daily.  - Set and communicate daily mobility goal to care team and patient/family/caregiver.   - Collaborate with rehabilitation services on mobility goals if consulted  - Out of bed for toileting  - Record patient progress and toleration of activity level   Outcome: Adequate for Discharge     Problem: DISCHARGE PLANNING  Goal: Discharge to home or other facility with appropriate resources  Description: INTERVENTIONS:  - Identify barriers to discharge w/patient and caregiver  - Arrange for needed discharge resources and transportation as appropriate  - Identify discharge learning needs (meds, wound care, etc.)  - Arrange for interpretive services to assist at discharge as needed  - Refer to Case Management Department for coordinating discharge planning if the patient needs post-hospital services based on physician/advanced practitioner order or complex needs related to functional  status, cognitive ability, or social support system  Outcome: Adequate for Discharge     Problem: Knowledge Deficit  Goal: Patient/family/caregiver demonstrates understanding of disease process, treatment plan, medications, and discharge instructions  Description: Complete learning assessment and assess knowledge base.  Interventions:  - Provide teaching at level of understanding  - Provide teaching via preferred learning methods  Outcome: Adequate for Discharge     Problem: NEUROSENSORY - ADULT  Goal: Achieves stable or improved neurological status  Description: INTERVENTIONS  - Monitor and report changes in neurological status  - Monitor vital signs such as temperature, blood pressure, glucose, and any other labs ordered   - Initiate measures to prevent increased intracranial pressure  - Monitor for seizure activity and implement precautions if appropriate      Outcome: Adequate for Discharge  Goal: Remains free of injury related to seizures activity  Description: INTERVENTIONS  - Maintain airway, patient safety  and administer oxygen as ordered  - Monitor patient for seizure activity, document and report duration and description of seizure to physician/advanced practitioner  - If seizure occurs,  ensure patient safety during seizure  - Reorient patient post seizure  - Seizure pads on all 4 side rails  - Instruct patient/family to notify RN of any seizure activity including if an aura is experienced  - Instruct patient/family to call for assistance with activity based on nursing assessment  - Administer anti-seizure medications if ordered    Outcome: Adequate for Discharge  Goal: Achieves maximal functionality and self care  Description: INTERVENTIONS  - Monitor swallowing and airway patency with patient fatigue and changes in neurological status  - Encourage and assist patient to increase activity and self care.   - Encourage visually impaired, hearing impaired and aphasic patients to use assistive/communication  devices  Outcome: Adequate for Discharge     Problem: METABOLIC, FLUID AND ELECTROLYTES - ADULT  Goal: Electrolytes maintained within normal limits  Description: INTERVENTIONS:  - Monitor labs and assess patient for signs and symptoms of electrolyte imbalances  - Administer electrolyte replacement as ordered  - Monitor response to electrolyte replacements, including repeat lab results as appropriate  - Instruct patient on fluid and nutrition as appropriate  Outcome: Adequate for Discharge  Goal: Fluid balance maintained  Description: INTERVENTIONS:  - Monitor labs   - Monitor I/O and WT  - Instruct patient on fluid and nutrition as appropriate  - Assess for signs & symptoms of volume excess or deficit  Outcome: Adequate for Discharge  Goal: Glucose maintained within target range  Description: INTERVENTIONS:  - Monitor Blood Glucose as ordered  - Assess for signs and symptoms of hyperglycemia and hypoglycemia  - Administer ordered medications to maintain glucose within target range  - Assess nutritional intake and initiate nutrition service referral as needed  Outcome: Adequate for Discharge

## 2025-02-19 NOTE — PLAN OF CARE
Problem: PAIN - ADULT  Goal: Verbalizes/displays adequate comfort level or baseline comfort level  Description: Interventions:  - Encourage patient to monitor pain and request assistance  - Assess pain using appropriate pain scale  - Administer analgesics based on type and severity of pain and evaluate response  - Implement non-pharmacological measures as appropriate and evaluate response  - Consider cultural and social influences on pain and pain management  - Notify physician/advanced practitioner if interventions unsuccessful or patient reports new pain  Outcome: Progressing     Problem: INFECTION - ADULT  Goal: Absence or prevention of progression during hospitalization  Description: INTERVENTIONS:  - Assess and monitor for signs and symptoms of infection  - Monitor lab/diagnostic results  - Monitor all insertion sites, i.e. indwelling lines, tubes, and drains  - [place] appropriate cooling/warming therapies per order  - Administer medications as ordered  - Instruct and encourage patient and family to use good hand hygiene technique  Outcome: Progressing      Problem: METABOLIC, FLUID AND ELECTROLYTES - ADULT  Goal: Glucose maintained within target range  Description: INTERVENTIONS:  - Monitor Blood Glucose as ordered  - Assess for signs and symptoms of hyperglycemia and hypoglycemia  - Administer ordered medications to maintain glucose within target range  - Assess nutritional intake and initiate nutrition service referral as needed  Outcome: Progressing       Problem: DISCHARGE PLANNING  Goal: Discharge to home or other facility with appropriate resources  Description: INTERVENTIONS:  - Identify barriers to discharge w/patient and caregiver  - Arrange for needed discharge resources and transportation as appropriate  - Identify discharge learning needs (meds, wound care, etc.)  - Arrange for interpretive services to assist at discharge as needed  - Refer to Case Management Department for coordinating  discharge planning if the patient needs post-hospital services based on physician/advanced practitioner order or complex needs related to functional status, cognitive ability, or social support system  Outcome: Progressing

## 2025-02-20 ENCOUNTER — PATIENT OUTREACH (OUTPATIENT)
Dept: CASE MANAGEMENT | Facility: OTHER | Age: 35
End: 2025-02-20

## 2025-02-21 ENCOUNTER — TRANSITIONAL CARE MANAGEMENT (OUTPATIENT)
Dept: FAMILY MEDICINE CLINIC | Facility: CLINIC | Age: 35
End: 2025-02-21

## 2025-02-22 LAB
BACTERIA BLD CULT: NORMAL
BACTERIA BLD CULT: NORMAL

## 2025-02-24 ENCOUNTER — PATIENT OUTREACH (OUTPATIENT)
Dept: CASE MANAGEMENT | Facility: OTHER | Age: 35
End: 2025-02-24

## 2025-02-24 DIAGNOSIS — Z59.71 UNINSURED: Primary | ICD-10-CM

## 2025-02-24 NOTE — PROGRESS NOTES
Chart reviewed.Jason was admitted to [facility] 2/16-2/19 with a nonhealing wound of right great toe, type 2 diabetes, recurrent major depressive disorder, and dyslipidemia.  He discharged home to self care.  I spoke with Jason who reports he is doing well. He is taking his medication as directed.  He has a follow up appointment with podiatry scheduled Wednesday and he has transportation.   He thanked me for calling but declined further outreach.  He did save my contact information in his phone. I encoraged him to call me with questions.

## 2025-02-26 ENCOUNTER — OFFICE VISIT (OUTPATIENT)
Dept: PODIATRY | Facility: CLINIC | Age: 35
End: 2025-02-26
Payer: OTHER MISCELLANEOUS

## 2025-02-26 ENCOUNTER — PATIENT OUTREACH (OUTPATIENT)
Dept: CASE MANAGEMENT | Facility: OTHER | Age: 35
End: 2025-02-26

## 2025-02-26 ENCOUNTER — APPOINTMENT (OUTPATIENT)
Dept: RADIOLOGY | Facility: CLINIC | Age: 35
End: 2025-02-26
Payer: OTHER MISCELLANEOUS

## 2025-02-26 VITALS — HEIGHT: 68 IN | RESPIRATION RATE: 18 BRPM | WEIGHT: 210 LBS | BODY MASS INDEX: 31.83 KG/M2

## 2025-02-26 DIAGNOSIS — E11.621 DIABETIC ULCER OF TOE OF RIGHT FOOT ASSOCIATED WITH TYPE 2 DIABETES MELLITUS, WITH FAT LAYER EXPOSED (HCC): ICD-10-CM

## 2025-02-26 DIAGNOSIS — I70.90 ATHEROSCLEROSIS: Primary | ICD-10-CM

## 2025-02-26 DIAGNOSIS — L97.512 DIABETIC ULCER OF TOE OF RIGHT FOOT ASSOCIATED WITH TYPE 2 DIABETES MELLITUS, WITH FAT LAYER EXPOSED (HCC): ICD-10-CM

## 2025-02-26 DIAGNOSIS — E11.42 DIABETIC POLYNEUROPATHY ASSOCIATED WITH TYPE 2 DIABETES MELLITUS (HCC): ICD-10-CM

## 2025-02-26 PROCEDURE — 73630 X-RAY EXAM OF FOOT: CPT

## 2025-02-26 PROCEDURE — 99214 OFFICE O/P EST MOD 30 MIN: CPT | Performed by: PODIATRIST

## 2025-02-26 RX ORDER — MUPIROCIN 20 MG/G
OINTMENT TOPICAL 2 TIMES DAILY
Qty: 22 G | Refills: 0 | Status: SHIPPED | OUTPATIENT
Start: 2025-02-26

## 2025-02-26 NOTE — PROGRESS NOTES
MUNDO MEDLEY reviewed chart. Pt was referred by RN CM d/t not having insurance. Pt declined further outreach by CHRISTOPHER MEDLEY.    MUNDO MEDLEY placed call to pt to introduce self and discuss needs. Pt did not answer. MUNDO MEDLEY left a message and will try to reach pt again within one week if pt does not call back sooner.

## 2025-02-27 ENCOUNTER — OFFICE VISIT (OUTPATIENT)
Dept: FAMILY MEDICINE CLINIC | Facility: CLINIC | Age: 35
End: 2025-02-27

## 2025-02-27 VITALS
RESPIRATION RATE: 16 BRPM | DIASTOLIC BLOOD PRESSURE: 70 MMHG | HEART RATE: 105 BPM | SYSTOLIC BLOOD PRESSURE: 110 MMHG | WEIGHT: 198 LBS | TEMPERATURE: 99 F | OXYGEN SATURATION: 98 % | HEIGHT: 67 IN | BODY MASS INDEX: 31.08 KG/M2

## 2025-02-27 DIAGNOSIS — Z76.89 ENCOUNTER FOR SUPPORT AND COORDINATION OF TRANSITION OF CARE: Primary | ICD-10-CM

## 2025-02-27 DIAGNOSIS — F17.219 CIGARETTE NICOTINE DEPENDENCE WITH NICOTINE-INDUCED DISORDER: ICD-10-CM

## 2025-02-27 DIAGNOSIS — L03.031 CELLULITIS OF GREAT TOE, RIGHT: ICD-10-CM

## 2025-02-27 PROCEDURE — 99495 TRANSJ CARE MGMT MOD F2F 14D: CPT | Performed by: FAMILY MEDICINE

## 2025-02-27 RX ORDER — VARENICLINE TARTRATE 1 MG/1
1 TABLET, FILM COATED ORAL 2 TIMES DAILY
Qty: 60 TABLET | Refills: 2 | Status: SHIPPED | OUTPATIENT
Start: 2025-03-06 | End: 2025-03-08

## 2025-02-27 RX ORDER — VARENICLINE TARTRATE 0.5 MG/1
TABLET, FILM COATED ORAL
Qty: 11 TABLET | Refills: 0 | Status: SHIPPED | OUTPATIENT
Start: 2025-02-27

## 2025-02-27 NOTE — ASSESSMENT & PLAN NOTE
Lab Results   Component Value Date    HGBA1C 8.3 (H) 01/13/2025       Orders:    VAS ARTERIAL DUPLEX- LOWER LIMB BILATERAL; Future

## 2025-02-27 NOTE — PROGRESS NOTES
Transition of Care Visit  Name: Jason Blanca      : 1990      MRN: [id number]  Encounter Provider: Jaren Marquez MD  Encounter Date: 2025   Encounter department: [facility] PRIMARY CARE [facility]    Assessment & Plan  Encounter for support and coordination of transition of care  Date and time call was made  2025  9:57 AM   Patient was hospitialized at  [facility]   Date of Admission  25   Date of discharge  25   Disposition  Home   Were the patients medications reviewed and updated  Yes   Current Symptoms  None          Cellulitis of great toe, right   Right Great Toe Cellulitis/Ulcer:  - Recent discharge after 3 days of antibiotics on   - X-ray negative for osteomyelitis  - Referred to wound care with upcoming appointments  - Vascular ultrasound scheduled for next week  - Recommend daily baby aspirin to improve circulation  - Continue current wound care regimen with daily cleaning and prescribed ointment       Cigarette nicotine dependence with nicotine-induced disorder   Nicotine Dependence:  - Initiating Varenicline (Chantix) for smoking cessation:  * 0.5mg once daily for 3 days  * 0.5mg twice daily for 4 days  * Then 1mg twice daily for remainder of month  - Counseled on medication side effects including depression, suicidal ideation, tremor, nausea, headaches  - Discussed importance of smoking cessation for wound healing  Orders:    varenicline (Chantix Continuing Month Yash) 1 mg tablet; Take 1 tablet (1 mg total) by mouth 2 (two) times a day Do not start before 2025.    varenicline (Chantix) 0.5 mg tablet; Take 1 tablet daily for 3 days then 1 tablet twice a day for 4 days.               History of Present Illness     Transitional Care Management Review:   Jason Blnaca is a 34 y.o. male here for TCM follow up.     During the TCM phone call patient stated:  TCM Call       Date and time call was made  2025  9:57 AM     Patient was hospitialized at  [facility]    Date of Admission  02/16/25    Date of discharge  02/19/25    Disposition  Home    Were the patients medications reviewed and updated  Yes    Current Symptoms  None          TCM Call       Post hospital issues  None    Scheduled for follow up?  Yes    Did you obtain your prescribed medications  Yes    Do you need help managing your prescriptions or medications  No    Is transportation to your appointment needed  No    I have advised the patient to call PCP with any new or worsening symptoms  Minal Felix MA    Living Arrangements  Family members    Are you recieving any outpatient services  No    Are you recieving home care services  No    Are you using any community resources  No    Current waiver services  No    Have you fallen in the last 12 months  No    Interperter language line needed  No          34-year-old male presents for follow-up after recent hospitalization for right great toe cellulitis. Patient reports following up with podiatrist yesterday who ordered vascular ultrasound due to concerns about poor circulation affecting wound healing. X-ray was performed yesterday, showing no osteomyelitis. Patient has been compliant with wound care, including daily cleaning and ointment application. Reports significant nicotine addiction, currently vaping with increased usage compared to previous cigarette smoking. Patient expresses strong desire to quit smoking and requests smoking cessation assistance. Concerned about potential toe amputation if condition worsens. Currently uninsured and paying out of pocket for medical care.    Objective:  Right great toe ulcer present with delayed healing  Recent X-ray: Negative for osteomyelitis  Vascular status: Concerns for compromised circulation warranting further evaluation    Current Medications:  - Ibuprofen  - Wound care supplies          Review of Systems  Objective   /70 (BP Location: Left arm, Patient  "Position: Sitting, Cuff Size: Standard)   Pulse 105   Temp 99 °F (37.2 °C) (Tympanic)   Resp 16   Ht 5' 7\" (1.702 m)   Wt 89.8 kg (198 lb)   SpO2 98%   BMI 31.01 kg/m²     Physical Exam  Medications have been reviewed by provider in current encounter    Administrative Statements   I have spent a total time of 25 minutes in caring for this patient on the day of the visit/encounter including Instructions for management, Patient and family education, Importance of tx compliance, Risk factor reductions, Impressions, Counseling / Coordination of care, and Documenting in the medical record.  "

## 2025-02-27 NOTE — PROGRESS NOTES
Name: Jason Blanca      : 1990      MRN: [id number]  Encounter Provider: Brian Pino DPM  Encounter Date: 2025   Encounter department: [facility] PODIATRY [facility]     MANNIE personally reviewed x-rays of the right foot taken today.  There is no evidence of osteomyelitis of the right great toe.    Explained to the patient that he is a slow healing right foot diabetic ulcer.  The lack of response to antibiotics suggests vascular disease.    Treatment: Patient was referred to [facility] wound care.  He was also referred for arterial duplex right lower extremity to assess ability to heal.  He will be seen again by me in 7 to 8 weeks as wound care should undertake his regular care.  Patient completed antibiotics.  Another prescription for Bactroban was provided at patient request.  :  Assessment & Plan  Atherosclerosis    Orders:    VAS ARTERIAL DUPLEX- LOWER LIMB BILATERAL; Future    Ambulatory Referral to Wound Care; Future    Diabetic polyneuropathy associated with type 2 diabetes mellitus (formerly [facility])    Lab Results   Component Value Date    HGBA1C 8.3 (H) 2025       Orders:    VAS ARTERIAL DUPLEX- LOWER LIMB BILATERAL; Future    Diabetic ulcer of toe of right foot associated with type 2 diabetes mellitus, with fat layer exposed (formerly [facility])    Lab Results   Component Value Date    HGBA1C 8.3 (H) 2025       Orders:    XR foot 3+ vw right; Future    mupirocin (BACTROBAN) 2 % ointment; Apply topically 2 (two) times a day    VAS ARTERIAL DUPLEX- LOWER LIMB BILATERAL; Future    Ambulatory Referral to Wound Care; Future        History of Present Illness   HPI  Jason Blanca is a 34 y.o. male who presents with a painful swollen, red and infected right great toe.  The patient is a known diabetic with neuropathy and poor circulation.  He states that he thought he had a small blister that developed on the right great toe approximately in early 2025.  This blister became larger and obviously  "infected to him by February 3.  States that he went to [facility].  Was diagnosed with an infected ulceration of the right great toe.   x-rays were taken and read as negative for osteomyelitis.    The patient then states that a few days later he was seen at [facility] and admitted.  He was diagnosed with a diabetic foot ulcer.  IV antibiotics were administered.  States that shelley Pope was his physician but he was dissatisfied with his care.    He notes that he had a series of x-rays, CT scan and MRI all read as negative for osteomyelitis.  He states that he completed an oral course of doxycycline.  He also is currently on Floxin as well as topical Bactroban.    He does not feel that he is responding to the medications    I personally reviewed an A1c dated 1/13/2025.  It was 8.3.    I personally reviewed an A1c dated 11/5/2024.  It was 6.6.    On questioning, patient smokes excessive amount of cigarettes on a regular basis.  He is seeing his medical doctor tomorrow as he desires quitting.          Review of Systems   Neurological:  Positive for numbness.        Paresthesia              Objective   Resp 18   Ht 5' 8\" (1.727 m)   Wt 95.3 kg (210 lb)   BMI 31.93 kg/m²      Physical Exam  Constitutional:       Appearance: Normal appearance.   Cardiovascular:      Comments: No  palpable pedal pulses bilateral  Musculoskeletal:         General: Swelling and tenderness present.      Comments: Right hallux is edematous and erythematous.   Skin:     Findings: Lesion present.      Comments: Scaling and erythematous skin overlies right hallux along with a punched-out ulceration at the medial aspect of the toe.  Ulcer does not appear to probe to bone.  It measures 0.5 cm in diameter.   Neurological:      General: No focal deficit present.      Mental Status: He is oriented to person, place, and time.      Sensory: Sensory deficit present.               "

## 2025-03-03 ENCOUNTER — PATIENT OUTREACH (OUTPATIENT)
Dept: CASE MANAGEMENT | Facility: OTHER | Age: 35
End: 2025-03-03

## 2025-03-03 NOTE — PROGRESS NOTES
MUNDO MEDLEY placed call to pt to f/u on referral placed by CHRISTOPHER MEDLEY.     MUNDO MEDLEY introduced self and explained role and reason for calling. Pt stated that they are over the income guidelines for [place] and other state benefits. He shared that his medical expenses are being covered by workmans comp at this time.    MUNDO MEDLEY offered information on MAWD should pt want to review it. Pt was agreeable to information on MAWD being emailed to him.    Pt stated he has no needs for MUNDO MEDLEY to assist with at this time. MUNDO MEDLEY encouraged pt to call back if he needs assistance in the future.    Referral closed at this time.

## 2025-03-04 ENCOUNTER — OFFICE VISIT (OUTPATIENT)
Dept: WOUND CARE | Facility: CLINIC | Age: 35
End: 2025-03-04
Payer: COMMERCIAL

## 2025-03-04 VITALS
HEIGHT: 68 IN | WEIGHT: 195 LBS | RESPIRATION RATE: 16 BRPM | DIASTOLIC BLOOD PRESSURE: 86 MMHG | TEMPERATURE: 97 F | SYSTOLIC BLOOD PRESSURE: 128 MMHG | HEART RATE: 92 BPM | BODY MASS INDEX: 29.55 KG/M2

## 2025-03-04 DIAGNOSIS — E11.621 DIABETIC ULCER OF TOE OF RIGHT FOOT ASSOCIATED WITH TYPE 2 DIABETES MELLITUS, WITH FAT LAYER EXPOSED (HCC): Primary | ICD-10-CM

## 2025-03-04 DIAGNOSIS — E11.42 DIABETIC POLYNEUROPATHY ASSOCIATED WITH TYPE 2 DIABETES MELLITUS (HCC): ICD-10-CM

## 2025-03-04 DIAGNOSIS — L97.512 DIABETIC ULCER OF TOE OF RIGHT FOOT ASSOCIATED WITH TYPE 2 DIABETES MELLITUS, WITH FAT LAYER EXPOSED (HCC): Primary | ICD-10-CM

## 2025-03-04 PROCEDURE — 99213 OFFICE O/P EST LOW 20 MIN: CPT | Performed by: PODIATRIST

## 2025-03-04 RX ORDER — IBUPROFEN 200 MG
400 TABLET ORAL EVERY 6 HOURS PRN
COMMUNITY

## 2025-03-04 RX ORDER — LIDOCAINE 40 MG/G
CREAM TOPICAL ONCE
Status: COMPLETED | OUTPATIENT
Start: 2025-03-04 | End: 2025-03-04

## 2025-03-04 RX ADMIN — LIDOCAINE 1 APPLICATION: 40 CREAM TOPICAL at 09:57

## 2025-03-04 NOTE — PROGRESS NOTES
Patient ID: Jason Blanca is a 34 y.o. male Date of Birth 1990     Diagnosis:  1. Diabetic ulcer of toe of right foot associated with type 2 diabetes mellitus, with fat layer exposed (HCC)  Comments:  verduzco 2  Orders:  -     lidocaine (LMX) 4 % cream  -     Wound cleansing and dressings Other (comment) (wound ulcer) Anterior;Right Toe D1, great; Future  -     Wound Procedure Treatment Other (comment) (wound ulcer) Anterior;Right Toe D1, great  2. Diabetic polyneuropathy associated with type 2 diabetes mellitus (HCC)      Diagnosis ICD-10-CM Associated Orders   1. Diabetic ulcer of toe of right foot associated with type 2 diabetes mellitus, with fat layer exposed (HCC)  E11.621 lidocaine (LMX) 4 % cream    L97.512 Wound cleansing and dressings Other (comment) (wound ulcer) Anterior;Right Toe D1, great     Wound Procedure Treatment Other (comment) (wound ulcer) Anterior;Right Toe D1, great    verduzco 2      2. Diabetic polyneuropathy associated with type 2 diabetes mellitus (Former[facility])  E11.42        A1C January 13, 2025 was 8.3  Reviewed Dr. Pino outpatient visits and 3 inpatient visits with [facility]  Reviewed MRI and XR. NO evidence of OM      Assessment & Plan:  Extensive chart review over the past month of the patient's inpatient and outpatient care.  Reviewed images and blood work.  Patient is not using cigarettes but he is vaping nicotine and using a patch.  I explained that he needs to stop all nicotine products immediately. We discussed the relationship between cigarette smoking, atherosclerotic disease, and its effects on the lower extremity. I emphasized the importance of smoking cessation   Arterial Dopplers are tomorrow.  We will follow-up on these results.  He does have palpable pulses but I suspect significant microvessel arterial disease in the toes.  I am hoping they can measure a toe pressure off the second digit  Stressed the importance of nutrition and protein supplementation.  His  A1c is greater than 8 currently.  Patient states he will begin using the cam boot.  He is walking in sandals today.  I spent a good deal of time stressed the importance of offloading and maintaining weight on the heel.  He has the ability to do this but has not because he finds the boot uncomfortable.  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  He knows he should not drive a car with the cam boot.  Patient just purchased Bactroban.  We can continue this is the primary dressing for at least another week.  Dressing instructions given to patient and his wife.  Patient finishing antibiotics in 2 days.  No need to extend at this point.  Reviewed EMG study from February 9 of last year.  Patient does have diabetic neuropathy: There is neurophysiological evidence of moderate, axonal, large-fiber, sensorimotor polyneuropathy, likely related to underlying diabetes      Chief Complaint   Patient presents with   • New Patient Visit     Right foot great toe wound         Subjective:   Patient presents for initial wound care evaluation.  He was first seen in the emergency department on February 4 with an ulcer on his great toe.  At that time his x-ray was negative he was discharged on oral antibiotics.  He returned to the emergency room on February 16 with worsening infection.  He was admitted for antibiotics and further workup.  He was evaluated by podiatry.  MRI was ordered which was negative for bone infection.  After a few days of antibiotics he was discharged and referred to the wound management center.    He was seen by Dr. Pino of [facility] podiatry on February 26 and referred to the wound management center.  A new x-ray was taken at that visit and again did not show any acute signs of infection.  Due to calcification of arteries on his x-ray as well as poorly palpable pulses and delayed healing and arterial duplex was ordered.  The arterial duplex is scheduled for  tomorrow.    Of note patient's hemoglobin A1c is currently not controlled.  He also smokes heavily but is trying to quit using Chantix.              The following portions of the patient's history were reviewed and updated as appropriate:   Patient Active Problem List   Diagnosis   • Type 2 diabetes mellitus with hyperglycemia, with neuropathic pain   • BMI 31.0-31.9,adult   • Recurrent major depressive disorder, in remission (HCC)   • Hypogonadism in male   • Painful and cold lower extremity   • PAD (peripheral artery disease) (HCC)   • Excessive sweating   • ADHD, predominantly inattentive type   • Erectile dysfunction of nonorganic origin   • Fatty liver   • Mixed dyslipidemia   • Vitamin D deficiency   • Axonal sensorimotor neuropathy   • Diabetic polyneuropathy associated with type 2 diabetes mellitus (HCC)   • Witnessed episode of apnea   • Right thyroid nodule   • MDD (major depressive disorder), recurrent severe, without psychosis (HCC)   • Nonhealing wound of right great toe     Past Medical History:   Diagnosis Date   • Anxiety    • Depression    • Diabetes mellitus (HCC)    • Fatty liver    • Obesity      Past Surgical History:   Procedure Laterality Date   • HERNIA REPAIR       Social History     Socioeconomic History   • Marital status: Single     Spouse name: None   • Number of children: None   • Years of education: None   • Highest education level: None   Occupational History   • None   Tobacco Use   • Smoking status: Former     Current packs/day: 0.00     Types: Cigarettes     Quit date: 2018     Years since quittin.1   • Smokeless tobacco: Never   • Tobacco comments:     Uses vape   Vaping Use   • Vaping status: Every Day   • Substances: Nicotine   Substance and Sexual Activity   • Alcohol use: Not Currently     Comment: social   • Drug use: Yes     Types: Marijuana     Comment: medical card   • Sexual activity: Yes     Partners: Female   Other Topics Concern   • None   Social History Narrative    • None     Social Drivers of Health     Financial Resource Strain: Not on file   Food Insecurity: No Food Insecurity (2025)    Nursing - Inadequate Food Risk Classification    • Worried About Running Out of Food in the Last Year: Not on file    • Ran Out of Food in the Last Year: Not on file    • Ran Out of Food in the Last Year: Never true   Transportation Needs: No Transportation Needs (2025)    Nursing - Transportation Risk Classification    • Lack of Transportation: Not on file    • Lack of Transportation: No   Physical Activity: Not on file   Stress: Not on file   Social Connections: Not on file   Intimate Partner Violence: Unknown (2025)    Nursing IPS    • Feels Physically and Emotionally Safe: Not on file    • Physically Hurt by Someone: Not on file    • Humiliated or Emotionally Abused by Someone: Not on file    • Physically Hurt by Someone: No    • Hurt or Threatened by Someone: No   Housing Stability: At Risk (2025)    Nursing: Inadequate Housing Risk Classification    • Has Housing: Not on file    • Worried About Losing Housing: Not on file    • Unable to Get Utilities: Not on file    • Unable to Pay for Housing in the Last Year: Yes    • Has Housin        Current Outpatient Medications:   •  amphetamine-dextroamphetamine (ADDERALL XR, 20MG,) 20 MG 24 hr capsule, Take 1 capsule (20 mg total) by mouth every morning Max Daily Amount: 20 mg, Disp: 30 capsule, Rfl: 0  •  buPROPion (WELLBUTRIN SR) 100 mg 12 hr tablet, Take 1 tablet (100 mg total) by mouth 2 (two) times a day, Disp: 60 tablet, Rfl: 5  •  doxycycline hyclate (VIBRAMYCIN) 100 mg capsule, Take 1 capsule (100 mg total) by mouth every 12 (twelve) hours for 14 days, Disp: 28 capsule, Rfl: 0  •  Empagliflozin-metFORMIN HCl ER (Synjardy XR) 12.5-1000 MG TB24, Take 2 tablets by mouth daily, Disp: 180 tablet, Rfl: 3  •  famotidine (PEPCID) 20 mg tablet, Take 1 tablet (20 mg total) by mouth 2 (two) times a day, Disp: 180 tablet,  Rfl: 1  •  gabapentin (Neurontin) 600 MG tablet, Take 1 tablet (600 mg total) by mouth 3 (three) times a day, Disp: 90 tablet, Rfl: 0  •  ibuprofen (MOTRIN) 200 mg tablet, Take 400 mg by mouth every 6 (six) hours as needed for mild pain Has been taking twice daily, Disp: , Rfl:   •  mupirocin (BACTROBAN) 2 % ointment, Apply topically 2 (two) times a day, Disp: 22 g, Rfl: 0  •  NovoLOG 100 UNIT/ML injection, Inject 100 Units under the skin in the morning Via insuline pump, Disp: 30 mL, Rfl: 11  •  OneTouch Delica Lancets 33G MISC, Check blood sugars once daily. Please substitute with appropriate alternative as covered by patient's insurance. Dx: E11.65, Disp: 100 each, Rfl: 3  •  tadalafil (CIALIS) 20 MG tablet, Take 1 tablet (20 mg total) by mouth daily as needed for erectile dysfunction, Disp: 30 tablet, Rfl: 3  •  Toujeo SoloStar 300 units/mL CONCENTRATED U-300 injection pen (1-unit dial), Inject 25 Units under the skin daily at bedtime, Disp: 7.5 mL, Rfl: 1  •  [START ON 3/6/2025] varenicline (Chantix Continuing Month Yash) 1 mg tablet, Take 1 tablet (1 mg total) by mouth 2 (two) times a day Do not start before March 6, 2025., Disp: 60 tablet, Rfl: 2  •  varenicline (Chantix) 0.5 mg tablet, Take 1 tablet daily for 3 days then 1 tablet twice a day for 4 days., Disp: 11 tablet, Rfl: 0  •  Vitamin D, Ergocalciferol, 64078 units CAPS, Take 1 capsule by mouth once a week, Disp: , Rfl:   •  Blood Glucose Monitoring Suppl (OneTouch Verio Reflect) w/Device KIT, Check blood sugars once daily. Please substitute with appropriate alternative as covered by patient's insurance. Dx: E11.65, Disp: 1 kit, Rfl: 0  •  [place] Choice Comfort EZ 33G X 4 MM MISC, USE WITH SOLOSTAR PEN DAILY., Disp: , Rfl:   •  Continuous Blood Gluc Sensor (FreeStyle Amber 3 Sensor) MISC, , Disp: , Rfl:   •  glucose blood (OneTouch Verio) test strip, Check blood sugars once daily. Please substitute with appropriate alternative as covered by patient's  "insurance. Dx: E11.65, Disp: 100 each, Rfl: 3  •  Injection Device for Insulin (CeQur Simplicity 2U) LAUREN, Use 1 each every 3 (three) days, Disp: , Rfl:   •  Injection Device for Insulin (CeQur Simplicity 2U) LAUREN, Use 1 each every 3 (three) days, Disp: , Rfl:   •  Insulin Pen Needle (BD Pen Needle Ratna U/F) 32G X 4 MM MISC, Use daily as directed with insulin pen, Disp: 100 each, Rfl: 3  •  Isopropyl Alcohol (Pharmacist Choice Alcohol) 70 % MISC, Apply 200 Pads topically 3 (three) times a day, Disp: 200 each, Rfl: 5  •  rosuvastatin (CRESTOR) 20 MG tablet, Take 1 tablet (20 mg total) by mouth daily, Disp: 30 tablet, Rfl: 0  •  SYRINGE-NEEDLE, DISP, 3 ML (Safety Syringes/Needle) 20G X 1-1/2\" 3 ML MISC, Use once a week for 12 doses, Disp: 12 each, Rfl: 1  •  Xyosted 100 MG/0.5ML SOAJ, Inject 100 mg under the skin every 7 days (Patient not taking: Reported on 3/4/2025), Disp: , Rfl:   No current facility-administered medications for this visit.  Family History   Problem Relation Age of Onset   • Diabetes Mother    • Prostate cancer Paternal Grandfather       Review of Systems   Constitutional: Negative.    Respiratory:  Negative for shortness of breath.    Cardiovascular:  Negative for leg swelling.   Gastrointestinal:  Negative for diarrhea, nausea and vomiting.   Musculoskeletal:  Positive for arthralgias. Negative for gait problem and joint swelling.   Skin:  Positive for color change and wound.   Neurological:  Positive for numbness.       Allergies  Amoxicillin, Augmentin [amoxicillin-pot clavulanate], and Sulfa antibiotics    Objective:  /86   Pulse 92   Temp (!) 97 °F (36.1 °C)   Resp 16   Ht 5' 8\" (1.727 m)   Wt 88.5 kg (195 lb)   BMI 29.65 kg/m²     Physical Exam  Vitals reviewed.   Cardiovascular:      Pulses: Normal pulses.           Dorsalis pedis pulses are 2+ on the right side.        Posterior tibial pulses are 2+ on the right side.   Musculoskeletal:         General: Swelling, tenderness and " deformity (hallux limiuts) present.      Right foot: Decreased range of motion.   Feet:      Right foot:      Protective Sensation: 10 sites tested.  5 sites sensed.      Skin integrity: Ulcer present.      Left foot:      Protective Sensation: 10 sites tested.  5 sites sensed.      Skin integrity: Callus present.   Skin:     Capillary Refill: Capillary refill takes less than 2 seconds.      Findings: No bruising or erythema.   Neurological:      Mental Status: He is alert and oriented to person, place, and time.      Sensory: Sensory deficit present.             Wound 02/16/25 Other (comment) Toe D1, great Anterior;Right (Active)   Wound Image   03/04/25 0943   Wound Description Yellow;Pink;Black 03/04/25 0912   Temi-wound Assessment Intact;Callus;Maceration 03/04/25 0912   Wound Length (cm) 0.6 cm 03/04/25 0912   Wound Width (cm) 1.1 cm 03/04/25 0912   Wound Depth (cm) 0.1 cm 03/04/25 0912   Wound Surface Area (cm^2) 0.66 cm^2 03/04/25 0912   Wound Volume (cm^3) 0.066 cm^3 03/04/25 0912   Calculated Wound Volume (cm^3) 0.07 cm^3 03/04/25 0912   Drainage Amount Small 03/04/25 0912   Drainage Description Yellow 03/04/25 0912   Treatments Irrigation with NSS 03/04/25 0912                             Procedures           Wound Instructions:  Orders Placed This Encounter   Procedures   • Wound cleansing and dressings Other (comment) (wound ulcer) Anterior;Right Toe D1, great     Wash your hands with soap and water.  Remove old dressing, discard into plastic bag and place in trash.  Cleanse the wound with mild soap and water prior to applying a clean dressing. Do not use tissue or cotton balls. Do not scrub the wound. Pat dry using gauze.  Shower - recommend covering wound in shower and washing foot separately   Apply Mupiricin to the right great toe wound.  Cover with gauze  Secure tape  Change dressing daily    Wear offloading boot for all ambulation  Stop all smoking and vape and nicotine products    Keep blood  "sugars under control    Return in 1 week to wound center for follow up visit  Go for vascular study tomorrow     Standing Status:   Future     Expiration Date:   3/11/2025   • Wound Procedure Treatment Other (comment) (wound ulcer) Anterior;Right Toe D1, great     This order was created via procedure documentation         Donal White DPM    Portions of the record may have been created with voice recognition software. Occasional wrong word or \"sound a like\" substitutions may have occurred due to the inherent limitations of voice recognition software. Read the chart carefully and recognize, using context, where substitutions have occurred.      "

## 2025-03-04 NOTE — PROGRESS NOTES
Wound Procedure Treatment Other (comment) (wound ulcer) Anterior;Right Toe D1, great    Performed by: Meghan Kim RN  Authorized by: Donal White DPM  Associated wounds:   Wound 02/16/25 Other (comment) Toe D1, great Anterior;Right    Wound cleansed with:  NSS   Applied topical:  Mupirocin ointment   Applied secondary dressing:  Gauze   Dressing secured with:  Tape and Surgilast

## 2025-03-04 NOTE — PATIENT INSTRUCTIONS
No orders of the defined types were placed in this encounter.     Orders Placed This Encounter   Procedures    Wound cleansing and dressings Other (comment) (wound ulcer) Anterior;Right Toe D1, great     Wash your hands with soap and water.  Remove old dressing, discard into plastic bag and place in trash.  Cleanse the wound with mild soap and water prior to applying a clean dressing. Do not use tissue or cotton balls. Do not scrub the wound. Pat dry using gauze.  Shower - recommend covering wound in shower and washing foot separately   Apply Mupiricin to the right great toe wound.  Cover with gauze  Secure tape  Change dressing daily    Wear offloading boot for all ambulation  Stop all smoking and vape and nicotine products    Keep blood sugars under control    Return in 1 week to wound center for follow up visit  Go for vascular study tomorrow     Standing Status:   Future     Expiration Date:   3/11/2025

## 2025-03-05 ENCOUNTER — HOSPITAL ENCOUNTER (OUTPATIENT)
Dept: NON INVASIVE DIAGNOSTICS | Facility: HOSPITAL | Age: 35
Discharge: HOME | End: 2025-03-05
Attending: PODIATRIST
Payer: COMMERCIAL

## 2025-03-05 DIAGNOSIS — L97.512 DIABETIC ULCER OF TOE OF RIGHT FOOT ASSOCIATED WITH TYPE 2 DIABETES MELLITUS, WITH FAT LAYER EXPOSED (HCC): ICD-10-CM

## 2025-03-05 DIAGNOSIS — E11.621 DIABETIC ULCER OF TOE OF RIGHT FOOT ASSOCIATED WITH TYPE 2 DIABETES MELLITUS, WITH FAT LAYER EXPOSED (HCC): ICD-10-CM

## 2025-03-05 DIAGNOSIS — I70.90 ATHEROSCLEROSIS: ICD-10-CM

## 2025-03-05 DIAGNOSIS — E11.42 DIABETIC POLYNEUROPATHY ASSOCIATED WITH TYPE 2 DIABETES MELLITUS (HCC): ICD-10-CM

## 2025-03-05 PROCEDURE — 93925 LOWER EXTREMITY STUDY: CPT | Performed by: SURGERY

## 2025-03-05 PROCEDURE — 93925 LOWER EXTREMITY STUDY: CPT

## 2025-03-05 PROCEDURE — 93923 UPR/LXTR ART STDY 3+ LVLS: CPT

## 2025-03-05 PROCEDURE — 93922 UPR/L XTREMITY ART 2 LEVELS: CPT | Performed by: SURGERY

## 2025-03-07 DIAGNOSIS — F17.219 CIGARETTE NICOTINE DEPENDENCE WITH NICOTINE-INDUCED DISORDER: ICD-10-CM

## 2025-03-08 ENCOUNTER — DOCUMENTATION (OUTPATIENT)
Dept: FAMILY MEDICINE CLINIC | Facility: CLINIC | Age: 35
End: 2025-03-08

## 2025-03-08 DIAGNOSIS — E11.42 DIABETIC POLYNEUROPATHY ASSOCIATED WITH TYPE 2 DIABETES MELLITUS (HCC): Primary | ICD-10-CM

## 2025-03-08 DIAGNOSIS — F17.219 CIGARETTE NICOTINE DEPENDENCE WITH NICOTINE-INDUCED DISORDER: ICD-10-CM

## 2025-03-08 DIAGNOSIS — F17.218 CIGARETTE NICOTINE DEPENDENCE WITH OTHER NICOTINE-INDUCED DISORDER: ICD-10-CM

## 2025-03-08 RX ORDER — VARENICLINE TARTRATE 1 MG/1
1 TABLET, FILM COATED ORAL 2 TIMES DAILY
Qty: 60 TABLET | Refills: 1 | Status: SHIPPED | OUTPATIENT
Start: 2025-03-08

## 2025-03-16 RX ORDER — VARENICLINE TARTRATE 1 MG/1
1 TABLET, FILM COATED ORAL 2 TIMES DAILY
Qty: 60 TABLET | Refills: 0 | OUTPATIENT
Start: 2025-03-16

## 2025-03-18 ENCOUNTER — OFFICE VISIT (OUTPATIENT)
Dept: WOUND CARE | Facility: CLINIC | Age: 35
End: 2025-03-18
Payer: COMMERCIAL

## 2025-03-18 VITALS
RESPIRATION RATE: 16 BRPM | DIASTOLIC BLOOD PRESSURE: 88 MMHG | SYSTOLIC BLOOD PRESSURE: 158 MMHG | TEMPERATURE: 96.2 F | HEART RATE: 96 BPM

## 2025-03-18 DIAGNOSIS — E11.621 DIABETIC ULCER OF TOE OF RIGHT FOOT ASSOCIATED WITH TYPE 2 DIABETES MELLITUS, WITH FAT LAYER EXPOSED (HCC): Primary | ICD-10-CM

## 2025-03-18 DIAGNOSIS — L97.512 DIABETIC ULCER OF TOE OF RIGHT FOOT ASSOCIATED WITH TYPE 2 DIABETES MELLITUS, WITH FAT LAYER EXPOSED (HCC): Primary | ICD-10-CM

## 2025-03-18 PROCEDURE — 11042 DBRDMT SUBQ TIS 1ST 20SQCM/<: CPT | Performed by: PODIATRIST

## 2025-03-18 RX ORDER — LIDOCAINE 40 MG/G
CREAM TOPICAL ONCE
Status: COMPLETED | OUTPATIENT
Start: 2025-03-18 | End: 2025-03-18

## 2025-03-18 RX ADMIN — LIDOCAINE 1 APPLICATION: 40 CREAM TOPICAL at 08:15

## 2025-03-18 NOTE — PATIENT INSTRUCTIONS
Orders Placed This Encounter   Procedures    Wound cleansing and dressings Other (comment) (wound ulcer) Anterior;Right Toe D1, great     Wash your hands with soap and water.  Remove old dressing, discard into plastic bag and place in trash.    Cleanse the wound with mild soap and water prior to applying a clean dressing.   Do not use tissue or cotton balls. Do not scrub the wound. Pat dry using gauze.    Shower - recommend covering wound in shower and washing foot separately     Apply Puracol to right great toe wound.  Cover with gauze  Secure tape  Change dressing daily.      Wear offloading boot for all ambulation  Continue to keep up the good work and not using nicotine products.      Please try to consume 3-4 servings of protein daily.   - Each serving of protein should contain about 30 mg protein.   - Good sources of protein include, lean meats (fish, chicken, etc), eggs, dairy products (yogurt,   cheese), tofu, legumes (chickpeas, lentils, peas, black beans), nuts (cashew, walnut, peanuts, etc), & quinoa.      Keep blood sugars under control     Standing Status:   Future     Expected Date:   3/18/2025     Expiration Date:   3/25/2025

## 2025-03-18 NOTE — PROGRESS NOTES
Patient ID: Jason Blanca is a 34 y.o. male Date of Birth 1990     Diagnosis:  1. Diabetic ulcer of toe of right foot associated with type 2 diabetes mellitus, with fat layer exposed (HCC)  -     lidocaine (LMX) 4 % cream  -     Wound cleansing and dressings Other (comment) (wound ulcer) Anterior;Right Toe D1, great; Future; Expected date: 03/18/2025  -     Wound Procedure Treatment Other (comment) (wound ulcer) Anterior;Right Toe D1, great  -     Debridement Other (comment) (wound ulcer) Anterior;Right Toe D1, great     Diagnosis ICD-10-CM Associated Orders   1. Diabetic ulcer of toe of right foot associated with type 2 diabetes mellitus, with fat layer exposed (HCC)  E11.621 lidocaine (LMX) 4 % cream    L97.512 Wound cleansing and dressings Other (comment) (wound ulcer) Anterior;Right Toe D1, great     Wound Procedure Treatment Other (comment) (wound ulcer) Anterior;Right Toe D1, great     Debridement Other (comment) (wound ulcer) Anterior;Right Toe D1, great           Assessment & Plan:  -Debridement today: excisional debridement, see procedure  -Dressings: purachol/DSD  -Offloading plan: Darco wedge shoe  -Signs of infection today: none  -Risk factors: neuropathy. PAD  -RTC: 1 week    I reviewed his arterial Doppler study.  He does have significant microvessel disease and calcified arteries but no significant occlusions in the leg.  Because there are no significant occlusions in his legs I did not consult vascular surgery as there is not much they can do for microvessel disease in the foot.  Overall his perfusion should be stable enough to heal this wound with better offloading and discontinuation of nicotine products.      Chief Complaint   Patient presents with   • Follow Up Wound Care Visit     Follow up visit for wound to right great toe.  Pt denies any issues or concerns since last visit.            Subjective:   Patient returns to wound care for right great toe ulcer.  He did get his arterial  Doppler study last week.  He states he has been much more compliant wearing the Darco wedge shoe and is noted a difference.  In addition he states he stopped using all nicotine products.        The following portions of the patient's history were reviewed and updated as appropriate:   Patient Active Problem List   Diagnosis   • Type 2 diabetes mellitus with hyperglycemia, with neuropathic pain   • BMI 31.0-31.9,adult   • Recurrent major depressive disorder, in remission (HCC)   • Hypogonadism in male   • Painful and cold lower extremity   • PAD (peripheral artery disease) (HCC)   • Excessive sweating   • ADHD, predominantly inattentive type   • Erectile dysfunction of nonorganic origin   • Fatty liver   • Mixed dyslipidemia   • Vitamin D deficiency   • Axonal sensorimotor neuropathy   • Diabetic polyneuropathy associated with type 2 diabetes mellitus (HCC)   • Witnessed episode of apnea   • Right thyroid nodule   • MDD (major depressive disorder), recurrent severe, without psychosis (HCC)   • Nonhealing wound of right great toe     Past Medical History:   Diagnosis Date   • Anxiety    • Depression    • Diabetes mellitus (HCC)    • Fatty liver    • Obesity      Past Surgical History:   Procedure Laterality Date   • HERNIA REPAIR       Social History     Socioeconomic History   • Marital status: Single     Spouse name: None   • Number of children: None   • Years of education: None   • Highest education level: None   Occupational History   • None   Tobacco Use   • Smoking status: Former     Current packs/day: 0.00     Types: Cigarettes     Quit date: 2018     Years since quittin.2   • Smokeless tobacco: Never   • Tobacco comments:     Uses vape   Vaping Use   • Vaping status: Every Day   • Substances: Nicotine   Substance and Sexual Activity   • Alcohol use: Not Currently     Comment: social   • Drug use: Yes     Types: Marijuana     Comment: medical card   • Sexual activity: Yes     Partners: Female   Other Topics  Concern   • None   Social History Narrative   • None     Social Drivers of Health     Financial Resource Strain: Not on file   Food Insecurity: No Food Insecurity (2025)    Nursing - Inadequate Food Risk Classification    • Worried About Running Out of Food in the Last Year: Not on file    • Ran Out of Food in the Last Year: Not on file    • Ran Out of Food in the Last Year: Never true   Transportation Needs: No Transportation Needs (2025)    Nursing - Transportation Risk Classification    • Lack of Transportation: Not on file    • Lack of Transportation: No   Physical Activity: Not on file   Stress: Not on file   Social Connections: Not on file   Intimate Partner Violence: Unknown (2025)    Nursing IPS    • Feels Physically and Emotionally Safe: Not on file    • Physically Hurt by Someone: Not on file    • Humiliated or Emotionally Abused by Someone: Not on file    • Physically Hurt by Someone: No    • Hurt or Threatened by Someone: No   Housing Stability: At Risk (2025)    Nursing: Inadequate Housing Risk Classification    • Has Housing: Not on file    • Worried About Losing Housing: Not on file    • Unable to Get Utilities: Not on file    • Unable to Pay for Housing in the Last Year: Yes    • Has Housin        Current Outpatient Medications:   •  amphetamine-dextroamphetamine (ADDERALL XR, 20MG,) 20 MG 24 hr capsule, Take 1 capsule (20 mg total) by mouth every morning Max Daily Amount: 20 mg, Disp: 30 capsule, Rfl: 0  •  Blood Glucose Monitoring Suppl (OneTouch Verio Reflect) w/Device KIT, Check blood sugars once daily. Please substitute with appropriate alternative as covered by patient's insurance. Dx: E11.65, Disp: 1 kit, Rfl: 0  •  buPROPion (WELLBUTRIN SR) 100 mg 12 hr tablet, Take 1 tablet (100 mg total) by mouth 2 (two) times a day, Disp: 60 tablet, Rfl: 5  •  Apex Choice Comfort EZ 33G X 4 MM MISC, USE WITH SOLOSTAR PEN DAILY., Disp: , Rfl:   •  Continuous Blood Gluc Sensor  "(FreeStyle Amber 3 Sensor) MISC, , Disp: , Rfl:   •  Empagliflozin-metFORMIN HCl ER (Synjardy XR) 12.5-1000 MG TB24, Take 2 tablets by mouth daily, Disp: 180 tablet, Rfl: 3  •  famotidine (PEPCID) 20 mg tablet, Take 1 tablet (20 mg total) by mouth 2 (two) times a day, Disp: 180 tablet, Rfl: 1  •  gabapentin (Neurontin) 600 MG tablet, Take 1 tablet (600 mg total) by mouth 3 (three) times a day, Disp: 90 tablet, Rfl: 0  •  glucose blood (OneTouch Verio) test strip, Check blood sugars once daily. Please substitute with appropriate alternative as covered by patient's insurance. Dx: E11.65, Disp: 100 each, Rfl: 3  •  ibuprofen (MOTRIN) 200 mg tablet, Take 400 mg by mouth every 6 (six) hours as needed for mild pain Has been taking twice daily, Disp: , Rfl:   •  Injection Device for Insulin (CeQur Simplicity 2U) LAUREN, Use 1 each every 3 (three) days, Disp: , Rfl:   •  Injection Device for Insulin (CeQur Simplicity 2U) LAUREN, Use 1 each every 3 (three) days, Disp: , Rfl:   •  Insulin Pen Needle (BD Pen Needle Ratna U/F) 32G X 4 MM MISC, Use daily as directed with insulin pen, Disp: 100 each, Rfl: 3  •  Isopropyl Alcohol (Pharmacist Choice Alcohol) 70 % MISC, Apply 200 Pads topically 3 (three) times a day, Disp: 200 each, Rfl: 5  •  mupirocin (BACTROBAN) 2 % ointment, Apply topically 2 (two) times a day, Disp: 22 g, Rfl: 0  •  NovoLOG 100 UNIT/ML injection, Inject 100 Units under the skin in the morning Via insuline pump, Disp: 30 mL, Rfl: 11  •  OneTouch Delica Lancets 33G MISC, Check blood sugars once daily. Please substitute with appropriate alternative as covered by patient's insurance. Dx: E11.65, Disp: 100 each, Rfl: 3  •  rosuvastatin (CRESTOR) 20 MG tablet, Take 1 tablet (20 mg total) by mouth daily, Disp: 30 tablet, Rfl: 0  •  SYRINGE-NEEDLE, DISP, 3 ML (Safety Syringes/Needle) 20G X 1-1/2\" 3 ML MISC, Use once a week for 12 doses, Disp: 12 each, Rfl: 1  •  tadalafil (CIALIS) 20 MG tablet, Take 1 tablet (20 mg total) " by mouth daily as needed for erectile dysfunction, Disp: 30 tablet, Rfl: 3  •  Toujeo SoloStar 300 units/mL CONCENTRATED U-300 injection pen (1-unit dial), Inject 25 Units under the skin daily at bedtime, Disp: 7.5 mL, Rfl: 1  •  varenicline (Chantix Continuing Month Pak) 1 mg tablet, Take 1 tablet (1 mg total) by mouth 2 (two) times a day, Disp: 60 tablet, Rfl: 1  •  varenicline (Chantix) 0.5 mg tablet, Take 1 tablet daily for 3 days then 1 tablet twice a day for 4 days., Disp: 11 tablet, Rfl: 0  •  Vitamin D, Ergocalciferol, 52098 units CAPS, Take 1 capsule by mouth once a week, Disp: , Rfl:   •  Xyosted 100 MG/0.5ML SOAJ, Inject 100 mg under the skin every 7 days (Patient not taking: Reported on 3/4/2025), Disp: , Rfl:   No current facility-administered medications for this visit.  Family History   Problem Relation Age of Onset   • Diabetes Mother    • Prostate cancer Paternal Grandfather       Review of Systems   Constitutional: Negative.    Cardiovascular:  Negative for leg swelling.   Gastrointestinal:  Negative for diarrhea, nausea and vomiting.   Skin:  Positive for color change and wound.   Neurological:  Positive for numbness.     Allergies:  Amoxicillin, Augmentin [amoxicillin-pot clavulanate], and Sulfa antibiotics      Objective:  /88   Pulse 96   Temp (!) 96.2 °F (35.7 °C)   Resp 16     Physical Exam  Vitals reviewed.   Cardiovascular:      Pulses:           Dorsalis pedis pulses are detected w/ Doppler on the right side.        Posterior tibial pulses are detected w/ Doppler on the right side.   Musculoskeletal:         General: Deformity (hallux limitus) present.      Right foot: Decreased range of motion.   Feet:      Right foot:      Skin integrity: Ulcer present.   Skin:     Capillary Refill: Capillary refill takes 2 to 3 seconds.      Findings: No erythema.   Neurological:      Mental Status: He is alert.      Sensory: Sensory deficit present.             Wound 02/16/25 Other (comment)  "Toe D1, great Anterior;Right (Active)   Wound Image   03/18/25 0806   Wound Description White 03/18/25 0812   Non-staged Wound Description Full thickness 03/18/25 0812   Wound Length (cm) 0.3 cm 03/18/25 0812   Wound Width (cm) 1.1 cm 03/18/25 0812   Wound Depth (cm) 0.3 cm 03/18/25 0812   Wound Surface Area (cm^2) 0.33 cm^2 03/18/25 0812   Wound Volume (cm^3) 0.099 cm^3 03/18/25 0812   Calculated Wound Volume (cm^3) 0.1 cm^3 03/18/25 0812   Change in Wound Size % -42.86 03/18/25 0812   Drainage Amount MARY 03/18/25 0812   Drainage Description MARY 03/18/25 0812   Temi-wound Assessment Callus;Maceration 03/18/25 0812   Treatments Irrigation with NSS 03/04/25 0912                         Debridement   Wound 02/16/25 Other (comment) Toe D1, great Anterior;Right     Date/Time: 3/18/2025 8:00 AM  Universal Protocol:  Consent: Verbal consent obtained.  Risks and benefits: risks, benefits and alternatives were discussed  Consent given by: patient  Time out: Immediately prior to procedure a \"time out\" was called to verify the correct patient, procedure, equipment, support staff and site/side marked as required.  Timeout called at: 3/18/2025 8:32 AM.  Patient understanding: patient states understanding of the procedure being performed  Patient identity confirmed: verbally with patient    Debridement Details  Performed by: physician  Debridement type: surgical  Level of debridement: subcutaneous tissue  Pain control: lidocaine 4%    Post-debridement measurements  Length (cm): 1.5  Width (cm): 0.5  Depth (cm): 0.4  Percent debrided: 100%  Surface Area (cm^2): 0.75  Area Debrided (cm^2): 0.75  Volume (cm^3): 0.3    Tissue and other material debrided: dermis, epidermis and subcutaneous tissue  Devitalized tissue debrided: biofilm, callus, clots, exudate, fibrin, necrotic debris, slough and eschar  Instrument(s) utilized: blade  Technique utilized: excisional  Bleeding: medium  Hemostasis obtained with: pressure  Procedural pain " "(0-10): 2  Post-procedural pain: 0   Response to treatment: procedure was tolerated well                 Wound Instructions:  Orders Placed This Encounter   Procedures   • Wound cleansing and dressings Other (comment) (wound ulcer) Anterior;Right Toe D1, great     Wash your hands with soap and water.  Remove old dressing, discard into plastic bag and place in trash.    Cleanse the wound with mild soap and water prior to applying a clean dressing.   Do not use tissue or cotton balls. Do not scrub the wound. Pat dry using gauze.    Shower - recommend covering wound in shower and washing foot separately     Apply Puracol to right great toe wound.  Cover with gauze  Secure tape  Change dressing daily.      Wear offloading boot for all ambulation  Continue to keep up the good work and not using nicotine products.      Please try to consume 3-4 servings of protein daily.   - Each serving of protein should contain about 30 mg protein.   - Good sources of protein include, lean meats (fish, chicken, etc), eggs, dairy products (yogurt,   cheese), tofu, legumes (chickpeas, lentils, peas, black beans), nuts (cashew, walnut, peanuts, etc), & quinoa.      Keep blood sugars under control     Standing Status:   Future     Expected Date:   3/18/2025     Expiration Date:   3/25/2025   • Wound Procedure Treatment Other (comment) (wound ulcer) Anterior;Right Toe D1, great     This order was created via procedure documentation   • Debridement Other (comment) (wound ulcer) Anterior;Right Toe D1, great     This order was created via procedure documentation         Donal White DPM      Portions of the record may have been created with voice recognition software. Occasional wrong word or \"sound a like\" substitutions may have occurred due to the inherent limitations of voice recognition software. Read the chart carefully and recognize, using context, where substitutions have occurred.      "

## 2025-03-18 NOTE — PROGRESS NOTES
Wound Procedure Treatment Other (comment) (wound ulcer) Anterior;Right Toe D1, great    Performed by: Karla Holm RN  Authorized by: Donal White DPM  Associated wounds:   Wound 02/16/25 Other (comment) Toe D1, great Anterior;Right    Wound cleansed with:  Wound aggrssively cleansed with NSS and gauze   Applied primary dressing:  Collagen dressing   Applied secondary dressing:  Gauze   Dressing secured with:  Tape

## 2025-03-25 ENCOUNTER — OFFICE VISIT (OUTPATIENT)
Dept: WOUND CARE | Facility: CLINIC | Age: 35
End: 2025-03-25
Payer: COMMERCIAL

## 2025-03-25 VITALS
TEMPERATURE: 97 F | HEART RATE: 88 BPM | RESPIRATION RATE: 18 BRPM | DIASTOLIC BLOOD PRESSURE: 80 MMHG | SYSTOLIC BLOOD PRESSURE: 120 MMHG

## 2025-03-25 DIAGNOSIS — E11.621 DIABETIC ULCER OF TOE OF RIGHT FOOT ASSOCIATED WITH TYPE 2 DIABETES MELLITUS, WITH FAT LAYER EXPOSED (HCC): Primary | ICD-10-CM

## 2025-03-25 DIAGNOSIS — L97.512 DIABETIC ULCER OF TOE OF RIGHT FOOT ASSOCIATED WITH TYPE 2 DIABETES MELLITUS, WITH FAT LAYER EXPOSED (HCC): Primary | ICD-10-CM

## 2025-03-25 PROCEDURE — 11042 DBRDMT SUBQ TIS 1ST 20SQCM/<: CPT | Performed by: PODIATRIST

## 2025-03-25 RX ORDER — LIDOCAINE 40 MG/G
CREAM TOPICAL ONCE
Status: COMPLETED | OUTPATIENT
Start: 2025-03-25 | End: 2025-03-25

## 2025-03-25 RX ADMIN — LIDOCAINE 1 APPLICATION: 40 CREAM TOPICAL at 08:13

## 2025-03-25 NOTE — PROGRESS NOTES
Patient ID: Jason Blanca is a 34 y.o. male Date of Birth 1990     Diagnosis:  1. Diabetic ulcer of toe of right foot associated with type 2 diabetes mellitus, with fat layer exposed (HCC)  -     Wound cleansing and dressings Other (comment) (wound ulcer) Anterior;Right Toe D1, great; Future  -     lidocaine (LMX) 4 % cream  -     Wound Procedure Treatment Other (comment) (wound ulcer) Anterior;Right Toe D1, great  -     Debridement Other (comment) (wound ulcer) Anterior;Right Toe D1, great     Diagnosis ICD-10-CM Associated Orders   1. 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  There is improvement in the wound today.  See debridement below  Continue wedge shoe with weight on the heel.  Patient does have significant small vessel disease in the toes.  He does not have any significant blockage in the legs fortunately.  Return to clinic 2 weeks  The collagen was making the wound a little too dry.  We will simply use Dermagran gauze.    Chief Complaint   Patient presents with   • Follow Up Wound Care Visit           Subjective:   Follow-up right great toe ulcer.  Patient states he has stopped using all nicotine products.  He states he is try to be much more compliant using the boot and resting the toe.  It does look improved        The following portions of the patient's history were reviewed and updated as appropriate:   Patient Active Problem List   Diagnosis   • Type 2 diabetes mellitus with hyperglycemia, with neuropathic pain   • BMI 31.0-31.9,adult   •  Recurrent major depressive disorder, in remission (HCC)   • Hypogonadism in male   • Painful and cold lower extremity   • PAD (peripheral artery disease) (HCC)   • Excessive sweating   • ADHD, predominantly inattentive type   • Erectile dysfunction of nonorganic origin   • Fatty liver   • Mixed dyslipidemia   • Vitamin D deficiency   • Axonal sensorimotor neuropathy   • Diabetic polyneuropathy associated with type 2 diabetes mellitus (HCC)   • Witnessed episode of apnea   • Right thyroid nodule   • MDD (major depressive disorder), recurrent severe, without psychosis (HCC)   • Nonhealing wound of right great toe     Past Medical History:   Diagnosis Date   • Anxiety    • Depression    • Diabetes mellitus (HCC)    • Fatty liver    • Obesity      Past Surgical History:   Procedure Laterality Date   • HERNIA REPAIR       Social History     Socioeconomic History   • Marital status: Single     Spouse name: Not on file   • Number of children: Not on file   • Years of education: Not on file   • Highest education level: Not on file   Occupational History   • Not on file   Tobacco Use   • Smoking status: Former     Current packs/day: 0.00     Types: Cigarettes     Quit date: 2018     Years since quittin.2   • Smokeless tobacco: Never   • Tobacco comments:     Uses vape   Vaping Use   • Vaping status: Every Day   • Substances: Nicotine   Substance and Sexual Activity   • Alcohol use: Not Currently     Comment: social   • Drug use: Yes     Types: Marijuana     Comment: medical card   • Sexual activity: Yes     Partners: Female   Other Topics Concern   • Not on file   Social History Narrative   • Not on file     Social Drivers of Health     Financial Resource Strain: Not on file   Food Insecurity: No Food Insecurity (2025)    Nursing - Inadequate Food Risk Classification    • Worried About Running Out of Food in the Last Year: Not on file    • Ran Out of Food in the Last Year: Not on file    • Ran Out of Food in the  Last Year: Never true   Transportation Needs: No Transportation Needs (2025)    Nursing - Transportation Risk Classification    • Lack of Transportation: Not on file    • Lack of Transportation: No   Physical Activity: Not on file   Stress: Not on file   Social Connections: Not on file   Intimate Partner Violence: Unknown (2025)    Nursing IPS    • Feels Physically and Emotionally Safe: Not on file    • Physically Hurt by Someone: Not on file    • Humiliated or Emotionally Abused by Someone: Not on file    • Physically Hurt by Someone: No    • Hurt or Threatened by Someone: No   Housing Stability: At Risk (2025)    Nursing: Inadequate Housing Risk Classification    • Has Housing: Not on file    • Worried About Losing Housing: Not on file    • Unable to Get Utilities: Not on file    • Unable to Pay for Housing in the Last Year: Yes    • Has Housin        Current Outpatient Medications:   •  amphetamine-dextroamphetamine (ADDERALL XR, 20MG,) 20 MG 24 hr capsule, Take 1 capsule (20 mg total) by mouth every morning Max Daily Amount: 20 mg, Disp: 30 capsule, Rfl: 0  •  Blood Glucose Monitoring Suppl (OneTouch Verio Reflect) w/Device KIT, Check blood sugars once daily. Please substitute with appropriate alternative as covered by patient's insurance. Dx: E11.65, Disp: 1 kit, Rfl: 0  •  buPROPion (WELLBUTRIN SR) 100 mg 12 hr tablet, Take 1 tablet (100 mg total) by mouth 2 (two) times a day, Disp: 60 tablet, Rfl: 5  •  [place] Choice Comfort EZ 33G X 4 MM MISC, USE WITH SOLOSTAR PEN DAILY., Disp: , Rfl:   •  Continuous Blood Gluc Sensor (FreeStyle Amber 3 Sensor) MISC, , Disp: , Rfl:   •  Empagliflozin-metFORMIN HCl ER (Synjardy XR) 12.5-1000 MG TB24, Take 2 tablets by mouth daily, Disp: 180 tablet, Rfl: 3  •  famotidine (PEPCID) 20 mg tablet, Take 1 tablet (20 mg total) by mouth 2 (two) times a day, Disp: 180 tablet, Rfl: 1  •  gabapentin (Neurontin) 600 MG tablet, Take 1 tablet (600 mg total) by mouth 3  "(three) times a day, Disp: 90 tablet, Rfl: 0  •  glucose blood (OneTouch Verio) test strip, Check blood sugars once daily. Please substitute with appropriate alternative as covered by patient's insurance. Dx: E11.65, Disp: 100 each, Rfl: 3  •  ibuprofen (MOTRIN) 200 mg tablet, Take 400 mg by mouth every 6 (six) hours as needed for mild pain Has been taking twice daily, Disp: , Rfl:   •  Injection Device for Insulin (CeQur Simplicity 2U) LAUREN, Use 1 each every 3 (three) days, Disp: , Rfl:   •  Injection Device for Insulin (CeQur Simplicity 2U) LAUREN, Use 1 each every 3 (three) days, Disp: , Rfl:   •  Insulin Pen Needle (BD Pen Needle Ratna U/F) 32G X 4 MM MISC, Use daily as directed with insulin pen, Disp: 100 each, Rfl: 3  •  Isopropyl Alcohol (Pharmacist Choice Alcohol) 70 % MISC, Apply 200 Pads topically 3 (three) times a day, Disp: 200 each, Rfl: 5  •  mupirocin (BACTROBAN) 2 % ointment, Apply topically 2 (two) times a day, Disp: 22 g, Rfl: 0  •  NovoLOG 100 UNIT/ML injection, Inject 100 Units under the skin in the morning Via insuline pump, Disp: 30 mL, Rfl: 11  •  OneTouch Delica Lancets 33G MISC, Check blood sugars once daily. Please substitute with appropriate alternative as covered by patient's insurance. Dx: E11.65, Disp: 100 each, Rfl: 3  •  rosuvastatin (CRESTOR) 20 MG tablet, Take 1 tablet (20 mg total) by mouth daily, Disp: 30 tablet, Rfl: 0  •  SYRINGE-NEEDLE, DISP, 3 ML (Safety Syringes/Needle) 20G X 1-1/2\" 3 ML MISC, Use once a week for 12 doses, Disp: 12 each, Rfl: 1  •  tadalafil (CIALIS) 20 MG tablet, Take 1 tablet (20 mg total) by mouth daily as needed for erectile dysfunction, Disp: 30 tablet, Rfl: 3  •  Toujeo SoloStar 300 units/mL CONCENTRATED U-300 injection pen (1-unit dial), Inject 25 Units under the skin daily at bedtime, Disp: 7.5 mL, Rfl: 1  •  varenicline (Chantix Continuing Month Yash) 1 mg tablet, Take 1 tablet (1 mg total) by mouth 2 (two) times a day, Disp: 60 tablet, Rfl: 1  •  " "varenicline (Chantix) 0.5 mg tablet, Take 1 tablet daily for 3 days then 1 tablet twice a day for 4 days., Disp: 11 tablet, Rfl: 0  •  Vitamin D, Ergocalciferol, 47116 units CAPS, Take 1 capsule by mouth once a week, Disp: , Rfl:   •  Xyosted 100 MG/0.5ML SOAJ, Inject 100 mg under the skin every 7 days (Patient not taking: Reported on 3/4/2025), Disp: , Rfl:   No current facility-administered medications for this visit.  Family History   Problem Relation Age of Onset   • Diabetes Mother    • Prostate cancer Paternal Grandfather       Review of Systems  Allergies:  Amoxicillin, Augmentin [amoxicillin-pot clavulanate], and Sulfa antibiotics      Objective:  /80   Pulse 88   Temp (!) 97 °F (36.1 °C)   Resp 18     Physical Exam        Wound 02/16/25 Other (comment) Toe D1, great Anterior;Right (Active)   Wound Image   03/25/25 0818   Wound Description Dry;Eschar;Brown 03/25/25 0812   Non-staged Wound Description Full thickness 03/25/25 0812   Wound Length (cm) 0.1 cm 03/25/25 0812   Wound Width (cm) 0.1 cm 03/25/25 0812   Wound Depth (cm) 0.1 cm 03/25/25 0812   Wound Surface Area (cm^2) 0.01 cm^2 03/25/25 0812   Wound Volume (cm^3) 0.001 cm^3 03/25/25 0812   Calculated Wound Volume (cm^3) 0 cm^3 03/25/25 0812   Change in Wound Size % 100 03/25/25 0812   Drainage Amount Scant 03/25/25 0812   Drainage Description Yellow 03/25/25 0812   Temi-wound Assessment Callus 03/25/25 0812   Treatments Irrigation with NSS 03/04/25 0912                         Debridement   Wound 02/16/25 Other (comment) Toe D1, great Anterior;Right     Date/Time: 3/25/2025 8:00 AM  Universal Protocol:  Consent: Verbal consent obtained.  Risks and benefits: risks, benefits and alternatives were discussed  Consent given by: patient  Time out: Immediately prior to procedure a \"time out\" was called to verify the correct patient, procedure, equipment, support staff and site/side marked as required.  Timeout called at: 3/25/2025 8:20 AM.  Patient " understanding: patient states understanding of the procedure being performed  Patient identity confirmed: verbally with patient    Debridement Details  Performed by: physician  Debridement type: surgical  Level of debridement: subcutaneous tissue  Pain control: lidocaine 4%    Post-debridement measurements  Length (cm): 0.6  Width (cm): 0.6  Depth (cm): 0.5  Percent debrided: 100%  Surface Area (cm^2): 0.36  Area Debrided (cm^2): 0.36  Volume (cm^3): 0.18    Tissue and other material debrided: dermis, epidermis and subcutaneous tissue  Devitalized tissue debrided: biofilm, callus, clots, exudate, fibrin, necrotic debris, slough and eschar  Instrument(s) utilized: blade  Technique utilized: excisional  Bleeding: medium  Hemostasis obtained with: pressure  Procedural pain (0-10): 0  Post-procedural pain: 0   Response to treatment: procedure was tolerated well                       Wound Instructions:  Orders Placed This Encounter   Procedures   • Wound cleansing and dressings Other (comment) (wound ulcer) Anterior;Right Toe D1, great     Wound cleansing and dressings Other (comment) (wound ulcer) Anterior;Right Toe D1, great     \Wash your hands with soap and water.  Remove old dressing, discard into plastic bag and place in trash.    Cleanse the wound with mild soap and water prior to applying a clean dressing.   Do not use tissue or cotton balls. Do not scrub the wound. Pat dry using gauze.     Shower - recommend covering wound in shower and washing foot separately      Apply dermagran to right great toe wound.  Cover with gauze  Secure tape  Change dressing daily.      Wear front offloading shoe for all ambulation  Continue to keep up the good work and not using nicotine products.       Please try to consume 3-4 servings of protein daily.   - Each serving of protein should contain about 30 mg protein.   - Good sources of protein include, lean meats (fish, chicken, etc), eggs, dairy products (yogurt,   cheese),  "tofu, legumes (chickpeas, lentils, peas, black beans), nuts (cashew, walnut, peanuts, etc), & quinoa.      Keep blood sugars under control    Return to wound center in 2 weeks     Standing Status:   Future     Expiration Date:   4/1/2025   • Wound Procedure Treatment Other (comment) (wound ulcer) Anterior;Right Toe D1, great     This order was created via procedure documentation   • Debridement Other (comment) (wound ulcer) Anterior;Right Toe D1, great     This order was created via procedure documentation         Donal White DPM      Portions of the record may have been created with voice recognition software. Occasional wrong word or \"sound a like\" substitutions may have occurred due to the inherent limitations of voice recognition software. Read the chart carefully and recognize, using context, where substitutions have occurred.      "

## 2025-03-25 NOTE — PROGRESS NOTES
Wound Procedure Treatment Other (comment) (wound ulcer) Anterior;Right Toe D1, great    Performed by: Suzie Winter RN  Authorized by: Donal White DPM  Associated wounds:   Wound 02/16/25 Other (comment) Toe D1, great Anterior;Right    Wound cleansed with:  NSS   Applied primary dressing:  Dermagran   Applied secondary dressing:  Gauze   Dressing secured with:  Maddy, Tape and Surgilast   Offloading device appllied:  Forefoot relief shoe

## 2025-03-25 NOTE — PATIENT INSTRUCTIONS
Orders Placed This Encounter   Procedures    Wound cleansing and dressings Other (comment) (wound ulcer) Anterior;Right Toe D1, great     Wound cleansing and dressings Other (comment) (wound ulcer) Anterior;Right Toe D1, great     \Wash your hands with soap and water.  Remove old dressing, discard into plastic bag and place in trash.    Cleanse the wound with mild soap and water prior to applying a clean dressing.   Do not use tissue or cotton balls. Do not scrub the wound. Pat dry using gauze.     Shower - recommend covering wound in shower and washing foot separately      Apply dermagran to right great toe wound.  Cover with gauze  Secure tape  Change dressing daily.      Wear front offloading shoe for all ambulation  Continue to keep up the good work and not using nicotine products.       Please try to consume 3-4 servings of protein daily.   - Each serving of protein should contain about 30 mg protein.   - Good sources of protein include, lean meats (fish, chicken, etc), eggs, dairy products (yogurt,   cheese), tofu, legumes (chickpeas, lentils, peas, black beans), nuts (cashew, walnut, peanuts, etc), & quinoa.      Keep blood sugars under control    Return to wound center in 2 weeks     Standing Status:   Future     Expiration Date:   4/1/2025    Wound Procedure Treatment Other (comment) (wound ulcer) Anterior;Right Toe D1, great     This order was created via procedure documentation

## 2025-04-02 DIAGNOSIS — F90.0 ADHD, PREDOMINANTLY INATTENTIVE TYPE: ICD-10-CM

## 2025-04-08 ENCOUNTER — OFFICE VISIT (OUTPATIENT)
Dept: WOUND CARE | Facility: CLINIC | Age: 35
End: 2025-04-08
Payer: COMMERCIAL

## 2025-04-08 ENCOUNTER — HOSPITAL ENCOUNTER (OUTPATIENT)
Dept: RADIOLOGY | Facility: HOSPITAL | Age: 35
Discharge: HOME | End: 2025-04-08
Payer: COMMERCIAL

## 2025-04-08 VITALS
TEMPERATURE: 97.5 F | SYSTOLIC BLOOD PRESSURE: 128 MMHG | DIASTOLIC BLOOD PRESSURE: 80 MMHG | HEART RATE: 96 BPM | RESPIRATION RATE: 16 BRPM

## 2025-04-08 DIAGNOSIS — F17.218 CIGARETTE NICOTINE DEPENDENCE WITH OTHER NICOTINE-INDUCED DISORDER: ICD-10-CM

## 2025-04-08 DIAGNOSIS — L97.512 DIABETIC ULCER OF TOE OF RIGHT FOOT ASSOCIATED WITH TYPE 2 DIABETES MELLITUS, WITH FAT LAYER EXPOSED (HCC): ICD-10-CM

## 2025-04-08 DIAGNOSIS — L97.512 DIABETIC ULCER OF TOE OF RIGHT FOOT ASSOCIATED WITH TYPE 2 DIABETES MELLITUS, WITH FAT LAYER EXPOSED (HCC): Primary | ICD-10-CM

## 2025-04-08 DIAGNOSIS — E11.621 DIABETIC ULCER OF TOE OF RIGHT FOOT ASSOCIATED WITH TYPE 2 DIABETES MELLITUS, WITH FAT LAYER EXPOSED (HCC): ICD-10-CM

## 2025-04-08 DIAGNOSIS — E11.621 DIABETIC ULCER OF TOE OF RIGHT FOOT ASSOCIATED WITH TYPE 2 DIABETES MELLITUS, WITH FAT LAYER EXPOSED (HCC): Primary | ICD-10-CM

## 2025-04-08 PROCEDURE — 73630 X-RAY EXAM OF FOOT: CPT

## 2025-04-08 PROCEDURE — 99213 OFFICE O/P EST LOW 20 MIN: CPT | Performed by: PODIATRIST

## 2025-04-08 PROCEDURE — 99214 OFFICE O/P EST MOD 30 MIN: CPT | Performed by: PODIATRIST

## 2025-04-08 RX ORDER — DOXYCYCLINE 100 MG/1
100 TABLET ORAL 2 TIMES DAILY
Qty: 14 TABLET | Refills: 0 | Status: SHIPPED | OUTPATIENT
Start: 2025-04-08 | End: 2025-04-15

## 2025-04-08 RX ORDER — LIDOCAINE 40 MG/G
CREAM TOPICAL ONCE
Status: COMPLETED | OUTPATIENT
Start: 2025-04-08 | End: 2025-04-08

## 2025-04-08 RX ADMIN — LIDOCAINE: 40 CREAM TOPICAL at 08:21

## 2025-04-08 NOTE — PROGRESS NOTES
Wound Procedure Treatment Other (comment) (wound ulcer) Anterior;Right Toe D1, great    Performed by: Julio Bustamante RN  Authorized by: Donal White DPM  Associated wounds:   Wound 02/16/25 Other (comment) Toe D1, great Anterior;Right    Wound cleansed with:  NSS   Applied primary dressing:  Collagen dressing   Applied secondary dressing:  Gauze   Dressing secured with:  Tape   Comments:  Woun' dres

## 2025-04-08 NOTE — PATIENT INSTRUCTIONS
Orders Placed This Encounter   Procedures    Wound cleansing and dressings Other (comment) (wound ulcer) Anterior;Right Toe D1, great     \Wash your hands with soap and water.  Remove old dressing, discard into plastic bag and place in trash.    Cleanse the wound with mild soap and water prior to applying a clean dressing.   Do not use tissue or cotton balls. Do not scrub the wound. Pat dry using gauze.     Shower, yes. Only with a cast cover. Do note get dressing wet. Wash right foot separately.       Right great toe wound:   STOP dermagran.  Apply woun' dres to the wound bed.  Cover with gauze  Secure tape  Change dressing daily.      Wear front offloading shoe whenever ambulating.        Please try to consume 3-4 servings of protein daily.   - Each serving of protein should contain about 30 mg protein.   - Good sources of protein include, lean meats (fish, chicken, etc), eggs, dairy products (yogurt,   cheese), tofu, legumes (chickpeas, lentils, peas, black beans), nuts (cashew, walnut, peanuts, etc), & quinoa.      Keep blood sugars under control.    Script sent to your pharmacy for an antibiotic. Take as directed.     X-ray of right foot ordered. Please complete as soon as possible.        Follow up at the wound center in one week.     Standing Status:   Future     Expiration Date:   4/15/2025    Wound Procedure Treatment Other (comment) (wound ulcer) Anterior;Right Toe D1, great     This order was created via procedure documentation

## 2025-04-08 NOTE — PROGRESS NOTES
Patient ID: Jason Blanca is a 34 y.o. male Date of Birth 1990     Diagnosis:  1. Diabetic ulcer of toe of right foot associated with type 2 diabetes mellitus, with fat layer exposed (HCC)  -     Wound cleansing and dressings Other (comment) (wound ulcer) Anterior;Right Toe D1, great; Future  -     lidocaine (LMX) 4 % cream  -     Wound Procedure Treatment Other (comment) (wound ulcer) Anterior;Right Toe D1, great  -     XR foot 3+ vw right; Future; Expected date: 04/08/2025  -     doxycycline (ADOXA) 100 MG tablet; Take 1 tablet (100 mg total) by mouth 2 (two) times a day for 7 days     Diagnosis ICD-10-CM Associated Orders   1. Diabetic ulcer of toe of right foot associated with type 2 diabetes mellitus, with fat layer exposed (HCC)  E11.621 Wound cleansing and dressings Other (comment) (wound ulcer) Anterior;Right Toe D1, great    L97.512 lidocaine (LMX) 4 % cream     Wound Procedure Treatment Other (comment) (wound ulcer) Anterior;Right Toe D1, great     XR foot 3+ vw right     doxycycline (ADOXA) 100 MG tablet           Assessment & Plan:  Wound is not improving and I am concerned with the new swelling and pain. I am putting him on an antibiotic for a week and getting an XR. MRI 2 months ago did NOT show OM but given the appearance we may need to repeat/ If the XR shows new bone erosion the MRI would be unnecessary. RTC 1 week    Chief Complaint   Patient presents with   • Follow Up Wound Care Visit     Right great toe wound           Subjective:   Patient following up great toe wound.  He said he has been a little more active at home but he noticed more pain and swelling in the toe.  He takes ibuprofen twice a day. Patient denies N/F/V/C/D/CP/SOB          The following portions of the patient's history were reviewed and updated as appropriate:   Patient Active Problem List   Diagnosis   • Type 2 diabetes mellitus with hyperglycemia, with neuropathic pain   • BMI 31.0-31.9,adult   • Recurrent major  depressive disorder, in remission (HCC)   • Hypogonadism in male   • Painful and cold lower extremity   • PAD (peripheral artery disease) (HCC)   • Excessive sweating   • ADHD, predominantly inattentive type   • Erectile dysfunction of nonorganic origin   • Fatty liver   • Mixed dyslipidemia   • Vitamin D deficiency   • Axonal sensorimotor neuropathy   • Diabetic polyneuropathy associated with type 2 diabetes mellitus (HCC)   • Witnessed episode of apnea   • Right thyroid nodule   • MDD (major depressive disorder), recurrent severe, without psychosis (HCC)   • Nonhealing wound of right great toe     Past Medical History:   Diagnosis Date   • Anxiety    • Depression    • Diabetes mellitus (HCC)    • Fatty liver    • Obesity      Past Surgical History:   Procedure Laterality Date   • HERNIA REPAIR       Social History     Socioeconomic History   • Marital status: Single     Spouse name: None   • Number of children: None   • Years of education: None   • Highest education level: None   Occupational History   • None   Tobacco Use   • Smoking status: Former     Current packs/day: 0.00     Types: Cigarettes     Quit date:      Years since quittin.2   • Smokeless tobacco: Never   • Tobacco comments:     Uses vape   Vaping Use   • Vaping status: Every Day   • Substances: Nicotine   Substance and Sexual Activity   • Alcohol use: Not Currently     Comment: social   • Drug use: Yes     Types: Marijuana     Comment: medical card   • Sexual activity: Yes     Partners: Female   Other Topics Concern   • None   Social History Narrative   • None     Social Drivers of Health     Financial Resource Strain: Not on file   Food Insecurity: No Food Insecurity (2025)    Nursing - Inadequate Food Risk Classification    • Worried About Running Out of Food in the Last Year: Not on file    • Ran Out of Food in the Last Year: Not on file    • Ran Out of Food in the Last Year: Never true   Transportation Needs: No Transportation  Needs (2025)    Nursing - Transportation Risk Classification    • Lack of Transportation: Not on file    • Lack of Transportation: No   Physical Activity: Not on file   Stress: Not on file   Social Connections: Not on file   Intimate Partner Violence: Unknown (2025)    Nursing IPS    • Feels Physically and Emotionally Safe: Not on file    • Physically Hurt by Someone: Not on file    • Humiliated or Emotionally Abused by Someone: Not on file    • Physically Hurt by Someone: No    • Hurt or Threatened by Someone: No   Housing Stability: At Risk (2025)    Nursing: Inadequate Housing Risk Classification    • Has Housing: Not on file    • Worried About Losing Housing: Not on file    • Unable to Get Utilities: Not on file    • Unable to Pay for Housing in the Last Year: Yes    • Has Housin        Current Outpatient Medications:   •  doxycycline (ADOXA) 100 MG tablet, Take 1 tablet (100 mg total) by mouth 2 (two) times a day for 7 days, Disp: 14 tablet, Rfl: 0  •  amphetamine-dextroamphetamine (ADDERALL XR, 20MG,) 20 MG 24 hr capsule, Take 1 capsule (20 mg total) by mouth every morning Max Daily Amount: 20 mg, Disp: 30 capsule, Rfl: 0  •  Blood Glucose Monitoring Suppl (OneTouch Verio Reflect) w/Device KIT, Check blood sugars once daily. Please substitute with appropriate alternative as covered by patient's insurance. Dx: E11.65, Disp: 1 kit, Rfl: 0  •  buPROPion (WELLBUTRIN SR) 100 mg 12 hr tablet, Take 1 tablet (100 mg total) by mouth 2 (two) times a day, Disp: 60 tablet, Rfl: 5  •  Elon Choice Comfort EZ 33G X 4 MM MISC, USE WITH SOLOSTAR PEN DAILY., Disp: , Rfl:   •  Continuous Blood Gluc Sensor (FreeStyle Amber 3 Sensor) MISC, , Disp: , Rfl:   •  Empagliflozin-metFORMIN HCl ER (Synjardy XR) 12.5-1000 MG TB24, Take 2 tablets by mouth daily, Disp: 180 tablet, Rfl: 3  •  famotidine (PEPCID) 20 mg tablet, Take 1 tablet (20 mg total) by mouth 2 (two) times a day, Disp: 180 tablet, Rfl: 1  •  gabapentin  "(Neurontin) 600 MG tablet, Take 1 tablet (600 mg total) by mouth 3 (three) times a day, Disp: 90 tablet, Rfl: 0  •  glucose blood (OneTouch Verio) test strip, Check blood sugars once daily. Please substitute with appropriate alternative as covered by patient's insurance. Dx: E11.65, Disp: 100 each, Rfl: 3  •  ibuprofen (MOTRIN) 200 mg tablet, Take 400 mg by mouth every 6 (six) hours as needed for mild pain Has been taking twice daily, Disp: , Rfl:   •  Injection Device for Insulin (CeQur Simplicity 2U) LAUREN, Use 1 each every 3 (three) days, Disp: , Rfl:   •  Injection Device for Insulin (CeQur Simplicity 2U) LAUREN, Use 1 each every 3 (three) days, Disp: , Rfl:   •  Insulin Pen Needle (BD Pen Needle Ratna U/F) 32G X 4 MM MISC, Use daily as directed with insulin pen, Disp: 100 each, Rfl: 3  •  Isopropyl Alcohol (Pharmacist Choice Alcohol) 70 % MISC, Apply 200 Pads topically 3 (three) times a day, Disp: 200 each, Rfl: 5  •  mupirocin (BACTROBAN) 2 % ointment, Apply topically 2 (two) times a day, Disp: 22 g, Rfl: 0  •  NovoLOG 100 UNIT/ML injection, Inject 100 Units under the skin in the morning Via insuline pump, Disp: 30 mL, Rfl: 11  •  OneTouch Delica Lancets 33G MISC, Check blood sugars once daily. Please substitute with appropriate alternative as covered by patient's insurance. Dx: E11.65, Disp: 100 each, Rfl: 3  •  rosuvastatin (CRESTOR) 20 MG tablet, Take 1 tablet (20 mg total) by mouth daily, Disp: 30 tablet, Rfl: 0  •  SYRINGE-NEEDLE, DISP, 3 ML (Safety Syringes/Needle) 20G X 1-1/2\" 3 ML MISC, Use once a week for 12 doses, Disp: 12 each, Rfl: 1  •  tadalafil (CIALIS) 20 MG tablet, Take 1 tablet (20 mg total) by mouth daily as needed for erectile dysfunction, Disp: 30 tablet, Rfl: 3  •  Toujeo SoloStar 300 units/mL CONCENTRATED U-300 injection pen (1-unit dial), Inject 25 Units under the skin daily at bedtime, Disp: 7.5 mL, Rfl: 1  •  varenicline (Chantix Continuing Month Yash) 1 mg tablet, Take 1 tablet (1 mg " total) by mouth 2 (two) times a day, Disp: 60 tablet, Rfl: 1  •  varenicline (Chantix) 0.5 mg tablet, Take 1 tablet daily for 3 days then 1 tablet twice a day for 4 days., Disp: 11 tablet, Rfl: 0  •  Vitamin D, Ergocalciferol, 39458 units CAPS, Take 1 capsule by mouth once a week, Disp: , Rfl:   •  Xyosted 100 MG/0.5ML SOAJ, Inject 100 mg under the skin every 7 days (Patient not taking: Reported on 3/4/2025), Disp: , Rfl:   No current facility-administered medications for this visit.  Family History   Problem Relation Age of Onset   • Diabetes Mother    • Prostate cancer Paternal Grandfather       Review of Systems   Constitutional: Negative.    Gastrointestinal:  Negative for diarrhea, nausea and vomiting.   Musculoskeletal:  Positive for arthralgias and joint swelling.   Skin:  Positive for color change and wound.   Neurological:  Positive for numbness.     Allergies:  Amoxicillin, Augmentin [amoxicillin-pot clavulanate], and Sulfa antibiotics      Objective:  /80   Pulse 96   Temp 97.5 °F (36.4 °C)   Resp 16     Physical Exam  Vitals reviewed.   Cardiovascular:      Pulses:           Dorsalis pedis pulses are 1+ on the right side.        Posterior tibial pulses are 1+ on the right side.   Musculoskeletal:         General: Deformity present.      Right foot: Deformity present.   Feet:      Right foot:      Skin integrity: Ulcer, skin breakdown and erythema present.   Skin:     Findings: Erythema present.   Neurological:      Mental Status: He is alert.      Sensory: Sensory deficit present.             Wound 02/16/25 Other (comment) Toe D1, great Anterior;Right (Active)   Wound Image   04/08/25 0815   Wound Description Yellow;Pink 04/08/25 0817   Non-staged Wound Description Full thickness 04/08/25 0817   Wound Length (cm) 0.4 cm 04/08/25 0817   Wound Width (cm) 0.4 cm 04/08/25 0817   Wound Depth (cm) 0.3 cm 04/08/25 0817   Wound Surface Area (cm^2) 0.16 cm^2 04/08/25 0817   Wound Volume (cm^3) 0.048 cm^3  04/08/25 0817   Calculated Wound Volume (cm^3) 0.05 cm^3 04/08/25 0817   Change in Wound Size % 28.57 04/08/25 0817   Drainage Amount Small 04/08/25 0817   Drainage Description Serosanguineous 04/08/25 0817   Temi-wound Assessment Intact 04/08/25 0817   Treatments Irrigation with NSS 04/08/25 0817                         Procedures             Wound Instructions:  Orders Placed This Encounter   Procedures   • Wound cleansing and dressings Other (comment) (wound ulcer) Anterior;Right Toe D1, great     \Wash your hands with soap and water.  Remove old dressing, discard into plastic bag and place in trash.    Cleanse the wound with mild soap and water prior to applying a clean dressing.   Do not use tissue or cotton balls. Do not scrub the wound. Pat dry using gauze.     Shower, yes. Only with a cast cover. Do note get dressing wet. Wash right foot separately.       Right great toe wound:   STOP dermagran.  Apply woun' dres to the wound bed.  Cover with gauze  Secure tape  Change dressing daily.      Wear front offloading shoe whenever ambulating.        Please try to consume 3-4 servings of protein daily.   - Each serving of protein should contain about 30 mg protein.   - Good sources of protein include, lean meats (fish, chicken, etc), eggs, dairy products (yogurt,   cheese), tofu, legumes (chickpeas, lentils, peas, black beans), nuts (cashew, walnut, peanuts, etc), & quinoa.      Keep blood sugars under control.    Script sent to your pharmacy for an antibiotic. Take as directed.     X-ray of right foot ordered. Please complete as soon as possible.        Follow up at the wound center in one week.     Standing Status:   Future     Expiration Date:   4/15/2025   • Wound Procedure Treatment Other (comment) (wound ulcer) Anterior;Right Toe D1, great     This order was created via procedure documentation   • XR foot 3+ vw right     Standing Status:   Future     Number of Occurrences:   1     Expected Date:   4/8/2025     " Expiration Date:   4/8/2029     Scheduling Instructions:      Bring along any outside films relating to this procedure.               Donal White DPM      Portions of the record may have been created with voice recognition software. Occasional wrong word or \"sound a like\" substitutions may have occurred due to the inherent limitations of voice recognition software. Read the chart carefully and recognize, using context, where substitutions have occurred.      "

## 2025-04-09 ENCOUNTER — TELEPHONE (OUTPATIENT)
Dept: FAMILY MEDICINE CLINIC | Facility: CLINIC | Age: 35
End: 2025-04-09

## 2025-04-09 RX ORDER — VARENICLINE TARTRATE 1 MG/1
1 TABLET, FILM COATED ORAL 2 TIMES DAILY
Qty: 60 TABLET | Refills: 0 | OUTPATIENT
Start: 2025-04-09

## 2025-04-09 NOTE — TELEPHONE ENCOUNTER
Prior Auth:  Patient comment: Pharmacy is waiting for a pre authorazation from the doctor for the insurance company to approve.    Medication: varenicline (Chantix Continuing Month Yash) 1 mg tablet        Dose/Frequency: Take 1 tablet (1 mg total) by mouth 2 (two) times a day     Quantity: 60 tablet     Ordering Provider:   [x] PCP/Provider - Jaren Marquez MD   [] Speciality/Provider -

## 2025-04-09 NOTE — TELEPHONE ENCOUNTER
PA for varenicline 0.5mg tablet SUBMITTED to [employer]    via    [x]CMM-KEY: [id number]  []Surescripts-Case ID #   []Availity-Auth ID # NDC #   []Faxed to plan   []Other website   []Phone call Case ID #     [x]PA sent as URGENT    All office notes, labs and other pertaining documents and studies sent. Clinical questions answered. Awaiting determination from insurance company.     Turnaround time for your insurance to make a decision on your Prior Authorization can take 7-21 business days.

## 2025-04-10 NOTE — TELEPHONE ENCOUNTER
PA for varenicline 0.5mg tablet DENIED    Reason:        Message sent to office clinical pool Yes    Denial letter scanned into Media Yes    Appeal started No (Provider will need to decide if appeal is warranted and send clinical documentation to Prior Authorization Team for initiation.)    **Please follow up with your patient regarding denial and next steps**

## 2025-04-11 DIAGNOSIS — F90.0 ADHD, PREDOMINANTLY INATTENTIVE TYPE: ICD-10-CM

## 2025-04-11 RX ORDER — DEXTROAMPHETAMINE SACCHARATE, AMPHETAMINE ASPARTATE MONOHYDRATE, DEXTROAMPHETAMINE SULFATE AND AMPHETAMINE SULFATE 5; 5; 5; 5 MG/1; MG/1; MG/1; MG/1
20 CAPSULE, EXTENDED RELEASE ORAL EVERY MORNING
Qty: 30 CAPSULE | Refills: 0 | Status: SHIPPED | OUTPATIENT
Start: 2025-04-11

## 2025-04-11 RX ORDER — IBUPROFEN 200 MG
400 TABLET ORAL EVERY 6 HOURS PRN
Refills: 0 | OUTPATIENT
Start: 2025-04-11

## 2025-04-11 RX ORDER — DEXTROAMPHETAMINE SACCHARATE, AMPHETAMINE ASPARTATE MONOHYDRATE, DEXTROAMPHETAMINE SULFATE AND AMPHETAMINE SULFATE 5; 5; 5; 5 MG/1; MG/1; MG/1; MG/1
20 CAPSULE, EXTENDED RELEASE ORAL EVERY MORNING
Qty: 30 CAPSULE | Refills: 0 | OUTPATIENT
Start: 2025-04-11

## 2025-04-15 ENCOUNTER — OFFICE VISIT (OUTPATIENT)
Dept: WOUND CARE | Facility: CLINIC | Age: 35
End: 2025-04-15
Payer: COMMERCIAL

## 2025-04-15 VITALS
DIASTOLIC BLOOD PRESSURE: 80 MMHG | HEART RATE: 100 BPM | TEMPERATURE: 96.7 F | SYSTOLIC BLOOD PRESSURE: 118 MMHG | RESPIRATION RATE: 14 BRPM

## 2025-04-15 DIAGNOSIS — L97.512 DIABETIC ULCER OF TOE OF RIGHT FOOT ASSOCIATED WITH TYPE 2 DIABETES MELLITUS, WITH FAT LAYER EXPOSED (HCC): Primary | ICD-10-CM

## 2025-04-15 DIAGNOSIS — E11.621 DIABETIC ULCER OF TOE OF RIGHT FOOT ASSOCIATED WITH TYPE 2 DIABETES MELLITUS, WITH FAT LAYER EXPOSED (HCC): Primary | ICD-10-CM

## 2025-04-15 PROCEDURE — 99213 OFFICE O/P EST LOW 20 MIN: CPT | Performed by: PODIATRIST

## 2025-04-15 PROCEDURE — 99212 OFFICE O/P EST SF 10 MIN: CPT | Performed by: PODIATRIST

## 2025-04-15 RX ORDER — LIDOCAINE 40 MG/G
CREAM TOPICAL ONCE
Status: COMPLETED | OUTPATIENT
Start: 2025-04-15 | End: 2025-04-15

## 2025-04-15 RX ADMIN — LIDOCAINE: 40 CREAM TOPICAL at 09:10

## 2025-04-15 NOTE — PATIENT INSTRUCTIONS
Orders Placed This Encounter   Procedures    Wound cleansing and dressings Diabetic Ulcer (wound ulcer) Anterior;Right Toe D1, great     \Wash your hands with soap and water.  Remove old dressing, discard into plastic bag and place in trash.    Cleanse the wound with mild soap and water prior to applying a clean dressing.   Do not use tissue or cotton balls. Do not scrub the wound. Pat dry using gauze.     Shower, yes. Only with a cast cover. Do note get dressing wet. Wash right foot separately.         Right great toe wound:   Apply woun' dres to the wound bed.  Cover with gauze  Secure tape  Change dressing daily.      Wear front offloading shoe whenever ambulating. Remove for sleeping.        Please try to consume 3-4 servings of protein daily.   - Each serving of protein should contain about 30 mg protein.   - Good sources of protein include, lean meats (fish, chicken, etc), eggs, dairy products (yogurt,cheese), tofu, legumes (chickpeas, lentils, peas, black beans), nuts (cashew, walnut, peanuts, etc), & quinoa.        Keep blood sugars under control.          Follow up at the wound center in two weeks.     Standing Status:   Future     Expiration Date:   4/22/2025

## 2025-04-15 NOTE — PROGRESS NOTES
Patient ID: Jason Blanca is a 34 y.o. male Date of Birth 1990     Diagnosis:  1. 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 Diabetic ulcer of toe of right foot associated with type 2 diabetes mellitus, with fat layer exposed (HCC)  E11.621 Wound cleansing and dressings Diabetic Ulcer (wound ulcer) Anterior;Right Toe D1, great    L97.512 lidocaine (LMX) 4 % cream     Wound Procedure Treatment Diabetic Ulcer (wound ulcer) Anterior;Right Toe D1, great           Assessment & Plan:  Wound much improved with the antibiotic. Cellulitis and pain improved.   XR reviewed, no cortical erosion. No need for further imaging at this time  Continue LWC, offload with darco wedge and rest  He is taking more care with his diet, high protein, low carbohydrate which is great  He no longer is using any nicotine products.     Chief Complaint   Patient presents with   • Follow Up Wound Care Visit     Right great toe wound           Subjective:   Follow-up right great toe wound.  He did get his x-ray.  He states since the antibiotic the pain and redness look better.  He also states the wound is looking better        The following portions of the patient's history were reviewed and updated as appropriate:   Patient Active Problem List   Diagnosis   • Type 2 diabetes mellitus with hyperglycemia, with neuropathic pain   • BMI 31.0-31.9,adult   • Recurrent major depressive disorder, in remission ([facility])   • Hypogonadism in male   • Painful and cold lower extremity   • PAD (peripheral artery disease) ([facility])   • Excessive sweating   • ADHD, predominantly inattentive type   • Erectile dysfunction of  nonorganic origin   • Fatty liver   • Mixed dyslipidemia   • Vitamin D deficiency   • Axonal sensorimotor neuropathy   • Diabetic polyneuropathy associated with type 2 diabetes mellitus (HCC)   • Witnessed episode of apnea   • Right thyroid nodule   • MDD (major depressive disorder), recurrent severe, without psychosis (HCC)   • Nonhealing wound of right great toe     Past Medical History:   Diagnosis Date   • Anxiety    • Depression    • Diabetes mellitus (HCC)    • Fatty liver    • Obesity      Past Surgical History:   Procedure Laterality Date   • HERNIA REPAIR       Social History     Socioeconomic History   • Marital status: Single     Spouse name: Not on file   • Number of children: Not on file   • Years of education: Not on file   • Highest education level: Not on file   Occupational History   • Not on file   Tobacco Use   • Smoking status: Former     Current packs/day: 0.00     Types: Cigarettes     Quit date:      Years since quittin.2   • Smokeless tobacco: Never   • Tobacco comments:     Uses vape   Vaping Use   • Vaping status: Every Day   • Substances: Nicotine   Substance and Sexual Activity   • Alcohol use: Not Currently     Comment: social   • Drug use: Yes     Types: Marijuana     Comment: medical card   • Sexual activity: Yes     Partners: Female   Other Topics Concern   • Not on file   Social History Narrative   • Not on file     Social Drivers of Health     Financial Resource Strain: Not on file   Food Insecurity: No Food Insecurity (2025)    Nursing - Inadequate Food Risk Classification    • Worried About Running Out of Food in the Last Year: Not on file    • Ran Out of Food in the Last Year: Not on file    • Ran Out of Food in the Last Year: Never true   Transportation Needs: No Transportation Needs (2025)    Nursing - Transportation Risk Classification    • Lack of Transportation: Not on file    • Lack of Transportation: No   Physical Activity: Not on file   Stress: Not on  file   Social Connections: Not on file   Intimate Partner Violence: Unknown (2025)    Nursing IPS    • Feels Physically and Emotionally Safe: Not on file    • Physically Hurt by Someone: Not on file    • Humiliated or Emotionally Abused by Someone: Not on file    • Physically Hurt by Someone: No    • Hurt or Threatened by Someone: No   Housing Stability: At Risk (2025)    Nursing: Inadequate Housing Risk Classification    • Has Housing: Not on file    • Worried About Losing Housing: Not on file    • Unable to Get Utilities: Not on file    • Unable to Pay for Housing in the Last Year: Yes    • Has Housin        Current Outpatient Medications:   •  amphetamine-dextroamphetamine (ADDERALL XR, 20MG,) 20 MG 24 hr capsule, Take 1 capsule (20 mg total) by mouth every morning Max Daily Amount: 20 mg, Disp: 30 capsule, Rfl: 0  •  Blood Glucose Monitoring Suppl (OneTouch Verio Reflect) w/Device KIT, Check blood sugars once daily. Please substitute with appropriate alternative as covered by patient's insurance. Dx: E11.65, Disp: 1 kit, Rfl: 0  •  buPROPion (WELLBUTRIN SR) 100 mg 12 hr tablet, Take 1 tablet (100 mg total) by mouth 2 (two) times a day, Disp: 60 tablet, Rfl: 5  •  [place] Choice Comfort EZ 33G X 4 MM MISC, USE WITH SOLOSTAR PEN DAILY., Disp: , Rfl:   •  Continuous Blood Gluc Sensor (FreeStyle Amber 3 Sensor) MISC, , Disp: , Rfl:   •  doxycycline (ADOXA) 100 MG tablet, Take 1 tablet (100 mg total) by mouth 2 (two) times a day for 7 days, Disp: 14 tablet, Rfl: 0  •  Empagliflozin-metFORMIN HCl ER (Synjardy XR) 12.5-1000 MG TB24, Take 2 tablets by mouth daily, Disp: 180 tablet, Rfl: 3  •  famotidine (PEPCID) 20 mg tablet, Take 1 tablet (20 mg total) by mouth 2 (two) times a day, Disp: 180 tablet, Rfl: 1  •  gabapentin (Neurontin) 600 MG tablet, Take 1 tablet (600 mg total) by mouth 3 (three) times a day, Disp: 90 tablet, Rfl: 0  •  glucose blood (OneTouch Verio) test strip, Check blood sugars once  "daily. Please substitute with appropriate alternative as covered by patient's insurance. Dx: E11.65, Disp: 100 each, Rfl: 3  •  ibuprofen (MOTRIN) 200 mg tablet, Take 400 mg by mouth every 6 (six) hours as needed for mild pain Has been taking twice daily, Disp: , Rfl:   •  Injection Device for Insulin (CeQur Simplicity 2U) LAUREN, Use 1 each every 3 (three) days, Disp: , Rfl:   •  Injection Device for Insulin (CeQur Simplicity 2U) LAUREN, Use 1 each every 3 (three) days, Disp: , Rfl:   •  Insulin Pen Needle (BD Pen Needle Ratna U/F) 32G X 4 MM MISC, Use daily as directed with insulin pen, Disp: 100 each, Rfl: 3  •  Isopropyl Alcohol (Pharmacist Choice Alcohol) 70 % MISC, Apply 200 Pads topically 3 (three) times a day, Disp: 200 each, Rfl: 5  •  mupirocin (BACTROBAN) 2 % ointment, Apply topically 2 (two) times a day, Disp: 22 g, Rfl: 0  •  NovoLOG 100 UNIT/ML injection, Inject 100 Units under the skin in the morning Via insuline pump, Disp: 30 mL, Rfl: 11  •  OneTouch Delica Lancets 33G MISC, Check blood sugars once daily. Please substitute with appropriate alternative as covered by patient's insurance. Dx: E11.65, Disp: 100 each, Rfl: 3  •  rosuvastatin (CRESTOR) 20 MG tablet, Take 1 tablet (20 mg total) by mouth daily, Disp: 30 tablet, Rfl: 0  •  SYRINGE-NEEDLE, DISP, 3 ML (Safety Syringes/Needle) 20G X 1-1/2\" 3 ML MISC, Use once a week for 12 doses, Disp: 12 each, Rfl: 1  •  tadalafil (CIALIS) 20 MG tablet, Take 1 tablet (20 mg total) by mouth daily as needed for erectile dysfunction, Disp: 30 tablet, Rfl: 3  •  Toujeo SoloStar 300 units/mL CONCENTRATED U-300 injection pen (1-unit dial), Inject 25 Units under the skin daily at bedtime, Disp: 7.5 mL, Rfl: 1  •  varenicline (Chantix Continuing Month Yash) 1 mg tablet, Take 1 tablet (1 mg total) by mouth 2 (two) times a day, Disp: 60 tablet, Rfl: 1  •  varenicline (Chantix) 0.5 mg tablet, Take 1 tablet daily for 3 days then 1 tablet twice a day for 4 days., Disp: 11 " tablet, Rfl: 0  •  Vitamin D, Ergocalciferol, 11075 units CAPS, Take 1 capsule by mouth once a week, Disp: , Rfl:   •  Xyosted 100 MG/0.5ML SOAJ, Inject 100 mg under the skin every 7 days (Patient not taking: Reported on 3/4/2025), Disp: , Rfl:   No current facility-administered medications for this visit.  Family History   Problem Relation Age of Onset   • Diabetes Mother    • Prostate cancer Paternal Grandfather       Review of Systems   Constitutional: Negative.    Gastrointestinal:  Negative for diarrhea, nausea and vomiting.   Skin:  Positive for color change and wound.     Allergies:  Amoxicillin, Augmentin [amoxicillin-pot clavulanate], and Sulfa antibiotics      Objective:  /80   Pulse 100   Temp (!) 96.7 °F (35.9 °C)   Resp 14     Physical Exam  Vitals reviewed.   Cardiovascular:      Pulses: Normal pulses.   Feet:      Right foot:      Skin integrity: Ulcer present. No erythema.   Skin:     Capillary Refill: Capillary refill takes 2 to 3 seconds.      Findings: No erythema.   Neurological:      Mental Status: He is alert.             Wound 02/16/25 Diabetic Ulcer Toe D1, great Anterior;Right (Active)   Wound Image   04/15/25 0904   Enter Neff score: Neff Grade 3: Deep abscess, OM, or joint sepsis 04/15/25 0904   Wound Description Pink;Epithelialization 04/15/25 0904   Non-staged Wound Description Full thickness 04/15/25 0904   Wound Length (cm) 0.2 cm 04/15/25 0904   Wound Width (cm) 0.2 cm 04/15/25 0904   Wound Depth (cm) 0.1 cm 04/15/25 0904   Wound Surface Area (cm^2) 0.04 cm^2 04/15/25 0904   Wound Volume (cm^3) 0.004 cm^3 04/15/25 0904   Calculated Wound Volume (cm^3) 0 cm^3 04/15/25 0904   Change in Wound Size % 100 04/15/25 0904   Drainage Amount Scant 04/15/25 0904   Drainage Description Yellow 04/15/25 0904   Temi-wound Assessment Intact;Maceration 04/15/25 0904   Treatments Irrigation with NSS 04/15/25 0904   Wound packed? No 04/15/25 0904                         Procedures        "      Wound Instructions:  Orders Placed This Encounter   Procedures   • Wound cleansing and dressings Diabetic Ulcer (wound ulcer) Anterior;Right Toe D1, great     \Wash your hands with soap and water.  Remove old dressing, discard into plastic bag and place in trash.    Cleanse the wound with mild soap and water prior to applying a clean dressing.   Do not use tissue or cotton balls. Do not scrub the wound. Pat dry using gauze.     Shower, yes. Only with a cast cover. Do note get dressing wet. Wash right foot separately.         Right great toe wound:   Apply woun' dres to the wound bed.  Cover with gauze  Secure tape  Change dressing daily.      Wear front offloading shoe whenever ambulating. Remove for sleeping.        Please try to consume 3-4 servings of protein daily.   - Each serving of protein should contain about 30 mg protein.   - Good sources of protein include, lean meats (fish, chicken, etc), eggs, dairy products (yogurt,cheese), tofu, legumes (chickpeas, lentils, peas, black beans), nuts (cashew, walnut, peanuts, etc), & quinoa.        Keep blood sugars under control.          Follow up at the wound center in two weeks.     Standing Status:   Future     Expiration Date:   4/22/2025   • Wound Procedure Treatment Diabetic Ulcer (wound ulcer) Anterior;Right Toe D1, great     This order was created via procedure documentation         Donal White DPM      Portions of the record may have been created with voice recognition software. Occasional wrong word or \"sound a like\" substitutions may have occurred due to the inherent limitations of voice recognition software. Read the chart carefully and recognize, using context, where substitutions have occurred.      "

## 2025-04-15 NOTE — PROGRESS NOTES
Wound Procedure Treatment Diabetic Ulcer (wound ulcer) Anterior;Right Toe D1, great    Performed by: Julio Bustamante RN  Authorized by: Donal White DPM  Associated wounds:   Wound 02/16/25 Diabetic Ulcer Toe D1, great Anterior;Right    Wound cleansed with:  NSS   Applied primary dressing:  Collagen dressing   Applied secondary dressing:  Gauze   Dressing secured with:  Tape   Comments:  Woun' dres

## 2025-04-29 ENCOUNTER — OFFICE VISIT (OUTPATIENT)
Dept: WOUND CARE | Facility: CLINIC | Age: 35
End: 2025-04-29
Payer: COMMERCIAL

## 2025-04-29 VITALS
TEMPERATURE: 96.8 F | DIASTOLIC BLOOD PRESSURE: 80 MMHG | HEART RATE: 88 BPM | SYSTOLIC BLOOD PRESSURE: 114 MMHG | RESPIRATION RATE: 16 BRPM

## 2025-04-29 DIAGNOSIS — E11.621 DIABETIC ULCER OF TOE OF RIGHT FOOT ASSOCIATED WITH TYPE 2 DIABETES MELLITUS, WITH FAT LAYER EXPOSED (HCC): Primary | ICD-10-CM

## 2025-04-29 DIAGNOSIS — L97.512 DIABETIC ULCER OF TOE OF RIGHT FOOT ASSOCIATED WITH TYPE 2 DIABETES MELLITUS, WITH FAT LAYER EXPOSED (HCC): Primary | ICD-10-CM

## 2025-04-29 PROCEDURE — 99213 OFFICE O/P EST LOW 20 MIN: CPT | Performed by: PODIATRIST

## 2025-04-29 RX ORDER — LIDOCAINE 40 MG/G
CREAM TOPICAL ONCE
Status: COMPLETED | OUTPATIENT
Start: 2025-04-29 | End: 2025-04-29

## 2025-04-29 RX ADMIN — LIDOCAINE: 40 CREAM TOPICAL at 08:39

## 2025-04-29 NOTE — PROGRESS NOTES
Patient ID: Jason Blanca is a 34 y.o. male Date of Birth 1990     Diagnosis:  1. Diabetic ulcer of toe of right foot associated with type 2 diabetes mellitus, with fat layer exposed (HCC)  -     lidocaine (LMX) 4 % cream  -     Wound cleansing and dressings Diabetic Ulcer (wound ulcer) Anterior;Right Toe D1, great; Future  -     Wound Procedure Treatment Diabetic Ulcer (wound ulcer) Anterior;Right Toe D1, great     Diagnosis ICD-10-CM Associated Orders   1. Diabetic ulcer of toe of right foot associated with type 2 diabetes mellitus, with fat layer exposed (HCC)  E11.621 lidocaine (LMX) 4 % cream    L97.512 Wound cleansing and dressings Diabetic Ulcer (wound ulcer) Anterior;Right Toe D1, great     Wound Procedure Treatment Diabetic Ulcer (wound ulcer) Anterior;Right Toe D1, great           Assessment & Plan:  Wound is almost healed.  Continue wound dressing gauze.  Return to clinic 2 weeks  I again tried to demonstrate with the patient the appropriate way to weight-bear on his heel in a Darco wedge shoe.  He still continues to take full strides in the wedge shoe which does increase pressure on the toe.  Thankfully the toe is healing.    Chief Complaint   Patient presents with   • Follow Up Wound Care Visit     Follow up visit for wound to right foot.  Pt denies any issues or concerns since last visit.            Subjective:   Follow-up diabetic foot wound great toe right foot.  Patient still witnessed walking in and out of wound care putting weight on the front of his foot.  He is wearing the wedge shoe but he is not keeping his right foot extended out in front of him.  Even when I demonstrated this to him he continued to walking appropriately.  Thankfully he has quit smoking.  He states his blood sugar has been controlled.        The following portions of the patient's history were reviewed and updated as appropriate:   Patient Active Problem List   Diagnosis   • Type 2 diabetes mellitus with  hyperglycemia, with neuropathic pain   • BMI 31.0-31.9,adult   • Recurrent major depressive disorder, in remission (HCC)   • Hypogonadism in male   • Painful and cold lower extremity   • PAD (peripheral artery disease) (HCC)   • Excessive sweating   • ADHD, predominantly inattentive type   • Erectile dysfunction of nonorganic origin   • Fatty liver   • Mixed dyslipidemia   • Vitamin D deficiency   • Axonal sensorimotor neuropathy   • Diabetic polyneuropathy associated with type 2 diabetes mellitus (HCC)   • Witnessed episode of apnea   • Right thyroid nodule   • MDD (major depressive disorder), recurrent severe, without psychosis (HCC)   • Nonhealing wound of right great toe     Past Medical History:   Diagnosis Date   • Anxiety    • Depression    • Diabetes mellitus (HCC)    • Fatty liver    • Obesity      Past Surgical History:   Procedure Laterality Date   • HERNIA REPAIR       Social History     Socioeconomic History   • Marital status: Single     Spouse name: Not on file   • Number of children: Not on file   • Years of education: Not on file   • Highest education level: Not on file   Occupational History   • Not on file   Tobacco Use   • Smoking status: Former     Current packs/day: 0.00     Types: Cigarettes     Quit date: 2018     Years since quittin.3   • Smokeless tobacco: Never   • Tobacco comments:     Uses vape   Vaping Use   • Vaping status: Every Day   • Substances: Nicotine   Substance and Sexual Activity   • Alcohol use: Not Currently     Comment: social   • Drug use: Yes     Types: Marijuana     Comment: medical card   • Sexual activity: Yes     Partners: Female   Other Topics Concern   • Not on file   Social History Narrative   • Not on file     Social Drivers of Health     Financial Resource Strain: Not on file   Food Insecurity: No Food Insecurity (2025)    Nursing - Inadequate Food Risk Classification    • Worried About Running Out of Food in the Last Year: Not on file    • Ran Out of  Food in the Last Year: Not on file    • Ran Out of Food in the Last Year: Never true   Transportation Needs: No Transportation Needs (2025)    Nursing - Transportation Risk Classification    • Lack of Transportation: Not on file    • Lack of Transportation: No   Physical Activity: Not on file   Stress: Not on file   Social Connections: Not on file   Intimate Partner Violence: Unknown (2025)    Nursing IPS    • Feels Physically and Emotionally Safe: Not on file    • Physically Hurt by Someone: Not on file    • Humiliated or Emotionally Abused by Someone: Not on file    • Physically Hurt by Someone: No    • Hurt or Threatened by Someone: No   Housing Stability: At Risk (2025)    Nursing: Inadequate Housing Risk Classification    • Has Housing: Not on file    • Worried About Losing Housing: Not on file    • Unable to Get Utilities: Not on file    • Unable to Pay for Housing in the Last Year: Yes    • Has Housin        Current Outpatient Medications:   •  amphetamine-dextroamphetamine (ADDERALL XR, 20MG,) 20 MG 24 hr capsule, Take 1 capsule (20 mg total) by mouth every morning Max Daily Amount: 20 mg, Disp: 30 capsule, Rfl: 0  •  Blood Glucose Monitoring Suppl (OneTouch Verio Reflect) w/Device KIT, Check blood sugars once daily. Please substitute with appropriate alternative as covered by patient's insurance. Dx: E11.65, Disp: 1 kit, Rfl: 0  •  buPROPion (WELLBUTRIN SR) 100 mg 12 hr tablet, Take 1 tablet (100 mg total) by mouth 2 (two) times a day, Disp: 60 tablet, Rfl: 5  •  [place] Choice Comfort EZ 33G X 4 MM MISC, USE WITH SOLOSTAR PEN DAILY., Disp: , Rfl:   •  Continuous Blood Gluc Sensor (FreeStyle Amber 3 Sensor) MISC, , Disp: , Rfl:   •  Empagliflozin-metFORMIN HCl ER (Synjardy XR) 12.5-1000 MG TB24, Take 2 tablets by mouth daily, Disp: 180 tablet, Rfl: 3  •  famotidine (PEPCID) 20 mg tablet, Take 1 tablet (20 mg total) by mouth 2 (two) times a day, Disp: 180 tablet, Rfl: 1  •  gabapentin  "(Neurontin) 600 MG tablet, Take 1 tablet (600 mg total) by mouth 3 (three) times a day, Disp: 90 tablet, Rfl: 0  •  glucose blood (OneTouch Verio) test strip, Check blood sugars once daily. Please substitute with appropriate alternative as covered by patient's insurance. Dx: E11.65, Disp: 100 each, Rfl: 3  •  ibuprofen (MOTRIN) 200 mg tablet, Take 400 mg by mouth every 6 (six) hours as needed for mild pain Has been taking twice daily, Disp: , Rfl:   •  Injection Device for Insulin (CeQur Simplicity 2U) LAUREN, Use 1 each every 3 (three) days, Disp: , Rfl:   •  Injection Device for Insulin (CeQur Simplicity 2U) LAUREN, Use 1 each every 3 (three) days, Disp: , Rfl:   •  Insulin Pen Needle (BD Pen Needle Ratna U/F) 32G X 4 MM MISC, Use daily as directed with insulin pen, Disp: 100 each, Rfl: 3  •  Isopropyl Alcohol (Pharmacist Choice Alcohol) 70 % MISC, Apply 200 Pads topically 3 (three) times a day, Disp: 200 each, Rfl: 5  •  mupirocin (BACTROBAN) 2 % ointment, Apply topically 2 (two) times a day, Disp: 22 g, Rfl: 0  •  NovoLOG 100 UNIT/ML injection, Inject 100 Units under the skin in the morning Via insuline pump, Disp: 30 mL, Rfl: 11  •  OneTouch Delica Lancets 33G MISC, Check blood sugars once daily. Please substitute with appropriate alternative as covered by patient's insurance. Dx: E11.65, Disp: 100 each, Rfl: 3  •  rosuvastatin (CRESTOR) 20 MG tablet, Take 1 tablet (20 mg total) by mouth daily, Disp: 30 tablet, Rfl: 0  •  SYRINGE-NEEDLE, DISP, 3 ML (Safety Syringes/Needle) 20G X 1-1/2\" 3 ML MISC, Use once a week for 12 doses, Disp: 12 each, Rfl: 1  •  tadalafil (CIALIS) 20 MG tablet, Take 1 tablet (20 mg total) by mouth daily as needed for erectile dysfunction, Disp: 30 tablet, Rfl: 3  •  Toujeo SoloStar 300 units/mL CONCENTRATED U-300 injection pen (1-unit dial), Inject 25 Units under the skin daily at bedtime, Disp: 7.5 mL, Rfl: 1  •  varenicline (Chantix Continuing Month Yash) 1 mg tablet, Take 1 tablet (1 mg " total) by mouth 2 (two) times a day, Disp: 60 tablet, Rfl: 1  •  varenicline (Chantix) 0.5 mg tablet, Take 1 tablet daily for 3 days then 1 tablet twice a day for 4 days., Disp: 11 tablet, Rfl: 0  •  Vitamin D, Ergocalciferol, 38271 units CAPS, Take 1 capsule by mouth once a week, Disp: , Rfl:   •  Xyosted 100 MG/0.5ML SOAJ, Inject 100 mg under the skin every 7 days (Patient not taking: Reported on 3/4/2025), Disp: , Rfl:   No current facility-administered medications for this visit.  Family History   Problem Relation Age of Onset   • Diabetes Mother    • Prostate cancer Paternal Grandfather       Review of Systems   Constitutional: Negative.    Gastrointestinal:  Negative for diarrhea, nausea and vomiting.   Musculoskeletal:  Negative for arthralgias and joint swelling.   Skin:  Positive for wound.     Allergies:  Amoxicillin, Augmentin [amoxicillin-pot clavulanate], and Sulfa antibiotics      Objective:  /80   Pulse 88   Temp (!) 96.8 °F (36 °C) (Tympanic)   Resp 16     Physical Exam  Vitals reviewed.   Cardiovascular:      Pulses: Normal pulses.           Dorsalis pedis pulses are 2+ on the right side.        Posterior tibial pulses are 2+ on the right side.   Musculoskeletal:         General: Deformity (Hallux limitus right foot) present. No tenderness.   Feet:      Right foot:      Skin integrity: Ulcer (Small pinpoint opening on ulcer.  No sign of infection.  Minimal drainage.) present.   Skin:     Capillary Refill: Capillary refill takes less than 2 seconds.      Findings: No erythema.   Neurological:      Mental Status: He is alert.      Sensory: Sensory deficit present.             Wound 02/16/25 Diabetic Ulcer Toe D1, great Anterior;Right (Active)   Wound Image   04/29/25 0834   Enter Neff score: Neff Grade 3: Deep abscess, OM, or joint sepsis 04/15/25 0904   Wound Description Epithelialization;Pink 04/29/25 0834   Non-staged Wound Description Full thickness 04/29/25 0834   Wound Length (cm)  0.1 cm 04/29/25 0834   Wound Width (cm) 0.1 cm 04/29/25 0834   Wound Depth (cm) 0.1 cm 04/29/25 0834   Wound Surface Area (cm^2) 0.01 cm^2 04/29/25 0834   Wound Volume (cm^3) 0.001 cm^3 04/29/25 0834   Calculated Wound Volume (cm^3) 0 cm^3 04/29/25 0834   Change in Wound Size % 100 04/29/25 0834   Drainage Amount Scant 04/29/25 0833   Drainage Description Yellow 04/29/25 0833   Temi-wound Assessment Callus 04/29/25 0834   Treatments Irrigation with NSS 04/29/25 0834   Wound packed? No 04/15/25 0904   Dressing Status Removed 04/29/25 0834                         Procedures             Wound Instructions:  Orders Placed This Encounter   Procedures   • Wound cleansing and dressings Diabetic Ulcer (wound ulcer) Anterior;Right Toe D1, great     Wash your hands with soap and water.  Remove old dressing, discard into plastic bag and place in trash.    Cleanse the wound with mild soap and water prior to applying a clean dressing.   Do not use tissue or cotton balls. Do not scrub the wound. Pat dry using gauze.     Shower, yes. Only with a cast cover. Do note get dressing wet. Wash right foot separately.         Right great toe wound:   Apply woun' dres to the wound bed.  Cover with gauze  Secure tape  Change dressing daily.      Wear front offloading shoe whenever ambulating. Remove for sleeping.        Please try to consume 3-4 servings of protein daily.   - Each serving of protein should contain about 30 mg protein.   - Good sources of protein include, lean meats (fish, chicken, etc), eggs, dairy products (yogurt,cheese), tofu,   legumes (chickpeas, lentils, peas, black beans), nuts (cashew, walnut, peanuts, etc), & quinoa.         Keep blood sugars under control.      Follow up at the wound center in two weeks.     Standing Status:   Future     Expiration Date:   5/6/2025   • Wound Procedure Treatment Diabetic Ulcer (wound ulcer) Anterior;Right Toe D1, great     This order was created via procedure documentation  "        Donal White DPM      Portions of the record may have been created with voice recognition software. Occasional wrong word or \"sound a like\" substitutions may have occurred due to the inherent limitations of voice recognition software. Read the chart carefully and recognize, using context, where substitutions have occurred.      "

## 2025-04-29 NOTE — PATIENT INSTRUCTIONS
Orders Placed This Encounter   Procedures    Wound cleansing and dressings Diabetic Ulcer (wound ulcer) Anterior;Right Toe D1, great     Wash your hands with soap and water.  Remove old dressing, discard into plastic bag and place in trash.    Cleanse the wound with mild soap and water prior to applying a clean dressing.   Do not use tissue or cotton balls. Do not scrub the wound. Pat dry using gauze.     Shower, yes. Only with a cast cover. Do note get dressing wet. Wash right foot separately.         Right great toe wound:   Apply woun' dres to the wound bed.  Cover with gauze  Secure tape  Change dressing daily.      Wear front offloading shoe whenever ambulating. Remove for sleeping.        Please try to consume 3-4 servings of protein daily.   - Each serving of protein should contain about 30 mg protein.   - Good sources of protein include, lean meats (fish, chicken, etc), eggs, dairy products (yogurt,cheese), tofu,   legumes (chickpeas, lentils, peas, black beans), nuts (cashew, walnut, peanuts, etc), & quinoa.         Keep blood sugars under control.      Follow up at the wound center in two weeks.     Standing Status:   Future     Expiration Date:   5/6/2025

## 2025-04-29 NOTE — PROGRESS NOTES
Wound Procedure Treatment Diabetic Ulcer (wound ulcer) Anterior;Right Toe D1, great    Performed by: Karla Holm RN  Authorized by: Donal White DPM  Associated wounds:   Wound 02/16/25 Diabetic Ulcer Toe D1, great Anterior;Right    Wound cleansed with:  NSS   Applied topical:  Woun'Dres   Applied secondary dressing:  Gauze   Dressing secured with:  Maddy and Tape

## 2025-05-13 ENCOUNTER — APPOINTMENT (OUTPATIENT)
Dept: LAB | Facility: HOSPITAL | Age: 35
End: 2025-05-13
Payer: COMMERCIAL

## 2025-05-13 ENCOUNTER — OFFICE VISIT (OUTPATIENT)
Dept: WOUND CARE | Facility: CLINIC | Age: 35
End: 2025-05-13
Payer: COMMERCIAL

## 2025-05-13 ENCOUNTER — TELEPHONE (OUTPATIENT)
Dept: PODIATRY | Facility: CLINIC | Age: 35
End: 2025-05-13

## 2025-05-13 ENCOUNTER — RESULTS FOLLOW-UP (OUTPATIENT)
Dept: FAMILY MEDICINE CLINIC | Facility: CLINIC | Age: 35
End: 2025-05-13

## 2025-05-13 ENCOUNTER — TELEPHONE (OUTPATIENT)
Age: 35
End: 2025-05-13

## 2025-05-13 VITALS
DIASTOLIC BLOOD PRESSURE: 86 MMHG | SYSTOLIC BLOOD PRESSURE: 132 MMHG | HEART RATE: 100 BPM | RESPIRATION RATE: 14 BRPM | TEMPERATURE: 96.8 F

## 2025-05-13 DIAGNOSIS — L97.512 DIABETIC ULCER OF TOE OF RIGHT FOOT ASSOCIATED WITH TYPE 2 DIABETES MELLITUS, WITH FAT LAYER EXPOSED (HCC): ICD-10-CM

## 2025-05-13 DIAGNOSIS — L97.512 DIABETIC ULCER OF TOE OF RIGHT FOOT ASSOCIATED WITH TYPE 2 DIABETES MELLITUS, WITH FAT LAYER EXPOSED (HCC): Primary | ICD-10-CM

## 2025-05-13 DIAGNOSIS — E11.621 DIABETIC ULCER OF TOE OF RIGHT FOOT ASSOCIATED WITH TYPE 2 DIABETES MELLITUS, WITH FAT LAYER EXPOSED (HCC): ICD-10-CM

## 2025-05-13 DIAGNOSIS — Z00.01 ENCOUNTER FOR GENERAL ADULT MEDICAL EXAMINATION WITH ABNORMAL FINDINGS: ICD-10-CM

## 2025-05-13 DIAGNOSIS — E11.621 DIABETIC ULCER OF TOE OF RIGHT FOOT ASSOCIATED WITH TYPE 2 DIABETES MELLITUS, WITH FAT LAYER EXPOSED (HCC): Primary | ICD-10-CM

## 2025-05-13 LAB
ALBUMIN SERPL BCG-MCNC: 4.7 G/DL (ref 3.5–5)
ALP SERPL-CCNC: 44 U/L (ref 34–104)
ALT SERPL W P-5'-P-CCNC: 34 U/L (ref 7–52)
ANION GAP SERPL CALCULATED.3IONS-SCNC: 7 MMOL/L (ref 4–13)
AST SERPL W P-5'-P-CCNC: 15 U/L (ref 13–39)
BASOPHILS # BLD AUTO: 0.05 THOUSANDS/ÂΜL (ref 0–0.1)
BASOPHILS NFR BLD AUTO: 1 % (ref 0–1)
BILIRUB SERPL-MCNC: 0.73 MG/DL (ref 0.2–1)
BUN SERPL-MCNC: 15 MG/DL (ref 5–25)
CALCIUM SERPL-MCNC: 9.9 MG/DL (ref 8.4–10.2)
CHLORIDE SERPL-SCNC: 100 MMOL/L (ref 96–108)
CO2 SERPL-SCNC: 30 MMOL/L (ref 21–32)
CREAT SERPL-MCNC: 0.94 MG/DL (ref 0.6–1.3)
CRP SERPL QL: 1.4 MG/L
EOSINOPHIL # BLD AUTO: 0.07 THOUSAND/ÂΜL (ref 0–0.61)
EOSINOPHIL NFR BLD AUTO: 1 % (ref 0–6)
ERYTHROCYTE [DISTWIDTH] IN BLOOD BY AUTOMATED COUNT: 13.7 % (ref 11.6–15.1)
ERYTHROCYTE [SEDIMENTATION RATE] IN BLOOD: 5 MM/HOUR (ref 0–14)
GFR SERPL CREATININE-BSD FRML MDRD: 104 ML/MIN/1.73SQ M
GLUCOSE SERPL-MCNC: 155 MG/DL (ref 65–140)
HCT VFR BLD AUTO: 52.8 % (ref 36.5–49.3)
HGB BLD-MCNC: 17.3 G/DL (ref 12–17)
IMM GRANULOCYTES # BLD AUTO: 0.03 THOUSAND/UL (ref 0–0.2)
IMM GRANULOCYTES NFR BLD AUTO: 1 % (ref 0–2)
LYMPHOCYTES # BLD AUTO: 1.36 THOUSANDS/ÂΜL (ref 0.6–4.47)
LYMPHOCYTES NFR BLD AUTO: 22 % (ref 14–44)
MCH RBC QN AUTO: 28.6 PG (ref 26.8–34.3)
MCHC RBC AUTO-ENTMCNC: 32.8 G/DL (ref 31.4–37.4)
MCV RBC AUTO: 87 FL (ref 82–98)
MONOCYTES # BLD AUTO: 0.48 THOUSAND/ÂΜL (ref 0.17–1.22)
MONOCYTES NFR BLD AUTO: 8 % (ref 4–12)
NEUTROPHILS # BLD AUTO: 4.21 THOUSANDS/ÂΜL (ref 1.85–7.62)
NEUTS SEG NFR BLD AUTO: 67 % (ref 43–75)
NRBC BLD AUTO-RTO: 0 /100 WBCS
PLATELET # BLD AUTO: 237 THOUSANDS/UL (ref 149–390)
PMV BLD AUTO: 10.2 FL (ref 8.9–12.7)
POTASSIUM SERPL-SCNC: 4.2 MMOL/L (ref 3.5–5.3)
PROT SERPL-MCNC: 7.4 G/DL (ref 6.4–8.4)
RBC # BLD AUTO: 6.04 MILLION/UL (ref 3.88–5.62)
SODIUM SERPL-SCNC: 137 MMOL/L (ref 135–147)
WBC # BLD AUTO: 6.2 THOUSAND/UL (ref 4.31–10.16)

## 2025-05-13 PROCEDURE — 99214 OFFICE O/P EST MOD 30 MIN: CPT | Performed by: PODIATRIST

## 2025-05-13 PROCEDURE — 80053 COMPREHEN METABOLIC PANEL: CPT

## 2025-05-13 PROCEDURE — 86140 C-REACTIVE PROTEIN: CPT

## 2025-05-13 PROCEDURE — 99212 OFFICE O/P EST SF 10 MIN: CPT | Performed by: PODIATRIST

## 2025-05-13 PROCEDURE — 85025 COMPLETE CBC W/AUTO DIFF WBC: CPT

## 2025-05-13 PROCEDURE — 36415 COLL VENOUS BLD VENIPUNCTURE: CPT

## 2025-05-13 PROCEDURE — 85652 RBC SED RATE AUTOMATED: CPT

## 2025-05-13 RX ORDER — LIDOCAINE 40 MG/G
CREAM TOPICAL ONCE
Status: COMPLETED | OUTPATIENT
Start: 2025-05-13 | End: 2025-05-13

## 2025-05-13 RX ADMIN — LIDOCAINE: 40 CREAM TOPICAL at 08:43

## 2025-05-13 NOTE — PROGRESS NOTES
Patient ID: Jason Blanca is a 34 y.o. male Date of Birth 1990     Diagnosis:  1. Diabetic ulcer of toe of right foot associated with type 2 diabetes mellitus, with fat layer exposed (HCC)  -     lidocaine (LMX) 4 % cream  -     Wound cleansing and dressings Diabetic Ulcer (wound ulcer) Anterior;Right Toe D1, great; Future  -     Wound Procedure Treatment Diabetic Ulcer (wound ulcer) Anterior;Right Toe D1, great  -     MRI foot/forefoot toes right wo contrast; Future; Expected date: 05/13/2025  -     CBC and differential; Future  -     Sedimentation rate, automated; Future  -     C-reactive protein; Future     Diagnosis ICD-10-CM Associated Orders   1. Diabetic ulcer of toe of right foot associated with type 2 diabetes mellitus, with fat layer exposed (HCC)  E11.621 lidocaine (LMX) 4 % cream    L97.512 Wound cleansing and dressings Diabetic Ulcer (wound ulcer) Anterior;Right Toe D1, great     Wound Procedure Treatment Diabetic Ulcer (wound ulcer) Anterior;Right Toe D1, great     MRI foot/forefoot toes right wo contrast     CBC and differential     Sedimentation rate, automated     C-reactive protein           Assessment & Plan:  Wound is not healing and there is more undermining. I am concerned he has chronic OM, recent XR was negative. MRI ordered. I will see next week and call him with MRI results. He may require surgery involving partial or significant toe amputation.   No cellulitis today, hold antibiotic for now.  I did order some blood work.  He is going to get that on his way out.  Depending on those results I may or may not begin on antibiotics as well as with the MRI.    Chief Complaint   Patient presents with   • Follow Up Wound Care Visit     Right great toe wound           Subjective:   Patient returns for wound care of his right great toe diabetic wound.  The wound has gotten larger and has been draining more.  He denies nausea fever vomiting or chills.  He is wearing his wedge  shoe.        The following portions of the patient's history were reviewed and updated as appropriate:   Patient Active Problem List   Diagnosis   • Type 2 diabetes mellitus with hyperglycemia, with neuropathic pain   • BMI 31.0-31.9,adult   • Recurrent major depressive disorder, in remission (HCC)   • Hypogonadism in male   • Painful and cold lower extremity   • PAD (peripheral artery disease) (HCC)   • Excessive sweating   • ADHD, predominantly inattentive type   • Erectile dysfunction of nonorganic origin   • Fatty liver   • Mixed dyslipidemia   • Vitamin D deficiency   • Axonal sensorimotor neuropathy   • Diabetic polyneuropathy associated with type 2 diabetes mellitus (HCC)   • Witnessed episode of apnea   • Right thyroid nodule   • MDD (major depressive disorder), recurrent severe, without psychosis (HCC)   • Nonhealing wound of right great toe     Past Medical History:   Diagnosis Date   • Anxiety    • Depression    • Diabetes mellitus (HCC)    • Fatty liver    • Obesity      Past Surgical History:   Procedure Laterality Date   • HERNIA REPAIR       Social History     Socioeconomic History   • Marital status: Single     Spouse name: Not on file   • Number of children: Not on file   • Years of education: Not on file   • Highest education level: Not on file   Occupational History   • Not on file   Tobacco Use   • Smoking status: Former     Current packs/day: 0.00     Types: Cigarettes     Quit date: 2018     Years since quittin.3   • Smokeless tobacco: Never   • Tobacco comments:     Uses vape   Vaping Use   • Vaping status: Every Day   • Substances: Nicotine   Substance and Sexual Activity   • Alcohol use: Not Currently     Comment: social   • Drug use: Yes     Types: Marijuana     Comment: medical card   • Sexual activity: Yes     Partners: Female   Other Topics Concern   • Not on file   Social History Narrative   • Not on file     Social Drivers of Health     Financial Resource Strain: Not on file   Food  Insecurity: No Food Insecurity (2025)    Nursing - Inadequate Food Risk Classification    • Worried About Running Out of Food in the Last Year: Not on file    • Ran Out of Food in the Last Year: Not on file    • Ran Out of Food in the Last Year: Never true   Transportation Needs: No Transportation Needs (2025)    Nursing - Transportation Risk Classification    • Lack of Transportation: Not on file    • Lack of Transportation: No   Physical Activity: Not on file   Stress: Not on file   Social Connections: Not on file   Intimate Partner Violence: Unknown (2025)    Nursing IPS    • Feels Physically and Emotionally Safe: Not on file    • Physically Hurt by Someone: Not on file    • Humiliated or Emotionally Abused by Someone: Not on file    • Physically Hurt by Someone: No    • Hurt or Threatened by Someone: No   Housing Stability: At Risk (2025)    Nursing: Inadequate Housing Risk Classification    • Has Housing: Not on file    • Worried About Losing Housing: Not on file    • Unable to Get Utilities: Not on file    • Unable to Pay for Housing in the Last Year: Yes    • Has Housin        Current Outpatient Medications:   •  amphetamine-dextroamphetamine (ADDERALL XR, 20MG,) 20 MG 24 hr capsule, Take 1 capsule (20 mg total) by mouth every morning Max Daily Amount: 20 mg, Disp: 30 capsule, Rfl: 0  •  Blood Glucose Monitoring Suppl (OneTouch Verio Reflect) w/Device KIT, Check blood sugars once daily. Please substitute with appropriate alternative as covered by patient's insurance. Dx: E11.65, Disp: 1 kit, Rfl: 0  •  buPROPion (WELLBUTRIN SR) 100 mg 12 hr tablet, Take 1 tablet (100 mg total) by mouth 2 (two) times a day, Disp: 60 tablet, Rfl: 5  •  Verdigre Choice Comfort EZ 33G X 4 MM MISC, USE WITH SOLOSTAR PEN DAILY., Disp: , Rfl:   •  Continuous Blood Gluc Sensor (FreeStyle Amber 3 Sensor) MISC, , Disp: , Rfl:   •  Empagliflozin-metFORMIN HCl ER (Synjardy XR) 12.5-1000 MG TB24, Take 2 tablets by  "mouth daily, Disp: 180 tablet, Rfl: 3  •  famotidine (PEPCID) 20 mg tablet, Take 1 tablet (20 mg total) by mouth 2 (two) times a day, Disp: 180 tablet, Rfl: 1  •  gabapentin (Neurontin) 600 MG tablet, Take 1 tablet (600 mg total) by mouth 3 (three) times a day, Disp: 90 tablet, Rfl: 0  •  glucose blood (OneTouch Verio) test strip, Check blood sugars once daily. Please substitute with appropriate alternative as covered by patient's insurance. Dx: E11.65, Disp: 100 each, Rfl: 3  •  ibuprofen (MOTRIN) 200 mg tablet, Take 400 mg by mouth every 6 (six) hours as needed for mild pain Has been taking twice daily, Disp: , Rfl:   •  Injection Device for Insulin (CeQur Simplicity 2U) LAUREN, Use 1 each every 3 (three) days, Disp: , Rfl:   •  Injection Device for Insulin (CeQur Simplicity 2U) LAUREN, Use 1 each every 3 (three) days, Disp: , Rfl:   •  Insulin Pen Needle (BD Pen Needle Ratna U/F) 32G X 4 MM MISC, Use daily as directed with insulin pen, Disp: 100 each, Rfl: 3  •  Isopropyl Alcohol (Pharmacist Choice Alcohol) 70 % MISC, Apply 200 Pads topically 3 (three) times a day, Disp: 200 each, Rfl: 5  •  mupirocin (BACTROBAN) 2 % ointment, Apply topically 2 (two) times a day, Disp: 22 g, Rfl: 0  •  NovoLOG 100 UNIT/ML injection, Inject 100 Units under the skin in the morning Via insuline pump, Disp: 30 mL, Rfl: 11  •  OneTouch Delica Lancets 33G MISC, Check blood sugars once daily. Please substitute with appropriate alternative as covered by patient's insurance. Dx: E11.65, Disp: 100 each, Rfl: 3  •  rosuvastatin (CRESTOR) 20 MG tablet, Take 1 tablet (20 mg total) by mouth daily, Disp: 30 tablet, Rfl: 0  •  SYRINGE-NEEDLE, DISP, 3 ML (Safety Syringes/Needle) 20G X 1-1/2\" 3 ML MISC, Use once a week for 12 doses, Disp: 12 each, Rfl: 1  •  tadalafil (CIALIS) 20 MG tablet, Take 1 tablet (20 mg total) by mouth daily as needed for erectile dysfunction, Disp: 30 tablet, Rfl: 3  •  Toujeo SoloStar 300 units/mL CONCENTRATED U-300 injection " pen (1-unit dial), Inject 25 Units under the skin daily at bedtime, Disp: 7.5 mL, Rfl: 1  •  varenicline (Chantix Continuing Month Pak) 1 mg tablet, Take 1 tablet (1 mg total) by mouth 2 (two) times a day, Disp: 60 tablet, Rfl: 1  •  varenicline (Chantix) 0.5 mg tablet, Take 1 tablet daily for 3 days then 1 tablet twice a day for 4 days., Disp: 11 tablet, Rfl: 0  •  Vitamin D, Ergocalciferol, 36118 units CAPS, Take 1 capsule by mouth once a week, Disp: , Rfl:   •  Xyosted 100 MG/0.5ML SOAJ, Inject 100 mg under the skin every 7 days (Patient not taking: Reported on 3/4/2025), Disp: , Rfl:   No current facility-administered medications for this visit.  Family History   Problem Relation Age of Onset   • Diabetes Mother    • Prostate cancer Paternal Grandfather       Review of Systems   Constitutional: Negative.    Respiratory:  Negative for shortness of breath.    Cardiovascular:  Negative for leg swelling.   Gastrointestinal:  Negative for constipation, diarrhea, nausea and vomiting.   Musculoskeletal:  Positive for joint swelling. Negative for arthralgias.   Skin:  Positive for color change and wound.   Neurological:  Positive for numbness.     Allergies:  Amoxicillin, Augmentin [amoxicillin-pot clavulanate], and Sulfa antibiotics      Objective:  /86   Pulse 100   Temp (!) 96.8 °F (36 °C)   Resp 14     Physical Exam  Vitals reviewed.   Cardiovascular:      Pulses: Normal pulses.           Dorsalis pedis pulses are 2+ on the right side.        Posterior tibial pulses are 2+ on the right side.   Pulmonary:      Effort: No respiratory distress.   Musculoskeletal:         General: Swelling present.      Right foot: No deformity.   Feet:      Right foot:      Skin integrity: Ulcer and skin breakdown present.   Skin:     Capillary Refill: Capillary refill takes less than 2 seconds.      Findings: No erythema.   Neurological:      Mental Status: He is alert and oriented to person, place, and time.      Sensory:  Sensory deficit present.             Wound 02/16/25 Diabetic Ulcer Toe D1, great Anterior;Right (Active)   Wound Image   05/13/25 0838   Enter Neff score: Neff Grade 3: Deep abscess, OM, or joint sepsis 05/13/25 0837   Wound Description Pink 05/13/25 0837   Non-staged Wound Description Full thickness 05/13/25 0837   Wound Length (cm) 0.2 cm 05/13/25 0837   Wound Width (cm) 0.2 cm 05/13/25 0837   Wound Depth (cm) 0.2 cm 05/13/25 0837   Wound Surface Area (cm^2) 0.04 cm^2 05/13/25 0837   Wound Volume (cm^3) 0.008 cm^3 05/13/25 0837   Calculated Wound Volume (cm^3) 0.01 cm^3 05/13/25 0837   Change in Wound Size % 85.71 05/13/25 0837   Number of underminings 1 05/13/25 0837   Undermining 1 0.3 05/13/25 0837   Undermining 1 is depth extending from 12-12 05/13/25 0837   Drainage Amount Scant 05/13/25 0837   Drainage Description Yellow 05/13/25 0837   Temi-wound Assessment Callus;Intact 05/13/25 0837   Treatments Irrigation with NSS 05/13/25 0837   Wound packed? No 05/13/25 0837   Dressing Status Removed 04/29/25 0834                         Procedures             Wound Instructions:  Orders Placed This Encounter   Procedures   • Wound cleansing and dressings Diabetic Ulcer (wound ulcer) Anterior;Right Toe D1, great     Wash your hands with soap and water.  Remove old dressing, discard into plastic bag and place in trash.    Cleanse the wound with mild soap and water prior to applying a clean dressing.   Do not use tissue or cotton balls. Do not scrub the wound. Pat dry using gauze.     Shower, yes. Only with a cast cover. Do note get dressing wet. Wash right foot separately.         Right great toe wound:   Apply woun' dres to the wound bed.  Cover with gauze  Secure tape  Change dressing daily.      Wear front offloading shoe whenever ambulating. Remove for sleeping.        Please try to consume 3-4 servings of protein daily.   - Each serving of protein should contain about 30 mg protein.   - Good sources of protein  "include, lean meats (fish, chicken, etc), eggs, dairy products (yogurt,cheese), tofu,   legumes (chickpeas, lentils, peas, black beans), nuts (cashew, walnut, peanuts, etc), & quinoa.         MRI of right great toe ordered. Please schedule appointment.       Follow up at the wound center in one week.     Standing Status:   Future     Expiration Date:   5/20/2025   • Wound Procedure Treatment Diabetic Ulcer (wound ulcer) Anterior;Right Toe D1, great     This order was created via procedure documentation   • MRI foot/forefoot toes right wo contrast     Standing Status:   Future     Expected Date:   5/13/2025     Expiration Date:   5/13/2029     Scheduling Instructions:      There is no preparation for this test. Please leave your jewelry and valuables at home, wedding rings are the exception. All patients will be required to change into a hospital gown and pants.  Street clothes are not permitted in the MRI.  Magnetic nail polish must be removed prior to arrival for your test. Please bring your insurance cards, a form of photo ID and a list of your medications with you. Arrive 15 minutes prior to your appointment time in order to register. Please bring any prior CT or MRI studies of this area that were not performed at a [facility].            To schedule this appointment, please contact Central Scheduling at [phone] or [phone].            Prior to your appointment, please make sure you complete the MRI Screening Form when you e-Check in for your appointment. This will be available starting 7 days before your appointment in [employer]. You may receive an e-mail with an activation code if you do not have a [employer] account. If you do not have access to a device, we will complete your screening at your appointment.     Reason for Exam:   suspect great toe osteomyelitis     For OP exams needed \"URGENT\", choose the appropriate timeframe below and call Central Scheduling at [phone]. No need to speak " "with a Radiologist.:   URGENT- within 1 week     What is the patient's sedation requirement?:   No Sedation     Does the patient need medication for Claustrophobia? If yes, order medication at this point.:   No     Does the patient wear a life vest, have an implanted cardiac device, a stimulation device, a sleep apnea stimulator, or a breast tissue expansion device?:   No     Release to patient through Mychart:   Immediate   • CBC and differential     This is a patient instruction: This test is non-fasting. Please drink two glasses of water morning of bloodwork.        Standing Status:   Future     Expiration Date:   7/13/2026   • Sedimentation rate, automated     Standing Status:   Future     Expiration Date:   7/13/2026   • C-reactive protein     Standing Status:   Future     Expiration Date:   7/13/2026         Donal White DPM      Portions of the record may have been created with voice recognition software. Occasional wrong word or \"sound a like\" substitutions may have occurred due to the inherent limitations of voice recognition software. Read the chart carefully and recognize, using context, where substitutions have occurred.      "

## 2025-05-13 NOTE — TELEPHONE ENCOUNTER
[facility]Quang [facility]TON    Doctor/Office: Dr White    #: [phone] - Direct Line to Quang      What needs to be faxed: The Authorization from [facility] that was faxed to [phone] on 5/13/25  The MRI is scheduled for 5/16/25    [facility]N [facility]: [facility]Quang    Fax#: [phone]

## 2025-05-13 NOTE — TELEPHONE ENCOUNTER
Caller: Quang     Doctor and/or Office: Qi BURGOS#:[phone]     Escalation: Care Asked If you  can  please fax the MRI auth to [phone] SH She said they faxed to the office instead to to radiology  Thank you

## 2025-05-13 NOTE — PATIENT INSTRUCTIONS
Orders Placed This Encounter   Procedures    Wound cleansing and dressings Diabetic Ulcer (wound ulcer) Anterior;Right Toe D1, great     Wash your hands with soap and water.  Remove old dressing, discard into plastic bag and place in trash.    Cleanse the wound with mild soap and water prior to applying a clean dressing.   Do not use tissue or cotton balls. Do not scrub the wound. Pat dry using gauze.     Shower, yes. Only with a cast cover. Do note get dressing wet. Wash right foot separately.         Right great toe wound:   Apply woun' dres to the wound bed.  Cover with gauze  Secure tape  Change dressing daily.      Wear front offloading shoe whenever ambulating. Remove for sleeping.        Please try to consume 3-4 servings of protein daily.   - Each serving of protein should contain about 30 mg protein.   - Good sources of protein include, lean meats (fish, chicken, etc), eggs, dairy products (yogurt,cheese), tofu,   legumes (chickpeas, lentils, peas, black beans), nuts (cashew, walnut, peanuts, etc), & quinoa.         MRI of right great toe ordered. Please schedule appointment.       Follow up at the wound center in one week.     Standing Status:   Future     Expiration Date:   5/20/2025

## 2025-05-13 NOTE — PROGRESS NOTES
Wound Procedure Treatment Diabetic Ulcer (wound ulcer) Anterior;Right Toe D1, great    Performed by: Julio Bustamante RN  Authorized by: Donal White DPM  Associated wounds:   Wound 02/16/25 Diabetic Ulcer Toe D1, great Anterior;Right    Wound cleansed with:  NSS   Applied topical:  Woun'Dres   Applied secondary dressing:  Gauze   Dressing secured with:  Tape   Offloading device appllied:  Forefoot relief shoe

## 2025-05-16 ENCOUNTER — HOSPITAL ENCOUNTER (OUTPATIENT)
Dept: MRI IMAGING | Facility: HOSPITAL | Age: 35
End: 2025-05-16
Attending: PODIATRIST
Payer: COMMERCIAL

## 2025-05-16 DIAGNOSIS — E11.621 DIABETIC ULCER OF TOE OF RIGHT FOOT ASSOCIATED WITH TYPE 2 DIABETES MELLITUS, WITH FAT LAYER EXPOSED (HCC): ICD-10-CM

## 2025-05-16 DIAGNOSIS — L97.512 DIABETIC ULCER OF TOE OF RIGHT FOOT ASSOCIATED WITH TYPE 2 DIABETES MELLITUS, WITH FAT LAYER EXPOSED (HCC): ICD-10-CM

## 2025-05-16 PROCEDURE — 73718 MRI LOWER EXTREMITY W/O DYE: CPT

## 2025-05-18 NOTE — RESULT ENCOUNTER NOTE
Dear Jason, The lab results indicate that your diabetes control has improved. Both your kidney and liver functions are normal, and your inflammatory markers are also within normal limits. However, your hemoglobin level has increased and is now at 17.3 g/dL, which is slightly above the upper limit of 17.0 g/dL. This increase may be due to your testosterone treatment or the use of empagliflozin. The next step is to repeat your hemoglobin test. Please reach out to your endocrinologist to discuss your options. Best regards,  Dr MORENO

## 2025-05-19 ENCOUNTER — TELEPHONE (OUTPATIENT)
Age: 35
End: 2025-05-19

## 2025-05-19 NOTE — TELEPHONE ENCOUNTER
Caller: [facility]     Doctor and/or Office: Dr. White     #: [phone] #3    Escalation: Care MRI results in Epic

## 2025-05-20 ENCOUNTER — OFFICE VISIT (OUTPATIENT)
Dept: WOUND CARE | Facility: CLINIC | Age: 35
End: 2025-05-20
Payer: COMMERCIAL

## 2025-05-20 VITALS
TEMPERATURE: 97.2 F | DIASTOLIC BLOOD PRESSURE: 88 MMHG | RESPIRATION RATE: 18 BRPM | SYSTOLIC BLOOD PRESSURE: 140 MMHG | HEART RATE: 100 BPM

## 2025-05-20 DIAGNOSIS — E11.621 DIABETIC ULCER OF TOE OF RIGHT FOOT ASSOCIATED WITH TYPE 2 DIABETES MELLITUS, WITH NECROSIS OF BONE (HCC): Primary | ICD-10-CM

## 2025-05-20 DIAGNOSIS — L97.514 DIABETIC ULCER OF TOE OF RIGHT FOOT ASSOCIATED WITH TYPE 2 DIABETES MELLITUS, WITH NECROSIS OF BONE (HCC): Primary | ICD-10-CM

## 2025-05-20 PROCEDURE — 97597 DBRDMT OPN WND 1ST 20 CM/<: CPT | Performed by: PODIATRIST

## 2025-05-20 PROCEDURE — 87186 SC STD MICRODIL/AGAR DIL: CPT | Performed by: PODIATRIST

## 2025-05-20 PROCEDURE — 87205 SMEAR GRAM STAIN: CPT | Performed by: PODIATRIST

## 2025-05-20 PROCEDURE — 87077 CULTURE AEROBIC IDENTIFY: CPT | Performed by: PODIATRIST

## 2025-05-20 PROCEDURE — 87070 CULTURE OTHR SPECIMN AEROBIC: CPT | Performed by: PODIATRIST

## 2025-05-20 PROCEDURE — 99214 OFFICE O/P EST MOD 30 MIN: CPT | Performed by: PODIATRIST

## 2025-05-20 RX ORDER — LIDOCAINE 40 MG/G
CREAM TOPICAL ONCE
Status: COMPLETED | OUTPATIENT
Start: 2025-05-20 | End: 2025-05-20

## 2025-05-20 RX ORDER — DOXYCYCLINE 100 MG/1
100 TABLET ORAL 2 TIMES DAILY
Qty: 84 TABLET | Refills: 0 | Status: SHIPPED | OUTPATIENT
Start: 2025-05-20 | End: 2025-07-01

## 2025-05-20 RX ADMIN — LIDOCAINE 1 APPLICATION: 40 CREAM TOPICAL at 08:25

## 2025-05-20 NOTE — PROGRESS NOTES
Patient ID: Jason Blanca is a 34 y.o. male Date of Birth 1990     Diagnosis:  1. Diabetic ulcer of toe of right foot associated with type 2 diabetes mellitus, with necrosis of bone (HCC)  -     Wound cleansing and dressings Diabetic Ulcer (wound ulcer) Anterior;Right Toe D1, great; Future  -     lidocaine (LMX) 4 % cream  -     Wound Procedure Treatment Diabetic Ulcer (wound ulcer) Anterior;Right Toe D1, great  -     doxycycline (ADOXA) 100 MG tablet; Take 1 tablet (100 mg total) by mouth 2 (two) times a day  -     Culture, tissue and Gram stain; Future; Expected date: Collect anytime  -     Culture, tissue and Gram stain     Diagnosis ICD-10-CM Associated Orders   1. Diabetic ulcer of toe of right foot associated with type 2 diabetes mellitus, with necrosis of bone (HCC)  E11.621 Wound cleansing and dressings Diabetic Ulcer (wound ulcer) Anterior;Right Toe D1, great    L97.514 lidocaine (LMX) 4 % cream     Wound Procedure Treatment Diabetic Ulcer (wound ulcer) Anterior;Right Toe D1, great     doxycycline (ADOXA) 100 MG tablet     Culture, tissue and Gram stain     Culture, tissue and Gram stain           Assessment & Plan:  I personally reviewed patient's MRI.  There does appear to be early osteomyelitis on the medial aspect of the interphalangeal joint of his great toe directly proximal to the wound.  I suspect this is an early osteomyelitis likely his ESR and sed rate are normal.  His white blood cell count is normal.  This is a minor infection at the moment.  We can try 6 weeks of oral antibiotics.  We discussed the benefit and risk.  We will check his blood work every 2 weeks while on the medication.  He is aware that if this fails he would likely require amputation of the toe.  Culture taken of wound, F/U results. Can tailor oral abx as needed. Previous infection was treated with doxy      Chief Complaint   Patient presents with    Follow Up Wound Care Visit           Subjective:   Patient returns  to go over his MRI of his toe.  The wound has not changed much.  He denies nausea fever vomiting chills.  He is still not smoking        The following portions of the patient's history were reviewed and updated as appropriate:   Problem List[1]  Past Medical History:   Diagnosis Date    Anxiety     Depression     Diabetes mellitus (HCC)     Fatty liver     Obesity      Past Surgical History:   Procedure Laterality Date    HERNIA REPAIR       Social History     Socioeconomic History    Marital status: Single     Spouse name: Not on file    Number of children: Not on file    Years of education: Not on file    Highest education level: Not on file   Occupational History    Not on file   Tobacco Use    Smoking status: Former     Current packs/day: 0.00     Types: Cigarettes     Quit date:      Years since quittin.3    Smokeless tobacco: Never    Tobacco comments:     Uses vape   Vaping Use    Vaping status: Every Day    Substances: Nicotine   Substance and Sexual Activity    Alcohol use: Not Currently     Comment: social    Drug use: Yes     Types: Marijuana     Comment: medical card    Sexual activity: Yes     Partners: Female   Other Topics Concern    Not on file   Social History Narrative    Not on file     Social Drivers of Health     Financial Resource Strain: Not on file   Food Insecurity: No Food Insecurity (2025)    Nursing - Inadequate Food Risk Classification     Worried About Running Out of Food in the Last Year: Not on file     Ran Out of Food in the Last Year: Not on file     Ran Out of Food in the Last Year: Never true   Transportation Needs: No Transportation Needs (2025)    Nursing - Transportation Risk Classification     Lack of Transportation: Not on file     Lack of Transportation: No   Physical Activity: Not on file   Stress: Not on file   Social Connections: Not on file   Intimate Partner Violence: Unknown (2025)    Nursing IPS     Feels Physically and Emotionally Safe: Not on  file     Physically Hurt by Someone: Not on file     Humiliated or Emotionally Abused by Someone: Not on file     Physically Hurt by Someone: No     Hurt or Threatened by Someone: No   Housing Stability: At Risk (2025)    Nursing: Inadequate Housing Risk Classification     Has Housing: Not on file     Worried About Losing Housing: Not on file     Unable to Get Utilities: Not on file     Unable to Pay for Housing in the Last Year: Yes     Has Housin      Current Medications[2]  Family History   Problem Relation Age of Onset    Diabetes Mother     Prostate cancer Paternal Grandfather       Review of Systems   Constitutional: Negative.    Gastrointestinal:  Negative for diarrhea, nausea and vomiting.   Musculoskeletal:  Positive for joint swelling.   Skin:  Positive for color change and wound.   Neurological:  Positive for numbness.     Allergies:  Amoxicillin, Augmentin [amoxicillin-pot clavulanate], and Sulfa antibiotics      Objective:  /88   Pulse 100   Temp (!) 97.2 °F (36.2 °C)   Resp 18     Physical Exam  Vitals reviewed.     Cardiovascular:      Pulses: Normal pulses.           Dorsalis pedis pulses are 2+ on the right side.     Musculoskeletal:         General: Deformity present.   Feet:      Right foot:      Skin integrity: Ulcer present. No erythema.     Skin:     Findings: No erythema.     Neurological:      Mental Status: He is alert.             Wound 25 Diabetic Ulcer Toe D1, great Anterior;Right (Active)   Wound Image   25   Enter Neff score: Neff Grade 3: Deep abscess, OM, or joint sepsis 25   Wound Description Pink;Epithelialization 25   Non-staged Wound Description Full thickness 25   Wound Length (cm) 0.2 cm 25   Wound Width (cm) 0.2 cm 25   Wound Depth (cm) 0.1 cm 25   Wound Surface Area (cm^2) 0.03 cm^2 25   Wound Volume (cm^3) 0.002 cm^3 25   Calculated Wound Volume  (cm^3) 0 cm^3 05/20/25 0826   Change in Wound Size % 100 05/20/25 0826   Number of underminings 1 05/13/25 0837   Undermining 1 0.3 05/13/25 0837   Undermining 1 is depth extending from 12-12 05/13/25 0837   Drainage Amount Scant 05/20/25 0826   Drainage Description Yellow 05/20/25 0826   Temi-wound Assessment Callus;Intact 05/20/25 0826   Treatments Irrigation with NSS 05/13/25 0837   Wound packed? No 05/13/25 0837   Dressing Status Removed 04/29/25 0834                         Debridement     Date/Time: 5/20/2025 8:15 AM    Universal Protocol:  Timeout called at: 5/20/2025 8:20 AM.  Patient understanding: patient states understanding of the procedure being performed  Patient identity confirmed: verbally with patient    Debridement Details  Performed by: physician  Debridement type: selective    Post-debridement measurements  Length (cm): 0.2  Width (cm): 0.2  Depth (cm): 0.1  Percent debrided: 10%  Surface Area (cm^2): 0.03  Area Debrided (cm^2): 0  Volume (cm^3): 0    Devitalized tissue debrided: biofilm and slough  Instrument(s) utilized: blade  Technique utilized: excisional  Bleeding: small  Hemostasis obtained with: not applicable  Procedural pain (0-10): 0  Post-procedural pain: 0                  Wound Instructions:  Orders Placed This Encounter   Procedures    Wound cleansing and dressings Diabetic Ulcer (wound ulcer) Anterior;Right Toe D1, great     Wound cleansing and dressings Diabetic Ulcer Anterior;Right Toe D1, great     Wash your hands with soap and water.  Remove old dressing, discard into plastic bag and place in trash.    Cleanse the wound with mild soap and water prior to applying a clean dressing.   Do not use tissue or cotton balls. Do not scrub the wound. Pat dry using gauze.     Shower, yes. Only with a cast cover. Do not get dressing wet. Wash right foot separately.      Right great toe wound:   Apply woun' dres to the wound bed.  Cover with gauze  Secure tape  Change dressing daily.     "  Wear front offloading shoe whenever ambulating. Remove for sleeping.        Please try to consume 3-4 servings of protein daily.   - Each serving of protein should contain about 30 mg protein.   - Good sources of protein include, lean meats (fish, chicken, etc), eggs, dairy products (yogurt, cheese), tofu,   legumes (chickpeas, lentils, peas, black beans), nuts (cashew, walnut, peanuts, etc), & quinoa.      Obtain antibiotics and take as prescribed       Follow up at the wound center in one week     Standing Status:   Future     Expiration Date:   5/27/2025    Wound Procedure Treatment Diabetic Ulcer (wound ulcer) Anterior;Right Toe D1, great     This order was created via procedure documentation    Culture, tissue and Gram stain     Standing Status:   Future     Number of Occurrences:   1     Expiration Date:   7/20/2026     Release to patient through [employer]:   Immediate         Donal White DPM      Portions of the record may have been created with voice recognition software. Occasional wrong word or \"sound a like\" substitutions may have occurred due to the inherent limitations of voice recognition software. Read the chart carefully and recognize, using context, where substitutions have occurred.          [1]   Patient Active Problem List  Diagnosis    Type 2 diabetes mellitus with hyperglycemia, with neuropathic pain    BMI 31.0-31.9,adult    Recurrent major depressive disorder, in remission (HCC)    Hypogonadism in male    Painful and cold lower extremity    PAD (peripheral artery disease) (HCC)    Excessive sweating    ADHD, predominantly inattentive type    Erectile dysfunction of nonorganic origin    Fatty liver    Mixed dyslipidemia    Vitamin D deficiency    Axonal sensorimotor neuropathy    Diabetic polyneuropathy associated with type 2 diabetes mellitus (HCC)    Witnessed episode of apnea    Right thyroid nodule    MDD (major depressive disorder), recurrent severe, without psychosis (HCC)    " Nonhealing wound of right great toe   [2]   Current Outpatient Medications:     doxycycline (ADOXA) 100 MG tablet, Take 1 tablet (100 mg total) by mouth 2 (two) times a day, Disp: 84 tablet, Rfl: 0    amphetamine-dextroamphetamine (ADDERALL XR, 20MG,) 20 MG 24 hr capsule, Take 1 capsule (20 mg total) by mouth every morning Max Daily Amount: 20 mg, Disp: 30 capsule, Rfl: 0    Blood Glucose Monitoring Suppl (OneTouch Verio Reflect) w/Device KIT, Check blood sugars once daily. Please substitute with appropriate alternative as covered by patient's insurance. Dx: E11.65, Disp: 1 kit, Rfl: 0    buPROPion (WELLBUTRIN SR) 100 mg 12 hr tablet, Take 1 tablet (100 mg total) by mouth 2 (two) times a day, Disp: 60 tablet, Rfl: 5    Jay Choice Comfort EZ 33G X 4 MM MISC, USE WITH SOLOSTAR PEN DAILY., Disp: , Rfl:     Continuous Blood Gluc Sensor (FreeStyle Amber 3 Sensor) MISC, , Disp: , Rfl:     Empagliflozin-metFORMIN HCl ER (Synjardy XR) 12.5-1000 MG TB24, Take 2 tablets by mouth daily, Disp: 180 tablet, Rfl: 3    famotidine (PEPCID) 20 mg tablet, Take 1 tablet (20 mg total) by mouth 2 (two) times a day, Disp: 180 tablet, Rfl: 1    gabapentin (Neurontin) 600 MG tablet, Take 1 tablet (600 mg total) by mouth 3 (three) times a day, Disp: 90 tablet, Rfl: 0    glucose blood (OneTouch Verio) test strip, Check blood sugars once daily. Please substitute with appropriate alternative as covered by patient's insurance. Dx: E11.65, Disp: 100 each, Rfl: 3    ibuprofen (MOTRIN) 200 mg tablet, Take 400 mg by mouth every 6 (six) hours as needed for mild pain Has been taking twice daily, Disp: , Rfl:     Injection Device for Insulin (CeQur Simplicity 2U) LAUREN, Use 1 each every 3 (three) days, Disp: , Rfl:     Injection Device for Insulin (CeQur Simplicity 2U) LAUREN, Use 1 each every 3 (three) days, Disp: , Rfl:     Insulin Pen Needle (BD Pen Needle Ratna U/F) 32G X 4 MM MISC, Use daily as directed with insulin pen, Disp: 100 each, Rfl: 3     "Isopropyl Alcohol (Pharmacist Choice Alcohol) 70 % MISC, Apply 200 Pads topically 3 (three) times a day, Disp: 200 each, Rfl: 5    mupirocin (BACTROBAN) 2 % ointment, Apply topically 2 (two) times a day, Disp: 22 g, Rfl: 0    NovoLOG 100 UNIT/ML injection, Inject 100 Units under the skin in the morning Via insuline pump, Disp: 30 mL, Rfl: 11    OneTouch Delica Lancets 33G MISC, Check blood sugars once daily. Please substitute with appropriate alternative as covered by patient's insurance. Dx: E11.65, Disp: 100 each, Rfl: 3    rosuvastatin (CRESTOR) 20 MG tablet, Take 1 tablet (20 mg total) by mouth daily, Disp: 30 tablet, Rfl: 0    SYRINGE-NEEDLE, DISP, 3 ML (Safety Syringes/Needle) 20G X 1-1/2\" 3 ML MISC, Use once a week for 12 doses, Disp: 12 each, Rfl: 1    tadalafil (CIALIS) 20 MG tablet, Take 1 tablet (20 mg total) by mouth daily as needed for erectile dysfunction, Disp: 30 tablet, Rfl: 3    Toujeo SoloStar 300 units/mL CONCENTRATED U-300 injection pen (1-unit dial), Inject 25 Units under the skin daily at bedtime, Disp: 7.5 mL, Rfl: 1    varenicline (Chantix Continuing Month Yash) 1 mg tablet, Take 1 tablet (1 mg total) by mouth 2 (two) times a day, Disp: 60 tablet, Rfl: 1    varenicline (Chantix) 0.5 mg tablet, Take 1 tablet daily for 3 days then 1 tablet twice a day for 4 days., Disp: 11 tablet, Rfl: 0    Vitamin D, Ergocalciferol, 05550 units CAPS, Take 1 capsule by mouth once a week, Disp: , Rfl:     Xyosted 100 MG/0.5ML SOAJ, Inject 100 mg under the skin every 7 days (Patient not taking: Reported on 3/4/2025), Disp: , Rfl:   No current facility-administered medications for this visit.    "

## 2025-05-20 NOTE — PATIENT INSTRUCTIONS
Orders Placed This Encounter   Procedures    Wound cleansing and dressings Diabetic Ulcer (wound ulcer) Anterior;Right Toe D1, great     Wound cleansing and dressings Diabetic Ulcer Anterior;Right Toe D1, great     Wash your hands with soap and water.  Remove old dressing, discard into plastic bag and place in trash.    Cleanse the wound with mild soap and water prior to applying a clean dressing.   Do not use tissue or cotton balls. Do not scrub the wound. Pat dry using gauze.     Shower, yes. Only with a cast cover. Do not get dressing wet. Wash right foot separately.      Right great toe wound:   Apply woun' dres to the wound bed.  Cover with gauze  Secure tape  Change dressing daily.      Wear front offloading shoe whenever ambulating. Remove for sleeping.        Please try to consume 3-4 servings of protein daily.   - Each serving of protein should contain about 30 mg protein.   - Good sources of protein include, lean meats (fish, chicken, etc), eggs, dairy products (yogurt, cheese), tofu,   legumes (chickpeas, lentils, peas, black beans), nuts (cashew, walnut, peanuts, etc), & quinoa.      Obtain antibiotics and take as prescribed       Follow up at the wound center in one week     Standing Status:   Future     Expiration Date:   5/27/2025

## 2025-05-20 NOTE — PROGRESS NOTES
Wound Procedure Treatment Diabetic Ulcer (wound ulcer) Anterior;Right Toe D1, great    Performed by: Suzie Winter RN  Authorized by: Donal White DPM  Associated wounds:   Wound 02/16/25 Diabetic Ulcer Toe D1, great Anterior;Right    Wound cleansed with:  NSS   Applied topical:  Woun'Dres   Applied secondary dressing:  Gauze   Dressing secured with:  Tape and Surgilast   Offloading device appllied:  Forefoot relief shoe

## 2025-05-22 LAB
BACTERIA TISS AEROBE CULT: ABNORMAL
GRAM STN SPEC: ABNORMAL
GRAM STN SPEC: ABNORMAL

## 2025-06-03 ENCOUNTER — OFFICE VISIT (OUTPATIENT)
Dept: WOUND CARE | Facility: CLINIC | Age: 35
End: 2025-06-03
Payer: COMMERCIAL

## 2025-06-03 VITALS
RESPIRATION RATE: 14 BRPM | HEART RATE: 92 BPM | DIASTOLIC BLOOD PRESSURE: 82 MMHG | TEMPERATURE: 95.7 F | SYSTOLIC BLOOD PRESSURE: 130 MMHG

## 2025-06-03 DIAGNOSIS — E11.621 DIABETIC ULCER OF TOE OF RIGHT FOOT ASSOCIATED WITH TYPE 2 DIABETES MELLITUS, WITH NECROSIS OF BONE (HCC): Primary | ICD-10-CM

## 2025-06-03 DIAGNOSIS — L97.514 DIABETIC ULCER OF TOE OF RIGHT FOOT ASSOCIATED WITH TYPE 2 DIABETES MELLITUS, WITH NECROSIS OF BONE (HCC): Primary | ICD-10-CM

## 2025-06-03 PROCEDURE — 99214 OFFICE O/P EST MOD 30 MIN: CPT | Performed by: PODIATRIST

## 2025-06-03 PROCEDURE — 11042 DBRDMT SUBQ TIS 1ST 20SQCM/<: CPT | Performed by: PODIATRIST

## 2025-06-03 RX ORDER — PERPHENAZINE 16 MG
1 TABLET ORAL 2 TIMES DAILY
COMMUNITY
Start: 2025-03-05

## 2025-06-03 RX ORDER — LIDOCAINE 40 MG/G
CREAM TOPICAL ONCE
Status: COMPLETED | OUTPATIENT
Start: 2025-06-03 | End: 2025-06-03

## 2025-06-03 RX ADMIN — LIDOCAINE: 40 CREAM TOPICAL at 08:26

## 2025-06-03 NOTE — PROGRESS NOTES
Patient ID: Jason Blanca is a 34 y.o. male Date of Birth 1990     Diagnosis:  1. Diabetic ulcer of toe of right foot associated with type 2 diabetes mellitus, with necrosis of bone (HCC)  -     CBC and differential; Future; Expected date: Collect anytime  -     Comprehensive metabolic panel; Future; Expected date: Collect anytime  -     Sedimentation rate, automated; Future; Expected date: Collect anytime  -     lidocaine (LMX) 4 % cream  -     Wound cleansing and dressings Diabetic Ulcer (wound ulcer) Anterior;Right Toe D1, great; Future  -     Wound Procedure Treatment Diabetic Ulcer (wound ulcer) Anterior;Right Toe D1, great  -     Debridement     Diagnosis ICD-10-CM Associated Orders   1. Diabetic ulcer of toe of right foot associated with type 2 diabetes mellitus, with necrosis of bone (HCC)  E11.621 CBC and differential    L97.514 Comprehensive metabolic panel     Sedimentation rate, automated     lidocaine (LMX) 4 % cream     Wound cleansing and dressings Diabetic Ulcer (wound ulcer) Anterior;Right Toe D1, great     Wound Procedure Treatment Diabetic Ulcer (wound ulcer) Anterior;Right Toe D1, great     Debridement           Assessment & Plan:  Patient tolerating oral doxycycline for OM. Will check his bloodwork while on antibiotics.   See wound debridement below  RTC 2 weeks  Crutches and wedge shoe  Remains high risk for amputation    Chief Complaint   Patient presents with   • Follow Up Wound Care Visit     Right great toe wound           Subjective:   Patient is on oral antibiotics for 4 weeks for early osteomyelitis of his right great toe.  He is tolerating the antibiotic well.  He is using crutches and is trying to stay off the foot.  He denies nausea fever vomiting chills diarrhea chest pain.        The following portions of the patient's history were reviewed and updated as appropriate:   Problem List[1]  Past Medical History[2]  Past Surgical History[3]  Social History[4]   Current  Medications[5]  Family History[6]   Review of Systems  As stated in HPI, otherwise normal    Medical History Reviewed by provider this encounter:  Tobacco  Allergies  Meds  Problems  Med Hx  Surg Hx  Fam Hx      Allergies:  Amoxicillin, Augmentin [amoxicillin-pot clavulanate], and Sulfa antibiotics      Objective:  /82   Pulse 92   Temp (!) 95.7 °F (35.4 °C)   Resp 14     Physical Exam  Vitals reviewed.     Cardiovascular:      Pulses:           Dorsalis pedis pulses are 2+ on the right side.        Posterior tibial pulses are 1+ on the right side.     Musculoskeletal:         General: Deformity present.      Right foot: Decreased range of motion (hallux limitus).   Feet:      Right foot:      Skin integrity: Ulcer present.     Skin:     Findings: No erythema.     Neurological:      Mental Status: He is alert.             Wound 02/16/25 Diabetic Ulcer Toe D1, great Anterior;Right (Active)   Wound Image   06/03/25 0819   Enter Neff score: Neff Grade 3: Deep abscess, OM, or joint sepsis 06/03/25 0817   Wound Description Pink 06/03/25 0817   Non-staged Wound Description Full thickness 06/03/25 0817   Wound Length (cm) 0.2 cm 06/03/25 0817   Wound Width (cm) 0.2 cm 06/03/25 0817   Wound Depth (cm) 0.3 cm 06/03/25 0817   Wound Surface Area (cm^2) 0.03 cm^2 06/03/25 0817   Wound Volume (cm^3) 0.006 cm^3 06/03/25 0817   Calculated Wound Volume (cm^3) 0.01 cm^3 06/03/25 0817   Change in Wound Size % 85.71 06/03/25 0817   Number of underminings 1 05/13/25 0837   Undermining 1 0.2 06/03/25 0817   Undermining 1 is depth extending from 12-12 06/03/25 0817   Drainage Amount Scant 06/03/25 0817   Drainage Description Yellow 06/03/25 0817   Temi-wound Assessment Intact 06/03/25 0817   Treatments Irrigation with NSS 06/03/25 0817   Wound packed? No 06/03/25 0817   Dressing Status Removed 04/29/25 0834                         Debridement     Date/Time: 6/3/2025 8:15 AM    Universal Protocol:  Consent: Verbal  "consent obtained  Risks and benefits: risks, benefits and alternatives were discussed  Consent given by: patient  Time out: Immediately prior to procedure a \"time out\" was called to verify the correct patient, procedure, equipment, support staff and site/side marked as required.  Timeout called at: 6/3/2025 8:20 AM.  Patient understanding: patient states understanding of the procedure being performed  Patient identity confirmed: verbally with patient    Debridement Details  Performed by: physician  Debridement type: surgical  Level of debridement: subcutaneous tissue  Pain control: lidocaine 4%    Post-debridement measurements  Length (cm): 0.5  Width (cm): 0.5  Depth (cm): 0.4  Percent debrided: 100%  Surface Area (cm^2): 0.2  Area Debrided (cm^2): 0.2  Volume (cm^3): 0.05    Tissue and other material debrided: dermis, epidermis and subcutaneous tissue  Devitalized tissue debrided: biofilm, callus, clots, exudate, fibrin, necrotic debris, slough and eschar  Instrument(s) utilized: blade  Technique utilized: excisional  Bleeding: medium  Hemostasis obtained with: pressure and silver nitrate  Procedural pain (0-10): 0  Post-procedural pain: 0   Response to treatment: procedure was tolerated well                 Wound Instructions:  Orders Placed This Encounter   Procedures   • Wound cleansing and dressings Diabetic Ulcer (wound ulcer) Anterior;Right Toe D1, great     Wash your hands with soap and water.  Remove old dressing, discard into plastic bag and place in trash.    Cleanse the wound with mild soap and water prior to applying a clean dressing.   Do not use tissue or cotton balls. Do not scrub the wound. Pat dry using gauze.     Shower, yes. Only with a cast cover. Do not get dressing wet. Wash right foot separately.      Right great toe wound:   STOP woun' dres.  Apply dermagran to the wound.   Cover with gauze  Secure tape  Change dressing three times a week.       Wear front offloading shoe whenever " "ambulating. Remove for sleeping.        Please try to consume 3-4 servings of protein daily.   - Each serving of protein should contain about 30 mg protein.   - Good sources of protein include, lean meats (fish, chicken, etc), eggs, dairy products (yogurt, cheese), tofu,   legumes (chickpeas, lentils, peas, black beans), nuts (cashew, walnut, peanuts, etc), & quinoa.          Follow up at the wound center in two weeks.     Standing Status:   Future     Expiration Date:   6/10/2025   • Wound Procedure Treatment Diabetic Ulcer (wound ulcer) Anterior;Right Toe D1, great     This order was created via procedure documentation   • Debridement     This order was created via procedure documentation   • CBC and differential     This is a patient instruction: This test is non-fasting. Please drink two glasses of water morning of bloodwork.        Standing Status:   Future     Expiration Date:   8/3/2026   • Comprehensive metabolic panel     This is a patient instruction: Patient fasting for 8 hours or longer recommended.     Standing Status:   Future     Expiration Date:   8/3/2026   • Sedimentation rate, automated     Standing Status:   Future     Expiration Date:   8/3/2026         oDnal White DPM      Portions of the record may have been created with voice recognition software. Occasional wrong word or \"sound a like\" substitutions may have occurred due to the inherent limitations of voice recognition software. Read the chart carefully and recognize, using context, where substitutions have occurred.             [1]  Patient Active Problem List  Diagnosis   • Type 2 diabetes mellitus with hyperglycemia, with neuropathic pain   • BMI 31.0-31.9,adult   • Recurrent major depressive disorder, in remission ([facility])   • Hypogonadism in male   • Painful and cold lower extremity   • PAD (peripheral artery disease) ([facility])   • Excessive sweating   • ADHD, predominantly inattentive type   • Erectile dysfunction of nonorganic " origin   • Fatty liver   • Mixed dyslipidemia   • Vitamin D deficiency   • Axonal sensorimotor neuropathy   • Diabetic polyneuropathy associated with type 2 diabetes mellitus (HCC)   • Witnessed episode of apnea   • Right thyroid nodule   • MDD (major depressive disorder), recurrent severe, without psychosis (HCC)   • Nonhealing wound of right great toe   [2]  Past Medical History:  Diagnosis Date   • Anxiety    • Depression    • Diabetes mellitus (HCC)    • Fatty liver    • Obesity    [3]  Past Surgical History:  Procedure Laterality Date   • HERNIA REPAIR     [4]  Social History  Socioeconomic History   • Marital status: Single   Tobacco Use   • Smoking status: Former     Current packs/day: 0.00     Types: Cigarettes     Quit date: 2018     Years since quittin.4   • Smokeless tobacco: Never   • Tobacco comments:     Uses vape   Vaping Use   • Vaping status: Every Day   • Substances: Nicotine   Substance and Sexual Activity   • Alcohol use: Not Currently     Comment: social   • Drug use: Yes     Types: Marijuana     Comment: medical card   • Sexual activity: Yes     Partners: Female     Social Drivers of Health     Food Insecurity: No Food Insecurity (2025)    Nursing - Inadequate Food Risk Classification    • Ran Out of Food in the Last Year: Never true   Transportation Needs: No Transportation Needs (2025)    Nursing - Transportation Risk Classification    • Lack of Transportation: No   Intimate Partner Violence: Unknown (2025)    Nursing IPS    • Physically Hurt by Someone: No    • Hurt or Threatened by Someone: No   Housing Stability: At Risk (2025)    Nursing: Inadequate Housing Risk Classification    • Unable to Pay for Housing in the Last Year: Yes    • Has Housin   [5]    Current Outpatient Medications:   •  amphetamine-dextroamphetamine (ADDERALL XR, 20MG,) 20 MG 24 hr capsule, Take 1 capsule (20 mg total) by mouth every morning Max Daily Amount: 20 mg, Disp: 30 capsule, Rfl:  0  •  Blood Glucose Monitoring Suppl (OneTouch Verio Reflect) w/Device KIT, Check blood sugars once daily. Please substitute with appropriate alternative as covered by patient's insurance. Dx: E11.65, Disp: 1 kit, Rfl: 0  •  buPROPion (WELLBUTRIN SR) 100 mg 12 hr tablet, Take 1 tablet (100 mg total) by mouth 2 (two) times a day, Disp: 60 tablet, Rfl: 5  •  [place] Choice Comfort EZ 33G X 4 MM MISC, USE WITH SOLOSTAR PEN DAILY., Disp: , Rfl:   •  Continuous Blood Gluc Sensor (FreeStyle Amber 3 Sensor) MISC, , Disp: , Rfl:   •  doxycycline (ADOXA) 100 MG tablet, Take 1 tablet (100 mg total) by mouth 2 (two) times a day, Disp: 84 tablet, Rfl: 0  •  Empagliflozin-metFORMIN HCl ER (Synjardy XR) 12.5-1000 MG TB24, Take 2 tablets by mouth daily, Disp: 180 tablet, Rfl: 3  •  famotidine (PEPCID) 20 mg tablet, Take 1 tablet (20 mg total) by mouth 2 (two) times a day, Disp: 180 tablet, Rfl: 1  •  gabapentin (Neurontin) 600 MG tablet, Take 1 tablet (600 mg total) by mouth 3 (three) times a day, Disp: 90 tablet, Rfl: 0  •  glucose blood (OneTouch Verio) test strip, Check blood sugars once daily. Please substitute with appropriate alternative as covered by patient's insurance. Dx: E11.65, Disp: 100 each, Rfl: 3  •  ibuprofen (MOTRIN) 200 mg tablet, Take 400 mg by mouth every 6 (six) hours as needed for mild pain Has been taking twice daily, Disp: , Rfl:   •  Injection Device for Insulin (CeQur Simplicity 2U) LAUREN, Use 1 each every 3 (three) days, Disp: , Rfl:   •  Injection Device for Insulin (CeQur Simplicity 2U) LAUREN, Use 1 each every 3 (three) days, Disp: , Rfl:   •  Insulin Pen Needle (BD Pen Needle Ratna U/F) 32G X 4 MM MISC, Use daily as directed with insulin pen, Disp: 100 each, Rfl: 3  •  Isopropyl Alcohol (Pharmacist Choice Alcohol) 70 % MISC, Apply 200 Pads topically 3 (three) times a day, Disp: 200 each, Rfl: 5  •  mupirocin (BACTROBAN) 2 % ointment, Apply topically 2 (two) times a day, Disp: 22 g, Rfl: 0  •  NovoLOG 100  "UNIT/ML injection, Inject 100 Units under the skin in the morning Via insuline pump, Disp: 30 mL, Rfl: 11  •  OneTouch Delica Lancets 33G MISC, Check blood sugars once daily. Please substitute with appropriate alternative as covered by patient's insurance. Dx: E11.65, Disp: 100 each, Rfl: 3  •  rosuvastatin (CRESTOR) 20 MG tablet, Take 1 tablet (20 mg total) by mouth daily, Disp: 30 tablet, Rfl: 0  •  SYRINGE-NEEDLE, DISP, 3 ML (Safety Syringes/Needle) 20G X 1-1/2\" 3 ML MISC, Use once a week for 12 doses, Disp: 12 each, Rfl: 1  •  tadalafil (CIALIS) 20 MG tablet, Take 1 tablet (20 mg total) by mouth daily as needed for erectile dysfunction, Disp: 30 tablet, Rfl: 3  •  Toujeo SoloStar 300 units/mL CONCENTRATED U-300 injection pen (1-unit dial), Inject 25 Units under the skin daily at bedtime, Disp: 7.5 mL, Rfl: 1  •  varenicline (Chantix Continuing Month Yash) 1 mg tablet, Take 1 tablet (1 mg total) by mouth 2 (two) times a day, Disp: 60 tablet, Rfl: 1  •  varenicline (Chantix) 0.5 mg tablet, Take 1 tablet daily for 3 days then 1 tablet twice a day for 4 days., Disp: 11 tablet, Rfl: 0  •  Vitamin D, Ergocalciferol, 11246 units CAPS, Take 1 capsule by mouth once a week, Disp: , Rfl:   •  Xyosted 100 MG/0.5ML SOAJ, Inject 100 mg under the skin every 7 days (Patient not taking: Reported on 3/4/2025), Disp: , Rfl:   No current facility-administered medications for this visit.[6]  Family History  Problem Relation Name Age of Onset   • Diabetes Mother     • Prostate cancer Paternal Grandfather     "

## 2025-06-03 NOTE — PATIENT INSTRUCTIONS
Orders Placed This Encounter   Procedures    Wound cleansing and dressings Diabetic Ulcer (wound ulcer) Anterior;Right Toe D1, great     Wash your hands with soap and water.  Remove old dressing, discard into plastic bag and place in trash.    Cleanse the wound with mild soap and water prior to applying a clean dressing.   Do not use tissue or cotton balls. Do not scrub the wound. Pat dry using gauze.     Shower, yes. Only with a cast cover. Do not get dressing wet. Wash right foot separately.      Right great toe wound:   STOP woun' dres.  Apply dermagran to the wound.   Cover with gauze  Secure tape  Change dressing daily.      Wear front offloading shoe whenever ambulating. Remove for sleeping.        Please try to consume 3-4 servings of protein daily.   - Each serving of protein should contain about 30 mg protein.   - Good sources of protein include, lean meats (fish, chicken, etc), eggs, dairy products (yogurt, cheese), tofu,   legumes (chickpeas, lentils, peas, black beans), nuts (cashew, walnut, peanuts, etc), & quinoa.          Follow up at the wound center in two weeks.     Standing Status:   Future     Expiration Date:   6/10/2025    CBC and differential     This is a patient instruction: This test is non-fasting. Please drink two glasses of water morning of bloodwork.        Standing Status:   Future     Expiration Date:   8/3/2026    Comprehensive metabolic panel     This is a patient instruction: Patient fasting for 8 hours or longer recommended.     Standing Status:   Future     Expiration Date:   8/3/2026    Sedimentation rate, automated     Standing Status:   Future     Expiration Date:   8/3/2026

## 2025-06-03 NOTE — PROGRESS NOTES
Wound Procedure Treatment Diabetic Ulcer (wound ulcer) Anterior;Right Toe D1, great    Performed by: Julio Bustamante RN  Authorized by: Donal White DPM  Associated wounds:   Wound 02/16/25 Diabetic Ulcer Toe D1, great Anterior;Right    Wound cleansed with:  NSS   Applied primary dressing:  Calcium alginate   Applied secondary dressing:  Gauze   Dressing secured with:  Tape   Offloading device appllied:  Surgical shoe

## 2025-06-04 ENCOUNTER — TELEMEDICINE (OUTPATIENT)
Dept: FAMILY MEDICINE CLINIC | Facility: CLINIC | Age: 35
End: 2025-06-04
Payer: COMMERCIAL

## 2025-06-04 DIAGNOSIS — Z87.891 FORMER SMOKER: ICD-10-CM

## 2025-06-04 DIAGNOSIS — Z79.4 TYPE 2 DIABETES MELLITUS WITH HYPERGLYCEMIA, WITH LONG-TERM CURRENT USE OF INSULIN (HCC): ICD-10-CM

## 2025-06-04 DIAGNOSIS — E66.3 OVERWEIGHT (BMI 25.0-29.9): ICD-10-CM

## 2025-06-04 DIAGNOSIS — E11.65 TYPE 2 DIABETES MELLITUS WITH HYPERGLYCEMIA, WITH LONG-TERM CURRENT USE OF INSULIN (HCC): ICD-10-CM

## 2025-06-04 DIAGNOSIS — Z11.3 SCREENING FOR STDS (SEXUALLY TRANSMITTED DISEASES): ICD-10-CM

## 2025-06-04 DIAGNOSIS — T14.8XXA NONHEALING NONSURGICAL WOUND: ICD-10-CM

## 2025-06-04 DIAGNOSIS — R61 EXCESSIVE SWEATING: ICD-10-CM

## 2025-06-04 DIAGNOSIS — F90.0 ADHD, PREDOMINANTLY INATTENTIVE TYPE: Primary | ICD-10-CM

## 2025-06-04 PROBLEM — R79.89 HIGH SERUM VITAMIN B12: Status: RESOLVED | Noted: 2025-03-04 | Resolved: 2025-06-04

## 2025-06-04 PROBLEM — N18.1 CKD (CHRONIC KIDNEY DISEASE) STAGE 1, GFR 90 ML/MIN OR GREATER: Status: ACTIVE | Noted: 2025-03-04

## 2025-06-04 PROBLEM — R80.9 POSITIVE FOR MICROALBUMINURIA: Status: ACTIVE | Noted: 2025-03-04

## 2025-06-04 PROCEDURE — 99214 OFFICE O/P EST MOD 30 MIN: CPT | Performed by: PHYSICIAN ASSISTANT

## 2025-06-04 RX ORDER — DEXTROAMPHETAMINE SACCHARATE, AMPHETAMINE ASPARTATE, DEXTROAMPHETAMINE SULFATE AND AMPHETAMINE SULFATE 2.5; 2.5; 2.5; 2.5 MG/1; MG/1; MG/1; MG/1
10 TABLET ORAL
Qty: 60 TABLET | Refills: 0 | Status: SHIPPED | OUTPATIENT
Start: 2025-06-04

## 2025-06-04 NOTE — ASSESSMENT & PLAN NOTE
Wt Readings from Last 3 Encounters:   03/04/25 88.5 kg (195 lb)   02/27/25 89.8 kg (198 lb)   02/26/25 95.3 kg (210 lb)     Intentional weight loss with diet and lifestyle management, encouraged to continue same.

## 2025-06-04 NOTE — ASSESSMENT & PLAN NOTE
Patient requesting to return to 10mg short acting as he does not take regularly and 20mg XR will cause difficulty sleeping at times. Last 30 day refill was on 4/11/25. Will switch back to 10mg BID as needed. Encouraged drug holiday with risk of tolerance and abuse.   Orders:  •  amphetamine-dextroamphetamine (ADDERALL, 10MG,) 10 mg tablet; Take 1 tablet (10 mg total) by mouth 2 (two) times a day Max Daily Amount: 20 mg

## 2025-06-04 NOTE — PROGRESS NOTES
Virtual Regular Visit  Name: Jason Blanca      : 1990      MRN: [id number]  Encounter Provider: Dominique Cooper PA-C  Encounter Date: 2025   Encounter department: [facility] CARE [facility]  :  Assessment & Plan  ADHD, predominantly inattentive type  Patient requesting to return to 10mg short acting as he does not take regularly and 20mg XR will cause difficulty sleeping at times. Last 30 day refill was on 25. Will switch back to 10mg BID as needed. Encouraged drug holiday with risk of tolerance and abuse.   Orders:  •  amphetamine-dextroamphetamine (ADDERALL, 10MG,) 10 mg tablet; Take 1 tablet (10 mg total) by mouth 2 (two) times a day Max Daily Amount: 20 mg    Nonhealing wound of right great toe  Continue doxycycline per wound care and podiatry. Discussed risk of photosensitivity with antibiotic use and encouraged continued follow-up. Reviewed wound care note from yesterday.       Screening for STDs (sexually transmitted diseases)  No new sexual partner but currently in divorce process and would like screening prior to engaging in new relationship.      Orders:  •  Hepatitis panel, acute; Future  •  HIV 1/2 AG/AB W REFLEX [facility] and [facility] only; Future  •  RPR (DX) W/REFL TITER AND CONFIRM TESTING (REFL); Future  •  Chlamydia/N. gonorrhoeae RNA, TMA; Future    Type 2 diabetes mellitus with hyperglycemia, with neuropathic pain  Not well-controlled, has been maintaining sugars on 2 units of short acting with each meal, stopped using Toujeo. Using CGM, recommend follow-up endo as scheduled for management of diabetes. Ozempic 0.25mg once weekly currently, plan to titrate dose. Continue Synjardy BID. Improved diet since last visit.   Lab Results   Component Value Date    HGBA1C 8.3 (H) 2025          Overweight (BMI 25.0-29.9)  Wt Readings from Last 3 Encounters:   25 88.5 kg (195 lb)   25 89.8 kg (198 lb)   25 95.3 kg (210 lb)     Intentional weight  loss with diet and lifestyle management, encouraged to continue same.        Excessive sweating  Discussed risks, benefits and alternatives for treatment, encouraged glucose control.        Former smoker  Quit smoking in April after taking Chantix, no longer taking and no cravings. Encouraged continued smoking cessation. Consider restart Chantix as needed.          History of Present Illness     Jason is a 34 y.o. male with a h/o diabetes on insulin, ADHD who presents for virtual visit requesting STD screening. R toe ongoing infection on antibiotic. Ongoing divorce, no high risk sexual activity or symptoms but wants screening ahead of time for new partner. Quit smoking about 2 months ago. No longer requiring Chantix, no vaping. Motivation to quit was to keep limbs with risk with diabetes. Difficulty sleeping with longer acting Adderall sometimes last 2 days, feels better when able to take as needed only.       Review of Systems   Constitutional:  Negative for fever and unexpected weight change.   Respiratory:  Negative for shortness of breath.    Cardiovascular:  Negative for chest pain.   Endocrine: Positive for heat intolerance.   Skin:  Positive for wound.       Objective   There were no vitals taken for this visit.    Physical Exam  Vitals reviewed.   Constitutional:       Appearance: Normal appearance.      Comments: Seated in 's seat of car parked, comfortable    HENT:      Head: Normocephalic and atraumatic.   Pulmonary:      Comments: No conversational dyspnea    Neurological:      Mental Status: He is alert and oriented to person, place, and time. Mental status is at baseline.         Administrative Statements   Encounter provider Dominique Cooper PA-C    The Patient is located at Other (in car) and in the following state in which I hold an active license [place].    The patient was identified by name and date of birth. Jason Blanca was informed that this is a telemedicine visit and that the  visit is being conducted through the Epic Embedded platform. He agrees to proceed..  My office door was closed. No one else was in the room.  He acknowledged consent and understanding of privacy and security of the video platform. The patient has agreed to participate and understands they can discontinue the visit at any time.    I have spent a total time of 25 minutes in caring for this patient on the day of the visit/encounter including Prognosis, Risks and benefits of tx options, Instructions for management, Patient and family education, Importance of tx compliance, Risk factor reductions, Impressions, Counseling / Coordination of care, Documenting in the medical record, Reviewing/placing orders in the medical record (including tests, medications, and/or procedures), Obtaining or reviewing history  , and Communicating with other healthcare professionals , not including the time spent for establishing the audio/video connection.

## 2025-06-04 NOTE — ASSESSMENT & PLAN NOTE
Not well-controlled, has been maintaining sugars on 2 units of short acting with each meal, stopped using Toujeo. Using CGM, recommend follow-up endo as scheduled for management of diabetes. Ozempic 0.25mg once weekly currently, plan to titrate dose. Continue Synjardy BID. Improved diet since last visit.   Lab Results   Component Value Date    HGBA1C 8.3 (H) 01/13/2025

## 2025-06-04 NOTE — ASSESSMENT & PLAN NOTE
Continue doxycycline per wound care and podiatry. Discussed risk of photosensitivity with antibiotic use and encouraged continued follow-up. Reviewed wound care note from yesterday.

## 2025-06-04 NOTE — ASSESSMENT & PLAN NOTE
Quit smoking in April after taking Chantix, no longer taking and no cravings. Encouraged continued smoking cessation. Consider restart Chantix as needed.

## 2025-06-06 LAB
BASOPHILS # BLD AUTO: 28 CELLS/UL (ref 0–200)
BASOPHILS NFR BLD AUTO: 0.5 %
CREAT ?TM UR-SCNC: 93 UMOL/L
EOSINOPHIL # BLD AUTO: 90 CELLS/UL (ref 15–500)
EOSINOPHIL NFR BLD AUTO: 1.6 %
ERYTHROCYTE [DISTWIDTH] IN BLOOD BY AUTOMATED COUNT: 13.9 % (ref 11–15)
ERYTHROCYTE [SEDIMENTATION RATE] IN BLOOD BY WESTERGREN METHOD: 2 MM/H
EXT ALBUMIN URINE RANDOM: 2.3
HBA1C MFR BLD HPLC: 6.9 %
HCT VFR BLD AUTO: 51.3 % (ref 38.5–50)
HGB BLD-MCNC: 16.8 G/DL (ref 13.2–17.1)
LYMPHOCYTES # BLD AUTO: 1232 CELLS/UL (ref 850–3900)
LYMPHOCYTES NFR BLD AUTO: 22 %
MCH RBC QN AUTO: 29.4 PG (ref 27–33)
MCHC RBC AUTO-ENTMCNC: 32.7 G/DL (ref 32–36)
MCV RBC AUTO: 89.7 FL (ref 80–100)
MICROALBUMIN/CREAT UR: 25 MG/G{CREAT}
MONOCYTES # BLD AUTO: 398 CELLS/UL (ref 200–950)
MONOCYTES NFR BLD AUTO: 7.1 %
NEUTROPHILS # BLD AUTO: 3853 CELLS/UL (ref 1500–7800)
NEUTROPHILS NFR BLD AUTO: 68.8 %
PLATELET # BLD AUTO: 223 THOUSAND/UL (ref 140–400)
PMV BLD REES-ECKER: 10.9 FL (ref 7.5–12.5)
RBC # BLD AUTO: 5.72 MILLION/UL (ref 4.2–5.8)
WBC # BLD AUTO: 5.6 THOUSAND/UL (ref 3.8–10.8)

## 2025-06-07 LAB
C TRACH RRNA SPEC QL NAA+PROBE: NOT DETECTED
HAV IGM SERPL QL IA: NORMAL
HBV CORE IGM SERPL QL IA: NORMAL
HBV SURFACE AG SERPL QL IA: NORMAL
HCV AB SERPL QL IA: NORMAL
HIV 1+2 AB+HIV1 P24 AG SERPL QL IA: NORMAL
N GONORRHOEA RRNA SPEC QL NAA+PROBE: NOT DETECTED
RPR SER QL: NORMAL

## 2025-06-09 ENCOUNTER — RESULTS FOLLOW-UP (OUTPATIENT)
Dept: FAMILY MEDICINE CLINIC | Facility: CLINIC | Age: 35
End: 2025-06-09

## 2025-06-11 ENCOUNTER — RESULTS FOLLOW-UP (OUTPATIENT)
Dept: PODIATRY | Facility: CLINIC | Age: 35
End: 2025-06-11

## 2025-06-17 ENCOUNTER — OFFICE VISIT (OUTPATIENT)
Dept: WOUND CARE | Facility: CLINIC | Age: 35
End: 2025-06-17
Payer: COMMERCIAL

## 2025-06-17 VITALS
HEART RATE: 89 BPM | TEMPERATURE: 97.4 F | DIASTOLIC BLOOD PRESSURE: 75 MMHG | SYSTOLIC BLOOD PRESSURE: 137 MMHG | RESPIRATION RATE: 18 BRPM

## 2025-06-17 DIAGNOSIS — E11.621 DIABETIC ULCER OF TOE OF RIGHT FOOT ASSOCIATED WITH TYPE 2 DIABETES MELLITUS, WITH NECROSIS OF BONE (HCC): Primary | ICD-10-CM

## 2025-06-17 DIAGNOSIS — T25.111A: ICD-10-CM

## 2025-06-17 DIAGNOSIS — L97.514 DIABETIC ULCER OF TOE OF RIGHT FOOT ASSOCIATED WITH TYPE 2 DIABETES MELLITUS, WITH NECROSIS OF BONE (HCC): Primary | ICD-10-CM

## 2025-06-17 PROCEDURE — 99204 OFFICE O/P NEW MOD 45 MIN: CPT | Performed by: NURSE PRACTITIONER

## 2025-06-17 PROCEDURE — 99213 OFFICE O/P EST LOW 20 MIN: CPT | Performed by: NURSE PRACTITIONER

## 2025-06-17 PROCEDURE — 97597 DBRDMT OPN WND 1ST 20 CM/<: CPT | Performed by: NURSE PRACTITIONER

## 2025-06-17 RX ORDER — LIDOCAINE 40 MG/G
CREAM TOPICAL ONCE
Status: COMPLETED | OUTPATIENT
Start: 2025-06-17 | End: 2025-06-17

## 2025-06-17 RX ADMIN — LIDOCAINE: 40 CREAM TOPICAL at 08:54

## 2025-06-17 NOTE — PROGRESS NOTES
Wound Procedure Treatment Diabetic Ulcer (wound ulcer) Anterior;Right Toe D1, great    Performed by: Darlene Jay RN  Authorized by: MYAH Paige  Associated wounds:   Wound 02/16/25 Diabetic Ulcer Toe D1, great Anterior;Right    Wound cleansed with:  NSS   Applied primary dressing:  Collagen dressing   Applied secondary dressing:  Gauze   Dressing secured with:  Maddy and Tape   Offloading device appllied:  Other   Comments:  Stretch net

## 2025-06-17 NOTE — PROGRESS NOTES
Wound Procedure Treatment Right;Medial Ankle    Performed by: Darlene Jay RN  Authorized by: MYAH Paige  Associated wounds:   Wound 06/17/25 Burn Thermal Ankle Right;Medial    Applied to periwound:  Skin prep   Applied primary dressing:  Hydrocolloid

## 2025-06-17 NOTE — PATIENT INSTRUCTIONS
Orders Placed This Encounter   Procedures    Wound cleansing and dressings Diabetic Ulcer (wound ulcer) Anterior;Right Toe D1, great     Wound cleansing and dressings:   Diabetic Ulcer (wound ulcer) Right Great Toe   Wash your hands with soap and water.   Remove old dressing, discard into plastic bag and place in trash.   Cleanse the wound with mild soap and water ( NO DEODORANT SOAP) prior to applying a clean dressing.   Do not use tissue or cotton balls. Do not scrub the wound. Pat dry using gauze. Shower, yes.     Right great toe wound: STOP dermagran.   Cleanse wound as above and rinse with saline   Dry around wound and leave wound bed WET   Apply thin strip puracol to depth of wound (as shown)  Apply small drop of woun'dres over puracol Cover with gauze   Secure tape and stretch net   Change dressing three times a week.   Wear forefoot offloading shoe whenever ambulating. Remove for sleeping.   Please use crutches to offload pressure from foot at all times when ambulating   Limit walking without crutches and forefoot offloading shoe     Continue try to consume 3-4 servings of protein daily. - Each serving of protein should contain about 30 mg protein. - Good sources of protein include, lean meats (fish, chicken, etc), eggs, dairy products (yogurt, cheese), tofu, legumes (chickpeas, lentils, peas, black beans), nuts (cashew, walnut, peanuts, etc), & quinoa.     Follow up at the wound center with Dr. White in two weeks     Standing Status:   Future     Expiration Date:   6/24/2025      Orders Placed This Encounter   Procedures    Wound cleansing and dressings Diabetic Ulcer (wound ulcer) Anterior;Right Toe D1, great     Wound cleansing and dressings:   Diabetic Ulcer (wound ulcer) Right Great Toe   Wash your hands with soap and water.   Remove old dressing, discard into plastic bag and place in trash.   Cleanse the wound with mild soap and water ( NO DEODORANT SOAP) prior to applying a clean dressing.    Do not use tissue or cotton balls. Do not scrub the wound. Pat dry using gauze. Shower, yes.     Right great toe wound: STOP dermagran.   Cleanse wound as above and rinse with saline   Dry around wound and leave wound bed WET   Apply thin strip puracol to depth of wound (as shown)  Apply small drop of woun'dres over puracol Cover with gauze   Secure tape and stretch net   Change dressing three times a week.   Wear forefoot offloading shoe whenever ambulating. Remove for sleeping.   Please use crutches to offload pressure from foot at all times when ambulating   Limit walking without crutches and forefoot offloading shoe     Continue try to consume 3-4 servings of protein daily. - Each serving of protein should contain about 30 mg protein. - Good sources of protein include, lean meats (fish, chicken, etc), eggs, dairy products (yogurt, cheese), tofu, legumes (chickpeas, lentils, peas, black beans), nuts (cashew, walnut, peanuts, etc), & quinoa.     Follow up at the wound center with Dr. White in two weeks     Standing Status:   Future     Expiration Date:   6/24/2025    Wound cleansing and dressings Right;Medial Ankle     Right medial lower leg:   Cleanse wound with soap and water and rinse with saline   Apply skin prep to skin surrounding wound  Apply hydrocolloid to wound    Leave in place for 3-4 days and change every 3-4 days      Follow up Dr. Jim in 2 weeks     Standing Status:   Future     Expiration Date:   6/24/2025
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
## 2025-07-01 ENCOUNTER — OFFICE VISIT (OUTPATIENT)
Dept: WOUND CARE | Facility: CLINIC | Age: 35
End: 2025-07-01
Payer: COMMERCIAL

## 2025-07-01 VITALS
HEART RATE: 80 BPM | DIASTOLIC BLOOD PRESSURE: 72 MMHG | TEMPERATURE: 96.6 F | SYSTOLIC BLOOD PRESSURE: 125 MMHG | RESPIRATION RATE: 16 BRPM

## 2025-07-01 DIAGNOSIS — M00.9 SEPTIC ARTHRITIS OF INTERPHALANGEAL JOINT OF TOE OF RIGHT FOOT (HCC): ICD-10-CM

## 2025-07-01 DIAGNOSIS — T25.111A: ICD-10-CM

## 2025-07-01 DIAGNOSIS — L03.031 CELLULITIS OF GREAT TOE OF RIGHT FOOT: ICD-10-CM

## 2025-07-01 DIAGNOSIS — L97.514 DIABETIC ULCER OF TOE OF RIGHT FOOT ASSOCIATED WITH TYPE 2 DIABETES MELLITUS, WITH NECROSIS OF BONE (HCC): Primary | ICD-10-CM

## 2025-07-01 DIAGNOSIS — E11.621 DIABETIC ULCER OF TOE OF RIGHT FOOT ASSOCIATED WITH TYPE 2 DIABETES MELLITUS, WITH NECROSIS OF BONE (HCC): Primary | ICD-10-CM

## 2025-07-01 PROCEDURE — 87205 SMEAR GRAM STAIN: CPT | Performed by: PODIATRIST

## 2025-07-01 PROCEDURE — 87070 CULTURE OTHR SPECIMN AEROBIC: CPT | Performed by: PODIATRIST

## 2025-07-01 PROCEDURE — 99213 OFFICE O/P EST LOW 20 MIN: CPT | Performed by: PODIATRIST

## 2025-07-01 PROCEDURE — 87147 CULTURE TYPE IMMUNOLOGIC: CPT | Performed by: PODIATRIST

## 2025-07-01 PROCEDURE — 87077 CULTURE AEROBIC IDENTIFY: CPT | Performed by: PODIATRIST

## 2025-07-01 PROCEDURE — 87186 SC STD MICRODIL/AGAR DIL: CPT | Performed by: PODIATRIST

## 2025-07-01 PROCEDURE — 99215 OFFICE O/P EST HI 40 MIN: CPT | Performed by: PODIATRIST

## 2025-07-01 NOTE — PROGRESS NOTES
Wound Procedure Treatment Diabetic Ulcer (wound ulcer) Anterior;Right Toe D1, great    Performed by: Darlene Jay RN  Authorized by: Donal White DPM  Associated wounds:   Wound 02/16/25 Diabetic Ulcer Toe D1, great Anterior;Right    Wound cleansed with:  NSS   Applied topical:  Betadine   Applied secondary dressing:  Gauze   Dressing secured with:  Maddy and Tape   Offloading device appllied:  Forefoot relief shoe and Other   Comments:  Surginet over all  Crutches
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
## 2025-07-01 NOTE — PATIENT INSTRUCTIONS
Orders Placed This Encounter   Procedures    Wound cleansing and dressings Diabetic Ulcer (wound ulcer) Anterior;Right Toe D1, great     Wound cleansing and dressings Diabetic Ulcer (wound ulcer) Anterior;Right Toe D1, great   Wound cleansing and dressings: Diabetic Ulcer (wound ulcer) Right Great Toe   Wash your hands with soap and water. Remove old dressing, discard into plastic bag and place in trash.   Cleanse the wound with mild soap and water ( NO DEODORANT SOAP) prior to applying a clean dressing.   Do not use tissue or cotton balls. Do not scrub the wound. Pat dry using gauze.   Shower, yes.     Right great toe wound:   DO NOT GET WOUND WET  STOP Puracol and Woun'Dres  Paint wound with betadine in wound center and allow to dry  Cover with gauze   Secure tape and stretch net   Leave dressing in place until admitted to hospital  Please use crutches to offload pressure from foot at all times when ambulating   Limit walking without crutches and forefoot offloading shoe     Continue try to consume 3-4 servings of protein daily.   Each serving of protein should contain about 30 mg protein. - Good sources of protein include, lean meats (fish, chicken, etc), eggs, dairy products (yogurt, cheese), tofu, legumes (chickpeas, lentils, peas, black beans), nuts (cashew, walnut, peanuts, etc), & quinoa.     Please go to ED at [facility] ([address] [place]) tomorrow AM for admission to hospital for R great toe infection    Follow up at the wound center with Dr. White following discharge from hospital     Standing Status:   Future     Expiration Date:   7/8/2025    Wound cleansing and dressings Right;Medial Ankle     Wound cleansing and dressings     Right medial ankle  STOP hydrocolloid to wound  Apply moisturizing lotion to scabbed area 1-23 times per day      Follow up Dr. Jim following hospitalizationin 2 weeks     Standing Status:   Future     Expiration Date:   7/8/2025

## 2025-07-02 ENCOUNTER — ANESTHESIA EVENT (INPATIENT)
Dept: PERIOP | Facility: HOSPITAL | Age: 35
DRG: 253 | End: 2025-07-02
Payer: COMMERCIAL

## 2025-07-02 ENCOUNTER — APPOINTMENT (INPATIENT)
Dept: RADIOLOGY | Facility: HOSPITAL | Age: 35
DRG: 253 | End: 2025-07-02
Payer: COMMERCIAL

## 2025-07-02 ENCOUNTER — APPOINTMENT (INPATIENT)
Dept: NON INVASIVE DIAGNOSTICS | Facility: HOSPITAL | Age: 35
DRG: 253 | End: 2025-07-02
Payer: COMMERCIAL

## 2025-07-02 ENCOUNTER — HOSPITAL ENCOUNTER (INPATIENT)
Facility: HOSPITAL | Age: 35
LOS: 4 days | Discharge: HOME | DRG: 253 | End: 2025-07-06
Attending: EMERGENCY MEDICINE | Admitting: PODIATRIST
Payer: COMMERCIAL

## 2025-07-02 DIAGNOSIS — L03.031 CELLULITIS OF GREAT TOE OF RIGHT FOOT: ICD-10-CM

## 2025-07-02 DIAGNOSIS — E11.621 DIABETIC ULCER OF TOE OF RIGHT FOOT ASSOCIATED WITH TYPE 2 DIABETES MELLITUS, WITH NECROSIS OF BONE (HCC): ICD-10-CM

## 2025-07-02 DIAGNOSIS — I73.9 PAD (PERIPHERAL ARTERY DISEASE) (HCC): ICD-10-CM

## 2025-07-02 DIAGNOSIS — Z89.411 STATUS POST AMPUTATION OF RIGHT GREAT TOE (HCC): ICD-10-CM

## 2025-07-02 DIAGNOSIS — L08.9 TOE INFECTION: Primary | ICD-10-CM

## 2025-07-02 DIAGNOSIS — L97.514 DIABETIC ULCER OF TOE OF RIGHT FOOT ASSOCIATED WITH TYPE 2 DIABETES MELLITUS, WITH NECROSIS OF BONE (HCC): ICD-10-CM

## 2025-07-02 LAB
ALBUMIN SERPL BCG-MCNC: 4.2 G/DL (ref 3.5–5)
ALP SERPL-CCNC: 40 U/L (ref 34–104)
ALT SERPL W P-5'-P-CCNC: 16 U/L (ref 7–52)
ANION GAP SERPL CALCULATED.3IONS-SCNC: 7 MMOL/L (ref 4–13)
AST SERPL W P-5'-P-CCNC: 11 U/L (ref 13–39)
BASOPHILS # BLD AUTO: 0.05 THOUSANDS/ÂΜL (ref 0–0.1)
BASOPHILS NFR BLD AUTO: 1 % (ref 0–1)
BILIRUB DIRECT SERPL-MCNC: 0.06 MG/DL (ref 0–0.2)
BILIRUB SERPL-MCNC: 0.47 MG/DL (ref 0.2–1)
BUN SERPL-MCNC: 19 MG/DL (ref 5–25)
CALCIUM SERPL-MCNC: 9.3 MG/DL (ref 8.4–10.2)
CHLORIDE SERPL-SCNC: 103 MMOL/L (ref 96–108)
CO2 SERPL-SCNC: 27 MMOL/L (ref 21–32)
CREAT SERPL-MCNC: 0.75 MG/DL (ref 0.6–1.3)
CRP SERPL QL: 20 MG/L
EOSINOPHIL # BLD AUTO: 0.06 THOUSAND/ÂΜL (ref 0–0.61)
EOSINOPHIL NFR BLD AUTO: 1 % (ref 0–6)
ERYTHROCYTE [DISTWIDTH] IN BLOOD BY AUTOMATED COUNT: 13.4 % (ref 11.6–15.1)
ERYTHROCYTE [SEDIMENTATION RATE] IN BLOOD: 8 MM/HOUR (ref 0–14)
GFR SERPL CREATININE-BSD FRML MDRD: 118 ML/MIN/1.73SQ M
GLUCOSE SERPL-MCNC: 129 MG/DL (ref 65–140)
GLUCOSE SERPL-MCNC: 134 MG/DL (ref 65–140)
GLUCOSE SERPL-MCNC: 242 MG/DL (ref 65–140)
HCT VFR BLD AUTO: 44.3 % (ref 36.5–49.3)
HGB BLD-MCNC: 15.1 G/DL (ref 12–17)
IMM GRANULOCYTES # BLD AUTO: 0.02 THOUSAND/UL (ref 0–0.2)
IMM GRANULOCYTES NFR BLD AUTO: 0 % (ref 0–2)
LACTATE SERPL-SCNC: 0.7 MMOL/L (ref 0.5–2)
LYMPHOCYTES # BLD AUTO: 1.44 THOUSANDS/ÂΜL (ref 0.6–4.47)
LYMPHOCYTES NFR BLD AUTO: 23 % (ref 14–44)
MAGNESIUM SERPL-MCNC: 1.9 MG/DL (ref 1.9–2.7)
MCH RBC QN AUTO: 29.8 PG (ref 26.8–34.3)
MCHC RBC AUTO-ENTMCNC: 34.1 G/DL (ref 31.4–37.4)
MCV RBC AUTO: 87 FL (ref 82–98)
MONOCYTES # BLD AUTO: 0.45 THOUSAND/ÂΜL (ref 0.17–1.22)
MONOCYTES NFR BLD AUTO: 7 % (ref 4–12)
NEUTROPHILS # BLD AUTO: 4.38 THOUSANDS/ÂΜL (ref 1.85–7.62)
NEUTS SEG NFR BLD AUTO: 68 % (ref 43–75)
NRBC BLD AUTO-RTO: 0 /100 WBCS
PLATELET # BLD AUTO: 196 THOUSANDS/UL (ref 149–390)
PMV BLD AUTO: 10.9 FL (ref 8.9–12.7)
POTASSIUM SERPL-SCNC: 4.1 MMOL/L (ref 3.5–5.3)
PROT SERPL-MCNC: 6.8 G/DL (ref 6.4–8.4)
RBC # BLD AUTO: 5.07 MILLION/UL (ref 3.88–5.62)
SODIUM SERPL-SCNC: 137 MMOL/L (ref 135–147)
WBC # BLD AUTO: 6.4 THOUSAND/UL (ref 4.31–10.16)

## 2025-07-02 PROCEDURE — 82948 REAGENT STRIP/BLOOD GLUCOSE: CPT

## 2025-07-02 PROCEDURE — 87040 BLOOD CULTURE FOR BACTERIA: CPT | Performed by: EMERGENCY MEDICINE

## 2025-07-02 PROCEDURE — 93926 LOWER EXTREMITY STUDY: CPT

## 2025-07-02 PROCEDURE — 93005 ELECTROCARDIOGRAM TRACING: CPT

## 2025-07-02 PROCEDURE — 83605 ASSAY OF LACTIC ACID: CPT | Performed by: EMERGENCY MEDICINE

## 2025-07-02 PROCEDURE — 85025 COMPLETE CBC W/AUTO DIFF WBC: CPT | Performed by: EMERGENCY MEDICINE

## 2025-07-02 PROCEDURE — 99223 1ST HOSP IP/OBS HIGH 75: CPT | Performed by: SURGERY

## 2025-07-02 PROCEDURE — 83735 ASSAY OF MAGNESIUM: CPT | Performed by: EMERGENCY MEDICINE

## 2025-07-02 PROCEDURE — 85652 RBC SED RATE AUTOMATED: CPT | Performed by: EMERGENCY MEDICINE

## 2025-07-02 PROCEDURE — 99223 1ST HOSP IP/OBS HIGH 75: CPT | Performed by: PODIATRIST

## 2025-07-02 PROCEDURE — 93922 UPR/L XTREMITY ART 2 LEVELS: CPT | Performed by: SURGERY

## 2025-07-02 PROCEDURE — 80048 BASIC METABOLIC PNL TOTAL CA: CPT | Performed by: EMERGENCY MEDICINE

## 2025-07-02 PROCEDURE — 93925 LOWER EXTREMITY STUDY: CPT | Performed by: SURGERY

## 2025-07-02 PROCEDURE — 99222 1ST HOSP IP/OBS MODERATE 55: CPT | Performed by: INTERNAL MEDICINE

## 2025-07-02 PROCEDURE — 99285 EMERGENCY DEPT VISIT HI MDM: CPT

## 2025-07-02 PROCEDURE — 80076 HEPATIC FUNCTION PANEL: CPT | Performed by: EMERGENCY MEDICINE

## 2025-07-02 PROCEDURE — 99285 EMERGENCY DEPT VISIT HI MDM: CPT | Performed by: EMERGENCY MEDICINE

## 2025-07-02 PROCEDURE — 73630 X-RAY EXAM OF FOOT: CPT

## 2025-07-02 PROCEDURE — 86140 C-REACTIVE PROTEIN: CPT | Performed by: EMERGENCY MEDICINE

## 2025-07-02 PROCEDURE — 36415 COLL VENOUS BLD VENIPUNCTURE: CPT | Performed by: EMERGENCY MEDICINE

## 2025-07-02 RX ORDER — POLYETHYLENE GLYCOL 3350 17 G/17G
17 POWDER, FOR SOLUTION ORAL DAILY PRN
Status: DISCONTINUED | OUTPATIENT
Start: 2025-07-02 | End: 2025-07-06 | Stop reason: HOSPADM

## 2025-07-02 RX ORDER — PRAVASTATIN SODIUM 40 MG
40 TABLET ORAL
Status: DISCONTINUED | OUTPATIENT
Start: 2025-07-02 | End: 2025-07-06 | Stop reason: HOSPADM

## 2025-07-02 RX ORDER — GABAPENTIN 300 MG/1
600 CAPSULE ORAL 3 TIMES DAILY
Status: DISCONTINUED | OUTPATIENT
Start: 2025-07-02 | End: 2025-07-06 | Stop reason: HOSPADM

## 2025-07-02 RX ORDER — BUPROPION HYDROCHLORIDE 150 MG/1
150 TABLET ORAL DAILY
Status: DISCONTINUED | OUTPATIENT
Start: 2025-07-02 | End: 2025-07-06 | Stop reason: HOSPADM

## 2025-07-02 RX ORDER — CEFAZOLIN SODIUM 2 G/50ML
2000 SOLUTION INTRAVENOUS EVERY 8 HOURS
Status: DISPENSED | OUTPATIENT
Start: 2025-07-02 | End: 2025-07-06

## 2025-07-02 RX ORDER — INSULIN LISPRO 100 [IU]/ML
1-5 INJECTION, SOLUTION INTRAVENOUS; SUBCUTANEOUS
Status: DISCONTINUED | OUTPATIENT
Start: 2025-07-02 | End: 2025-07-06 | Stop reason: HOSPADM

## 2025-07-02 RX ORDER — HEPARIN SODIUM 5000 [USP'U]/ML
5000 INJECTION, SOLUTION INTRAVENOUS; SUBCUTANEOUS EVERY 8 HOURS SCHEDULED
Status: DISCONTINUED | OUTPATIENT
Start: 2025-07-02 | End: 2025-07-06 | Stop reason: HOSPADM

## 2025-07-02 RX ORDER — INSULIN LISPRO 100 [IU]/ML
2-12 INJECTION, SOLUTION INTRAVENOUS; SUBCUTANEOUS
Status: DISCONTINUED | OUTPATIENT
Start: 2025-07-02 | End: 2025-07-02

## 2025-07-02 RX ORDER — ACETAMINOPHEN 325 MG/1
650 TABLET ORAL EVERY 6 HOURS PRN
Status: DISCONTINUED | OUTPATIENT
Start: 2025-07-02 | End: 2025-07-06

## 2025-07-02 RX ADMIN — HEPARIN SODIUM 5000 UNITS: 5000 INJECTION INTRAVENOUS; SUBCUTANEOUS at 21:12

## 2025-07-02 RX ADMIN — CEFAZOLIN SODIUM 2000 MG: 2 SOLUTION INTRAVENOUS at 20:25

## 2025-07-02 RX ADMIN — ACETAMINOPHEN 650 MG: 325 TABLET ORAL at 21:12

## 2025-07-02 RX ADMIN — GABAPENTIN 600 MG: 300 CAPSULE ORAL at 21:12

## 2025-07-02 RX ADMIN — GABAPENTIN 600 MG: 300 CAPSULE ORAL at 17:05

## 2025-07-02 RX ADMIN — CEFAZOLIN SODIUM 2000 MG: 2 SOLUTION INTRAVENOUS at 13:21

## 2025-07-02 RX ADMIN — BUPROPION HYDROCHLORIDE 150 MG: 150 TABLET, EXTENDED RELEASE ORAL at 13:21

## 2025-07-02 RX ADMIN — SODIUM CHLORIDE 1000 ML: 0.9 INJECTION, SOLUTION INTRAVENOUS at 11:20

## 2025-07-02 RX ADMIN — HEPARIN SODIUM 5000 UNITS: 5000 INJECTION INTRAVENOUS; SUBCUTANEOUS at 13:21

## 2025-07-02 RX ADMIN — PRAVASTATIN SODIUM 40 MG: 40 TABLET ORAL at 17:05

## 2025-07-02 NOTE — ASSESSMENT & PLAN NOTE
"Lab Results   Component Value Date    HGBA1C 6.9 (H) 06/06/2025       No results for input(s): \"POCGLU\" in the last 72 hours.    Blood Sugar Average: Last 72 hrs:      "

## 2025-07-02 NOTE — ASSESSMENT & PLAN NOTE
Lab Results   Component Value Date    EGFR 118 07/02/2025    EGFR 118 06/06/2025    EGFR 104 05/13/2025    CREATININE 0.75 07/02/2025    CREATININE 0.81 06/06/2025    CREATININE 0.94 05/13/2025     Creatinine stable

## 2025-07-02 NOTE — ASSESSMENT & PLAN NOTE
Lab Results   Component Value Date    HGBA1C 6.9 (H) 06/06/2025       Recent Labs     07/02/25  1232 07/02/25  1635   POCGLU 129 242*       Blood Sugar Average: Last 72 hrs:  (P) 185.5    Continue insulin sliding scale  Pt may use his own blood glucose monitor   Hold empagliflozin-metformin   Hold ozempic   Monitor blood glucose and adjust insulin as needed

## 2025-07-02 NOTE — CONSULTS
Consultation - Vascular Surgery   Name: Jason Blanca 34 y.o. male I MRN: [id number]  Unit/Bed#: [place] [address] -02 I Date of Admission: 7/2/2025   Date of Service: 7/2/2025 I Hospital Day: 0   Inpatient consult to Vascular Surgery  Consult performed by: MYAH Leiva  Consult ordered by: Waldemar Michelle DPM        Physician Requesting Evaluation: Donal White DPM   Reason for Evaluation / Principal Problem: Right great toe osteomyelitis    Assessment & Plan  PAD (peripheral artery disease) (HCC)  Patient is a 34-year-old male, former smoker, with PMH HTN, HLD, peripheral neuropathy, and type II DM.  Patient presented to [facility] ED this morning upon recommendation by podiatry due to erythema, warmth, and tenderness noted to the right first MTPJ.  Patient has been following with podiatry for several months related to nonhealing right hallux wound.  Vascular surgery is consulted to evaluate peripheral perfusion and healing potential prior to planned right hallux amputation.    Imaging:  LEAD 7/2/2025:  > 75% stenosis of the right distal SARAY  R OCTAVIA: 1.58/147/65, L OCTAVIA: 1.29/118/78  MRI right foot 5/16/2025:  Small ulcer in the medial aspect of great toe with mild bone marrow edema in the head of proximal phalanx and base of distal phalanx, which may be related to reactive changes or early osteomyelitis.    Recommendations:  -Right MTPJ osteomyelitis in the setting of type II DM and PAD  -Nonpalpable DP/PT pulses bilaterally with a > 75% stenosis of the right distal SARAY on arterial duplex  -While great toe pressure is within healing range for a diabetic patient, patient would benefit from a right lower extremity arteriogram with possible intervention, 7/3/2025 with   -Recommend medical optimization with aspirin and pravastatin  -Recommend continued local wound care per podiatry recommendations  -Continue antibiotics per primary team  -Patient seen and evaluated with Dr. Boggs.  Vascular  surgery will continue to follow  Cellulitis of great toe of right foot    I have discussed the above management plan in detail with the primary service.   Vascular Surgery service will follow.  Please contact the SecureChat role for the Vascular Surgery service with any questions/concerns.    History of Present Illness   Jason Blanca is a 34 y.o. male, former smoker, with PMH HTN, HLD, peripheral neuropathy, and type II DM.  Patient presented to [facility] ED this morning upon recommendation by podiatry due to erythema, warmth, and tenderness noted to the right first MTPJ.  Patient has been following with podiatry for several months related to nonhealing right hallux wound.  Vascular surgery is consulted to evaluate peripheral perfusion and healing potential prior to planned right hallux amputation.    Patient reports that the wound to his right great toe developed approximately 7 months ago.  He reports that he developed a blister in this area when breaking in new work shoes and that it has not healed since.  Patient denies any prior history of nonhealing wounds to his bilateral feet.  He does note generalized aching to his bilateral lower extremities on a daily basis, however denies any claudication or rest pain.  Patient does note chronic peripheral neuropathy to his bilateral feet at his baseline for which he utilizes gabapentin 3 times daily.  He follows closely with endocrinology related to his diabetes and is on several SQ and oral antidiabetic medications.  Patient reports that redness and pain to his right great toe acutely worsened within the last 48 hours.  He was previously prescribed doxycycline by podiatry due to MRI from May 2025 noting possible early osteomyelitis in the head of proximal phalanx and base of distal phalanx of the right great toe.    Review of Systems   Constitutional:  Positive for activity change. Negative for chills and fever.   Musculoskeletal:  Positive for gait problem (related  to right foot pain).   Skin:  Positive for color change and wound.   Neurological:  Positive for numbness.     Medical History Review: I have reviewed the patient's PMH, PSH, Social History, Family History, Meds, and Allergies     Objective :  Temp:  [97.3 °F (36.3 °C)-98.3 °F (36.8 °C)] 97.7 °F (36.5 °C)  HR:  [77-82] 82  BP: (121-159)/(70-74) 121/74  Resp:  [16] 16  SpO2:  [94 %-100 %] 94 %  O2 Device: None (Room air)    I/O         06/30 0701  07/01 0700 07/01 0701  07/02 0700 07/02 0701  07/03 0700    P.O.   480    Total Intake(mL/kg)   480 (5.5)    Net   +480                   Physical Exam  Vitals and nursing note reviewed.   Constitutional:       General: He is not in acute distress.     Appearance: Normal appearance. He is well-developed.   HENT:      Head: Normocephalic and atraumatic.     Eyes:      Conjunctiva/sclera: Conjunctivae normal.       Cardiovascular:      Rate and Rhythm: Normal rate and regular rhythm.      Pulses:           Femoral pulses are 2+ on the right side and 2+ on the left side.       Dorsalis pedis pulses are detected w/ Doppler on the right side and detected w/ Doppler on the left side.        Posterior tibial pulses are detected w/ Doppler on the right side and detected w/ Doppler on the left side.      Heart sounds: Normal heart sounds. No murmur heard.  Pulmonary:      Effort: Pulmonary effort is normal. No respiratory distress.      Breath sounds: Normal breath sounds.     Musculoskeletal:         General: Tenderness (right foot) present. No swelling.      Cervical back: Normal range of motion and neck supple.     Skin:     General: Skin is warm and dry.      Capillary Refill: Capillary refill takes less than 2 seconds.      Findings: Wound present. No lesion or rash.     Neurological:      General: No focal deficit present.      Mental Status: He is alert and oriented to person, place, and time.     Psychiatric:         Mood and Affect: Mood normal.         Behavior: Behavior  normal.            Lab Results: I have reviewed the following results:  Recent Labs     07/02/25  1119   WBC 6.40   HGB 15.1   HCT 44.3      SODIUM 137   K 4.1      CO2 27   BUN 19   CREATININE 0.75   GLUC 134   MG 1.9   AST 11*   ALT 16   ALB 4.2   TBILI 0.47   ALKPHOS 40   LACTICACID 0.7

## 2025-07-02 NOTE — ASSESSMENT & PLAN NOTE
Leads greater than 75% stenosis of the right distal luis  Appreciate vascular recommendations- pt would benefit from agram with possible intervention- timing currently has not been deteremined  Continue asa and statin

## 2025-07-02 NOTE — QUICK NOTE
Vascular Attending Note:    Full consult to follow.  Briefly this is a 34-year-old gentleman who was admitted with chronic osteomyelitis of the right hallux.  Patient reports that the wound has been present for several months.  He follows with Dr. White from podiatry.  He recently quit smoking.  He is a longstanding type I diabetic.  Vascular surgery has been consulted to optimize perfusion prior to anticipated hallux amputation.  His noninvasive arterial studies suggest supranormal OCTAVIA at 1.58 on the right with metatarsal pressure 147 and great toe pressure 65 with normal PVR/PPG waveforms.  On exam he does not have palpable pulses.  I did discuss with the patient that given the duplex finding he does likely have sufficient perfusion to allow healing however he also is noted to have a greater than 75% distal anterior tibial artery stenosis.  Given his young age and underlying diabetes and nonurgent circumstances with respect to need for amputation I would favor proceeding with right lower extremity arteriogram with possible distal AT endovascular dimension if amenable in an effort to optimize his perfusion prior to amputation.  In the event that he develops wound deterioration and or any signs or symptoms concerning for sepsis may need to proceed with toe amputation for source control in a more expeditious fashion.  Timing of angiogram to be determined.  The above plan was directly communicated with Dr. White and is agreement to allow revascularization prior to amputation.

## 2025-07-02 NOTE — ASSESSMENT & PLAN NOTE
Lab Results   Component Value Date    EGFR 118 06/06/2025    EGFR 104 05/13/2025    EGFR 139 02/18/2025    CREATININE 0.81 06/06/2025    CREATININE 0.94 05/13/2025    CREATININE 0.51 (L) 02/18/2025   Per internal medicine
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
## 2025-07-02 NOTE — ASSESSMENT & PLAN NOTE
Patient is a 34-year-old male, former smoker, with PMH HTN, HLD, peripheral neuropathy, and type II DM.  Patient presented to [facility] ED this morning upon recommendation by podiatry due to erythema, warmth, and tenderness noted to the right first MTPJ.  Patient has been following with podiatry for several months related to nonhealing right hallux wound.  Vascular surgery is consulted to evaluate peripheral perfusion and healing potential prior to planned right hallux amputation.    Imaging:  LEAD 7/2/2025:  > 75% stenosis of the right distal SARAY  R OCTAVIA: 1.58/147/65, L OCTAVIA: 1.29/118/78  MRI right foot 5/16/2025:  Small ulcer in the medial aspect of great toe with mild bone marrow edema in the head of proximal phalanx and base of distal phalanx, which may be related to reactive changes or early osteomyelitis.    Recommendations:  -Right MTPJ osteomyelitis in the setting of type II DM and PAD  -Nonpalpable DP/PT pulses bilaterally with a > 75% stenosis of the right distal SARAY on arterial duplex  -While great toe pressure is within healing range for a diabetic patient, patient would benefit from a right lower extremity arteriogram with possible intervention, 7/3/2025 with   -Recommend medical optimization with aspirin and pravastatin  -Recommend continued local wound care per podiatry recommendations  -Continue antibiotics per primary team  -Patient seen and evaluated with Dr. Boggs.  Vascular surgery will continue to follow

## 2025-07-02 NOTE — CONSULTS
Consultation - Hospitalist   Name: Jason Blanca 34 y.o. male I MRN: [id number]  Unit/Bed#: [place] [address] -02 I Date of Admission: 7/2/2025   Date of Service: 7/2/2025 I Hospital Day: 0   Inpatient consult to Internal Medicine  Consult performed by: Esperanza Ramos DO  Consult ordered by: Crystal Dill DPM        Physician Requesting Evaluation: Donal White DPM   Reason for Evaluation / Principal Problem: pre op clearance     Assessment & Plan  Diabetic ulcer of toe of right foot associated with type 2 diabetes mellitus, with necrosis of bone (HCC)  Admitted by podiatry for right medial hallux wound with underling om and cellulitis   On ancef   Will require right hallux amputation- tbd timing as he also requires vascular intervention  Will check EKG preop. If wnl he is intermediate risk for or and may proceed   Type 2 diabetes mellitus with hyperglycemia, with neuropathic pain  Lab Results   Component Value Date    HGBA1C 6.9 (H) 06/06/2025       Recent Labs     07/02/25  1232 07/02/25  1635   POCGLU 129 242*       Blood Sugar Average: Last 72 hrs:  (P) 185.5    Continue insulin sliding scale  Pt may use his own blood glucose monitor   Hold empagliflozin-metformin   Hold ozempic   Monitor blood glucose and adjust insulin as needed  Recurrent major depressive disorder, in remission (HCC)  Continue wellbutrin  PAD (peripheral artery disease) (HCC)  Leads greater than 75% stenosis of the right distal luis  Appreciate vascular recommendations- pt would benefit from agram with possible intervention- timing currently has not been deteremined  Continue asa and statin   ADHD, predominantly inattentive type  On adderall outpt of 10mg bid   CKD (chronic kidney disease) stage 1, GFR 90 ml/min or greater  Lab Results   Component Value Date    EGFR 118 07/02/2025    EGFR 118 06/06/2025    EGFR 104 05/13/2025    CREATININE 0.75 07/02/2025    CREATININE 0.81 06/06/2025    CREATININE 0.94 05/13/2025      Creatinine stable   Cellulitis of great toe of right foot      History of Present Illness   Chief Complaint: cellulitis of right toe     Jason Blanca is a 34 y.o. male with a PMH of type 2 diabetes, ckd, pad who presents with ulcer and cellulitis of his right great toe. He states the wound has been present for about 7 months and he has been going to wound care. MRI showed om. He was told to go to the hospital per wound care. Pt denies cardiac history. He is able to ambulate greater than 1 city walk and up a flight of stairs. No cp no sob.     Review of Systems   Constitutional: Negative.    HENT: Negative.     Eyes: Negative.    Respiratory: Negative.     Cardiovascular: Negative.    Gastrointestinal: Negative.    Endocrine: Negative.    Genitourinary: Negative.    Musculoskeletal: Negative.    Skin:  Positive for color change and wound.   Allergic/Immunologic: Negative.    Neurological: Negative.    Hematological: Negative.    Psychiatric/Behavioral: Negative.         Historical Information   Past Medical History[1]  Past Surgical History[2]  Social History[3]  E-Cigarette/Vaping    E-Cigarette Use Current Every Day User      E-Cigarette/Vaping Substances    Nicotine Yes      Family history non-contributory  Social History:  Marital Status: Single     Meds/Allergies   I have reviewed home medications with patient personally.  Prior to Admission medications    Medication Sig Start Date End Date Taking? Authorizing Provider   Alpha-Lipoic Acid 600 MG CAPS Take 1 capsule by mouth 2 (two) times a day 3/5/25  Yes Historical Provider, MD   amphetamine-dextroamphetamine (ADDERALL, 10MG,) 10 mg tablet Take 1 tablet (10 mg total) by mouth 2 (two) times a day Max Daily Amount: 20 mg 6/4/25  Yes Dominique Cooper PA-C   Blood Glucose Monitoring Suppl (OneTouch Verio Reflect) w/Device KIT Check blood sugars once daily. Please substitute with appropriate alternative as covered by patient's insurance. Dx: E11.65 1/5/24   Yes Dominique Cooper PA-C   [place] Choice Comfort EZ 33G X 4 MM MISC  4/6/24  Yes Historical Provider, MD   Continuous Blood Gluc Sensor (FreeStyle Amber 3 Sensor) MISC  3/11/24  Yes Historical Provider, MD   Empagliflozin-metFORMIN HCl ER (Synjardy XR) 12.5-1000 MG TB24 Take 2 tablets by mouth daily 12/16/24 12/11/25 Yes Jaren Marquez MD   gabapentin (Neurontin) 600 MG tablet Take 1 tablet (600 mg total) by mouth 3 (three) times a day 12/16/24  Yes Jaren Marquez MD   glucose blood (OneTouch Verio) test strip Check blood sugars once daily. Please substitute with appropriate alternative as covered by patient's insurance. Dx: E11.65 1/5/24  Yes Dominique Cooper PA-C   Injection Device for Insulin (CeQur Simplicity 2U) LAUREN Use 1 each every 3 (three) days 4/8/24  Yes Historical Provider, MD   Injection Device for Insulin (CeQur Simplicity 2U) LAUREN Use 1 each every 3 (three) days 7/23/24  Yes Historical Provider, MD   Insulin Pen Needle (BD Pen Needle Ratna U/F) 32G X 4 MM MISC Use daily as directed with insulin pen 1/5/24  Yes Dominique Cooper PA-C   Isopropyl Alcohol (Pharmacist Choice Alcohol) 70 % MISC Apply 200 Pads topically 3 (three) times a day 1/5/24  Yes Dominique Cooper PA-C   mupirocin (BACTROBAN) 2 % ointment Apply topically 2 (two) times a day 2/26/25  Yes Brian Pino DPM   NovoLOG 100 UNIT/ML injection Inject 100 Units under the skin in the morning Via insuline pump 12/16/24 12/11/25 Yes Jaren Marquez MD   OneTouch Delica Lancets 33G MISC Check blood sugars once daily. Please substitute with appropriate alternative as covered by patient's insurance. Dx: E11.65 1/5/24  Yes Dominique Cooper PA-C   rosuvastatin (CRESTOR) 20 MG tablet Take 1 tablet (20 mg total) by mouth daily 12/16/24 12/16/25 Yes Jaren Marquez MD   tadalafil (CIALIS) 20 MG tablet Take 1 tablet (20 mg total) by mouth daily as needed for erectile dysfunction 2/11/25  Yes Jaren Marquez MD   Vitamin D, Ergocalciferol, 87560 units CAPS Take 1  "capsule by mouth once a week 4/8/24  Yes Historical Provider, MD   buPROPion (WELLBUTRIN SR) 100 mg 12 hr tablet Take 1 tablet (100 mg total) by mouth 2 (two) times a day 2/11/25 3/13/25  Jaren Marquez MD   doxycycline (ADOXA) 100 MG tablet Take 1 tablet (100 mg total) by mouth 2 (two) times a day 5/20/25 7/1/25  Donal White DPM   famotidine (PEPCID) 20 mg tablet Take 1 tablet (20 mg total) by mouth 2 (two) times a day  Patient taking differently: Take 20 mg by mouth 2 (two) times a day as needed for heartburn or indigestion 2/9/24 6/4/25  Dominique Cooper PA-C   semaglutide, 0.25 or 0.5 mg/dose, (Ozempic, 0.25 or 0.5 MG/DOSE,) 2 mg/3 mL injection pen Inject 0.5 mg under the skin every 7 days 3/5/25   Historical Provider, MD   SYRINGE-NEEDLE, DISP, 3 ML (Safety Syringes/Needle) 20G X 1-1/2\" 3 ML MISC Use once a week for 12 doses 2/28/24 5/16/24  MD Dhruv Lozadaujeo SoloStar 300 units/mL CONCENTRATED U-300 injection pen (1-unit dial) Inject 25 Units under the skin daily at bedtime  Patient not taking: No sig reported 12/16/24 12/11/25  Jaren Marquez MD   Xyosted 100 MG/0.5ML SOAJ Inject 100 mg under the skin every 7 days  Patient not taking: Reported on 3/4/2025 3/19/24   Historical Provider, MD     Allergies   Allergen Reactions    Amoxicillin Other (See Comments)     Other reaction(s): augmentin    Augmentin [Amoxicillin-Pot Clavulanate] Hives    Sulfa Antibiotics Swelling       Objective :  Temp:  [97.3 °F (36.3 °C)-98.3 °F (36.8 °C)] 97.7 °F (36.5 °C)  HR:  [77-82] 82  BP: (121-159)/(70-74) 121/74  Resp:  [16] 16  SpO2:  [94 %-100 %] 94 %  O2 Device: None (Room air)    Physical Exam  Constitutional:       Appearance: Normal appearance.   HENT:      Head: Normocephalic and atraumatic.     Eyes:      Extraocular Movements: Extraocular movements intact.      Pupils: Pupils are equal, round, and reactive to light.       Cardiovascular:      Rate and Rhythm: Normal rate and regular rhythm.      Heart " sounds: No murmur heard.     No friction rub. No gallop.   Pulmonary:      Effort: Pulmonary effort is normal. No respiratory distress.      Breath sounds: Normal breath sounds. No wheezing, rhonchi or rales.   Abdominal:      General: There is no distension.      Palpations: Abdomen is soft.      Tenderness: There is no abdominal tenderness. There is no guarding or rebound.     Skin:     Comments: Right foot bandage c/d/I     Neurological:      Mental Status: He is alert and oriented to person, place, and time.          Lines/Drains:        Lab Results: I have reviewed the following results:  Results from last 7 days   Lab Units 07/02/25  1119   WBC Thousand/uL 6.40   HEMOGLOBIN g/dL 15.1   HEMATOCRIT % 44.3   PLATELETS Thousands/uL 196   SEGS PCT % 68   LYMPHO PCT % 23   MONO PCT % 7   EOS PCT % 1     Results from last 7 days   Lab Units 07/02/25  1119   SODIUM mmol/L 137   POTASSIUM mmol/L 4.1   CHLORIDE mmol/L 103   CO2 mmol/L 27   BUN mg/dL 19   CREATININE mg/dL 0.75   ANION GAP mmol/L 7   CALCIUM mg/dL 9.3   ALBUMIN g/dL 4.2   TOTAL BILIRUBIN mg/dL 0.47   ALK PHOS U/L 40   ALT U/L 16   AST U/L 11*   GLUCOSE RANDOM mg/dL 134         Results from last 7 days   Lab Units 07/02/25  1635 07/02/25  1232   POC GLUCOSE mg/dl 242* 129     Lab Results   Component Value Date    HGBA1C 6.9 (H) 06/06/2025    HGBA1C 6.9 06/06/2025    HGBA1C 8.3 (H) 01/13/2025     Results from last 7 days   Lab Units 07/02/25  1119   LACTIC ACID mmol/L 0.7       Imaging Results Review: No pertinent imaging studies reviewed.  EKG pending          [1]   Past Medical History:  Diagnosis Date    Anxiety     Depression     Diabetes mellitus (HCC)     Fatty liver     Obesity    [2]   Past Surgical History:  Procedure Laterality Date    HERNIA REPAIR     [3]   Social History  Tobacco Use    Smoking status: Former     Current packs/day: 0.00     Average packs/day: 0.5 packs/day for 18.1 years (9.1 ttl pk-yrs)     Types: Cigarettes     Start date:       Quit date: 2025     Years since quittin.3    Smokeless tobacco: Never    Tobacco comments:     Uses vape   Vaping Use    Vaping status: Every Day    Substances: Nicotine   Substance and Sexual Activity    Alcohol use: Not Currently     Comment: social    Drug use: Yes     Types: Marijuana     Comment: medical card    Sexual activity: Yes     Partners: Female
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
## 2025-07-02 NOTE — PLAN OF CARE
Problem: PAIN - ADULT  Goal: Verbalizes/displays adequate comfort level or baseline comfort level  Description: Interventions:  - Encourage patient to monitor pain and request assistance  - Assess pain using appropriate pain scale  - Administer analgesics as ordered based on type and severity of pain and evaluate response  - Implement non-pharmacological measures as appropriate and evaluate response  - Consider cultural and social influences on pain and pain management  - Notify physician/advanced practitioner if interventions unsuccessful or patient reports new pain  - Educate patient/family on pain management process including their role and importance of  reporting pain   - Provide non-pharmacologic/complimentary pain relief interventions  Outcome: Progressing     Problem: INFECTION - ADULT  Goal: Absence or prevention of progression during hospitalization  Description: INTERVENTIONS:  - Assess and monitor for signs and symptoms of infection  - Monitor lab/diagnostic results  - Monitor all insertion sites, i.e. indwelling lines, tubes, and drains  - Monitor endotracheal if appropriate and nasal secretions for changes in amount and color  - [place] appropriate cooling/warming therapies per order  - Administer medications as ordered  - Instruct and encourage patient and family to use good hand hygiene technique  - Identify and instruct in appropriate isolation precautions for identified infection/condition  Outcome: Progressing  Goal: Absence of fever/infection during neutropenic period  Description: INTERVENTIONS:  - Monitor WBC  - Perform strict hand hygiene  - Limit to healthy visitors only  - No plants, dried, fresh or silk flowers with ely in patient room  Outcome: Progressing     Problem: DISCHARGE PLANNING  Goal: Discharge to home or other facility with appropriate resources  Description: INTERVENTIONS:  - Identify barriers to discharge w/patient and caregiver  - Arrange for needed discharge resources  and transportation as appropriate  - Identify discharge learning needs (meds, wound care, etc.)  - Arrange for interpretive services to assist at discharge as needed  - Refer to Case Management Department for coordinating discharge planning if the patient needs post-hospital services based on physician/advanced practitioner order or complex needs related to functional status, cognitive ability, or social support system  Outcome: Progressing

## 2025-07-02 NOTE — ASSESSMENT & PLAN NOTE
Admitted by podiatry for right medial hallux wound with underling om and cellulitis   On ancef   Will require right hallux amputation- tbd timing as he also requires vascular intervention  Will check EKG preop. If wnl he is intermediate risk for or and may proceed

## 2025-07-02 NOTE — ED PROVIDER NOTES
Time reflects when diagnosis was documented in both MDM as applicable and the Disposition within this note       Time User Action Codes Description Comment    7/2/2025 11:18 AM Amanda Kolton E Add [L08.9] Toe infection           ED Disposition       ED Disposition   Admit    Condition   Stable    Date/Time   Wed Jul 2, 2025 11:18 AM    Comment   Case was discussed with Podiatry and the patient's admission status was agreed to be Admission Status: inpatient status to the service of Dr. White .               Assessment & Plan       Medical Decision Making  1. Wound - Patient with infection to the Right great toe, sent for admission. Will check CBC for leukocytosis and anemia, metabolic panel for electrolyte abnormalities and dehydration,  LFT's to assess GB dysfunction, X-ray of foot as there is high concern for osteomyelitis. Will order blood cultures, lactic acid, ESR and CRP. Will reach out to podiatry for admission.     Problems Addressed:  Toe infection: chronic illness or injury with exacerbation, progression, or side effects of treatment    Amount and/or Complexity of Data Reviewed  External Data Reviewed: notes.  Labs: ordered.  Radiology: ordered.    Risk  Decision regarding hospitalization.             Medications   sodium chloride 0.9 % bolus 1,000 mL (1,000 mL Intravenous New Bag 7/2/25 1120)       ED Risk Strat Scores                    No data recorded        SBIRT 20yo+      Flowsheet Row Most Recent Value   Initial Alcohol Screen: US AUDIT-C     1. How often do you have a drink containing alcohol? 0 Filed at: 07/02/2025 1100   2. How many drinks containing alcohol do you have on a typical day you are drinking?  0 Filed at: 07/02/2025 1100   3a. Male UNDER 65: How often do you have five or more drinks on one occasion? 0 Filed at: 07/02/2025 1100   Audit-C Score 0 Filed at: 07/02/2025 1100   DALY: How many times in the past year have you...    Used an illegal drug or used a prescription  medication for non-medical reasons? Never Filed at: 07/02/2025 1100                            History of Present Illness       Chief Complaint   Patient presents with    Toe Pain     Patient being sent from wound care for R - Big toe needs to be removed today. Hx of type 1 and past toe infections       Past Medical History[1]   Past Surgical History[2]   Family History[3]   Social History[4]   E-Cigarette/Vaping    E-Cigarette Use Current Every Day User       E-Cigarette/Vaping Substances    Nicotine Yes       I have reviewed and agree with the history as documented.     35 YO male with history of diabetes presents for admission. Patient has a chronic ulcer to the Right great toe. States he has been following with podiatry for this, states it seems to get better then worsen again. He states he developed purulent drainage from this a few days prior and has had increasing discomfort over the foot. He denies fevers. Patient saw his podiatrist yesterday who recommended admission, however patient was not able to be admitted at that time so presents for admission now. He denies any new issues since yesterday. Pt denies CP/SOB/F/C/N/V/D/C, no dysuria, burning on urination or blood in urine.       History provided by:  Patient   used: No        Review of Systems   Constitutional:  Negative for fever.   HENT:  Negative for dental problem.    Eyes:  Negative for visual disturbance.   Respiratory:  Negative for shortness of breath.    Cardiovascular:  Negative for chest pain.   Gastrointestinal:  Negative for abdominal pain, nausea and vomiting.   Genitourinary:  Negative for dysuria and frequency.   Musculoskeletal:  Negative for neck pain and neck stiffness.   Skin:  Positive for wound (Swelling and redness to the Right great toe with ulceration, able to expressive purulent drainage from this). Negative for rash.   Neurological:  Negative for dizziness, weakness and light-headedness.    Psychiatric/Behavioral:  Negative for agitation, behavioral problems and confusion.    All other systems reviewed and are negative.          Objective       ED Triage Vitals [07/02/25 1059]   Temperature Pulse Blood Pressure Respirations SpO2 Patient Position - Orthostatic VS   98.3 °F (36.8 °C) 81 159/70 16 100 % Lying      Temp Source Heart Rate Source BP Location FiO2 (%) Pain Score    Oral Monitor Right arm -- --      Vitals      Date and Time Temp Pulse SpO2 Resp BP Pain Score FACES Pain Rating User   07/02/25 1059 98.3 °F (36.8 °C) 81 100 % 16 159/70 -- -- JT            Physical Exam    Results Reviewed       Procedure Component Value Units Date/Time    Blood culture #1 [[id number]]     Lab Status: No result Specimen: Blood     Blood culture #2 [[id number]]     Lab Status: No result Specimen: Blood     CBC and differential [[id number]] Collected: 07/02/25 1119    Lab Status: No result Specimen: Blood from Arm, Left     Basic metabolic panel [[id number]] Collected: 07/02/25 1119    Lab Status: No result Specimen: Blood from Arm, Left     Hepatic function panel [[id number]] Collected: 07/02/25 1119    Lab Status: No result Specimen: Blood from Arm, Left     Sedimentation rate, automated [[id number]] Collected: 07/02/25 1119    Lab Status: No result Specimen: Blood from Arm, Left     C-reactive protein [[id number]] Collected: 07/02/25 1119    Lab Status: No result Specimen: Blood from Arm, Left     Lactic acid, plasma (w/reflex if result > 2.0) [[id number]] Collected: 07/02/25 1119    Lab Status: No result Specimen: Blood from Arm, Left     Magnesium [[id number]] Collected: 07/02/25 1119    Lab Status: No result Specimen: Blood from Arm, Left             XR foot 3+ views RIGHT    (Results Pending)       Procedures    ED Medication and Procedure Management   Prior to Admission Medications   Prescriptions Last Dose Informant Patient Reported? Taking?   Alpha-Lipoic Acid 600 MG CAPS   Yes No   Sig: Take 1 capsule by mouth  "2 (two) times a day   Blood Glucose Monitoring Suppl (OneTouch Verio Reflect) w/Device KIT  Self No No   Sig: Check blood sugars once daily. Please substitute with appropriate alternative as covered by patient's insurance. Dx: E11.65   [place] Choice Comfort EZ 33G X 4 MM MISC  Self Yes No   Continuous Blood Gluc Sensor (FreeStyle Amber 3 Sensor) MISC  Self Yes No   Empagliflozin-metFORMIN HCl ER (Synjardy XR) 12.5-1000 MG TB24   No No   Sig: Take 2 tablets by mouth daily   Injection Device for Insulin (CeQur Simplicity 2U) LAUREN   Yes No   Sig: Use 1 each every 3 (three) days   Injection Device for Insulin (CeQur Simplicity 2U) LAUREN   Yes No   Sig: Use 1 each every 3 (three) days   Insulin Pen Needle (BD Pen Needle Ratna U/F) 32G X 4 MM MISC  Self No No   Sig: Use daily as directed with insulin pen   Isopropyl Alcohol (Pharmacist Choice Alcohol) 70 % MISC  Self No No   Sig: Apply 200 Pads topically 3 (three) times a day   NovoLOG 100 UNIT/ML injection   No No   Sig: Inject 100 Units under the skin in the morning Via insuline pump   OneTouch Delica Lancets 33G MISC  Self No No   Sig: Check blood sugars once daily. Please substitute with appropriate alternative as covered by patient's insurance. Dx: E11.65   SYRINGE-NEEDLE, DISP, 3 ML (Safety Syringes/Needle) 20G X 1-1/2\" 3 ML MISC  Self No No   Sig: Use once a week for 12 doses   Toujeo SoloStar 300 units/mL CONCENTRATED U-300 injection pen (1-unit dial)   No No   Sig: Inject 25 Units under the skin daily at bedtime   Patient not taking: Reported on 6/4/2025   Vitamin D, Ergocalciferol, 37625 units CAPS   Yes No   Sig: Take 1 capsule by mouth once a week   Xyosted 100 MG/0.5ML SOAJ  Self Yes No   Sig: Inject 100 mg under the skin every 7 days   Patient not taking: Reported on 3/4/2025   amphetamine-dextroamphetamine (ADDERALL, 10MG,) 10 mg tablet   No No   Sig: Take 1 tablet (10 mg total) by mouth 2 (two) times a day Max Daily Amount: 20 mg   buPROPion (WELLBUTRIN SR) " 100 mg 12 hr tablet   No No   Sig: Take 1 tablet (100 mg total) by mouth 2 (two) times a day   doxycycline (ADOXA) 100 MG tablet   No No   Sig: Take 1 tablet (100 mg total) by mouth 2 (two) times a day   famotidine (PEPCID) 20 mg tablet  Self No No   Sig: Take 1 tablet (20 mg total) by mouth 2 (two) times a day   Patient taking differently: Take 20 mg by mouth 2 (two) times a day as needed for heartburn or indigestion   gabapentin (Neurontin) 600 MG tablet   No No   Sig: Take 1 tablet (600 mg total) by mouth 3 (three) times a day   glucose blood (OneTouch Verio) test strip  Self No No   Sig: Check blood sugars once daily. Please substitute with appropriate alternative as covered by patient's insurance. Dx: E11.65   mupirocin (BACTROBAN) 2 % ointment   No No   Sig: Apply topically 2 (two) times a day   rosuvastatin (CRESTOR) 20 MG tablet   No No   Sig: Take 1 tablet (20 mg total) by mouth daily   semaglutide, 0.25 or 0.5 mg/dose, (Ozempic, 0.25 or 0.5 MG/DOSE,) 2 mg/3 mL injection pen   Yes No   Sig: Inject 0.5 mg under the skin every 7 days   tadalafil (CIALIS) 20 MG tablet   No No   Sig: Take 1 tablet (20 mg total) by mouth daily as needed for erectile dysfunction      Facility-Administered Medications: None     Patient's Medications   Discharge Prescriptions    No medications on file     No discharge procedures on file.  ED SEPSIS DOCUMENTATION   Time reflects when diagnosis was documented in both MDM as applicable and the Disposition within this note       Time User Action Codes Description Comment    7/2/2025 11:18 AM Kolton Patel [L08.9] Toe infection                      [1]   Past Medical History:  Diagnosis Date    Anxiety     Depression     Diabetes mellitus (HCC)     Fatty liver     Obesity    [2]   Past Surgical History:  Procedure Laterality Date    HERNIA REPAIR     [3]   Family History  Problem Relation Name Age of Onset    Diabetes Mother      Prostate cancer Paternal Grandfather     [4]    Social History  Tobacco Use    Smoking status: Former     Current packs/day: 0.00     Average packs/day: 0.5 packs/day for 18.1 years (9.1 ttl pk-yrs)     Types: Cigarettes     Start date:      Quit date: 2025     Years since quittin.3    Smokeless tobacco: Never    Tobacco comments:     Uses vape   Vaping Use    Vaping status: Every Day    Substances: Nicotine   Substance Use Topics    Alcohol use: Not Currently     Comment: social    Drug use: Yes     Types: Marijuana     Comment: medical card        Kolton Patel MD  [phone]

## 2025-07-03 ENCOUNTER — APPOINTMENT (INPATIENT)
Dept: RADIOLOGY | Facility: HOSPITAL | Age: 35
DRG: 253 | End: 2025-07-03
Payer: COMMERCIAL

## 2025-07-03 ENCOUNTER — ANESTHESIA (INPATIENT)
Dept: PERIOP | Facility: HOSPITAL | Age: 35
DRG: 253 | End: 2025-07-03
Payer: COMMERCIAL

## 2025-07-03 LAB
ANION GAP SERPL CALCULATED.3IONS-SCNC: 7 MMOL/L (ref 4–13)
APTT PPP: 30 SECONDS (ref 23–34)
ATRIAL RATE: 73 BPM
ATRIAL RATE: 76 BPM
BACTERIA WND AEROBE CULT: ABNORMAL
BASOPHILS # BLD AUTO: 0.05 THOUSANDS/ÂΜL (ref 0–0.1)
BASOPHILS NFR BLD AUTO: 1 % (ref 0–1)
BUN SERPL-MCNC: 15 MG/DL (ref 5–25)
CALCIUM SERPL-MCNC: 9.1 MG/DL (ref 8.4–10.2)
CHLORIDE SERPL-SCNC: 104 MMOL/L (ref 96–108)
CO2 SERPL-SCNC: 29 MMOL/L (ref 21–32)
CREAT SERPL-MCNC: 0.68 MG/DL (ref 0.6–1.3)
EOSINOPHIL # BLD AUTO: 0.08 THOUSAND/ÂΜL (ref 0–0.61)
EOSINOPHIL NFR BLD AUTO: 2 % (ref 0–6)
ERYTHROCYTE [DISTWIDTH] IN BLOOD BY AUTOMATED COUNT: 13.3 % (ref 11.6–15.1)
EXT GLUCOSE BLD: 114
GFR SERPL CREATININE-BSD FRML MDRD: 123 ML/MIN/1.73SQ M
GLUCOSE SERPL-MCNC: 107 MG/DL (ref 65–140)
GLUCOSE SERPL-MCNC: 130 MG/DL (ref 65–140)
GLUCOSE [MOLES/VOLUME] IN CAPILLARY BLOOD: 151 MMOL/L
GRAM STN SPEC: ABNORMAL
GRAM STN SPEC: ABNORMAL
HCT VFR BLD AUTO: 48 % (ref 36.5–49.3)
HGB BLD-MCNC: 15.8 G/DL (ref 12–17)
IMM GRANULOCYTES # BLD AUTO: 0.03 THOUSAND/UL (ref 0–0.2)
IMM GRANULOCYTES NFR BLD AUTO: 1 % (ref 0–2)
INR PPP: 0.92 (ref 0.85–1.19)
LYMPHOCYTES # BLD AUTO: 1.24 THOUSANDS/ÂΜL (ref 0.6–4.47)
LYMPHOCYTES NFR BLD AUTO: 25 % (ref 14–44)
MCH RBC QN AUTO: 29.3 PG (ref 26.8–34.3)
MCHC RBC AUTO-ENTMCNC: 32.9 G/DL (ref 31.4–37.4)
MCV RBC AUTO: 89 FL (ref 82–98)
MONOCYTES # BLD AUTO: 0.39 THOUSAND/ÂΜL (ref 0.17–1.22)
MONOCYTES NFR BLD AUTO: 8 % (ref 4–12)
NEUTROPHILS # BLD AUTO: 3.17 THOUSANDS/ÂΜL (ref 1.85–7.62)
NEUTS SEG NFR BLD AUTO: 63 % (ref 43–75)
NRBC BLD AUTO-RTO: 0 /100 WBCS
P AXIS: 61 DEGREES
P AXIS: 68 DEGREES
PLATELET # BLD AUTO: 190 THOUSANDS/UL (ref 149–390)
PMV BLD AUTO: 10.7 FL (ref 8.9–12.7)
POTASSIUM SERPL-SCNC: 4.2 MMOL/L (ref 3.5–5.3)
PR INTERVAL: 142 MS
PR INTERVAL: 154 MS
PROTHROMBIN TIME: 12.6 SECONDS (ref 12.3–15)
QRS AXIS: 62 DEGREES
QRS AXIS: 81 DEGREES
QRSD INTERVAL: 94 MS
QRSD INTERVAL: 96 MS
QT INTERVAL: 362 MS
QT INTERVAL: 376 MS
QTC INTERVAL: 407 MS
QTC INTERVAL: 415 MS
RBC # BLD AUTO: 5.4 MILLION/UL (ref 3.88–5.62)
SODIUM SERPL-SCNC: 140 MMOL/L (ref 135–147)
T WAVE AXIS: 30 DEGREES
T WAVE AXIS: 36 DEGREES
VENTRICULAR RATE: 73 BPM
VENTRICULAR RATE: 76 BPM
WBC # BLD AUTO: 4.96 THOUSAND/UL (ref 4.31–10.16)

## 2025-07-03 PROCEDURE — C1725 CATH, TRANSLUMIN NON-LASER: HCPCS | Performed by: STUDENT IN AN ORGANIZED HEALTH CARE EDUCATION/TRAINING PROGRAM

## 2025-07-03 PROCEDURE — NC001 PR NO CHARGE: Performed by: STUDENT IN AN ORGANIZED HEALTH CARE EDUCATION/TRAINING PROGRAM

## 2025-07-03 PROCEDURE — 82948 REAGENT STRIP/BLOOD GLUCOSE: CPT

## 2025-07-03 PROCEDURE — C1760 CLOSURE DEV, VASC: HCPCS | Performed by: STUDENT IN AN ORGANIZED HEALTH CARE EDUCATION/TRAINING PROGRAM

## 2025-07-03 PROCEDURE — 80048 BASIC METABOLIC PNL TOTAL CA: CPT

## 2025-07-03 PROCEDURE — 047P3ZZ DILATION OF RIGHT ANTERIOR TIBIAL ARTERY, PERCUTANEOUS APPROACH: ICD-10-PCS | Performed by: STUDENT IN AN ORGANIZED HEALTH CARE EDUCATION/TRAINING PROGRAM

## 2025-07-03 PROCEDURE — 37228 PR REVSC OPN/PRQ TIB/PERO W/ANGIOPLASTY UNI: CPT | Performed by: STUDENT IN AN ORGANIZED HEALTH CARE EDUCATION/TRAINING PROGRAM

## 2025-07-03 PROCEDURE — 75710 ARTERY X-RAYS ARM/LEG: CPT | Performed by: STUDENT IN AN ORGANIZED HEALTH CARE EDUCATION/TRAINING PROGRAM

## 2025-07-03 PROCEDURE — 047R3ZZ DILATION OF RIGHT POSTERIOR TIBIAL ARTERY, PERCUTANEOUS APPROACH: ICD-10-PCS | Performed by: STUDENT IN AN ORGANIZED HEALTH CARE EDUCATION/TRAINING PROGRAM

## 2025-07-03 PROCEDURE — C1769 GUIDE WIRE: HCPCS | Performed by: STUDENT IN AN ORGANIZED HEALTH CARE EDUCATION/TRAINING PROGRAM

## 2025-07-03 PROCEDURE — C1887 CATHETER, GUIDING: HCPCS | Performed by: STUDENT IN AN ORGANIZED HEALTH CARE EDUCATION/TRAINING PROGRAM

## 2025-07-03 PROCEDURE — C1894 INTRO/SHEATH, NON-LASER: HCPCS | Performed by: STUDENT IN AN ORGANIZED HEALTH CARE EDUCATION/TRAINING PROGRAM

## 2025-07-03 PROCEDURE — 93010 ELECTROCARDIOGRAM REPORT: CPT | Performed by: INTERNAL MEDICINE

## 2025-07-03 PROCEDURE — 37232 PR REVSC OPN/PRQ TIB/PERO W/ANGIOPLASTY UNI EA VSL: CPT | Performed by: STUDENT IN AN ORGANIZED HEALTH CARE EDUCATION/TRAINING PROGRAM

## 2025-07-03 PROCEDURE — 85730 THROMBOPLASTIN TIME PARTIAL: CPT

## 2025-07-03 PROCEDURE — 76000 FLUOROSCOPY <1 HR PHYS/QHP: CPT

## 2025-07-03 PROCEDURE — C1893 INTRO/SHEATH, FIXED,NON-PEEL: HCPCS | Performed by: STUDENT IN AN ORGANIZED HEALTH CARE EDUCATION/TRAINING PROGRAM

## 2025-07-03 PROCEDURE — 93005 ELECTROCARDIOGRAM TRACING: CPT

## 2025-07-03 PROCEDURE — 85347 COAGULATION TIME ACTIVATED: CPT

## 2025-07-03 PROCEDURE — 99232 SBSQ HOSP IP/OBS MODERATE 35: CPT

## 2025-07-03 PROCEDURE — 85610 PROTHROMBIN TIME: CPT

## 2025-07-03 PROCEDURE — NC001 PR NO CHARGE: Performed by: PODIATRIST

## 2025-07-03 PROCEDURE — 85025 COMPLETE CBC W/AUTO DIFF WBC: CPT

## 2025-07-03 DEVICE — MYNX CONTROL VCD 6F 7F
Type: IMPLANTABLE DEVICE | Site: GROIN | Status: FUNCTIONAL
Brand: MYNX CONTROL

## 2025-07-03 RX ORDER — LIDOCAINE HYDROCHLORIDE 10 MG/ML
INJECTION, SOLUTION EPIDURAL; INFILTRATION; INTRACAUDAL; PERINEURAL AS NEEDED
Status: DISCONTINUED | OUTPATIENT
Start: 2025-07-03 | End: 2025-07-03

## 2025-07-03 RX ORDER — LIDOCAINE HYDROCHLORIDE 10 MG/ML
INJECTION, SOLUTION EPIDURAL; INFILTRATION; INTRACAUDAL; PERINEURAL AS NEEDED
Status: DISCONTINUED | OUTPATIENT
Start: 2025-07-03 | End: 2025-07-03 | Stop reason: HOSPADM

## 2025-07-03 RX ORDER — GLYCOPYRROLATE 0.2 MG/ML
INJECTION INTRAMUSCULAR; INTRAVENOUS AS NEEDED
Status: DISCONTINUED | OUTPATIENT
Start: 2025-07-03 | End: 2025-07-03

## 2025-07-03 RX ORDER — MIDAZOLAM HYDROCHLORIDE 2 MG/2ML
INJECTION, SOLUTION INTRAMUSCULAR; INTRAVENOUS AS NEEDED
Status: DISCONTINUED | OUTPATIENT
Start: 2025-07-03 | End: 2025-07-03

## 2025-07-03 RX ORDER — SODIUM CHLORIDE, SODIUM LACTATE, POTASSIUM CHLORIDE, CALCIUM CHLORIDE 600; 310; 30; 20 MG/100ML; MG/100ML; MG/100ML; MG/100ML
INJECTION, SOLUTION INTRAVENOUS CONTINUOUS PRN
Status: DISCONTINUED | OUTPATIENT
Start: 2025-07-03 | End: 2025-07-03

## 2025-07-03 RX ORDER — PROPOFOL 10 MG/ML
INJECTION, EMULSION INTRAVENOUS CONTINUOUS PRN
Status: DISCONTINUED | OUTPATIENT
Start: 2025-07-03 | End: 2025-07-03

## 2025-07-03 RX ORDER — PROPOFOL 10 MG/ML
INJECTION, EMULSION INTRAVENOUS AS NEEDED
Status: DISCONTINUED | OUTPATIENT
Start: 2025-07-03 | End: 2025-07-03

## 2025-07-03 RX ORDER — ONDANSETRON 2 MG/ML
4 INJECTION INTRAMUSCULAR; INTRAVENOUS ONCE AS NEEDED
Status: DISCONTINUED | OUTPATIENT
Start: 2025-07-03 | End: 2025-07-03 | Stop reason: HOSPADM

## 2025-07-03 RX ORDER — FENTANYL CITRATE 50 UG/ML
INJECTION, SOLUTION INTRAMUSCULAR; INTRAVENOUS AS NEEDED
Status: DISCONTINUED | OUTPATIENT
Start: 2025-07-03 | End: 2025-07-03

## 2025-07-03 RX ORDER — DEXTROAMPHETAMINE SACCHARATE, AMPHETAMINE ASPARTATE, DEXTROAMPHETAMINE SULFATE AND AMPHETAMINE SULFATE 2.5; 2.5; 2.5; 2.5 MG/1; MG/1; MG/1; MG/1
10 TABLET ORAL
Refills: 0 | Status: DISCONTINUED | OUTPATIENT
Start: 2025-07-04 | End: 2025-07-06 | Stop reason: HOSPADM

## 2025-07-03 RX ORDER — CEFAZOLIN SODIUM 1 G/3ML
INJECTION, POWDER, FOR SOLUTION INTRAMUSCULAR; INTRAVENOUS AS NEEDED
Status: DISCONTINUED | OUTPATIENT
Start: 2025-07-03 | End: 2025-07-03

## 2025-07-03 RX ORDER — SODIUM CHLORIDE, SODIUM LACTATE, POTASSIUM CHLORIDE, CALCIUM CHLORIDE 600; 310; 30; 20 MG/100ML; MG/100ML; MG/100ML; MG/100ML
50 INJECTION, SOLUTION INTRAVENOUS CONTINUOUS
Status: DISCONTINUED | OUTPATIENT
Start: 2025-07-03 | End: 2025-07-04

## 2025-07-03 RX ORDER — CHLORHEXIDINE GLUCONATE ORAL RINSE 1.2 MG/ML
15 SOLUTION DENTAL ONCE
Status: DISCONTINUED | OUTPATIENT
Start: 2025-07-03 | End: 2025-07-03 | Stop reason: HOSPADM

## 2025-07-03 RX ORDER — IODIXANOL 320 MG/ML
INJECTION, SOLUTION INTRAVASCULAR AS NEEDED
Status: DISCONTINUED | OUTPATIENT
Start: 2025-07-03 | End: 2025-07-03 | Stop reason: HOSPADM

## 2025-07-03 RX ORDER — HEPARIN SODIUM 200 [USP'U]/100ML
INJECTION, SOLUTION INTRAVENOUS
Status: COMPLETED | OUTPATIENT
Start: 2025-07-03 | End: 2025-07-03

## 2025-07-03 RX ORDER — PROTAMINE SULFATE 10 MG/ML
INJECTION, SOLUTION INTRAVENOUS AS NEEDED
Status: DISCONTINUED | OUTPATIENT
Start: 2025-07-03 | End: 2025-07-03

## 2025-07-03 RX ORDER — FENTANYL CITRATE/PF 50 MCG/ML
25 SYRINGE (ML) INJECTION
Refills: 0 | Status: DISCONTINUED | OUTPATIENT
Start: 2025-07-03 | End: 2025-07-03 | Stop reason: HOSPADM

## 2025-07-03 RX ORDER — ONDANSETRON 2 MG/ML
INJECTION INTRAMUSCULAR; INTRAVENOUS AS NEEDED
Status: DISCONTINUED | OUTPATIENT
Start: 2025-07-03 | End: 2025-07-03

## 2025-07-03 RX ORDER — ASPIRIN 81 MG/1
81 TABLET ORAL DAILY
Status: DISCONTINUED | OUTPATIENT
Start: 2025-07-03 | End: 2025-07-06 | Stop reason: HOSPADM

## 2025-07-03 RX ORDER — KETAMINE HYDROCHLORIDE 100 MG/ML
INJECTION, SOLUTION INTRAMUSCULAR; INTRAVENOUS AS NEEDED
Status: DISCONTINUED | OUTPATIENT
Start: 2025-07-03 | End: 2025-07-03

## 2025-07-03 RX ORDER — HEPARIN SODIUM 1000 [USP'U]/ML
INJECTION, SOLUTION INTRAVENOUS; SUBCUTANEOUS AS NEEDED
Status: DISCONTINUED | OUTPATIENT
Start: 2025-07-03 | End: 2025-07-03

## 2025-07-03 RX ADMIN — FENTANYL CITRATE 50 MCG: 50 INJECTION INTRAMUSCULAR; INTRAVENOUS at 16:21

## 2025-07-03 RX ADMIN — KETAMINE HYDROCHLORIDE 10 MG: 100 INJECTION, SOLUTION, CONCENTRATE INTRAMUSCULAR; INTRAVENOUS at 17:51

## 2025-07-03 RX ADMIN — KETAMINE HYDROCHLORIDE 10 MG: 100 INJECTION, SOLUTION, CONCENTRATE INTRAMUSCULAR; INTRAVENOUS at 16:37

## 2025-07-03 RX ADMIN — PROPOFOL 30 MG: 10 INJECTION, EMULSION INTRAVENOUS at 16:37

## 2025-07-03 RX ADMIN — FENTANYL CITRATE 50 MCG: 50 INJECTION INTRAMUSCULAR; INTRAVENOUS at 16:18

## 2025-07-03 RX ADMIN — HEPARIN SODIUM 5000 UNITS: 5000 INJECTION INTRAVENOUS; SUBCUTANEOUS at 22:38

## 2025-07-03 RX ADMIN — GABAPENTIN 600 MG: 300 CAPSULE ORAL at 08:25

## 2025-07-03 RX ADMIN — GABAPENTIN 600 MG: 300 CAPSULE ORAL at 21:07

## 2025-07-03 RX ADMIN — GLYCOPYRROLATE 0.2 MCG: 0.2 INJECTION, SOLUTION INTRAMUSCULAR; INTRAVENOUS at 16:21

## 2025-07-03 RX ADMIN — BUPROPION HYDROCHLORIDE 150 MG: 150 TABLET, EXTENDED RELEASE ORAL at 08:25

## 2025-07-03 RX ADMIN — CEFAZOLIN 2000 MG: 1 INJECTION, POWDER, FOR SOLUTION INTRAMUSCULAR; INTRAVENOUS; PARENTERAL at 16:35

## 2025-07-03 RX ADMIN — DEXMEDETOMIDINE 4 MCG: 100 INJECTION, SOLUTION, CONCENTRATE INTRAVENOUS at 16:21

## 2025-07-03 RX ADMIN — ACETAMINOPHEN 650 MG: 325 TABLET ORAL at 22:38

## 2025-07-03 RX ADMIN — HEPARIN SODIUM 9000 UNITS: 1000 INJECTION, SOLUTION INTRAVENOUS; SUBCUTANEOUS at 17:09

## 2025-07-03 RX ADMIN — PROTAMINE SULFATE 50 MG: 10 INJECTION, SOLUTION INTRAVENOUS at 17:56

## 2025-07-03 RX ADMIN — CEFAZOLIN SODIUM 2000 MG: 2 SOLUTION INTRAVENOUS at 04:20

## 2025-07-03 RX ADMIN — KETAMINE HYDROCHLORIDE 10 MG: 100 INJECTION, SOLUTION, CONCENTRATE INTRAMUSCULAR; INTRAVENOUS at 17:40

## 2025-07-03 RX ADMIN — ASPIRIN 81 MG: 81 TABLET, DELAYED RELEASE ORAL at 20:45

## 2025-07-03 RX ADMIN — LIDOCAINE HYDROCHLORIDE 100 MG: 10 INJECTION, SOLUTION EPIDURAL; INFILTRATION; INTRACAUDAL at 16:30

## 2025-07-03 RX ADMIN — PROPOFOL 75 MCG/KG/MIN: 10 INJECTION, EMULSION INTRAVENOUS at 16:30

## 2025-07-03 RX ADMIN — MIDAZOLAM HYDROCHLORIDE 2 MG: 1 INJECTION, SOLUTION INTRAMUSCULAR; INTRAVENOUS at 16:17

## 2025-07-03 RX ADMIN — DEXMEDETOMIDINE 4 MCG: 100 INJECTION, SOLUTION, CONCENTRATE INTRAVENOUS at 16:17

## 2025-07-03 RX ADMIN — CEFAZOLIN SODIUM 2000 MG: 2 SOLUTION INTRAVENOUS at 12:21

## 2025-07-03 RX ADMIN — SODIUM CHLORIDE, SODIUM LACTATE, POTASSIUM CHLORIDE, AND CALCIUM CHLORIDE: .6; .31; .03; .02 INJECTION, SOLUTION INTRAVENOUS at 16:24

## 2025-07-03 RX ADMIN — ONDANSETRON 4 MG: 2 INJECTION INTRAMUSCULAR; INTRAVENOUS at 16:40

## 2025-07-03 RX ADMIN — ACETAMINOPHEN 650 MG: 325 TABLET ORAL at 08:28

## 2025-07-03 RX ADMIN — KETAMINE HYDROCHLORIDE 10 MG: 100 INJECTION, SOLUTION, CONCENTRATE INTRAMUSCULAR; INTRAVENOUS at 17:07

## 2025-07-03 RX ADMIN — SODIUM CHLORIDE, SODIUM LACTATE, POTASSIUM CHLORIDE, AND CALCIUM CHLORIDE 50 ML/HR: .6; .31; .03; .02 INJECTION, SOLUTION INTRAVENOUS at 20:51

## 2025-07-03 RX ADMIN — KETAMINE HYDROCHLORIDE 10 MG: 100 INJECTION, SOLUTION, CONCENTRATE INTRAMUSCULAR; INTRAVENOUS at 17:46

## 2025-07-03 NOTE — ASSESSMENT & PLAN NOTE
35 yo male ex-smoker w/ a hx of DM II (A1c 6.9) w/ peripheral neuropathy, HTN, HLD, depression, ADHD, and CKD stage 1 presented to [facility] on 7/2/25 per podiatry recommendation w/ worsening R hallux wound (+) OM, present x7 months. Pt admitted and started on IV ABX. Podiatry plans R hallux amputation pending revascularization.    Vascular surgery consulted for R hallux wound in the setting of PAD. LEAD shows >75% R AT stenosis w/ R OCTAVIA: 1.58/147/65.    Diagnostics:  -7/2/25 LEAD: > 75% stenosis of the right distal SARAY. R OCTAVIA: 1.58/147/65, L OCTAVIA: 1.29/118/78  -7/2/25 R foot xray: No acute osseous abnormality.   -5/16/25 MRI right foot: Small ulcer in the medial aspect of great toe with mild bone marrow edema in the head of proximal phalanx and base of distal phalanx, which may be related to reactive changes or early osteomyelitis.    Plan:  -R hallux wound (+) OM in the setting of PAD w/ DM  -LEAD shows >75% R AT stenosis w/ R OCTAVIA: 1.58/147/65  -Plan for RLE arteriogram w/ possible intervention in OR; scheduled for today (7/3)  -Please keep pt NPO today for OR  -Recommend beginning ASA along w/ continuation of pravastatin for optimal medical management of PAD   -Continue ABX per primary team  -Podiatry plans R hallux amputation pending revascularization  -SLIM following for medical management; assistance appreciated  -Will discuss w/ Dr. Arellano

## 2025-07-03 NOTE — PROGRESS NOTES
Progress Note - Vascular Surgery   Name: Jason Blanca 34 y.o. male I MRN: [id number]  Unit/Bed#: [place] [address] -02 I Date of Admission: 7/2/2025   Date of Service: 7/3/2025 I Hospital Day: 1    Assessment & Plan  PAD (peripheral artery disease) (HCC)  33 yo male ex-smoker w/ a hx of DM II (A1c 6.9) w/ peripheral neuropathy, HTN, HLD, depression, ADHD, and CKD stage 1 presented to [facility] on 7/2/25 per podiatry recommendation w/ worsening R hallux wound (+) OM, present x7 months. Pt admitted and started on IV ABX. Podiatry plans R hallux amputation pending revascularization.    Vascular surgery consulted for R hallux wound in the setting of PAD. LEAD shows >75% R AT stenosis w/ R OCTAVIA: 1.58/147/65.    Diagnostics:  -7/2/25 LEAD: > 75% stenosis of the right distal SARAY. R OCTAVIA: 1.58/147/65, L OCTAVIA: 1.29/118/78  -7/2/25 R foot xray: No acute osseous abnormality.   -5/16/25 MRI right foot: Small ulcer in the medial aspect of great toe with mild bone marrow edema in the head of proximal phalanx and base of distal phalanx, which may be related to reactive changes or early osteomyelitis.    Plan:  -R hallux wound (+) OM in the setting of PAD w/ DM  -LEAD shows >75% R AT stenosis w/ R OCTAVIA: 1.58/147/65  -Plan for RLE arteriogram w/ possible intervention in OR; scheduled for today (7/3)  -Please keep pt NPO today for OR  -Recommend beginning ASA along w/ continuation of pravastatin for optimal medical management of PAD   -Continue ABX per primary team  -Podiatry plans R hallux amputation pending revascularization  -SLIM following for medical management; assistance appreciated  -Will discuss w/ Dr. Arellano    Subjective : Pt seen for exam while sitting upright in bed; NAD. Pt denies any complaints at this time.    Objective :  Temp:  [97.3 °F (36.3 °C)-98.3 °F (36.8 °C)] 97.5 °F (36.4 °C)  HR:  [74-82] 74  BP: (115-159)/(68-74) 117/70  Resp:  [16] 16  SpO2:  [94 %-100 %] 100 %  O2 Device: None (Room air)     I/O         07/01  0701  07/02 0700 07/02 0701  07/03 0700 07/03 0701  07/04 0700    P.O.  730 0    IV Piggyback  100     Total Intake(mL/kg)  830 (9.5) 0 (0)    Net  +830 0                   Physical Exam  Vitals and nursing note reviewed.   Constitutional:       General: He is awake. He is not in acute distress.     Appearance: Normal appearance. He is well-developed.   HENT:      Head: Normocephalic and atraumatic.     Cardiovascular:      Rate and Rhythm: Normal rate and regular rhythm.      Pulses:           Femoral pulses are 2+ on the right side and 2+ on the left side.       Dorsalis pedis pulses are detected w/ Doppler on the right side and detected w/ Doppler on the left side.        Posterior tibial pulses are detected w/ Doppler on the right side and detected w/ Doppler on the left side.      Heart sounds: Normal heart sounds. No murmur heard.  Pulmonary:      Effort: Pulmonary effort is normal. No respiratory distress.      Breath sounds: Normal breath sounds.   Abdominal:      General: There is no distension.     Musculoskeletal:         General: Tenderness (right foot) present. No swelling.      Cervical back: Neck supple.      Right lower leg: No edema.      Left lower leg: No edema.     Skin:     General: Skin is warm and dry.      Capillary Refill: Capillary refill takes less than 2 seconds.      Findings: Wound present. No lesion or rash.      Comments: R hallux and R medial ankle wounds     Neurological:      General: No focal deficit present.      Mental Status: He is alert and oriented to person, place, and time.      Sensory: Sensory deficit present.      Motor: Motor function is intact.      Comments: Pt reports chronic pins and needle sensation in feet from peripheral neuropathy   Psychiatric:         Mood and Affect: Mood normal.         Speech: Speech normal.         Behavior: Behavior normal. Behavior is cooperative.       Wound/Incision:  R hallux and R medial ankle wounds w/ dressings clean, dry and  intact.      R medial ankle    R hallux      Lab Results: I have reviewed the following results:  CBC with diff:   Lab Results   Component Value Date    WBC 4.96 07/03/2025    HGB 15.8 07/03/2025    HCT 48.0 07/03/2025    MCV 89 07/03/2025     07/03/2025    RBC 5.40 07/03/2025    MCH 29.3 07/03/2025    MCHC 32.9 07/03/2025    RDW 13.3 07/03/2025    MPV 10.7 07/03/2025    NRBC 0 07/03/2025     BMP/CMP:  Lab Results   Component Value Date    SODIUM 140 07/03/2025    SODIUM 139 06/06/2025    K 4.2 07/03/2025    K 4.4 06/06/2025     07/03/2025     06/06/2025    CO2 29 07/03/2025    CO2 26 06/06/2025    BUN 15 07/03/2025    BUN 16 06/06/2025    BUN 13 11/05/2024    CREATININE 0.68 07/03/2025    CREATININE 0.81 06/06/2025    CALCIUM 9.1 07/03/2025    CALCIUM 9.3 06/06/2025    AST 11 (L) 07/02/2025    AST 16 06/06/2025    AST 18 01/13/2025    ALT 16 07/02/2025    ALT 27 06/06/2025    ALT 20 01/13/2025    ALKPHOS 40 07/02/2025    ALKPHOS 43 06/06/2025    EGFR 123 07/03/2025    EGFR 118 06/06/2025    EGFR 120.7 01/13/2025    EGFR 118.51 01/13/2025     Coags:   Lab Results   Component Value Date    PTT 30 07/03/2025    INR 0.92 07/03/2025     Blood Culture:   Lab Results   Component Value Date    BLOODCX Received in Microbiology Lab. Culture in Progress. 07/02/2025    BLOODCX Received in Microbiology Lab. Culture in Progress. 07/02/2025     Wound Culure:   Lab Results   Component Value Date    WOUNDCULT 3+ Growth of Staphylococcus pseudintermedius (A) 07/01/2025

## 2025-07-03 NOTE — ASSESSMENT & PLAN NOTE
Lab Results   Component Value Date    EGFR 123 07/03/2025    EGFR 118 07/02/2025    EGFR 118 06/06/2025    CREATININE 0.68 07/03/2025    CREATININE 0.75 07/02/2025    CREATININE 0.81 06/06/2025   Per internal medicine

## 2025-07-03 NOTE — ASSESSMENT & PLAN NOTE
Lab Results   Component Value Date    HGBA1C 6.9 (H) 06/06/2025     Patient has his CGM and pump.  He feels comfortable managing this and does not want to transition over to our sliding scale.  Holding home medications Synjardy, Ozempic  Blood sugar stable, monitor via Accu-Cheks

## 2025-07-03 NOTE — PROGRESS NOTES
Progress Note - Hospitalist   Name: Jason Blanca 34 y.o. male I MRN: [id number]  Unit/Bed#: [place] [address] -02 I Date of Admission: 7/2/2025   Date of Service: 7/3/2025 I Hospital Day: 1    Assessment & Plan  Diabetic ulcer of toe of right foot associated with type 2 diabetes mellitus, with necrosis of bone (HCC)  Admitted by podiatry for right medial hallux wound with underling osteomyelitis and cellulitis extending to first MTPJ  Placed on IV Ancef  Pain regimen and wound care per primary  Patient will require great right toe amputation pending vascular optimization  Reviewed preoperative EKG normal sinus rhythm. Per previous attending note no contraindications to surgery.  Cellulitis of great toe of right foot  On IV antibiotics as stated above   Wound cultures on 7/1 with Staph pseudintermedius  No evidence of SIRS or septic criteria  Blood cultures obtained, follow  PAD (peripheral artery disease) ([facility])  Leads greater than 75% stenosis of the right distal luis  Vascular following, planning for RLE arteriogram with possible intervention in OR today  Starting patient on aspirin per recommendations, continue statin  Type 2 diabetes mellitus with hyperglycemia, with neuropathic pain  Lab Results   Component Value Date    HGBA1C 6.9 (H) 06/06/2025     Patient has his CGM and pump.  He feels comfortable managing this and does not want to transition over to our sliding scale.  Holding home medications Synjardy, Ozempic  Blood sugar stable, monitor via Accu-Cheks  Recurrent major depressive disorder, in remission (HCC)  Mood stable, continue Wellbutrin  ADHD, predominantly inattentive type  On Adderall 10 mg twice daily, PDMP reviewed  Mixed dyslipidemia  Continue statin    VTE Pharmacologic Prophylaxis:   heparin held    Mobility:   Basic Mobility Inpatient Raw Score: 24  JH-HLM Goal: 8: Walk 250 feet or more  JH-HLM Achieved: 8: Walk 250 feet ot more  JH-HLM Goal achieved. Continue to encourage appropriate  mobility.    Patient Centered Rounds: I performed bedside rounds with nursing staff today.   Discussions with Specialists or Other Care Team Provider: Podiatry, vascular, attending    Education and Discussions with Family / Patient: Pt    Current Length of Stay: 1 day(s)  Current Patient Status: Inpatient   Certification Statement: The patient will continue to require additional inpatient hospital stay due to surgical intervention, iv antibiotics  Discharge Plan: Anticipate discharge in >72 hrs to discharge location to be determined pending rehab evaluations.    Code Status: Level 1 - Full Code    Subjective   Patient seen and examined lying in bed.  Reports he is ready for his vascular procedure today.  No acute issues overnight.  He does have some pain in his foot.  He denies any fevers, chills, chest pain or shortness of breath.  Notes he is fairly active.  Does not deal with exertional chest pain or shortness of breath.  Has previously underwent anesthesia without difficulties.    Objective :  Temp:  [97.3 °F (36.3 °C)-97.7 °F (36.5 °C)] 97.5 °F (36.4 °C)  HR:  [74-82] 74  BP: (115-121)/(68-74) 117/70  Resp:  [16] 16  SpO2:  [94 %-100 %] 100 %    Body mass index is 29.4 kg/m².     Input and Output Summary (last 24 hours):     Intake/Output Summary (Last 24 hours) at 7/3/2025 1354  Last data filed at 7/3/2025 0914  Gross per 24 hour   Intake 830 ml   Output --   Net 830 ml       Physical Exam  Vitals and nursing note reviewed.   Constitutional:       Appearance: Normal appearance. He is obese. He is not ill-appearing or diaphoretic.     Cardiovascular:      Rate and Rhythm: Normal rate and regular rhythm.   Pulmonary:      Effort: Pulmonary effort is normal. No respiratory distress.      Breath sounds: Normal breath sounds.   Abdominal:      General: Bowel sounds are normal.      Palpations: Abdomen is soft.      Tenderness: There is no abdominal tenderness.     Musculoskeletal:         General: Deformity (RLE  wrapped CDI) present.      Right lower leg: No edema.      Left lower leg: No edema.     Skin:     General: Skin is warm and dry.     Neurological:      Mental Status: He is alert and oriented to person, place, and time. Mental status is at baseline.     Psychiatric:         Mood and Affect: Mood normal.         Behavior: Behavior normal.         Lab Results: I have reviewed the following results:   Results from last 7 days   Lab Units 07/03/25  0619   WBC Thousand/uL 4.96   HEMOGLOBIN g/dL 15.8   HEMATOCRIT % 48.0   PLATELETS Thousands/uL 190   SEGS PCT % 63   LYMPHO PCT % 25   MONO PCT % 8   EOS PCT % 2     Results from last 7 days   Lab Units 07/03/25  1245 07/03/25  1242 07/03/25  0619 07/02/25  1119   SODIUM mmol/L  --   --  140 137   POTASSIUM mmol/L  --   --  4.2 4.1   CHLORIDE mmol/L  --   --  104 103   CO2 mmol/L  --   --  29 27   BUN mg/dL  --   --  15 19   CREATININE mg/dL  --   --  0.68 0.75   ANION GAP mmol/L  --   --  7 7   CALCIUM mg/dL  --   --  9.1 9.3   ALBUMIN g/dL  --   --   --  4.2   TOTAL BILIRUBIN mg/dL  --   --   --  0.47   ALK PHOS U/L  --   --   --  40   ALT U/L  --   --   --  16   AST U/L  --   --   --  11*   GLUCOSE RANDOM  114   < > 130 134    < > = values in this interval not displayed.     Results from last 7 days   Lab Units 07/03/25  0619   INR  0.92     Results from last 7 days   Lab Units 07/02/25  1635 07/02/25  1232   POC GLUCOSE mg/dl 242* 129     Results from last 7 days   Lab Units 07/02/25  1119   LACTIC ACID mmol/L 0.7     Recent Cultures (last 7 days):   Results from last 7 days   Lab Units 07/02/25  1127 07/01/25  1028   BLOOD CULTURE  Received in Microbiology Lab. Culture in Progress.  Received in Microbiology Lab. Culture in Progress.  --    GRAM STAIN RESULT   --  4+ Gram positive cocci in pairs*  2+ Polys*   WOUND CULTURE   --  3+ Growth of Staphylococcus pseudintermedius*     Last 24 Hours Medication List:     Current Facility-Administered Medications:      acetaminophen (TYLENOL) tablet 650 mg, Q6H PRN    buPROPion (WELLBUTRIN XL) 24 hr tablet 150 mg, Daily    ceFAZolin (ANCEF) IVPB (premix in dextrose) 2,000 mg 50 mL, Q8H, Last Rate: 2,000 mg (07/03/25 1221)    chlorhexidine (PERIDEX) 0.12 % oral rinse 15 mL, Once    gabapentin (NEURONTIN) capsule 600 mg, TID    heparin (porcine) subcutaneous injection 5,000 Units, Q8H KIA **AND** [CANCELED] Platelet count, Once    insulin lispro (HumALOG/ADMELOG) 100 units/mL subcutaneous injection 1-5 Units, TID AC **AND** Fingerstick Glucose (POCT), TID AC    insulin lispro (HumALOG/ADMELOG) 100 units/mL subcutaneous injection 1-5 Units, HS    polyethylene glycol (MIRALAX) packet 17 g, Daily PRN    pravastatin (PRAVACHOL) tablet 40 mg, Daily With Dinner    Administrative Statements   Today, Patient Was Seen By: Ayanna Leary PA-C    **Please Note: This note may have been constructed using a voice recognition system.**

## 2025-07-03 NOTE — ASSESSMENT & PLAN NOTE
On IV antibiotics as stated above   Wound cultures on 7/1 with Staph pseudintermedius  No evidence of SIRS or septic criteria  Blood cultures obtained, follow

## 2025-07-03 NOTE — ASSESSMENT & PLAN NOTE
Lab Results   Component Value Date    EGFR 123 07/03/2025    EGFR 118 07/02/2025    EGFR 118 06/06/2025    CREATININE 0.68 07/03/2025    CREATININE 0.75 07/02/2025    CREATININE 0.81 06/06/2025     Creatinine stable

## 2025-07-03 NOTE — ASSESSMENT & PLAN NOTE
Arterial studies show diffuse disease in right lower extremity with greater than 75% stenosis in distal anterior tibial artery.  OCTAVIA 1.58,  mmHg, GTP 65 mmHg    Per vascular surgery, given above findings of stenosis will plan for right lower extremity angiogram, tentatively 7/3/2025

## 2025-07-03 NOTE — ASSESSMENT & PLAN NOTE
Admitted by podiatry for right medial hallux wound with underling osteomyelitis and cellulitis extending to first MTPJ  Placed on IV Ancef  Pain regimen and wound care per primary  Patient will require great right toe amputation pending vascular optimization  Reviewed preoperative EKG normal sinus rhythm. Per previous attending note no contraindications to surgery.

## 2025-07-03 NOTE — ASSESSMENT & PLAN NOTE
Jason Blanca is a 34 y.o. male admitted 7/2/2025 for right medial hallux wound with underlying osteomyelitis and cellulitis extending to the first MTPJ.  PMH of type 2 diabetes mellitus, PAD, ADHD, CKD stage I.      Diagnostics:  5/16: Right foot MRI -small ulcer in the medial aspect of the great toe with mild bone marrow edema in the head of the proximal phalanx and distal phalanx, which may be related to reactive changes or early osteomyelitis  7/1: Right hallux wound culture - pending.  Preliminary results: Staphylococcus coagulase negative  7/2: Right foot X-Ray - Per my personal read, there is clear erosion to the medial aspect of the proximal phalanx head and distal phalanx base at the level of the ulceration consistent with osteomyelitis, however final read negative  7/2: Leads -RLE: Diffuse disease noted throughout the [place] arteries without significant focal stenosis.  There is a greater than 75% stenosis noted in the distal anterior tibial artery.  OCTAVIA of 1.58, metatarsal pressure of 147 mmHg, great toe pressure of 65 mmHg.  PVR/PPG tracings are normal.    Procedures:   7/3: Right lower extremity angiogram - pending     Plan:    Plan for right great toe amputation PENDING vascular optimization and medical clearance. Tentatively will schedule for 7/5 vs. 7/6.  Consent signed at bedside. Patient agreeable to procedure, discussed alternatives, risks, and potential complications  Continue IV Ancef, wound cultures finalized  Continue local wound care consisting of Betadine Adaptic DSD to the right hallux wound and Adaptic DSD to the superficial right medial ankle burn wound. Wound care instructions placed.  Elevation on green foam wedges or pillows when non-ambulatory.  Rest of care per primary team.    Weightbearing status: Weightbearing as tolerated to right lower extremity    VTE Pharmacologic Prophylaxis: VTE covered by:  heparin (porcine), Subcutaneous, 5,000 Units at 07/02/25 2112     VTE  Mechanical Prophylaxis: sequential compression device  Disposition: Patient requires admission for the above

## 2025-07-03 NOTE — ASSESSMENT & PLAN NOTE
Lab Results   Component Value Date    HGBA1C 6.9 (H) 06/06/2025       Recent Labs     07/02/25  1232 07/02/25  1635   POCGLU 129 242*       Blood Sugar Average: Last 72 hrs:  (P) 185.5

## 2025-07-03 NOTE — PLAN OF CARE
Problem: PAIN - ADULT  Goal: Verbalizes/displays adequate comfort level or baseline comfort level  Description: Interventions:  - Encourage patient to monitor pain and request assistance  - Assess pain using appropriate pain scale  - Administer analgesics as ordered based on type and severity of pain and evaluate response  - Implement non-pharmacological measures as appropriate and evaluate response  - Consider cultural and social influences on pain and pain management  - Notify physician/advanced practitioner if interventions unsuccessful or patient reports new pain  - Educate patient/family on pain management process including their role and importance of  reporting pain   - Provide non-pharmacologic/complimentary pain relief interventions  7/2/2025 2057 by Sherry Mir RN  Outcome: Progressing  7/2/2025 2056 by Sherry Mir RN  Outcome: Progressing     Problem: INFECTION - ADULT  Goal: Absence or prevention of progression during hospitalization  Description: INTERVENTIONS:  - Assess and monitor for signs and symptoms of infection  - Monitor lab/diagnostic results  - Monitor all insertion sites, i.e. indwelling lines, tubes, and drains  - Monitor endotracheal if appropriate and nasal secretions for changes in amount and color  - [place] appropriate cooling/warming therapies per order  - Administer medications as ordered  - Instruct and encourage patient and family to use good hand hygiene technique  - Identify and instruct in appropriate isolation precautions for identified infection/condition  Outcome: Progressing  Goal: Absence of fever/infection during neutropenic period  Description: INTERVENTIONS:  - Monitor WBC  - Perform strict hand hygiene  - Limit to healthy visitors only  - No plants, dried, fresh or silk flowers with ely in patient room  Outcome: Progressing     Problem: DISCHARGE PLANNING  Goal: Discharge to home or other facility with appropriate resources  Description: INTERVENTIONS:  -  Identify barriers to discharge w/patient and caregiver  - Arrange for needed discharge resources and transportation as appropriate  - Identify discharge learning needs (meds, wound care, etc.)  - Arrange for interpretive services to assist at discharge as needed  - Refer to Case Management Department for coordinating discharge planning if the patient needs post-hospital services based on physician/advanced practitioner order or complex needs related to functional status, cognitive ability, or social support system  Outcome: Progressing

## 2025-07-03 NOTE — PLAN OF CARE
Problem: PAIN - ADULT  Goal: Verbalizes/displays adequate comfort level or baseline comfort level  Description: Interventions:  - Encourage patient to monitor pain and request assistance  - Assess pain using appropriate pain scale  - Administer analgesics as ordered based on type and severity of pain and evaluate response  - Implement non-pharmacological measures as appropriate and evaluate response  - Consider cultural and social influences on pain and pain management  - Notify physician/advanced practitioner if interventions unsuccessful or patient reports new pain  - Educate patient/family on pain management process including their role and importance of  reporting pain   - Provide non-pharmacologic/complimentary pain relief interventions  7/3/2025 1014 by Venkatesh James RN  Outcome: Progressing  7/3/2025 1013 by Venkatesh James RN  Outcome: Progressing     Problem: INFECTION - ADULT  Goal: Absence or prevention of progression during hospitalization  Description: INTERVENTIONS:  - Assess and monitor for signs and symptoms of infection  - Monitor lab/diagnostic results  - Monitor all insertion sites, i.e. indwelling lines, tubes, and drains  - Monitor endotracheal if appropriate and nasal secretions for changes in amount and color  - [place] appropriate cooling/warming therapies per order  - Administer medications as ordered  - Instruct and encourage patient and family to use good hand hygiene technique  - Identify and instruct in appropriate isolation precautions for identified infection/condition  7/3/2025 1014 by Venkatesh James RN  Outcome: Progressing  7/3/2025 1013 by Venkatesh James RN  Outcome: Progressing  Goal: Absence of fever/infection during neutropenic period  Description: INTERVENTIONS:  - Monitor WBC  - Perform strict hand hygiene  - Limit to healthy visitors only  - No plants, dried, fresh or silk flowers with ely in patient room  7/3/2025 1014 by Venkatesh James RN  Outcome:  Progressing  7/3/2025 1013 by Venkatesh James RN  Outcome: Progressing     Problem: DISCHARGE PLANNING  Goal: Discharge to home or other facility with appropriate resources  Description: INTERVENTIONS:  - Identify barriers to discharge w/patient and caregiver  - Arrange for needed discharge resources and transportation as appropriate  - Identify discharge learning needs (meds, wound care, etc.)  - Arrange for interpretive services to assist at discharge as needed  - Refer to Case Management Department for coordinating discharge planning if the patient needs post-hospital services based on physician/advanced practitioner order or complex needs related to functional status, cognitive ability, or social support system  7/3/2025 1014 by Venkatesh James RN  Outcome: Progressing  7/3/2025 1013 by Venkatesh James RN  Outcome: Progressing     Problem: SAFETY ADULT  Goal: Patient will remain free of falls  Description: INTERVENTIONS:  - Educate patient/family on patient safety including physical limitations  - Instruct patient to call for assistance with activity   - Consider consulting OT/PT to assist with strengthening/mobility based on AM PAC & JH-HLM score  - Consult OT/PT to assist with strengthening/mobility   - Keep Call bell within reach  - Keep bed low and locked with side rails adjusted as appropriate  - Keep care items and personal belongings within reach  - Initiate and maintain comfort rounds  - Make Fall Risk Sign visible to staff  - Apply yellow socks and bracelet for high fall risk patients  - Consider moving patient to room near nurses station  Outcome: Progressing  Goal: Maintain or return to baseline ADL function  Description: INTERVENTIONS:  -  Assess patient's ability to carry out ADLs; assess patient's baseline for ADL function and identify physical deficits which impact ability to perform ADLs (bathing, care of mouth/teeth, toileting, grooming, dressing, etc.)  - Assess/evaluate cause of self-care  deficits   - Assess range of motion  - Assess patient's mobility; develop plan if impaired  - Assess patient's need for assistive devices and provide as appropriate  - Encourage maximum independence but intervene and supervise when necessary  - Involve family in performance of ADLs  - Assess for home care needs following discharge   - Consider OT consult to assist with ADL evaluation and planning for discharge  - Provide patient education as appropriate  - Monitor functional capacity and physical performance, use of AM PAC & JH-HLM   - Monitor gait, balance and fatigue with ambulation    Outcome: Progressing  Goal: Maintains/Returns to pre admission functional level  Description: INTERVENTIONS:  - Perform AM-PAC 6 Click Basic Mobility/ Daily Activity assessment daily.  - Set and communicate daily mobility goal to care team and patient/family/caregiver.   - Collaborate with rehabilitation services on mobility goals if consulted  - Out of bed for toileting  - Record patient progress and toleration of activity level   Outcome: Progressing     Problem: Knowledge Deficit  Goal: Patient/family/caregiver demonstrates understanding of disease process, treatment plan, medications, and discharge instructions  Description: Complete learning assessment and assess knowledge base.  Interventions:  - Provide teaching at level of understanding  - Provide teaching via preferred learning methods  Outcome: Progressing     Problem: METABOLIC, FLUID AND ELECTROLYTES - ADULT  Goal: Electrolytes maintained within normal limits  Description: INTERVENTIONS:  - Monitor labs and assess patient for signs and symptoms of electrolyte imbalances  - Administer electrolyte replacement as ordered  - Monitor response to electrolyte replacements, including repeat lab results as appropriate  - Instruct patient on fluid and nutrition as appropriate  Outcome: Progressing  Goal: Fluid balance maintained  Description: INTERVENTIONS:  - Monitor labs   -  Monitor I/O and WT  - Instruct patient on fluid and nutrition as appropriate  - Assess for signs & symptoms of volume excess or deficit  Outcome: Progressing  Goal: Glucose maintained within target range  Description: INTERVENTIONS:  - Monitor Blood Glucose as ordered  - Assess for signs and symptoms of hyperglycemia and hypoglycemia  - Administer ordered medications to maintain glucose within target range  - Assess nutritional intake and initiate nutrition service referral as needed  Outcome: Progressing
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
## 2025-07-03 NOTE — ASSESSMENT & PLAN NOTE
Leads greater than 75% stenosis of the right distal luis  Vascular following, planning for RLE arteriogram with possible intervention in OR today  Starting patient on aspirin per recommendations, continue statin

## 2025-07-03 NOTE — ANESTHESIA PREPROCEDURE EVALUATION
Procedure:  RLE ARTERIOGRAM (Right: Abdomen)    Relevant Problems   CARDIO   (+) PAD (peripheral artery disease) (HCC)      ENDO   (+) Type 2 diabetes mellitus with hyperglycemia, with neuropathic pain      GI/HEPATIC   (+) Fatty liver      /RENAL   (+) CKD (chronic kidney disease) stage 1, GFR 90 ml/min or greater      NEURO/PSYCH   (+) Recurrent major depressive disorder, in remission (HCC)        Physical Exam    Airway     Mallampati score: III  TM Distance: >3 FB  Neck ROM: full  Mouth opening: >= 4 cm      Cardiovascular  Cardiovascular exam normal    Dental   No notable dental hx     Pulmonary  Pulmonary exam normal     Neurological  - normal exam    Other Findings        Anesthesia Plan  ASA Score- 3     Anesthesia Type- IV sedation with anesthesia with ASA Monitors.         Additional Monitors:     Airway Plan: natural airway.           Plan Factors-Exercise tolerance (METS): >4 METS.    Chart reviewed.    Patient summary reviewed.    Patient is not a current smoker.              Induction- intravenous.    Postoperative Plan- .   Monitoring Plan - Monitoring plan - standard ASA monitoring      Perioperative Resuscitation Plan - Level 1 - Full Code.       Informed Consent- Anesthetic plan and risks discussed with patient.        NPO Status:  Vitals Value Taken Time   Date of last liquid 07/03/25 07/03/25 14:41   Time of last liquid 0825 07/03/25 14:41   Date of last solid 07/03/25 07/03/25 14:41   Time of last solid 1730 07/03/25 14:41
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
## 2025-07-03 NOTE — NURSING NOTE
Patient using own insulin and monitor to manage blood glucoses. SLIM aware. To put orders in.    Venkatesh James 7/3/2025 12:58 PM

## 2025-07-03 NOTE — NURSING NOTE
Pt completed both chlorhexidine baths before procedure. Will administer mouth wash and nasal swabs closer to procedure time of [id number].    Venkatesh James 7/3/2025 12:11 PM
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
## 2025-07-03 NOTE — PROGRESS NOTES
Progress Note - Podiatry   Name: Jason Blanca 34 y.o. male I MRN: [id number]  Unit/Bed#: [place] [address] -02 I Date of Admission: 7/2/2025   Date of Service: 7/3/2025 I Hospital Day: 1  { ?Quick Links I Problem List MANNIE VAZ I Billing Tip:76826}  Assessment & Plan  Diabetic ulcer of toe of right foot associated with type 2 diabetes mellitus, with necrosis of bone (HCC)  Cellulitis of great toe of right foot  Jason Blanca is a 34 y.o. male admitted 7/2/2025 for right medial hallux wound with underlying osteomyelitis and cellulitis extending to the first MTPJ.  PMH of type 2 diabetes melitis, PAD, ADHD, CKD stage I.      Diagnostics:  5/16: Right foot MRI -small ulcer in the medial aspect of the great toe with mild bone marrow edema in the head of the proximal phalanx and distal phalanx, which may be related to reactive changes or early osteomyelitis  7/1: Right hallux wound culture -pending.  Preliminary results: Staphylococcus coagulase negative  7/2: Right foot X-Ray -awaiting final read.  Per my personal read, there is clear erosion to the medial aspect of the proximal phalanx head and distal phalanx base at the level of the ulceration consistent with osteomyelitis  7/2: Leads -RLE: Diffuse disease noted throughout the [place] arteries without significant focal stenosis.  There is a greater than 75% stenosis noted in the distal anterior tibial artery.  OCTAVIA of 1.58, metatarsal pressure of 147 mmHg, great toe pressure of 65 mmHg.  PVR/PPG tracings are normal.     Initial lab work/cultures  WBC: 6.4  RBC: 5.07  Hgb: 15.1  Hct: 44.3  Na: 137  K: 4.1  BUN: 19  Cr: 0.7     Plan:    Patient admitted under podiatry for IV antibiotics and podiatric surgical intervention consisting of right hallux amputation, podiatric surgical intervention pending optimization per vascular surgery.  Dressings are clean dry and intact this morning  Follow-up final right foot x-ray  Follow-up final leads report  Patient scheduled  for right lower extremity angiogram today, 7/3.  Will follow-up results.  Appreciate vascular surgery recommendations  Patient cleared medically for OR by splint.  Appreciate recommendations  Started on IV Ancef, will follow-up culture from wound care on 7/1 for further antibiotic tailoring.  Preliminary growth of strep coagulase negative  Recommend local wound care consisting of Betadine Adaptic DSD to the right hallux wound and Adaptic DSD to the superficial right medial ankle burn wound. Wound care instructions placed.  Elevation on green foam wedges or pillows when non-ambulatory.  Rest of care per primary team.    Weightbearing status: Weightbearing as tolerated to right lower extremity  Disposition: Patient admitted under podiatry for IV antibiotics, vascular surgery consult, and podiatric surgical intervention  Type 2 diabetes mellitus with hyperglycemia, with neuropathic pain  Lab Results   Component Value Date    HGBA1C 6.9 (H) 06/06/2025       Recent Labs     07/02/25  1232 07/02/25  1635   POCGLU 129 242*       Blood Sugar Average: Last 72 hrs:  (P) 185.5    Recurrent major depressive disorder, in remission ([facility])  Per internal medicine  PAD (peripheral artery disease) ([facility])  Vascular surgery consult placed  ADHD, predominantly inattentive type  Managed on Adderall as needed  CKD (chronic kidney disease) stage 1, GFR 90 ml/min or greater  Lab Results   Component Value Date    EGFR 123 07/03/2025    EGFR 118 07/02/2025    EGFR 118 06/06/2025    CREATININE 0.68 07/03/2025    CREATININE 0.75 07/02/2025    CREATININE 0.81 06/06/2025   Per internal medicine    Subjective : ***    Objective :{?Quick Links I ICU Summary I Vitals I I/Os I LDAs I Mobility (PT/OT) I Code Status / ACP   ?Quick Links I Active Meds I Pain Meds I Antibiotics I Anticoagulants:02861}  Temp:  [97.3 °F (36.3 °C)-98.3 °F (36.8 °C)] 97.5 °F (36.4 °C)  HR:  [74-82] 74  BP: (115-159)/(68-74) 117/70  Resp:  [16] 16  SpO2:  [94 %-100 %] 100 %  O2  Device: None (Room air)    Physical Exam:     General:  Alert, cooperative, and in no distress.  Lower extremity exam:  Cardiovascular status at baseline.  Neurological status at baseline.  Musculoskeletal status at baseline***. No calf tenderness noted. ***    Right lower extremity: Dressings are clean dry and intact with no strikethrough and no ascending erythema above the level of the dressings    Clinical Images 07/03/25:  ***    Additional Data:     Labs:    Results from last 7 days   Lab Units 07/03/25  0619   WBC Thousand/uL 4.96   HEMOGLOBIN g/dL 15.8   HEMATOCRIT % 48.0   PLATELETS Thousands/uL 190   SEGS PCT % 63   LYMPHO PCT % 25   MONO PCT % 8   EOS PCT % 2     Results from last 7 days   Lab Units 07/03/25  0619 07/02/25  1119   POTASSIUM mmol/L 4.2 4.1   CHLORIDE mmol/L 104 103   CO2 mmol/L 29 27   BUN mg/dL 15 19   CREATININE mg/dL 0.68 0.75   CALCIUM mg/dL 9.1 9.3   ALK PHOS U/L  --  40   ALT U/L  --  16   AST U/L  --  11*     Results from last 7 days   Lab Units 07/03/25  0619   INR  0.92       * I Have Reviewed All Lab Data Listed Above.    Recent Cultures (last 7 days):     Results from last 7 days   Lab Units 07/02/25  1127 07/01/25  1028   BLOOD CULTURE  Received in Microbiology Lab. Culture in Progress.  Received in Microbiology Lab. Culture in Progress.  --    GRAM STAIN RESULT   --  4+ Gram positive cocci in pairs*  2+ Polys*   WOUND CULTURE   --  3+ Growth of Staphylococcus pseudintermedius*           Imaging: I have personally reviewed pertinent films in PACS  EKG, Pathology, and Other Studies: I have personally reviewed pertinent reports.

## 2025-07-03 NOTE — OP NOTE
VASCULAR AND ENDOVASCULAR SURGERY OP NOTE     DATE:  - 07/03/25     ATTENDING SURGEON:  - Matt Arellano MD     ASSISTANT SURGEON(S):  - Surgeons and Role:     * Matt Arellano MD - Primary     PREOPERATIVE DIAGNOSIS:    -Critical limb threatening ischemia of the right lower extremity, manifest as R great toe wound     POSTOPERATIVE DIAGNOSIS:   - SAME     PROCEDURE PERFORMED:   - Ultrasound guided access of the left common femoral artery  - Introduction of wire and catheter into the abdominal aorta  - Selective left ileofemoral arteriogram with interpretation  - Aortogram and iliac runoff with interpretation  - Selective right lower extremity arteriogram with interpretation  - Selective catheterization of 2nd order vessels  - Selective catheterization of 3rd order vessels   - Balloon angioplasty of right anterior tibial artery with [id number] compliant balloon   - Balloon angioplasty of right posterior tibial artery with [id number] compliant balloon   - Mynx closure of common femoral access                                          ANESTHESIA:  - local     BLOOD LOSS:  - Minimal     FLUOROSCOPY TIME:  - 26min     CONTRAST:  - 80mL     OPERATIVE FINDINGS:   - Aorta: patent, no flow limiting disease  - Right iliac system:  patent, no flow limiting disease  - Left iliac system:  patent, no flow limiting disease     Right lower extremity:  - CFA:  patent, no flow limiting disease  - SFA:  patent, no flow limiting disease  - PFA:  patent, no flow limiting disease  - Pop:  patent, no flow limiting disease  - AT:  multifocal stenosis w/ distal occlusion, inline flow restored following angioplasty  - TPT:  patent, no flow limiting disease  - PT:  multifocal stenosis, good luminal gain following angioplasty  - Peroneal:  patent, no flow limiting disease     PROCEDURE IN DETAIL:  Informed consent was obtained from the patient prior to proceeding to the operating room. The patient was then identified in the  pre-anesthesia area, then taken to the operating room, placed in the supine position on the operating table, and induced under general endotracheal anesthesia. The patient was correctly positioned, padded at all pressure points. The bilateral groins were then prepared and draped in a sterile fashion. A pre-operative timeout was performed. Preoperative antibiotics were administered at this time.       The left groin was assessed via ultrasonography and a micropuncture kit was used to cannulate the common femoral artery artery. Fluoroscopy was used to confirm wire trajectory. A left ileofemoral arteriogram was performed through the micro sheath to confirm adequate access of the common femoral artery, which was achieved. The microsheath was used to introduce a stiff angled glide wire into the abdominal aorta. The microsheath was then exchanged for a 5Fr short sheath.      An omniflush catheter was introduced over the wire into the abdominal aorta.  An aortagram was performed. The findings are described above. A combination of wire and catheter was used to access the contralateral femoral artery. The catheter was advanced to the region of the distal external iliac/common femoral artery at the level of the femoral head. An arteriogram of the right lower extremity was then performed. The findings are described above.        The decision was made to treat the AT and PT disease. IV heparin was administered.      The 5Fr sheath was exchanged over the wire for a 7Fr long sheath, which was advanced to the level of the distal SFA. A combination of wire and catheter was used to cannulate the right AT artery beyond the region of stenosis. An angiogram was performed through the catheter to confirm true lumen access.     A [id number] angioplasty balloon was advanced over the wire and positioned with stenosis in the center.  The balloon was inflated under fluoroscopy to nominal GOLDIE. The sheath was utilized to perform a post treatment  arteriogram, which demonstrated improvement in the stenosis.    We then turned our attention to the right PT. The wire and catheter was withdrawn from the AT and directed into the PT. A [id number] compliant balloon was used to angioplasty the PT in the fashion described above. The sheath was utilized to perform a post treatment arteriogram, which demonstrated improvement in the stenosis.     A completion arteriogram of the right lower extremity was performed demonstrating restoration of inline flow to the right foot via the AT and improvement in the right PT stenosis. The wire and catheter system was removed under fluoroscopy after the sheath was downsized to a 7Fr short sheath. Protamine was infused. A Mynx device was utilized for closure of the common femoral access site. The closure device was used successfully. After 5 minutes of post closure digital pressure no hematoma was appreciated.     The patient tolerated the procedure well and was transported to the PACU in stable condition.        Vascular Quality Initiative - Peripheral Vascular Intervention     Urgency: Elective    Functional Status:  Fully active; able to carry on all predisease activities without restriction.   Ambulation: Amb = independently ambulatory    Leg Symptoms    Right: Ulcer/necrosis (gangrene): de stephanie tissue loss due to peripheral arterial disease, not due to non-healing prior amputation       Tissue Loss Severity: Grade 1, Shallow = small shallow ulcer(s) on distal leg or foot, any exposed bone is only limited to distal phalanx (ie, minor tissue loss: limb salvage possible with simple digital amputation [1 or 2 digits], or skin coverage).     Infection: Grade 0, None = No symptoms or signs of infection.    COVID Information  COVID Symptoms Pre-Procedure: Asymptomatic    Treatment Delayed by Pandemic: None    Access   Number of Sites: 1     Access Site 1:     Side 1: Left    Site 1: Femoral Retrograde    Access Guidance 1:U/S    Largest  Sheath Size 1: 7 Fr.    Closure Device 1: MynxGrip      Number of Closure Devices: 1     Closure Device Outcome: Closure device successful         Procedure  Fluoro Time: 26 minutes  Contrast Volume: Visipaque 80 ml  DAP: 29.79 Gy.cm2  CO2: no  Anticoagulant: Heparin  Protamine: Yes  If Creatinine is > 1.2 or missing, JESUS Prophylaxis none     Treatment Details  Indication: Occlusive Disease,    Completion Assessment  Artery 1 treated: Ant Tibial   Right                    Was this Site previously treated?: No          TASC Grade: D          Total Treated Length: 350 cm          Total Occluded Length: 200 cm          Calcification: Focal (calcification on one side of artery < half length of lesion)          Number of Treatment types (Devices):   1           Device 1          Treatment Type: Plain Balloon          Concomitant: None          Technical result: Successful (stenosis <=30%)      Artery 2 treated: Post Tibial  Right                     Was this Site previously treated?: No          TASC Grade: D          Total Treated Length: 350 cm          Total Occluded Length: 200 cm          Calcification: Focal (calcification on one side of artery < half length of lesion)          Number of Treatment types (Devices):   1           Device 1          Treatment Type: Plain Balloon          Concomitant: None          Technical result: Successful (stenosis <=30%)       None     Post Procedure  Patient currently taking: Statin, Yes      Antiplatelet Medication, Yes    Procedure Complications: No

## 2025-07-04 ENCOUNTER — ANESTHESIA EVENT (INPATIENT)
Dept: PERIOP | Facility: HOSPITAL | Age: 35
DRG: 253 | End: 2025-07-04
Payer: COMMERCIAL

## 2025-07-04 LAB
ANION GAP SERPL CALCULATED.3IONS-SCNC: 6 MMOL/L (ref 4–13)
BASOPHILS # BLD AUTO: 0.03 THOUSANDS/ÂΜL (ref 0–0.1)
BASOPHILS NFR BLD AUTO: 1 % (ref 0–1)
BUN SERPL-MCNC: 12 MG/DL (ref 5–25)
CALCIUM SERPL-MCNC: 9.2 MG/DL (ref 8.4–10.2)
CHLORIDE SERPL-SCNC: 103 MMOL/L (ref 96–108)
CO2 SERPL-SCNC: 30 MMOL/L (ref 21–32)
CREAT SERPL-MCNC: 0.76 MG/DL (ref 0.6–1.3)
EOSINOPHIL # BLD AUTO: 0.08 THOUSAND/ÂΜL (ref 0–0.61)
EOSINOPHIL NFR BLD AUTO: 2 % (ref 0–6)
ERYTHROCYTE [DISTWIDTH] IN BLOOD BY AUTOMATED COUNT: 13.1 % (ref 11.6–15.1)
GFR SERPL CREATININE-BSD FRML MDRD: 118 ML/MIN/1.73SQ M
GLUCOSE SERPL-MCNC: 125 MG/DL (ref 65–140)
GLUCOSE SERPL-MCNC: 87 MG/DL
GLUCOSE [MOLES/VOLUME] IN CAPILLARY BLOOD: 123 MMOL/L
GLUCOSE [MOLES/VOLUME] IN CAPILLARY BLOOD: 139 MMOL/L
GLUCOSE [MOLES/VOLUME] IN CAPILLARY BLOOD: 146 MMOL/L
HCT VFR BLD AUTO: 47.9 % (ref 36.5–49.3)
HGB BLD-MCNC: 16.3 G/DL (ref 12–17)
IMM GRANULOCYTES # BLD AUTO: 0.01 THOUSAND/UL (ref 0–0.2)
IMM GRANULOCYTES NFR BLD AUTO: 0 % (ref 0–2)
LYMPHOCYTES # BLD AUTO: 0.86 THOUSANDS/ÂΜL (ref 0.6–4.47)
LYMPHOCYTES NFR BLD AUTO: 18 % (ref 14–44)
Lab: 136
MCH RBC QN AUTO: 29.3 PG (ref 26.8–34.3)
MCHC RBC AUTO-ENTMCNC: 34 G/DL (ref 31.4–37.4)
MCV RBC AUTO: 86 FL (ref 82–98)
MONOCYTES # BLD AUTO: 0.35 THOUSAND/ÂΜL (ref 0.17–1.22)
MONOCYTES NFR BLD AUTO: 7 % (ref 4–12)
NEUTROPHILS # BLD AUTO: 3.52 THOUSANDS/ÂΜL (ref 1.85–7.62)
NEUTS SEG NFR BLD AUTO: 72 % (ref 43–75)
NRBC BLD AUTO-RTO: 0 /100 WBCS
PLATELET # BLD AUTO: 216 THOUSANDS/UL (ref 149–390)
PMV BLD AUTO: 10.6 FL (ref 8.9–12.7)
POTASSIUM SERPL-SCNC: 4 MMOL/L (ref 3.5–5.3)
RBC # BLD AUTO: 5.56 MILLION/UL (ref 3.88–5.62)
SODIUM SERPL-SCNC: 139 MMOL/L (ref 135–147)
WBC # BLD AUTO: 4.85 THOUSAND/UL (ref 4.31–10.16)

## 2025-07-04 PROCEDURE — 80048 BASIC METABOLIC PNL TOTAL CA: CPT

## 2025-07-04 PROCEDURE — 99232 SBSQ HOSP IP/OBS MODERATE 35: CPT | Performed by: NURSE PRACTITIONER

## 2025-07-04 PROCEDURE — 99232 SBSQ HOSP IP/OBS MODERATE 35: CPT

## 2025-07-04 PROCEDURE — 85025 COMPLETE CBC W/AUTO DIFF WBC: CPT

## 2025-07-04 RX ORDER — CLOPIDOGREL BISULFATE 75 MG/1
75 TABLET ORAL DAILY
Status: DISCONTINUED | OUTPATIENT
Start: 2025-07-05 | End: 2025-07-06 | Stop reason: HOSPADM

## 2025-07-04 RX ORDER — METHOCARBAMOL 500 MG/1
500 TABLET, FILM COATED ORAL EVERY 6 HOURS PRN
Status: DISCONTINUED | OUTPATIENT
Start: 2025-07-04 | End: 2025-07-06

## 2025-07-04 RX ORDER — LIDOCAINE 50 MG/G
2 PATCH TOPICAL DAILY PRN
Status: DISCONTINUED | OUTPATIENT
Start: 2025-07-04 | End: 2025-07-06 | Stop reason: HOSPADM

## 2025-07-04 RX ORDER — IBUPROFEN 600 MG/1
600 TABLET, FILM COATED ORAL EVERY 6 HOURS PRN
Status: DISCONTINUED | OUTPATIENT
Start: 2025-07-04 | End: 2025-07-05

## 2025-07-04 RX ORDER — IBUPROFEN 600 MG/1
600 TABLET, FILM COATED ORAL EVERY 6 HOURS PRN
Status: DISCONTINUED | OUTPATIENT
Start: 2025-07-04 | End: 2025-07-04

## 2025-07-04 RX ORDER — CLOPIDOGREL BISULFATE 75 MG/1
300 TABLET ORAL ONCE
Status: COMPLETED | OUTPATIENT
Start: 2025-07-04 | End: 2025-07-04

## 2025-07-04 RX ADMIN — GABAPENTIN 600 MG: 300 CAPSULE ORAL at 21:02

## 2025-07-04 RX ADMIN — CEFAZOLIN SODIUM 2000 MG: 2 SOLUTION INTRAVENOUS at 12:40

## 2025-07-04 RX ADMIN — GABAPENTIN 600 MG: 300 CAPSULE ORAL at 16:10

## 2025-07-04 RX ADMIN — PRAVASTATIN SODIUM 40 MG: 40 TABLET ORAL at 16:10

## 2025-07-04 RX ADMIN — CEFAZOLIN SODIUM 2000 MG: 2 SOLUTION INTRAVENOUS at 04:56

## 2025-07-04 RX ADMIN — CEFAZOLIN SODIUM 2000 MG: 2 SOLUTION INTRAVENOUS at 19:49

## 2025-07-04 RX ADMIN — ACETAMINOPHEN 650 MG: 325 TABLET ORAL at 16:12

## 2025-07-04 RX ADMIN — HEPARIN SODIUM 5000 UNITS: 5000 INJECTION INTRAVENOUS; SUBCUTANEOUS at 05:41

## 2025-07-04 RX ADMIN — METHOCARBAMOL 500 MG: 500 TABLET ORAL at 09:37

## 2025-07-04 RX ADMIN — BUPROPION HYDROCHLORIDE 150 MG: 150 TABLET, EXTENDED RELEASE ORAL at 08:06

## 2025-07-04 RX ADMIN — HEPARIN SODIUM 5000 UNITS: 5000 INJECTION INTRAVENOUS; SUBCUTANEOUS at 21:02

## 2025-07-04 RX ADMIN — CLOPIDOGREL 300 MG: 75 TABLET ORAL at 09:34

## 2025-07-04 RX ADMIN — GABAPENTIN 600 MG: 300 CAPSULE ORAL at 08:05

## 2025-07-04 RX ADMIN — METHOCARBAMOL 500 MG: 500 TABLET ORAL at 16:12

## 2025-07-04 RX ADMIN — HEPARIN SODIUM 5000 UNITS: 5000 INJECTION INTRAVENOUS; SUBCUTANEOUS at 14:04

## 2025-07-04 RX ADMIN — ACETAMINOPHEN 650 MG: 325 TABLET ORAL at 09:37

## 2025-07-04 RX ADMIN — ASPIRIN 81 MG: 81 TABLET, DELAYED RELEASE ORAL at 08:06

## 2025-07-04 NOTE — PROGRESS NOTES
Progress Note - Vascular Surgery   Name: Jason Blanca 34 y.o. male I MRN: [id number]  Unit/Bed#: [place] [address] -02 I Date of Admission: 7/2/2025   Date of Service: 7/4/2025 I Hospital Day: 2    Assessment & Plan  PAD (peripheral artery disease) (HCC)  33 yo male ex-smoker w/ a hx of DM II (A1c 6.9) w/ peripheral neuropathy, HTN, HLD, depression, ADHD, and CKD stage 1 presented to [facility] on 7/2/25 per podiatry recommendation w/ worsening R hallux wound (+) OM, present x7 months. Pt admitted and started on IV ABX. Podiatry plans R hallux amputation pending revascularization.    Vascular surgery consulted for R hallux wound in the setting of PAD. LEAD shows >75% R AT stenosis w/ R OCTAVIA: 1.58/147/65.    Diagnostics:  -7/2/25 LEAD: > 75% stenosis of the right distal SARAY. R OCTAVIA: 1.58/147/65, L OCTAVIA: 1.29/118/78  -7/2/25 R foot xray: No acute osseous abnormality.   -5/16/25 MRI right foot: Small ulcer in the medial aspect of great toe with mild bone marrow edema in the head of proximal phalanx and base of distal phalanx, which may be related to reactive changes or early osteomyelitis.    Plan:  -R hallux wound (+) OM in the setting of PAD w/ DM  -LEAD shows >75% R AT stenosis w/ R OCTAVIA: 1.58/147/65  -POD 1 s/p RLE arteriogram w/ PT + AT PTA. Left groin access site intact with glue. Multiphasic Dopplerable DP/PT  -Aspirin, Plavix post intervention w/ continuation of pravastatin for optimal medical management of PAD   -Continue ABX per primary team  -Optimized from a vascular standpoint   -R hallux amputation per podiatry  -SLIM following for medical management; assistance appreciated  -Outpatient follow up 7/24. Please reach out to vascular with any concerns in the interim   -Will discuss w/ Dr. Doyle   Mixed dyslipidemia        Subjective :   Status post right lower extremity angiogram via left femoral access.  Complains of mild soreness at the left groin access site.  Left groin access site is soft, without ecchymosis  or swelling.  Glue is intact.    Objective :  I have reviewed and made appropriate changes to the review of systems input by the medical assistant.    Vitals:    25 2335 25 0027 25 0332 25 0738   BP: 114/67 117/74 103/65 123/77   BP Location:       Pulse: 84 86 74 78   Resp: 18 18 18 16   Temp:    97.5 °F (36.4 °C)   TempSrc:       SpO2: 99% 99% 100% 99%   Weight:           Problem List[1]    Past Surgical History[2]    Family History[3]    Social History     Socioeconomic History    Marital status: Single     Spouse name: Not on file    Number of children: Not on file    Years of education: Not on file    Highest education level: Not on file   Occupational History    Not on file   Tobacco Use    Smoking status: Former     Current packs/day: 0.00     Average packs/day: 0.5 packs/day for 18.1 years (9.1 ttl pk-yrs)     Types: Cigarettes     Start date:      Quit date: 2025     Years since quittin.3    Smokeless tobacco: Never    Tobacco comments:     Uses vape   Vaping Use    Vaping status: Every Day    Substances: Nicotine   Substance and Sexual Activity    Alcohol use: Not Currently     Comment: social    Drug use: Yes     Types: Marijuana     Comment: medical card    Sexual activity: Yes     Partners: Female   Other Topics Concern    Not on file   Social History Narrative    Not on file     Social Drivers of Health     Financial Resource Strain: Not on file   Food Insecurity: No Food Insecurity (7/3/2025)    Nursing - Inadequate Food Risk Classification     Worried About Running Out of Food in the Last Year: Not on file     Ran Out of Food in the Last Year: Not on file     Ran Out of Food in the Last Year: Never true   Transportation Needs: No Transportation Needs (7/3/2025)    Nursing - Transportation Risk Classification     Lack of Transportation: Not on file     Lack of Transportation: No   Physical Activity: Not on file   Stress: Not on file   Social Connections: Not on  file   Intimate Partner Violence: Unknown (7/3/2025)    Nursing IPS     Feels Physically and Emotionally Safe: Not on file     Physically Hurt by Someone: Not on file     Humiliated or Emotionally Abused by Someone: Not on file     Physically Hurt by Someone: No     Hurt or Threatened by Someone: No   Housing Stability: Unknown (7/3/2025)    Nursing: Inadequate Housing Risk Classification     Has Housing: Not on file     Worried About Losing Housing: Not on file     Unable to Get Utilities: Not on file     Unable to Pay for Housing in the Last Year: No     Has Housing: No       Allergies[4]    Current Medications[5]    Temp:  [97.3 °F (36.3 °C)-97.8 °F (36.6 °C)] 97.5 °F (36.4 °C)  HR:  [] 78  BP: (103-144)/(65-96) 123/77  Resp:  [16-18] 16  SpO2:  [96 %-100 %] 99 %  O2 Device: None (Room air)     I/O         07/02 0701 07/03 0700 07/03 0701  07/04 0700 07/04 0701  07/05 0700    P.O. 730 240     I.V. (mL/kg)  1500 (17.1)     IV Piggyback 100      Total Intake(mL/kg) 830 (9.5) 1740 (19.8)     Urine (mL/kg/hr)  1620 (0.8)     Total Output  1620     Net +830 +120                    Physical Exam  Vitals reviewed.   Constitutional:       Appearance: Normal appearance.   HENT:      Head: Normocephalic and atraumatic.     Eyes:      Extraocular Movements: Extraocular movements intact.       Cardiovascular:      Pulses:           Dorsalis pedis pulses are detected w/ Doppler on the right side.        Posterior tibial pulses are detected w/ Doppler on the right side.     Musculoskeletal:         General: No swelling.     Skin:     General: Skin is warm.      Comments: Left groin access site soft. Surgical glue intact      Neurological:      General: No focal deficit present.      Mental Status: He is alert and oriented to person, place, and time.         Lab Results: I have reviewed the following results:  CBC with diff:   Lab Results   Component Value Date    WBC 4.85 07/04/2025    HGB 16.3 07/04/2025    HCT 47.9  "07/04/2025    MCV 86 07/04/2025     07/04/2025    RBC 5.56 07/04/2025    MCH 29.3 07/04/2025    MCHC 34.0 07/04/2025    RDW 13.1 07/04/2025    MPV 10.6 07/04/2025    NRBC 0 07/04/2025   ,   BMP/CMP:  Lab Results   Component Value Date    SODIUM 139 07/04/2025    SODIUM 139 06/06/2025    K 4.0 07/04/2025    K 4.4 06/06/2025     07/04/2025     06/06/2025    CO2 30 07/04/2025    CO2 26 06/06/2025    BUN 12 07/04/2025    BUN 16 06/06/2025    BUN 13 11/05/2024    CREATININE 0.76 07/04/2025    CREATININE 0.81 06/06/2025    CALCIUM 9.2 07/04/2025    CALCIUM 9.3 06/06/2025    AST 11 (L) 07/02/2025    AST 16 06/06/2025    AST 18 01/13/2025    ALT 16 07/02/2025    ALT 27 06/06/2025    ALT 20 01/13/2025    ALKPHOS 40 07/02/2025    ALKPHOS 43 06/06/2025    EGFR 118 07/04/2025    EGFR 118 06/06/2025    EGFR 120.7 01/13/2025    EGFR 118.51 01/13/2025   ,   Lipid Panel: No results found for: \"CHOL\",   Coags:   Lab Results   Component Value Date    PTT 30 07/03/2025    INR 0.92 07/03/2025   ,   Blood Culture:   Lab Results   Component Value Date    BLOODCX No Growth at 24 hrs. 07/02/2025    BLOODCX No Growth at 24 hrs. 07/02/2025   ,   Urinalysis: No results found for: \"COLORU\", \"CLARITYU\", \"SPECGRAV\", \"PHUR\", \"LEUKOCYTESUR\", \"NITRITE\", \"PROTEINUA\", \"GLUCOSEU\", \"KETONESU\", \"BILIRUBINUR\", \"BLOODU\",   Urine Culture: No results found for: \"URINECX\",   Wound Culure:   Lab Results   Component Value Date    WOUNDCULT 3+ Growth of Staphylococcus pseudintermedius (A) 07/01/2025            [1]   Patient Active Problem List  Diagnosis    Type 2 diabetes mellitus with hyperglycemia, with neuropathic pain    Overweight (BMI 25.0-29.9)    Recurrent major depressive disorder, in remission (HCC)    Hypogonadism in male    Painful and cold lower extremity    PAD (peripheral artery disease) (HCC)    Excessive sweating    ADHD, predominantly inattentive type    Erectile dysfunction of nonorganic origin    Fatty liver    Mixed " dyslipidemia    Vitamin D deficiency    Axonal sensorimotor neuropathy    Witnessed episode of apnea    Right thyroid nodule    Nonhealing wound of right great toe    CKD (chronic kidney disease) stage 1, GFR 90 ml/min or greater    Positive for microalbuminuria    Former smoker    Diabetic ulcer of toe of right foot associated with type 2 diabetes mellitus, with necrosis of bone (HCC)    Cellulitis of great toe of right foot   [2]   Past Surgical History:  Procedure Laterality Date    HERNIA REPAIR     [3]   Family History  Problem Relation Name Age of Onset    Diabetes Mother      Prostate cancer Paternal Grandfather     [4]   Allergies  Allergen Reactions    Amoxicillin Other (See Comments)     Other reaction(s): augmentin    Augmentin [Amoxicillin-Pot Clavulanate] Hives    Sulfa Antibiotics Swelling   [5]   Current Facility-Administered Medications:     acetaminophen (TYLENOL) tablet 650 mg, 650 mg, Oral, Q6H PRN, Yamileth Phoenix, DPM, 650 mg at 07/04/25 0937    amphetamine-dextroamphetamine (ADDERALL) tablet 10 mg, 10 mg, Oral, BID (AM & Afternoon), Yamileth Garibay DPMATY    aspirin (ECOTRIN LOW STRENGTH) EC tablet 81 mg, 81 mg, Oral, Daily, Yamileth Phoenix, DPM, 81 mg at 07/04/25 0806    buPROPion (WELLBUTRIN XL) 24 hr tablet 150 mg, 150 mg, Oral, Daily, Yamileth Graibay, DPM, 150 mg at 07/04/25 0806    ceFAZolin (ANCEF) IVPB (premix in dextrose) 2,000 mg 50 mL, 2,000 mg, Intravenous, Q8H, Yamileth Garibay, DPM, Last Rate: 100 mL/hr at 07/04/25 0456, 2,000 mg at 07/04/25 0456    [START ON 7/5/2025] clopidogrel (PLAVIX) tablet 75 mg, 75 mg, Oral, Daily, Ayanna Leary PA-C    gabapentin (NEURONTIN) capsule 600 mg, 600 mg, Oral, TID, Yamileth Garibay, DPM, 600 mg at 07/04/25 0805    heparin (porcine) subcutaneous injection 5,000 Units, 5,000 Units, Subcutaneous, Q8H KIA, 5,000 Units at 07/04/25 0541 **AND** [CANCELED] Platelet count, , , Once, Crystal Dill DPM    ibuprofen (MOTRIN) tablet 600 mg, 600 mg, Oral, Q6H PRN, Ayanna Leary,  ANA PAULA    insulin lispro (HumALOG/ADMELOG) 100 units/mL subcutaneous injection 1-5 Units, 1-5 Units, Subcutaneous, TID AC **AND** Fingerstick Glucose (POCT), , , TID AC, Yamileth Garibay DPM    insulin lispro (HumALOG/ADMELOG) 100 units/mL subcutaneous injection 1-5 Units, 1-5 Units, Subcutaneous, HS, Yamileth Garibay DPM    lidocaine (LIDODERM) 5 % patch 2 patch, 2 patch, Topical, Daily PRN, Ayanna Leary PA-C    methocarbamol (ROBAXIN) tablet 500 mg, 500 mg, Oral, Q6H PRN, Ayanna Leary PA-C, 500 mg at 07/04/25 0937    polyethylene glycol (MIRALAX) packet 17 g, 17 g, Oral, Daily PRN, Yamileth Garibay DPMATY    pravastatin (PRAVACHOL) tablet 40 mg, 40 mg, Oral, Daily With Dinner, Yamileth Garibay DPM, 40 mg at 07/02/25 1703

## 2025-07-04 NOTE — ASSESSMENT & PLAN NOTE
33 yo male ex-smoker w/ a hx of DM II (A1c 6.9) w/ peripheral neuropathy, HTN, HLD, depression, ADHD, and CKD stage 1 presented to [facility] on 7/2/25 per podiatry recommendation w/ worsening R hallux wound (+) OM, present x7 months. Pt admitted and started on IV ABX. Podiatry plans R hallux amputation pending revascularization.    Vascular surgery consulted for R hallux wound in the setting of PAD. LEAD shows >75% R AT stenosis w/ R OCTAVIA: 1.58/147/65.    Diagnostics:  -7/2/25 LEAD: > 75% stenosis of the right distal SARAY. R OCTAVIA: 1.58/147/65, L OCTAVIA: 1.29/118/78  -7/2/25 R foot xray: No acute osseous abnormality.   -5/16/25 MRI right foot: Small ulcer in the medial aspect of great toe with mild bone marrow edema in the head of proximal phalanx and base of distal phalanx, which may be related to reactive changes or early osteomyelitis.    Plan:  -R hallux wound (+) OM in the setting of PAD w/ DM  -LEAD shows >75% R AT stenosis w/ R OCTAVIA: 1.58/147/65  -POD 1 s/p RLE arteriogram w/ PT + AT PTA. Left groin access site intact with glue. Multiphasic Dopplerable DP/PT  -Aspirin, Plavix post intervention w/ continuation of pravastatin for optimal medical management of PAD   -Continue ABX per primary team  -Optimized from a vascular standpoint   -R hallux amputation per podiatry  -SLIM following for medical management; assistance appreciated  -Outpatient follow up 7/24. Please reach out to vascular with any concerns in the interim   -Will discuss w/ Dr. Doyle

## 2025-07-04 NOTE — ASSESSMENT & PLAN NOTE
Admitted by podiatry for right medial hallux wound with underling osteomyelitis and cellulitis extending to first MTPJ  Continues on IV Ancef  Wound care per primary  Continue analgesia as needed.  Patient is not interested in narcotic pain medication at this time.  Can utilize Tylenol, ibuprofen, muscle relaxer and lidocaine as needed  Patient will require great right toe amputation now that he is status post vascular intervention -follow-up podiatry's final plan  No contraindications to surgery

## 2025-07-04 NOTE — ASSESSMENT & PLAN NOTE
On IV antibiotics as stated above   Wound cultures on 7/1 with Staph pseudintermedius  No evidence of SIRS or septic criteria  Blood cultures obtained, follow --> negative at 24 hours, discussed with patient

## 2025-07-04 NOTE — ASSESSMENT & PLAN NOTE
Lab Results   Component Value Date    HGBA1C 6.9 (H) 06/06/2025       Recent Labs     07/02/25  1232 07/02/25  1635 07/03/25  1827   POCGLU 129 242* 107       Blood Sugar Average: Last 72 hrs:  (P) [id number]

## 2025-07-04 NOTE — CASE MANAGEMENT
Case Management Discharge Planning Note    Patient name Jason Blanca  Location [place] [address] /[address] 2 M* MRN [id number]  : 1990 Date 2025       Current Admission Date: 2025  Current Admission Diagnosis:Diabetic ulcer of toe of right foot associated with type 2 diabetes mellitus, with necrosis of bone (HCC)   Patient Active Problem List    Diagnosis Date Noted    Diabetic ulcer of toe of right foot associated with type 2 diabetes mellitus, with necrosis of bone (HCC) 2025    Cellulitis of great toe of right foot 2025    Former smoker 2025    CKD (chronic kidney disease) stage 1, GFR 90 ml/min or greater 2025    Positive for microalbuminuria 2025    Nonhealing wound of right great toe 2025    Witnessed episode of apnea 10/02/2024    Right thyroid nodule 10/02/2024    Axonal sensorimotor neuropathy 2024    PAD (peripheral artery disease) (HCC) 2024    Excessive sweating 2024    ADHD, predominantly inattentive type 2024    Vitamin D deficiency 2024    Erectile dysfunction of nonorganic origin 2024    Fatty liver 2024    Mixed dyslipidemia 2024    Painful and cold lower extremity 2024    Hypogonadism in male 2023    Type 2 diabetes mellitus with hyperglycemia, with neuropathic pain 2023    Overweight (BMI 25.0-29.9) 2023    Recurrent major depressive disorder, in remission (HCC) 2023      LOS (days): 2  Geometric Mean LOS (GMLOS) (days):   Days to GMLOS:     OBJECTIVE:  Risk of Unplanned Readmission Score: 12.16         Current admission status: Inpatient   Preferred Pharmacy:   [id number] - [place], [place] - [address]  [address]  [facility] [address]  Phone: [phone] Fax: [phone]     [facility] - [place], [place] - [address] [place] [address] STREET  [address] [place] [address]  Phone: [phone] Fax: [phone]    Primary Care Provider: Jaren  MD Jimmy    Primary Insurance: [facility]  Secondary Insurance:     DISCHARGE DETAILS:                  Additional Comments: CM consulted for VNA services, patient pending amputation of the toe. There will be no wounds to treat. Patient is also IPTA, not home bound, does not qualify for VNA. Consult closed. Patient was going to the wound center outpatient as well. CM will continue to follow.

## 2025-07-04 NOTE — UTILIZATION REVIEW
Continued Stay Review    Date: 7/4                          Current Patient Class: Inpatient  Current Level of Care: med surg     HPI:34 y.o. male initially admitted on 7/2    Current Diagnosis:  PAD, Right hallux wound, + OM, in setting of PAD w/DM     Assessment/Plan:  POD 1 s/p RLE arteriogram w/ PT + AT PTA. Left groin access site intact with glue. Multiphasic Dopplerable DP/PT   Pt c/o mild soreness at left groin access site , Left groin access site is sodt, no swelling or ecchymosis, glue intact . Optimized from a vascular standpoint  Continue IV antibiotic    Plan 7/5 R hallux amputation per podiatry     Medications:   Scheduled Medications:  amphetamine-dextroamphetamine, 10 mg, Oral, BID (AM & Afternoon)  aspirin, 81 mg, Oral, Daily  buPROPion, 150 mg, Oral, Daily  cefazolin, 2,000 mg, Intravenous, Q8H  [START ON 7/5/2025] clopidogrel, 75 mg, Oral, Daily  gabapentin, 600 mg, Oral, TID  heparin (porcine), 5,000 Units, Subcutaneous, Q8H KIA  insulin lispro, 1-5 Units, Subcutaneous, TID AC  insulin lispro, 1-5 Units, Subcutaneous, HS  pravastatin, 40 mg, Oral, Daily With Dinner      Continuous IV Infusions:     PRN Meds:  acetaminophen, 650 mg, Oral, Q6H PRN  ibuprofen, 600 mg, Oral, Q6H PRN  lidocaine, 2 patch, Topical, Daily PRN  methocarbamol, 500 mg, Oral, Q6H PRN  polyethylene glycol, 17 g, Oral, Daily PRN      Discharge Plan: TBD     Vital Signs (last 3 days)       Date/Time Temp Pulse Resp BP MAP (mmHg) SpO2 O2 Device Patient Position - Orthostatic VS Pain    07/04/25 0937 -- -- -- -- -- -- -- -- 3    07/04/25 0900 -- -- -- -- -- -- None (Room air) -- --    07/04/25 07:38:50 97.5 °F (36.4 °C) 78 16 123/77 92 99 % -- -- --    07/04/25 03:32:23 -- 74 18 103/65 78 100 % -- -- --    07/04/25 00:27:51 -- 86 18 117/74 88 99 % -- -- --    07/03/25 23:35:10 -- 84 18 114/67 83 99 % -- -- --    07/03/25 2238 -- -- -- -- -- -- -- -- 5    07/03/25 2215 -- 100 -- 131/84 100 -- -- -- --    07/03/25 2115 -- 73 --  131/88 102 -- -- -- --    07/03/25 20:17:03 97.3 °F (36.3 °C) 75 16 137/90 106 98 % -- -- --    07/03/25 2000 -- -- -- -- -- -- None (Room air) -- 4 07/03/25 19:20:10 97.4 °F (36.3 °C) 69 16 132/89 103 96 % None (Room air) Lying --    07/03/25 1855 97.4 °F (36.3 °C) 72 16 144/96 116 99 % None (Room air) -- 4 07/03/25 1823 97.8 °F (36.6 °C) 82 16 142/90 111 99 % None (Room air) -- No Pain    07/03/25 0838 -- 68 16 152/87 119 99 % None (Room air) -- No Pain    07/03/25 0828 -- -- -- -- -- -- -- -- 3    07/03/25 07:42:07 97.5 °F (36.4 °C) 74 16 117/70 86 100 % -- Lying --    07/02/25 2112 -- -- -- -- -- -- -- -- 6 07/02/25 21:01:26 97.3 °F (36.3 °C) -- -- 115/68 84 -- -- -- --    07/02/25 2048 -- -- -- -- -- -- -- -- 6 07/02/25 15:24:30 97.7 °F (36.5 °C) 82 16 121/74 90 94 % -- -- --    07/02/25 1330 -- -- -- -- -- -- None (Room air) -- No Pain    07/02/25 12:37:32 97.3 °F (36.3 °C) 77 16 123/74 90 99 % -- -- --    07/02/25 1059 98.3 °F (36.8 °C) 81 16 159/70 101 100 % None (Room air) Lying --          Weight (last 2 days)       Date/Time Weight    07/02/25 1100 87.7 (193.34)            Pertinent Labs/Diagnostic Results:   Radiology:  VAS ARTERIAL DUPLEX-LOWER LIMB UNILATERAL   Final Interpretation by Ramirez Doyle MD (07/02 1533)      XR foot 3+ views RIGHT   Final Interpretation by Pippa Zimmerman MD (07/02 1506)   No acute osseous abnormality.            Computerized Assisted Algorithm (CAA) may have been used to analyze all applicable images.         Workstation performed: [id number]         IR lower extremity angiogram    (Results Pending)     Cardiology:  ECG 12 lead    by Graciela Neely RN (07/03 2103)      ECG 12 lead   Final Result by Joel [place] DO (07/03 1211)   Normal sinus rhythm   Normal ECG   When compared with ECG of 02-Jul-2025 18:25,   Nonspecific T wave abnormality no longer evident in Lateral leads   Confirmed by Joel Michael ([id number]) on 7/3/2025 12:11:28 PM     "  ECG 12 lead   Final Result by Joel [place] DO (07/03 1132)   Normal sinus rhythm   Nonspecific T wave abnormality   Abnormal ECG   No previous ECGs available   Confirmed by Joel Michael ([id number]) on 7/3/2025 11:31:58 AM        GI:  No orders to display           Results from last 7 days   Lab Units 07/04/25  0606 07/03/25  0619 07/02/25  1119   WBC Thousand/uL 4.85 4.96 6.40   HEMOGLOBIN g/dL 16.3 15.8 15.1   HEMATOCRIT % 47.9 48.0 44.3   PLATELETS Thousands/uL 216 190 196   TOTAL NEUT ABS Thousands/µL 3.52 3.17 4.38         Results from last 7 days   Lab Units 07/04/25  0606 07/03/25  0619 07/02/25  1119   SODIUM mmol/L 139 140 137   POTASSIUM mmol/L 4.0 4.2 4.1   CHLORIDE mmol/L 103 104 103   CO2 mmol/L 30 29 27   ANION GAP mmol/L 6 7 7   BUN mg/dL 12 15 19   CREATININE mg/dL 0.76 0.68 0.75   EGFR ml/min/1.73sq m 118 123 118   CALCIUM mg/dL 9.2 9.1 9.3   MAGNESIUM mg/dL  --   --  1.9     Results from last 7 days   Lab Units 07/02/25  1119   AST U/L 11*   ALT U/L 16   ALK PHOS U/L 40   TOTAL PROTEIN g/dL 6.8   ALBUMIN g/dL 4.2   TOTAL BILIRUBIN mg/dL 0.47   BILIRUBIN DIRECT mg/dL 0.06     Results from last 7 days   Lab Units 07/03/25  1827 07/02/25  1635 07/02/25  1232   POC GLUCOSE mg/dl 107 242* 129     Results from last 7 days   Lab Units 07/04/25  0606 07/03/25  1245 07/03/25  1242 07/03/25  0619 07/02/25  1119   GLUCOSE RANDOM mg/dL 125 114 87 130 134             No results found for: \"BETA-HYDROXYBUTYRATE\"                           Results from last 7 days   Lab Units 07/03/25  0619   PROTIME seconds 12.6   INR  0.92   PTT seconds 30             Results from last 7 days   Lab Units 07/02/25  1119   LACTIC ACID mmol/L 0.7                                     Results from last 7 days   Lab Units 07/02/25  1119   CRP mg/L 20.0*   SED RATE mm/hour 8                                             Results from last 7 days   Lab Units 07/02/25  1127 07/01/25  1028   BLOOD CULTURE  No Growth at 24 hrs.  No " Growth at 24 hrs.  --    GRAM STAIN RESULT   --  4+ Gram positive cocci in pairs*  2+ Polys*   WOUND CULTURE   --  3+ Growth of Staphylococcus pseudintermedius*                   Network Utilization Review Department  ATTENTION: Please call with any questions or concerns to [phone] and carefully listen to the prompts so that you are directed to the right person. All voicemails are confidential.   For Discharge needs, contact Care Management DC Support Team at [phone] opt. [phone]  Send all requests for admission clinical reviews, approved or denied determinations and any other requests to dedicated fax number below belonging to the campus where the patient is receiving treatment. List of dedicated fax numbers for the Facilities:  FACILITY NAME UR FAX NUMBER   ADMISSION DENIALS (Administrative/Medical Necessity) [phone]   DISCHARGE SUPPORT TEAM (NETWORK) [phone]   PARENT CHILD HEALTH (Maternity/NICU/Pediatrics) [phone]   [facility] [phone]   [facility] [phone]   [facility] [phone]   [facility] [phone]   [facility] [phone]   [facility] [phone]   [facility] [phone]   [facility] [phone]   [facility] [phone]   [facility] [phone]   [facility] [phone]   [facility] [phone]

## 2025-07-04 NOTE — ASSESSMENT & PLAN NOTE
Vascular following, patient is status post balloon angioplasty with vascular 7/3/2025 to anterior and posterior tibial artery  Started on aspirin and loaded with Plavix, discussed with Dr. Regalado and patient.  Continue statin

## 2025-07-04 NOTE — ASSESSMENT & PLAN NOTE
Arterial studies show diffuse disease in right lower extremity with greater than 75% stenosis in distal anterior tibial artery.  OCTAVIA 1.58,  mmHg, GTP 65 mmHg    S/p right lower extremity angiogram with adequate PT/AT runoff  Continue aspirin, statin, Plavix

## 2025-07-04 NOTE — ASSESSMENT & PLAN NOTE
Jason Blanca is a 34 y.o. male admitted 7/2/2025 for right medial hallux wound with underlying osteomyelitis and cellulitis extending to the first MTPJ.  PMH of type 2 diabetes mellitus, PAD, ADHD, CKD stage I.      Diagnostics:  5/16: Right foot MRI -small ulcer in the medial aspect of the great toe with mild bone marrow edema in the head of the proximal phalanx and distal phalanx, which may be related to reactive changes or early osteomyelitis  7/2: Right foot X-Ray - Per my personal read, there is clear erosion to the medial aspect of the proximal phalanx head and distal phalanx base at the level of the ulceration consistent with osteomyelitis, however final read negative  7/2: Leads -RLE: Diffuse disease noted throughout the [place] arteries without significant focal stenosis.  There is a greater than 75% stenosis noted in the distal anterior tibial artery.  OCTAVIA of 1.58, metatarsal pressure of 147 mmHg, great toe pressure of 65 mmHg.  PVR/PPG tracings are normal.    Procedures:   7/3: Right lower extremity angiogram: PT, AT angioplasty.  Multiphasic dopplerable DP/PT pulses     Plan:    Patient to go to the OR tomorrow, 7/5, with Dr. Pisano for right great toe amputation.  NPO midnight  Patient agreeable to procedure, discussed alternatives, risks, and potential complications  Continue IV Ancef  Continue local wound care consisting of Betadine Adaptic DSD to the right hallux wound and Adaptic DSD to the superficial right medial ankle burn wound.   Elevation on green foam wedges or pillows when non-ambulatory.    Weightbearing status: Weightbearing as tolerated to right lower extremity    VTE Pharmacologic Prophylaxis: Heparin     VTE Mechanical Prophylaxis: sequential compression device  Disposition: Patient requires admission for the above

## 2025-07-04 NOTE — PROGRESS NOTES
Progress Note - Podiatry   Name: Jason Blanca 34 y.o. male I MRN: [id number]  Unit/Bed#: [place] [address] -02 I Date of Admission: 7/2/2025   Date of Service: 7/4/2025 I Hospital Day: 2     Assessment & Plan  Diabetic ulcer of toe of right foot associated with type 2 diabetes mellitus, with necrosis of bone (HCC)  Cellulitis of great toe of right foot  Jason Blanca is a 34 y.o. male admitted 7/2/2025 for right medial hallux wound with underlying osteomyelitis and cellulitis extending to the first MTPJ.  PMH of type 2 diabetes mellitus, PAD, ADHD, CKD stage I.      Diagnostics:  5/16: Right foot MRI -small ulcer in the medial aspect of the great toe with mild bone marrow edema in the head of the proximal phalanx and distal phalanx, which may be related to reactive changes or early osteomyelitis  7/2: Right foot X-Ray - Per my personal read, there is clear erosion to the medial aspect of the proximal phalanx head and distal phalanx base at the level of the ulceration consistent with osteomyelitis, however final read negative  7/2: Leads -RLE: Diffuse disease noted throughout the [place] arteries without significant focal stenosis.  There is a greater than 75% stenosis noted in the distal anterior tibial artery.  OCTAVIA of 1.58, metatarsal pressure of 147 mmHg, great toe pressure of 65 mmHg.  PVR/PPG tracings are normal.    Procedures:   7/3: Right lower extremity angiogram: PT, AT angioplasty.  Multiphasic dopplerable DP/PT pulses     Plan:    Patient to go to the OR tomorrow, 7/5, with Dr. Pisano for right great toe amputation.  NPO midnight  Patient agreeable to procedure, discussed alternatives, risks, and potential complications  Continue IV Ancef  Continue local wound care consisting of Betadine Adaptic DSD to the right hallux wound and Adaptic DSD to the superficial right medial ankle burn wound.   Elevation on green foam wedges or pillows when non-ambulatory.    Weightbearing status: Weightbearing as  tolerated to right lower extremity    VTE Pharmacologic Prophylaxis: Heparin     VTE Mechanical Prophylaxis: sequential compression device  Disposition: Patient requires admission for the above  Type 2 diabetes mellitus with hyperglycemia, with neuropathic pain  Lab Results   Component Value Date    HGBA1C 6.9 (H) 06/06/2025       Recent Labs     07/02/25  1232 07/02/25  1635 07/03/25  1827   POCGLU 129 242* 107       Blood Sugar Average: Last 72 hrs:  (P) [id number]    Recurrent major depressive disorder, in remission ([facility])  Per internal medicine  PAD (peripheral artery disease) ([facility])  Arterial studies show diffuse disease in right lower extremity with greater than 75% stenosis in distal anterior tibial artery.  OCTAVIA 1.58,  mmHg, GTP 65 mmHg    S/p right lower extremity angiogram with adequate PT/AT runoff  Continue aspirin, statin, Plavix    ADHD, predominantly inattentive type  Managed on Adderall as needed  Mixed dyslipidemia  Per internal medicine    Subjective : The patient was seen, evaluated, and assessed at bedside today. The patient was awake, alert, and in no acute distress. No acute events overnight. The patient reports he has some mild left groin pain and right leg pain after his angiogram yesterday. Patient denies N/V/F/chills/SOB/CP.      Objective :  Temp:  [97.3 °F (36.3 °C)-97.8 °F (36.6 °C)] 97.5 °F (36.4 °C)  HR:  [] 78  BP: (103-144)/(65-96) 123/77  Resp:  [16-18] 16  SpO2:  [96 %-100 %] 99 %  O2 Device: None (Room air)    Physical Exam:     General:  Alert, cooperative, and in no distress.  Lower extremity exam:  Cardiovascular status at baseline.  Neurological status at baseline.  Musculoskeletal status at baseline. No calf tenderness noted.     Right lower extremity: Dressings left clean, dry, intact    Additional Data:     Labs:    Results from last 7 days   Lab Units 07/04/25  0606   WBC Thousand/uL 4.85   HEMOGLOBIN g/dL 16.3   HEMATOCRIT % 47.9   PLATELETS  Thousands/uL 216   SEGS PCT % 72   LYMPHO PCT % 18   MONO PCT % 7   EOS PCT % 2     Results from last 7 days   Lab Units 07/04/25  0606 07/03/25  0619 07/02/25  1119   POTASSIUM mmol/L 4.0   < > 4.1   CHLORIDE mmol/L 103   < > 103   CO2 mmol/L 30   < > 27   BUN mg/dL 12   < > 19   CREATININE mg/dL 0.76   < > 0.75   CALCIUM mg/dL 9.2   < > 9.3   ALK PHOS U/L  --   --  40   ALT U/L  --   --  16   AST U/L  --   --  11*    < > = values in this interval not displayed.     Results from last 7 days   Lab Units 07/03/25  0619   INR  0.92       * I Have Reviewed All Lab Data Listed Above.    Recent Cultures (last 7 days):     Results from last 7 days   Lab Units 07/02/25  1127 07/01/25  1028   BLOOD CULTURE  No Growth at 24 hrs.  No Growth at 24 hrs.  --    GRAM STAIN RESULT   --  4+ Gram positive cocci in pairs*  2+ Polys*   WOUND CULTURE   --  3+ Growth of Staphylococcus pseudintermedius*           Imaging: I have personally reviewed pertinent films in PACS  EKG, Pathology, and Other Studies: I have personally reviewed pertinent reports.

## 2025-07-04 NOTE — PROGRESS NOTES
Progress Note - Hospitalist   Name: Jason Blanca 34 y.o. male I MRN: [id number]  Unit/Bed#: [place] [address] -02 I Date of Admission: 7/2/2025   Date of Service: 7/4/2025 I Hospital Day: 2    Assessment & Plan  Diabetic ulcer of toe of right foot associated with type 2 diabetes mellitus, with necrosis of bone ([facility])  Admitted by podiatry for right medial hallux wound with underling osteomyelitis and cellulitis extending to first MTPJ  Continues on IV Ancef  Wound care per primary  Continue analgesia as needed.  Patient is not interested in narcotic pain medication at this time.  Can utilize Tylenol, ibuprofen, muscle relaxer and lidocaine as needed  Patient will require great right toe amputation now that he is status post vascular intervention -follow-up podiatry's final plan  No contraindications to surgery  Cellulitis of great toe of right foot  On IV antibiotics as stated above   Wound cultures on 7/1 with Staph pseudintermedius  No evidence of SIRS or septic criteria  Blood cultures obtained, follow --> negative at 24 hours, discussed with patient  PAD (peripheral artery disease) ([facility])  Vascular following, patient is status post balloon angioplasty with vascular 7/3/2025 to anterior and posterior tibial artery  Started on aspirin and loaded with Plavix, discussed with Dr. Regalado and patient.  Continue statin  Type 2 diabetes mellitus with hyperglycemia, with neuropathic pain  Lab Results   Component Value Date    HGBA1C 6.9 (H) 06/06/2025     Patient has his CGM and pump.  He feels comfortable managing this and does not want to transition over to our sliding scale.  Holding home medications Synjardy, Ozempic  Blood sugar stable, monitor via Accu-Cheks  Recurrent major depressive disorder, in remission ([facility])  Mood stable, continue Wellbutrin  ADHD, predominantly inattentive type  On Adderall 10 mg twice daily, PDMP reviewed  Mixed dyslipidemia  Continue statin    VTE Pharmacologic Prophylaxis:    Heparin    Mobility:   Basic Mobility Inpatient Raw Score: 24  JH-HLM Goal: 8: Walk 250 feet or more  JH-HLM Achieved: 8: Walk 250 feet ot more  JH-HLM Goal achieved. Continue to encourage appropriate mobility.    Patient Centered Rounds: I performed bedside rounds with nursing staff today.   Discussions with Specialists or Other Care Team Provider: Vascular physician and advanced practitioner    Education and Discussions with Family / Patient: Patient    Current Length of Stay: 2 day(s)  Current Patient Status: Inpatient   Certification Statement: The patient will continue to require additional inpatient hospital stay due to pending podiatry surgical intervention  Discharge Plan: Anticipate discharge in >72 hrs to discharge location to be determined pending rehab evaluations.    Code Status: Level 1 - Full Code    Subjective   Patient seen and examined lying in bed.  He is eating breakfast.  States he feels well.  He does have some throbbing pain in his right leg and had some swelling.  The incision site in his left groin is a little tender but he has no significant bleeding, swelling or bruising.  Denies any numbness or tingling of his lower extremities.  Denies any fevers or chills.  Tolerating antibiotics well.  Does note he is having some back discomfort and thinks maybe it is the way he was laying and is open to a muscle relaxer.  He would like to avoid any narcotic pain medications at this time.     Objective :  Temp:  [97.3 °F (36.3 °C)-97.8 °F (36.6 °C)] 97.5 °F (36.4 °C)  HR:  [] 78  BP: (103-144)/(65-96) 123/77  Resp:  [16-18] 16  SpO2:  [96 %-100 %] 99 %  O2 Device: None (Room air)    Body mass index is 29.4 kg/m².     Input and Output Summary (last 24 hours):     Intake/Output Summary (Last 24 hours) at 7/4/2025 1108  Last data filed at 7/4/2025 0639  Gross per 24 hour   Intake 1740 ml   Output 1620 ml   Net 120 ml       Physical Exam  Vitals and nursing note reviewed.   Constitutional:        Appearance: Normal appearance. He is not ill-appearing or diaphoretic.     Cardiovascular:      Rate and Rhythm: Normal rate and regular rhythm.   Pulmonary:      Effort: Pulmonary effort is normal. No respiratory distress.      Breath sounds: Normal breath sounds.   Abdominal:      General: Bowel sounds are normal.      Palpations: Abdomen is soft.      Tenderness: There is no abdominal tenderness.     Musculoskeletal:      Comments: Bilateral lower extremities warm to touch, soft to palpation, nontender.  Right foot wrapped CDI     Skin:     General: Skin is warm and dry.      Comments: Left groin site incision tender to palpation, dressing CDI.  No hematoma ecchymosis or drainage.     Neurological:      Mental Status: He is alert and oriented to person, place, and time. Mental status is at baseline.     Psychiatric:         Mood and Affect: Mood normal.         Behavior: Behavior normal.         Lab Results: I have reviewed the following results:   Results from last 7 days   Lab Units 07/04/25  0606   WBC Thousand/uL 4.85   HEMOGLOBIN g/dL 16.3   HEMATOCRIT % 47.9   PLATELETS Thousands/uL 216   SEGS PCT % 72   LYMPHO PCT % 18   MONO PCT % 7   EOS PCT % 2     Results from last 7 days   Lab Units 07/04/25  0606 07/03/25  0619 07/02/25  1119   SODIUM mmol/L 139   < > 137   POTASSIUM mmol/L 4.0   < > 4.1   CHLORIDE mmol/L 103   < > 103   CO2 mmol/L 30   < > 27   BUN mg/dL 12   < > 19   CREATININE mg/dL 0.76   < > 0.75   ANION GAP mmol/L 6   < > 7   CALCIUM mg/dL 9.2   < > 9.3   ALBUMIN g/dL  --   --  4.2   TOTAL BILIRUBIN mg/dL  --   --  0.47   ALK PHOS U/L  --   --  40   ALT U/L  --   --  16   AST U/L  --   --  11*   GLUCOSE RANDOM mg/dL 125   < > 134    < > = values in this interval not displayed.     Results from last 7 days   Lab Units 07/03/25  0619   INR  0.92     Results from last 7 days   Lab Units 07/03/25  1827 07/02/25  1635 07/02/25  1232   POC GLUCOSE mg/dl 107 242* 129     Results from last 7 days   Lab  Units 07/02/25  1119   LACTIC ACID mmol/L 0.7     Recent Cultures (last 7 days):   Results from last 7 days   Lab Units 07/02/25  1127 07/01/25  1028   BLOOD CULTURE  No Growth at 24 hrs.  No Growth at 24 hrs.  --    GRAM STAIN RESULT   --  4+ Gram positive cocci in pairs*  2+ Polys*   WOUND CULTURE   --  3+ Growth of Staphylococcus pseudintermedius*     Last 24 Hours Medication List:     Current Facility-Administered Medications:     acetaminophen (TYLENOL) tablet 650 mg, Q6H PRN    amphetamine-dextroamphetamine (ADDERALL) tablet 10 mg, BID (AM & Afternoon)    aspirin (ECOTRIN LOW STRENGTH) EC tablet 81 mg, Daily    buPROPion (WELLBUTRIN XL) 24 hr tablet 150 mg, Daily    ceFAZolin (ANCEF) IVPB (premix in dextrose) 2,000 mg 50 mL, Q8H, Last Rate: 2,000 mg (07/04/25 0456)    [START ON 7/5/2025] clopidogrel (PLAVIX) tablet 75 mg, Daily    gabapentin (NEURONTIN) capsule 600 mg, TID    heparin (porcine) subcutaneous injection 5,000 Units, Q8H KIA **AND** [CANCELED] Platelet count, Once    ibuprofen (MOTRIN) tablet 600 mg, Q6H PRN    insulin lispro (HumALOG/ADMELOG) 100 units/mL subcutaneous injection 1-5 Units, TID AC **AND** Fingerstick Glucose (POCT), TID AC    insulin lispro (HumALOG/ADMELOG) 100 units/mL subcutaneous injection 1-5 Units, HS    lidocaine (LIDODERM) 5 % patch 2 patch, Daily PRN    methocarbamol (ROBAXIN) tablet 500 mg, Q6H PRN    polyethylene glycol (MIRALAX) packet 17 g, Daily PRN    pravastatin (PRAVACHOL) tablet 40 mg, Daily With Dinner    Administrative Statements   Today, Patient Was Seen By: Ayanna Leary PA-C    **Please Note: This note may have been constructed using a voice recognition system.**

## 2025-07-05 ENCOUNTER — ANESTHESIA (INPATIENT)
Dept: PERIOP | Facility: HOSPITAL | Age: 35
DRG: 253 | End: 2025-07-05
Payer: COMMERCIAL

## 2025-07-05 ENCOUNTER — APPOINTMENT (INPATIENT)
Dept: RADIOLOGY | Facility: HOSPITAL | Age: 35
DRG: 253 | End: 2025-07-05
Payer: COMMERCIAL

## 2025-07-05 LAB
ANION GAP SERPL CALCULATED.3IONS-SCNC: 8 MMOL/L (ref 4–13)
BASOPHILS # BLD AUTO: 0.05 THOUSANDS/ÂΜL (ref 0–0.1)
BASOPHILS NFR BLD AUTO: 1 % (ref 0–1)
BUN SERPL-MCNC: 12 MG/DL (ref 5–25)
CALCIUM SERPL-MCNC: 8.9 MG/DL (ref 8.4–10.2)
CHLORIDE SERPL-SCNC: 104 MMOL/L (ref 96–108)
CO2 SERPL-SCNC: 27 MMOL/L (ref 21–32)
CREAT SERPL-MCNC: 0.7 MG/DL (ref 0.6–1.3)
EOSINOPHIL # BLD AUTO: 0.12 THOUSAND/ÂΜL (ref 0–0.61)
EOSINOPHIL NFR BLD AUTO: 2 % (ref 0–6)
ERYTHROCYTE [DISTWIDTH] IN BLOOD BY AUTOMATED COUNT: 13.1 % (ref 11.6–15.1)
GFR SERPL CREATININE-BSD FRML MDRD: 122 ML/MIN/1.73SQ M
GLUCOSE SERPL-MCNC: 132 MG/DL (ref 65–140)
GLUCOSE SERPL-MCNC: 144 MG/DL (ref 65–140)
GLUCOSE [MOLES/VOLUME] IN CAPILLARY BLOOD: 215 MMOL/L
HCT VFR BLD AUTO: 46.1 % (ref 36.5–49.3)
HGB BLD-MCNC: 15.6 G/DL (ref 12–17)
IMM GRANULOCYTES # BLD AUTO: 0.02 THOUSAND/UL (ref 0–0.2)
IMM GRANULOCYTES NFR BLD AUTO: 0 % (ref 0–2)
LYMPHOCYTES # BLD AUTO: 1.27 THOUSANDS/ÂΜL (ref 0.6–4.47)
LYMPHOCYTES NFR BLD AUTO: 23 % (ref 14–44)
MCH RBC QN AUTO: 29.2 PG (ref 26.8–34.3)
MCHC RBC AUTO-ENTMCNC: 33.8 G/DL (ref 31.4–37.4)
MCV RBC AUTO: 86 FL (ref 82–98)
MONOCYTES # BLD AUTO: 0.47 THOUSAND/ÂΜL (ref 0.17–1.22)
MONOCYTES NFR BLD AUTO: 9 % (ref 4–12)
NEUTROPHILS # BLD AUTO: 3.61 THOUSANDS/ÂΜL (ref 1.85–7.62)
NEUTS SEG NFR BLD AUTO: 65 % (ref 43–75)
NRBC BLD AUTO-RTO: 0 /100 WBCS
PLATELET # BLD AUTO: 202 THOUSANDS/UL (ref 149–390)
PMV BLD AUTO: 10.7 FL (ref 8.9–12.7)
POTASSIUM SERPL-SCNC: 4 MMOL/L (ref 3.5–5.3)
RBC # BLD AUTO: 5.34 MILLION/UL (ref 3.88–5.62)
SODIUM SERPL-SCNC: 139 MMOL/L (ref 135–147)
WBC # BLD AUTO: 5.54 THOUSAND/UL (ref 4.31–10.16)

## 2025-07-05 PROCEDURE — 88305 TISSUE EXAM BY PATHOLOGIST: CPT | Performed by: PATHOLOGY

## 2025-07-05 PROCEDURE — 73630 X-RAY EXAM OF FOOT: CPT

## 2025-07-05 PROCEDURE — 99232 SBSQ HOSP IP/OBS MODERATE 35: CPT

## 2025-07-05 PROCEDURE — 80048 BASIC METABOLIC PNL TOTAL CA: CPT

## 2025-07-05 PROCEDURE — 28820 AMPUTATION OF TOE: CPT | Performed by: PODIATRIST

## 2025-07-05 PROCEDURE — 0Y6P0Z0 DETACHMENT AT RIGHT 1ST TOE, COMPLETE, OPEN APPROACH: ICD-10-PCS | Performed by: PODIATRIST

## 2025-07-05 PROCEDURE — 88311 DECALCIFY TISSUE: CPT | Performed by: PATHOLOGY

## 2025-07-05 PROCEDURE — 85025 COMPLETE CBC W/AUTO DIFF WBC: CPT

## 2025-07-05 PROCEDURE — 82948 REAGENT STRIP/BLOOD GLUCOSE: CPT

## 2025-07-05 RX ORDER — ONDANSETRON 2 MG/ML
4 INJECTION INTRAMUSCULAR; INTRAVENOUS EVERY 4 HOURS PRN
Status: DISCONTINUED | OUTPATIENT
Start: 2025-07-05 | End: 2025-07-06 | Stop reason: HOSPADM

## 2025-07-05 RX ORDER — FENTANYL CITRATE 50 UG/ML
INJECTION, SOLUTION INTRAMUSCULAR; INTRAVENOUS AS NEEDED
Status: DISCONTINUED | OUTPATIENT
Start: 2025-07-05 | End: 2025-07-05

## 2025-07-05 RX ORDER — ONDANSETRON 2 MG/ML
INJECTION INTRAMUSCULAR; INTRAVENOUS AS NEEDED
Status: DISCONTINUED | OUTPATIENT
Start: 2025-07-05 | End: 2025-07-05

## 2025-07-05 RX ORDER — OXYCODONE HYDROCHLORIDE 5 MG/1
5 TABLET ORAL EVERY 6 HOURS PRN
Refills: 0 | Status: DISCONTINUED | OUTPATIENT
Start: 2025-07-05 | End: 2025-07-06

## 2025-07-05 RX ORDER — PROPOFOL 10 MG/ML
INJECTION, EMULSION INTRAVENOUS AS NEEDED
Status: DISCONTINUED | OUTPATIENT
Start: 2025-07-05 | End: 2025-07-05

## 2025-07-05 RX ORDER — ONDANSETRON 2 MG/ML
4 INJECTION INTRAMUSCULAR; INTRAVENOUS EVERY 6 HOURS PRN
Status: DISCONTINUED | OUTPATIENT
Start: 2025-07-05 | End: 2025-07-05 | Stop reason: HOSPADM

## 2025-07-05 RX ORDER — DEXAMETHASONE SODIUM PHOSPHATE 10 MG/ML
INJECTION, SOLUTION INTRAMUSCULAR; INTRAVENOUS AS NEEDED
Status: DISCONTINUED | OUTPATIENT
Start: 2025-07-05 | End: 2025-07-05

## 2025-07-05 RX ORDER — PROMETHAZINE HYDROCHLORIDE 25 MG/ML
12.5 INJECTION, SOLUTION INTRAMUSCULAR; INTRAVENOUS EVERY 6 HOURS PRN
Status: DISCONTINUED | OUTPATIENT
Start: 2025-07-05 | End: 2025-07-06 | Stop reason: HOSPADM

## 2025-07-05 RX ORDER — LIDOCAINE HYDROCHLORIDE 20 MG/ML
INJECTION, SOLUTION EPIDURAL; INFILTRATION; INTRACAUDAL; PERINEURAL AS NEEDED
Status: DISCONTINUED | OUTPATIENT
Start: 2025-07-05 | End: 2025-07-05

## 2025-07-05 RX ORDER — MAGNESIUM HYDROXIDE 1200 MG/15ML
LIQUID ORAL AS NEEDED
Status: DISCONTINUED | OUTPATIENT
Start: 2025-07-05 | End: 2025-07-05 | Stop reason: HOSPADM

## 2025-07-05 RX ORDER — FENTANYL CITRATE 50 UG/ML
50 INJECTION, SOLUTION INTRAMUSCULAR; INTRAVENOUS
Status: DISCONTINUED | OUTPATIENT
Start: 2025-07-05 | End: 2025-07-05 | Stop reason: HOSPADM

## 2025-07-05 RX ORDER — SODIUM CHLORIDE 9 MG/ML
INJECTION, SOLUTION INTRAVENOUS CONTINUOUS PRN
Status: DISCONTINUED | OUTPATIENT
Start: 2025-07-05 | End: 2025-07-05

## 2025-07-05 RX ORDER — IBUPROFEN 600 MG/1
600 TABLET, FILM COATED ORAL EVERY 6 HOURS PRN
Status: DISCONTINUED | OUTPATIENT
Start: 2025-07-05 | End: 2025-07-06

## 2025-07-05 RX ORDER — MIDAZOLAM HYDROCHLORIDE 2 MG/2ML
INJECTION, SOLUTION INTRAMUSCULAR; INTRAVENOUS AS NEEDED
Status: DISCONTINUED | OUTPATIENT
Start: 2025-07-05 | End: 2025-07-05

## 2025-07-05 RX ORDER — HYDROMORPHONE HCL/PF 1 MG/ML
0.5 SYRINGE (ML) INJECTION
Status: DISCONTINUED | OUTPATIENT
Start: 2025-07-05 | End: 2025-07-05 | Stop reason: HOSPADM

## 2025-07-05 RX ADMIN — FENTANYL CITRATE 25 MCG: 50 INJECTION INTRAMUSCULAR; INTRAVENOUS at 08:31

## 2025-07-05 RX ADMIN — GABAPENTIN 600 MG: 300 CAPSULE ORAL at 11:20

## 2025-07-05 RX ADMIN — INSULIN LISPRO 2 UNITS: 100 INJECTION, SOLUTION INTRAVENOUS; SUBCUTANEOUS at 18:02

## 2025-07-05 RX ADMIN — ACETAMINOPHEN 650 MG: 325 TABLET ORAL at 11:24

## 2025-07-05 RX ADMIN — SODIUM CHLORIDE: 0.9 INJECTION, SOLUTION INTRAVENOUS at 07:53

## 2025-07-05 RX ADMIN — BUPROPION HYDROCHLORIDE 150 MG: 150 TABLET, EXTENDED RELEASE ORAL at 11:20

## 2025-07-05 RX ADMIN — GABAPENTIN 600 MG: 300 CAPSULE ORAL at 21:51

## 2025-07-05 RX ADMIN — DEXAMETHASONE SODIUM PHOSPHATE 10 MG: 10 INJECTION, SOLUTION INTRAMUSCULAR; INTRAVENOUS at 08:03

## 2025-07-05 RX ADMIN — PROPOFOL 200 MG: 10 INJECTION, EMULSION INTRAVENOUS at 08:00

## 2025-07-05 RX ADMIN — FENTANYL CITRATE 25 MCG: 50 INJECTION INTRAMUSCULAR; INTRAVENOUS at 08:00

## 2025-07-05 RX ADMIN — METHOCARBAMOL 500 MG: 500 TABLET ORAL at 14:38

## 2025-07-05 RX ADMIN — PRAVASTATIN SODIUM 40 MG: 40 TABLET ORAL at 18:02

## 2025-07-05 RX ADMIN — DEXTROAMPHETAMINE SACCHARATE, AMPHETAMINE ASPARTATE, DEXTROAMPHETAMINE SULFATE AND AMPHETAMINE SULFATE 5 MG: 2.5; 2.5; 2.5; 2.5 TABLET ORAL at 11:24

## 2025-07-05 RX ADMIN — CLOPIDOGREL 75 MG: 75 TABLET ORAL at 11:20

## 2025-07-05 RX ADMIN — HEPARIN SODIUM 5000 UNITS: 5000 INJECTION INTRAVENOUS; SUBCUTANEOUS at 21:56

## 2025-07-05 RX ADMIN — INSULIN LISPRO 2 UNITS: 100 INJECTION, SOLUTION INTRAVENOUS; SUBCUTANEOUS at 11:34

## 2025-07-05 RX ADMIN — GABAPENTIN 600 MG: 300 CAPSULE ORAL at 18:02

## 2025-07-05 RX ADMIN — HEPARIN SODIUM 5000 UNITS: 5000 INJECTION INTRAVENOUS; SUBCUTANEOUS at 14:39

## 2025-07-05 RX ADMIN — CEFAZOLIN SODIUM 2000 MG: 2 SOLUTION INTRAVENOUS at 21:56

## 2025-07-05 RX ADMIN — LIDOCAINE HYDROCHLORIDE 100 MG: 20 INJECTION, SOLUTION EPIDURAL; INFILTRATION; INTRACAUDAL at 08:00

## 2025-07-05 RX ADMIN — INSULIN LISPRO 3 UNITS: 100 INJECTION, SOLUTION INTRAVENOUS; SUBCUTANEOUS at 21:49

## 2025-07-05 RX ADMIN — HEPARIN SODIUM 5000 UNITS: 5000 INJECTION INTRAVENOUS; SUBCUTANEOUS at 05:14

## 2025-07-05 RX ADMIN — CEFAZOLIN SODIUM 2000 MG: 2 SOLUTION INTRAVENOUS at 11:31

## 2025-07-05 RX ADMIN — CEFAZOLIN SODIUM 2000 MG: 2 SOLUTION INTRAVENOUS at 04:18

## 2025-07-05 RX ADMIN — ONDANSETRON 4 MG: 2 INJECTION INTRAMUSCULAR; INTRAVENOUS at 08:25

## 2025-07-05 RX ADMIN — ACETAMINOPHEN 650 MG: 325 TABLET ORAL at 21:50

## 2025-07-05 RX ADMIN — ASPIRIN 81 MG: 81 TABLET, DELAYED RELEASE ORAL at 11:20

## 2025-07-05 RX ADMIN — FENTANYL CITRATE 25 MCG: 50 INJECTION INTRAMUSCULAR; INTRAVENOUS at 08:08

## 2025-07-05 RX ADMIN — PROMETHAZINE HYDROCHLORIDE 12.5 MG: 25 INJECTION INTRAMUSCULAR; INTRAVENOUS at 09:56

## 2025-07-05 RX ADMIN — FENTANYL CITRATE 25 MCG: 50 INJECTION INTRAMUSCULAR; INTRAVENOUS at 08:06

## 2025-07-05 RX ADMIN — MIDAZOLAM HYDROCHLORIDE 2 MG: 1 INJECTION, SOLUTION INTRAMUSCULAR; INTRAVENOUS at 07:53

## 2025-07-05 NOTE — OP NOTE
OPERATIVE REPORT - Podiatry  PATIENT NAME: Jason Blanca    :  1990  MRN: [id number]  Pt Location: [facility] OR ROOM 01    SURGERY DATE: 2025    Surgeons and Role:     * Aleks Pisano DPM - Primary     * Norma Fitch DPM - Assisting     * Yamileth Garibay DPM - Observing    Pre-op Diagnosis:  Toe infection [L08.9]  Diabetic ulcer of toe of right foot associated with type 2 diabetes mellitus, with necrosis of bone (HCC) [E11.621, L97.514]  Cellulitis of great toe of right foot [L03.031]    Post-Op Diagnosis Codes:     * Toe infection [L08.9]     * Diabetic ulcer of toe of right foot associated with type 2 diabetes mellitus, with necrosis of bone (HCC) [E11.621, L97.514]     * Cellulitis of great toe of right foot [L03.031]    Procedure(s) (LRB):  Right hallux amputation (Right)    Specimen(s):  ID Type Source Tests Collected by Time Destination   1 : Right great toe Tissue Toe, Right TISSUE EXAM Aleks Pisano DPM 2025 0816        Estimated Blood Loss:   Minimal    Drains:  * No LDAs found *    Anesthesia Type:   Choice with 17 mL of 1% Lidocaine and 0.5% Bupivacaine in a 1:1 mixture    Hemostasis:  Direct compression, electrocautery    Materials:  3-0 nylon    Injectables:  None    Operative Findings:  - We believe to have obtained surgical cure of bone infection with removal of right hallux at first metatarsal phalangeal joint  - No sinus tracts or purulence appreciated  - Healthy bleeding to tissue margins  - Primary closure achieved    Complications:   None    Procedure and Technique:     Under mild sedation, the patient was brought into the operating on his hospital bed in the supine position. IV sedation was achieved by anesthesia team and a universal timeout was performed where all parties are in agreement of correct patient, correct procedure and correct site. A local block was performed consisting of 17 mL of 1% Lidocaine and 0.5% Bupivacaine in a 1:1 mixture. The foot was then prepped  "and draped in the usual aseptic manner.     Attention was directed to the right hallux where a fish mouth type of incision was made. Utilizing a sterile 15 blade, this incision was carried deep straight to bone. Soft tissue structures were then reflected off the proximal phalanx. The hallux was then disarticulated at the level of the first metatarsal phalangeal joint The severed digit was then passed off to the back table.  It was noted that all tissue margins were bleeding and viable. No deep sinus tracts or areas of purulence were visualized. The remaining bone of the hallux was noted to be of hard and viable quality. Any remaining tendinous structures were identified and severed proximally to remove any nidus of infection.      The surgical incision was irrigated with copious amounts of normal sterile saline. Skin edges were reapproximated and closure was obtained utilizing 3-0 nylon. The foot was then cleansed and dried. The incision site was dressed with xeroform, gauze, and ABD. This was then covered with a Kerlix and an ACE wrap.     The patient tolerated the procedure and anesthesia well without immediate complications and transferred to PACU with vital signs stable.     As with many limb salvage procedures, we contemplate the possibility of performing further stages to this procedure. Procedures may include debridements, delayed closure, plastic surgery techniques, or more proximal amputations. This procedure may be considered part of a multi-staged limb salvage treatment plan.     Dr. Pisano was present during the entire procedure and participated in all key aspects.    The patient will be: Weightbearing as tolerated to right lower extremity    SIGNATURE: Norma Fitch DPM  DATE: July 5, 2025  TIME: 8:48 AM    Portions of the record may have been created with voice recognition software. Occasional wrong word or \"sound a like\" substitutions may have occurred due to the inherent limitations of voice " recognition software. Read the chart carefully and recognize, using context, where substitutions have occurred.

## 2025-07-05 NOTE — ASSESSMENT & PLAN NOTE
On IV antibiotics as stated above   Wound cultures on 7/1 with Staph pseudintermedius  No evidence of SIRS or septic criteria  Blood cultures obtained, follow --> negative at 48 hours  Foot to be evaluated once postoperative dressing change occurs

## 2025-07-05 NOTE — ANESTHESIA POSTPROCEDURE EVALUATION
Post-Op Assessment Note    CV Status:  Stable  Pain Score: 0    Pain management: adequate       Mental Status:  Arousable   Hydration Status:  Stable   PONV Controlled:  None   Airway Patency:  Patent     Post Op Vitals Reviewed: Yes    No anethesia notable event occurred.            Last Filed PACU Vitals:  Vitals Value Taken Time   Temp 97.3 °F (36.3 °C) 07/05/25 08:45   Pulse 81 07/05/25 08:45   /51 07/05/25 08:45   Resp 16 07/05/25 08:45   SpO2 99 % 07/05/25 08:45       Modified Flako:     Vitals Value Taken Time   Activity 2 07/05/25 08:45   Respiration 2 07/05/25 08:45   Circulation 2 07/05/25 08:45   Consciousness 1 07/05/25 08:45   Oxygen Saturation 1 07/05/25 08:45     Modified Flako Score: 8

## 2025-07-05 NOTE — ASSESSMENT & PLAN NOTE
Vascular following, patient is status post balloon angioplasty with vascular 7/3/2025 to anterior and posterior tibial artery  Continue newly added DAPT with aspirin and Plavix per vascular, statin

## 2025-07-05 NOTE — ASSESSMENT & PLAN NOTE
Jason Blanca is a 34 y.o. male admitted 7/2/2025 for right medial hallux wound with underlying osteomyelitis and cellulitis extending to the first MTPJ.  PMH of type 2 diabetes mellitus, PAD, ADHD, CKD stage I.      Diagnostics:  5/16: Right foot MRI -small ulcer in the medial aspect of the great toe with mild bone marrow edema in the head of the proximal phalanx and distal phalanx, which may be related to reactive changes or early osteomyelitis  7/2: Right foot X-Ray - Per my personal read, there is clear erosion to the medial aspect of the proximal phalanx head and distal phalanx base at the level of the ulceration consistent with osteomyelitis, however final read negative  7/2: Leads -RLE: Diffuse disease noted throughout the [place] arteries without significant focal stenosis.  There is a greater than 75% stenosis noted in the distal anterior tibial artery.  OCTAVIA of 1.58, metatarsal pressure of 147 mmHg, great toe pressure of 65 mmHg.  PVR/PPG tracings are normal.    Labs  WBC- 5.54 (4.85)  Hb- 15.6 (16.3)        Procedures:   7/3: Right lower extremity angiogram: PT, AT angioplasty.  Multiphasic dopplerable DP/PT pulses    Plan:    Patient to go to OR today, 07/05/25, for right hallux amputation with Dr. Pisano  Consent signed with surgeon prior to procedure.   Confirmed NPO status.  H&P, vitals, and current labs reviewed.  No acute changes noted.  Alternatives, risks, and complications discussed with patient.  All questions answered.  No guarantees given of outcome.    Weightbearing Status: Weightbearing as tolerated to right lower extremity    VTE Pharmacologic Prophylaxis: Heparin     VTE Mechanical Prophylaxis: sequential compression device  Disposition: Patient requires admission for the above

## 2025-07-05 NOTE — ANESTHESIA PREPROCEDURE EVALUATION
Procedure:  Right hallux amputation (Right: Foot)    Relevant Problems   ANESTHESIA (within normal limits)      CARDIO   (+) PAD (peripheral artery disease) (HCC)      ENDO   (+) Type 2 diabetes mellitus with hyperglycemia, with neuropathic pain      GI/HEPATIC   (+) Fatty liver      /RENAL   (+) CKD (chronic kidney disease) stage 1, GFR 90 ml/min or greater      NEURO/PSYCH  Uses THC   (+) Recurrent major depressive disorder, in remission (HCC)      Behavioral Health   (+) ADHD, predominantly inattentive type      Neurology/Sleep   (+) Axonal sensorimotor neuropathy      Surgery/Wound/Pain   (+) Diabetic ulcer of toe of right foot associated with type 2 diabetes mellitus, with necrosis of bone (HCC)      Orthopedic/Musculoskeletal   (+) Cellulitis of great toe of right foot      Other   (+) Mixed dyslipidemia        Physical Exam    Airway     Mallampati score: II    Neck ROM: full      Cardiovascular  Cardiovascular exam normal    Dental   No notable dental hx     Pulmonary   Breath sounds clear to auscultation    Neurological    He appears awake, alert and oriented x3.      Other Findings        Anesthesia Plan  ASA Score- 3     Anesthesia Type- general with ASA Monitors.         Additional Monitors:     Airway Plan: LMA and LMA.           Plan Factors-    Chart reviewed.   Existing labs reviewed. Patient summary reviewed.    Patient is a current smoker.  Patient instructed to abstain from smoking on day of procedure. Patient did not smoke on day of surgery.            Induction-     Postoperative Plan- .   Monitoring Plan -   Post Operative Pain Plan - multimodal analgesia    Perioperative Resuscitation Plan - Level 1 - Full Code.       Informed Consent- Anesthetic plan and risks discussed with patient.        NPO Status:  Vitals Value Taken Time   Date of last liquid 07/02/25 07/03/25 15:15   Time of last liquid 2000 07/03/25 15:15   Date of last solid 07/03/25 07/03/25 15:15   Time of last solid 2000  07/03/25 15:15

## 2025-07-05 NOTE — ANESTHESIA POSTPROCEDURE EVALUATION
Post-Op Assessment Note    CV Status:  Stable    Pain management: adequate       Mental Status:  Alert and awake   Hydration Status:  Euvolemic   PONV Controlled:  Controlled   Airway Patency:  Patent  Two or more mitigation strategies used for obstructive sleep apnea   Post Op Vitals Reviewed: Yes    No anethesia notable event occurred.    Staff: Anesthesiologist           Last Filed PACU Vitals:  Vitals Value Taken Time   Temp 97.3 °F (36.3 °C) 07/05/25 08:45   Pulse 82 07/05/25 09:15   /66 07/05/25 09:15   Resp 17 07/05/25 09:15   SpO2 99 % 07/05/25 09:15       Modified Flako:     Vitals Value Taken Time   Activity 2 07/05/25 09:15   Respiration 2 07/05/25 09:15   Circulation 2 07/05/25 09:15   Consciousness 2 07/05/25 09:15   Oxygen Saturation 2 07/05/25 09:15     Modified Flako Score: 10

## 2025-07-05 NOTE — PROGRESS NOTES
Progress Note - Podiatry   Name: Jason Blanca 34 y.o. male I MRN: [id number]  Unit/Bed#: [place] [address] -01 I Date of Admission: 7/2/2025   Date of Service: 7/5/2025 I Hospital Day: 3    Assessment & Plan  Diabetic ulcer of toe of right foot associated with type 2 diabetes mellitus, with necrosis of bone (HCC)  Cellulitis of great toe of right foot  Jason Blanca is a 34 y.o. male admitted 7/2/2025 for right medial hallux wound with underlying osteomyelitis and cellulitis extending to the first MTPJ.  PMH of type 2 diabetes mellitus, PAD, ADHD, CKD stage I.      Diagnostics:  5/16: Right foot MRI -small ulcer in the medial aspect of the great toe with mild bone marrow edema in the head of the proximal phalanx and distal phalanx, which may be related to reactive changes or early osteomyelitis  7/2: Right foot X-Ray - Per my personal read, there is clear erosion to the medial aspect of the proximal phalanx head and distal phalanx base at the level of the ulceration consistent with osteomyelitis, however final read negative  7/2: Leads -RLE: Diffuse disease noted throughout the [place] arteries without significant focal stenosis.  There is a greater than 75% stenosis noted in the distal anterior tibial artery.  OCTAVIA of 1.58, metatarsal pressure of 147 mmHg, great toe pressure of 65 mmHg.  PVR/PPG tracings are normal.    Labs  WBC- 5.54 (4.85)  Hb- 15.6 (16.3)        Procedures:   7/3: Right lower extremity angiogram: PT, AT angioplasty.  Multiphasic dopplerable DP/PT pulses    Plan:    Patient to go to OR today, 07/05/25, for right hallux amputation with Dr. Pisano  Consent signed with surgeon prior to procedure.   Confirmed NPO status.  H&P, vitals, and current labs reviewed.  No acute changes noted.  Alternatives, risks, and complications discussed with patient.  All questions answered.  No guarantees given of outcome.    Weightbearing Status: Weightbearing as tolerated to right lower extremity    VTE  Pharmacologic Prophylaxis: Heparin     VTE Mechanical Prophylaxis: sequential compression device  Disposition: Patient requires admission for the above  Type 2 diabetes mellitus with hyperglycemia, with neuropathic pain  Lab Results   Component Value Date    HGBA1C 6.9 (H) 06/06/2025       Recent Labs     07/02/25  1232 07/02/25  1635 07/03/25  1827   POCGLU 129 242* 107       Blood Sugar Average: Last 72 hrs:  (P) 159.4110196999367037    Recurrent major depressive disorder, in remission ([facility])  Per internal medicine  PAD (peripheral artery disease) ([facility])  Arterial studies show diffuse disease in right lower extremity with greater than 75% stenosis in distal anterior tibial artery.  OCTAVIA 1.58,  mmHg, GTP 65 mmHg    S/p right lower extremity angiogram with adequate PT/AT runoff  Continue aspirin, statin, Plavix    ADHD, predominantly inattentive type  Managed on Adderall as needed  Mixed dyslipidemia  Per internal medicine      Subjective :   The patient was seen, evaluated, and assessed at bedside today. The patient was awake, alert, and in no acute distress. Patient confirmed NPO status. All questions and concerns regarding the surgical procedure addressed. Patient understands risks vs benefits of procedure and remains amenable with plan for surgery today. Patient denies N/V/F/chills/SOB/CP.      Objective :  Temp:  [97.5 °F (36.4 °C)-98 °F (36.7 °C)] 98 °F (36.7 °C)  HR:  [77-78] 77  BP: (123-128)/(77-79) 128/79  Resp:  [16-18] 18  SpO2:  [97 %-99 %] 97 %  O2 Device: None (Room air)    Physical Exam  Physical Exam:     General:  Alert, cooperative, and in no distress.  Lungs: Non labored breathing  Abdomen: Soft, non-tender.  Lower extremity exam:  Cardiovascular status at baseline.  Neurological status at baseline.  Musculoskeletal status at baseline. No calf tenderness noted bilaterally.     Dressing left intact to the Operating Room.     Additional Data:     Labs:    Results from last 7 days   Lab Units  07/05/25  0506   WBC Thousand/uL 5.54   HEMOGLOBIN g/dL 15.6   HEMATOCRIT % 46.1   PLATELETS Thousands/uL 202   SEGS PCT % 65   LYMPHO PCT % 23   MONO PCT % 9   EOS PCT % 2     Results from last 7 days   Lab Units 07/05/25  0506 07/03/25  0619 07/02/25  1119   POTASSIUM mmol/L 4.0   < > 4.1   CHLORIDE mmol/L 104   < > 103   CO2 mmol/L 27   < > 27   BUN mg/dL 12   < > 19   CREATININE mg/dL 0.70   < > 0.75   CALCIUM mg/dL 8.9   < > 9.3   ALK PHOS U/L  --   --  40   ALT U/L  --   --  16   AST U/L  --   --  11*    < > = values in this interval not displayed.     Results from last 7 days   Lab Units 07/03/25  0619   INR  0.92       * I Have Reviewed All Lab Data Listed Above.    Recent Cultures (last 7 days):     Results from last 7 days   Lab Units 07/02/25  1127 07/01/25  1028   BLOOD CULTURE  No Growth at 48 hrs.  No Growth at 48 hrs.  --    GRAM STAIN RESULT   --  4+ Gram positive cocci in pairs*  2+ Polys*   WOUND CULTURE   --  3+ Growth of Staphylococcus pseudintermedius*           Imaging: I have personally reviewed pertinent films in PACS  EKG, Pathology, and Other Studies: I have personally reviewed pertinent reports.      VTE Pharmacologic Prophylaxis: Heparin  VTE Mechanical Prophylaxis: sequential compression device

## 2025-07-05 NOTE — ASSESSMENT & PLAN NOTE
Lab Results   Component Value Date    HGBA1C 6.9 (H) 06/06/2025     Patient has his CGM and pump.  He feels comfortable managing this and does not want to transition over to our sliding scale.  Holding home medications Synjardy, Ozempic  Sugar has been well-controlled, monitor via Accu-Cheks

## 2025-07-05 NOTE — ASSESSMENT & PLAN NOTE
Admitted by podiatry for right medial hallux wound with underling osteomyelitis and cellulitis extending to first MTPJ  Wound care per primary  Patient is status post right hallux amputation with Dr. Fitch  Reviewed operative note, believed to have obtained a surgical cure no sinus tract or purulence noted.  Continue analgesia as needed, will adjust today so patient has more appropriate coverage if pain develops postoperatively  Weightbearing as tolerated in surgical shoe, PT OT evaluations

## 2025-07-05 NOTE — ASSESSMENT & PLAN NOTE
Lab Results   Component Value Date    HGBA1C 6.9 (H) 06/06/2025       Recent Labs     07/02/25  1232 07/02/25  1635 07/03/25  1827   POCGLU 129 242* 107       Blood Sugar Average: Last 72 hrs:  (P) [id number]

## 2025-07-05 NOTE — PROGRESS NOTES
Progress Note - Hospitalist   Name: Jason Blanca 34 y.o. male I MRN: [id number]  Unit/Bed#: [place] [address] -01 I Date of Admission: 7/2/2025   Date of Service: 7/5/2025 I Hospital Day: 3    Assessment & Plan  Diabetic ulcer of toe of right foot associated with type 2 diabetes mellitus, with necrosis of bone (Formerly [facility])  Admitted by podiatry for right medial hallux wound with underling osteomyelitis and cellulitis extending to first MTPJ  Wound care per primary  Patient is status post right hallux amputation with Dr. Fitch  Reviewed operative note, believed to have obtained a surgical cure no sinus tract or purulence noted.  Continue analgesia as needed, will adjust today so patient has more appropriate coverage if pain develops postoperatively  Weightbearing as tolerated in surgical shoe, PT OT evaluations  Cellulitis of great toe of right foot  On IV antibiotics as stated above   Wound cultures on 7/1 with Staph pseudintermedius  No evidence of SIRS or septic criteria  Blood cultures obtained, follow --> negative at 48 hours  Foot to be evaluated once postoperative dressing change occurs  PAD (peripheral artery disease) (Formerly [facility])  Vascular following, patient is status post balloon angioplasty with vascular 7/3/2025 to anterior and posterior tibial artery  Continue newly added DAPT with aspirin and Plavix per vascular, statin  Type 2 diabetes mellitus with hyperglycemia, with neuropathic pain  Lab Results   Component Value Date    HGBA1C 6.9 (H) 06/06/2025     Patient has his CGM and pump.  He feels comfortable managing this and does not want to transition over to our sliding scale.  Holding home medications Synjardy, Ozempic  Sugar has been well-controlled, monitor via Accu-Cheks  Recurrent major depressive disorder, in remission (Formerly [facility])  Mood stable, continue Wellbutrin  ADHD, predominantly inattentive type  On Adderall 10 mg twice daily, PDMP reviewed  Mixed dyslipidemia  Continue statin    VTE Pharmacologic  Prophylaxis:   heparin    Mobility:   Basic Mobility Inpatient Raw Score: 21  JH-HLM Goal: 6: Walk 10 steps or more  JH-HLM Achieved: 8: Walk 250 feet ot more  JH-HLM Goal NOT achieved. Continue with multidisciplinary rounding and encourage appropriate mobility to improve upon JH-HLM goals.    Patient Centered Rounds: I performed bedside rounds with nursing staff today.     Education and Discussions with Family / Patient: Pt    Current Length of Stay: 3 day(s)  Current Patient Status: Inpatient   Certification Statement: The patient will continue to require additional inpatient hospital stay due to pt/ot eval, pain management  Discharge Plan: Anticipate discharge in 24-48 hrs to discharge location to be determined pending rehab evaluations.    Code Status: Level 1 - Full Code    Subjective   Patient seen and examined postoperatively.  States he feels well.  No current pain.  He does have some nausea at this time.  He is hungry and going to eat.  Sugars have been well-controlled.  We discussed pain regimen and adding oxycodone in in case his pain worsens. He is agreeable.    Objective :  Temp:  [97.2 °F (36.2 °C)-98 °F (36.7 °C)] 97.7 °F (36.5 °C)  HR:  [77-92] 83  BP: (106-128)/(51-83) 122/78  Resp:  [16-18] 17  SpO2:  [97 %-99 %] 99 %  O2 Device: None (Room air)    Body mass index is 29.4 kg/m².     Input and Output Summary (last 24 hours):     Intake/Output Summary (Last 24 hours) at 7/5/2025 1129  Last data filed at 7/5/2025 0850  Gross per 24 hour   Intake 1480 ml   Output --   Net 1480 ml       Physical Exam  Vitals and nursing note reviewed.   Constitutional:       Appearance: Normal appearance. He is not ill-appearing or diaphoretic.     Cardiovascular:      Rate and Rhythm: Normal rate and regular rhythm.   Pulmonary:      Effort: Pulmonary effort is normal. No respiratory distress.      Breath sounds: Normal breath sounds.   Abdominal:      General: Bowel sounds are normal.      Palpations: Abdomen is soft.       Tenderness: There is no abdominal tenderness.     Musculoskeletal:         General: Deformity (RLE wrapped CDI) present.      Right lower leg: No edema.      Left lower leg: No edema.     Skin:     General: Skin is warm and dry.      Findings: No bruising.     Neurological:      Mental Status: He is alert and oriented to person, place, and time. Mental status is at baseline.     Psychiatric:         Mood and Affect: Mood normal.         Behavior: Behavior normal.         Lab Results: I have reviewed the following results:   Results from last 7 days   Lab Units 07/05/25  0506   WBC Thousand/uL 5.54   HEMOGLOBIN g/dL 15.6   HEMATOCRIT % 46.1   PLATELETS Thousands/uL 202   SEGS PCT % 65   LYMPHO PCT % 23   MONO PCT % 9   EOS PCT % 2     Results from last 7 days   Lab Units 07/05/25  0506 07/03/25  0619 07/02/25  1119   SODIUM mmol/L 139   < > 137   POTASSIUM mmol/L 4.0   < > 4.1   CHLORIDE mmol/L 104   < > 103   CO2 mmol/L 27   < > 27   BUN mg/dL 12   < > 19   CREATININE mg/dL 0.70   < > 0.75   ANION GAP mmol/L 8   < > 7   CALCIUM mg/dL 8.9   < > 9.3   ALBUMIN g/dL  --   --  4.2   TOTAL BILIRUBIN mg/dL  --   --  0.47   ALK PHOS U/L  --   --  40   ALT U/L  --   --  16   AST U/L  --   --  11*   GLUCOSE RANDOM mg/dL 132   < > 134    < > = values in this interval not displayed.     Results from last 7 days   Lab Units 07/03/25  0619   INR  0.92     Results from last 7 days   Lab Units 07/05/25  0857 07/03/25  1827 07/02/25  1635 07/02/25  1232   POC GLUCOSE mg/dl 144* 107 242* 129     Results from last 7 days   Lab Units 07/02/25  1119   LACTIC ACID mmol/L 0.7     Recent Cultures (last 7 days):   Results from last 7 days   Lab Units 07/02/25  1127 07/01/25  1028   BLOOD CULTURE  No Growth at 48 hrs.  No Growth at 48 hrs.  --    GRAM STAIN RESULT   --  4+ Gram positive cocci in pairs*  2+ Polys*   WOUND CULTURE   --  3+ Growth of Staphylococcus pseudintermedius*     Last 24 Hours Medication List:     Current  Facility-Administered Medications:     acetaminophen (TYLENOL) tablet 650 mg, Q6H PRN    amphetamine-dextroamphetamine (ADDERALL) tablet 10 mg, BID (AM & Afternoon)    aspirin (ECOTRIN LOW STRENGTH) EC tablet 81 mg, Daily    buPROPion (WELLBUTRIN XL) 24 hr tablet 150 mg, Daily    ceFAZolin (ANCEF) IVPB (premix in dextrose) 2,000 mg 50 mL, Q8H, Last Rate: 2,000 mg (07/05/25 0418)    clopidogrel (PLAVIX) tablet 75 mg, Daily    gabapentin (NEURONTIN) capsule 600 mg, TID    heparin (porcine) subcutaneous injection 5,000 Units, Q8H KIA **AND** [CANCELED] Platelet count, Once    ibuprofen (MOTRIN) tablet 600 mg, Q6H PRN    insulin lispro (HumALOG/ADMELOG) 100 units/mL subcutaneous injection 1-5 Units, TID AC **AND** Fingerstick Glucose (POCT), TID AC    insulin lispro (HumALOG/ADMELOG) 100 units/mL subcutaneous injection 1-5 Units, HS    lidocaine (LIDODERM) 5 % patch 2 patch, Daily PRN    methocarbamol (ROBAXIN) tablet 500 mg, Q6H PRN    ondansetron (ZOFRAN) injection 4 mg, Q4H PRN    oxyCODONE (ROXICODONE) IR tablet 5 mg, Q6H PRN    polyethylene glycol (MIRALAX) packet 17 g, Daily PRN    pravastatin (PRAVACHOL) tablet 40 mg, Daily With Dinner    promethazine (PHENERGAN) injection 12.5 mg, Q6H PRN    Administrative Statements   Today, Patient Was Seen By: Ayanna Leary PA-C    **Please Note: This note may have been constructed using a voice recognition system.**

## 2025-07-05 NOTE — PLAN OF CARE
Problem: PAIN - ADULT  Goal: Verbalizes/displays adequate comfort level or baseline comfort level  Description: Interventions:  - Encourage patient to monitor pain and request assistance  - Assess pain using appropriate pain scale  - Administer analgesics as ordered based on type and severity of pain and evaluate response  - Implement non-pharmacological measures as appropriate and evaluate response  - Consider cultural and social influences on pain and pain management  - Notify physician/advanced practitioner if interventions unsuccessful or patient reports new pain  - Educate patient/family on pain management process including their role and importance of  reporting pain   - Provide non-pharmacologic/complimentary pain relief interventions  Outcome: Progressing     Problem: INFECTION - ADULT  Goal: Absence or prevention of progression during hospitalization  Description: INTERVENTIONS:  - Assess and monitor for signs and symptoms of infection  - Monitor lab/diagnostic results  - Monitor all insertion sites, i.e. indwelling lines, tubes, and drains  - Monitor endotracheal if appropriate and nasal secretions for changes in amount and color  - [place] appropriate cooling/warming therapies per order  - Administer medications as ordered  - Instruct and encourage patient and family to use good hand hygiene technique  - Identify and instruct in appropriate isolation precautions for identified infection/condition  Outcome: Progressing  Goal: Absence of fever/infection during neutropenic period  Description: INTERVENTIONS:  - Monitor WBC  - Perform strict hand hygiene  - Limit to healthy visitors only  - No plants, dried, fresh or silk flowers with ely in patient room  Outcome: Progressing     Problem: DISCHARGE PLANNING  Goal: Discharge to home or other facility with appropriate resources  Description: INTERVENTIONS:  - Identify barriers to discharge w/patient and caregiver  - Arrange for needed discharge resources  and transportation as appropriate  - Identify discharge learning needs (meds, wound care, etc.)  - Arrange for interpretive services to assist at discharge as needed  - Refer to Case Management Department for coordinating discharge planning if the patient needs post-hospital services based on physician/advanced practitioner order or complex needs related to functional status, cognitive ability, or social support system  Outcome: Progressing     Problem: SAFETY ADULT  Goal: Patient will remain free of falls  Description: INTERVENTIONS:  - Educate patient/family on patient safety including physical limitations  - Instruct patient to call for assistance with activity   - Consider consulting OT/PT to assist with strengthening/mobility based on AM PAC & JH-HLM score  - Consult OT/PT to assist with strengthening/mobility   - Keep Call bell within reach  - Keep bed low and locked with side rails adjusted as appropriate  - Keep care items and personal belongings within reach  - Initiate and maintain comfort rounds  - Make Fall Risk Sign visible to staff  - Apply yellow socks and bracelet for high fall risk patients  - Consider moving patient to room near nurses station  Outcome: Progressing  Goal: Maintain or return to baseline ADL function  Description: INTERVENTIONS:  -  Assess patient's ability to carry out ADLs; assess patient's baseline for ADL function and identify physical deficits which impact ability to perform ADLs (bathing, care of mouth/teeth, toileting, grooming, dressing, etc.)  - Assess/evaluate cause of self-care deficits   - Assess range of motion  - Assess patient's mobility; develop plan if impaired  - Assess patient's need for assistive devices and provide as appropriate  - Encourage maximum independence but intervene and supervise when necessary  - Involve family in performance of ADLs  - Assess for home care needs following discharge   - Consider OT consult to assist with ADL evaluation and planning  for discharge  - Provide patient education as appropriate  - Monitor functional capacity and physical performance, use of AM PAC & -HLM   - Monitor gait, balance and fatigue with ambulation    Outcome: Progressing  Goal: Maintains/Returns to pre admission functional level  Description: INTERVENTIONS:  - Perform AM-PAC 6 Click Basic Mobility/ Daily Activity assessment daily.  - Set and communicate daily mobility goal to care team and patient/family/caregiver.   - Collaborate with rehabilitation services on mobility goals if consulted  - Out of bed for toileting  - Record patient progress and toleration of activity level   Outcome: Progressing     Problem: Knowledge Deficit  Goal: Patient/family/caregiver demonstrates understanding of disease process, treatment plan, medications, and discharge instructions  Description: Complete learning assessment and assess knowledge base.  Interventions:  - Provide teaching at level of understanding  - Provide teaching via preferred learning methods  Outcome: Progressing     Problem: METABOLIC, FLUID AND ELECTROLYTES - ADULT  Goal: Electrolytes maintained within normal limits  Description: INTERVENTIONS:  - Monitor labs and assess patient for signs and symptoms of electrolyte imbalances  - Administer electrolyte replacement as ordered  - Monitor response to electrolyte replacements, including repeat lab results as appropriate  - Instruct patient on fluid and nutrition as appropriate  Outcome: Progressing  Goal: Fluid balance maintained  Description: INTERVENTIONS:  - Monitor labs   - Monitor I/O and WT  - Instruct patient on fluid and nutrition as appropriate  - Assess for signs & symptoms of volume excess or deficit  Outcome: Progressing  Goal: Glucose maintained within target range  Description: INTERVENTIONS:  - Monitor Blood Glucose as ordered  - Assess for signs and symptoms of hyperglycemia and hypoglycemia  - Administer ordered medications to maintain glucose within target  range  - Assess nutritional intake and initiate nutrition service referral as needed  Outcome: Progressing

## 2025-07-05 NOTE — UTILIZATION REVIEW
Continued Stay Review    Date: 7/5/25                          Current Patient Class: Inpatient  Current Level of Care: MS    HPI:34 y.o. male initially admitted on 7/2/25   Current Diagnosis: Diabetic ulcer of toe of R foot w/ cellulitis.     7/5/25 Assessment/Plan:   Pt report feels well post op, denies pain at this time. On exam, RLE wrapped. BC neg at 48hrs.Labs WNL.   Plan:  Pain control-add oxycodone. PT/OT evals. Continue Ancef IV,     7/5/25 Operative note from Podiatry.  Procedure(s) (LRB):  Right hallux amputation (Right)  Anesthesia Type:   Choice with 17 mL of 1% Lidocaine and 0.5% Bupivacaine in a 1:1 mixture  Operative Findings:  - We believe to have obtained surgical cure of bone infection with removal of right hallux at first metatarsal phalangeal joint  - No sinus tracts or purulence appreciated  - Healthy bleeding to tissue margins  - Primary closure achieved     Certification Statement: The patient will continue to require additional inpatient hospital stay due to pt/ot eval, pain management     Medications:   Scheduled Medications:  amphetamine-dextroamphetamine, 10 mg, Oral, BID (AM & Afternoon)  aspirin, 81 mg, Oral, Daily  buPROPion, 150 mg, Oral, Daily  cefazolin, 2,000 mg, Intravenous, Q8H  clopidogrel, 75 mg, Oral, Daily  gabapentin, 600 mg, Oral, TID  heparin (porcine), 5,000 Units, Subcutaneous, Q8H KIA  insulin lispro, 1-5 Units, Subcutaneous, TID AC  insulin lispro, 1-5 Units, Subcutaneous, HS  pravastatin, 40 mg, Oral, Daily With Dinner    Continuous IV Infusions: NONE     PRN Meds:  acetaminophen, 650 mg, Oral, Q6H PRN  ibuprofen, 600 mg, Oral, Q6H PRN  lidocaine, 2 patch, Topical, Daily PRN  methocarbamol, 500 mg, Oral, Q6H PRN- given x1 7/5  ondansetron, 4 mg, Intravenous, Q4H PRN  oxyCODONE, 5 mg, Oral, Q6H PRN  polyethylene glycol, 17 g, Oral, Daily PRN  promethazine, 12.5 mg, Intramuscular, Q6H PRN- given x1 7/5      Discharge Plan: TBD    Vital Signs (last 3 days)        Date/Time Temp Pulse Resp BP MAP (mmHg) SpO2 O2 Flow Rate (L/min) O2 Device Patient Position - Orthostatic VS Pain    07/05/25 0945 -- -- -- -- -- -- -- None (Room air) -- No Pain    07/05/25 09:29:14 97.7 °F (36.5 °C) 83 -- 122/78 93 99 % -- -- -- --    07/05/25 0915 -- 82 17 122/66 88 99 % -- None (Room air) -- No Pain    07/05/25 0900 -- 92 16 118/64 84 99 % -- None (Room air) -- No Pain    07/05/25 0845 97.3 °F (36.3 °C) 81 16 106/51 73 99 % 5 L/min Simple mask -- No Pain    07/05/25 07:29:27 97.2 °F (36.2 °C) 79 16 123/83 96 97 % -- -- -- --    07/04/25 2000 -- -- -- -- -- -- -- -- -- 4 07/04/25 19:41:58 -- -- 18 128/79 95 -- -- -- -- --    07/04/25 1612 -- -- -- -- -- -- -- -- -- 3    07/04/25 15:17:35 98 °F (36.7 °C) 77 16 128/79 95 97 % -- -- -- --    07/04/25 0937 -- -- -- -- -- -- -- -- -- 3    07/04/25 0900 -- -- -- -- -- -- -- None (Room air) -- --    07/04/25 07:38:50 97.5 °F (36.4 °C) 78 16 123/77 92 99 % -- -- -- --    07/04/25 03:32:23 -- 74 18 103/65 78 100 % -- -- -- --    07/04/25 00:27:51 -- 86 18 117/74 88 99 % -- -- -- --    07/03/25 23:35:10 -- 84 18 114/67 83 99 % -- -- -- --    07/03/25 2238 -- -- -- -- -- -- -- -- -- 5    07/03/25 2215 -- 100 -- 131/84 100 -- -- -- -- --    07/03/25 2115 -- 73 -- 131/88 102 -- -- -- -- --    07/03/25 20:17:03 97.3 °F (36.3 °C) 75 16 137/90 106 98 % -- -- -- --    07/03/25 2000 -- -- -- -- -- -- -- None (Room air) -- 4 07/03/25 19:20:10 97.4 °F (36.3 °C) 69 16 132/89 103 96 % -- None (Room air) Lying --    07/03/25 1855 97.4 °F (36.3 °C) 72 16 144/96 116 99 % -- None (Room air) -- 4 07/03/25 1823 97.8 °F (36.6 °C) 82 16 142/90 111 99 % -- None (Room air) -- No Pain    07/03/25 0838 -- 68 16 152/87 119 99 % -- None (Room air) -- No Pain    07/03/25 0828 -- -- -- -- -- -- -- -- -- 3    07/03/25 07:42:07 97.5 °F (36.4 °C) 74 16 117/70 86 100 % -- -- Lying --    07/02/25 2112 -- -- -- -- -- -- -- -- -- 6 07/02/25 21:01:26 97.3 °F (36.3 °C) -- --  115/68 84 -- -- -- -- --    07/02/25 2048 -- -- -- -- -- -- -- -- -- 6    07/02/25 15:24:30 97.7 °F (36.5 °C) 82 16 121/74 90 94 % -- -- -- --    07/02/25 1330 -- -- -- -- -- -- -- None (Room air) -- No Pain    07/02/25 12:37:32 97.3 °F (36.3 °C) 77 16 123/74 90 99 % -- -- -- --    07/02/25 1059 98.3 °F (36.8 °C) 81 16 159/70 101 100 % -- None (Room air) Lying --          Weight (last 2 days)       None            Pertinent Labs/Diagnostic Results:   Radiology:  VAS ARTERIAL DUPLEX-LOWER LIMB UNILATERAL   Final Interpretation by Ramirez Doyle MD (07/02 1533)      XR foot 3+ views RIGHT   Final Interpretation by Pippa Zimmerman MD (07/02 1506)   No acute osseous abnormality.            Computerized Assisted Algorithm (CAA) may have been used to analyze all applicable images.         Workstation performed: [id number]         IR lower extremity angiogram    (Results Pending)   XR foot right 3+ views    (Results Pending)     Cardiology:  ECG 12 lead   Final Result by Graciela Neely RN (07/05 0647)      ECG 12 lead   Final Result by Joel Michael DO (07/03 1211)   Normal sinus rhythm   Normal ECG   When compared with ECG of 02-Jul-2025 18:25,   Nonspecific T wave abnormality no longer evident in Lateral leads   Confirmed by Joel Michael ([id number]) on 7/3/2025 12:11:28 PM      ECG 12 lead   Final Result by Joel Michael DO (07/03 1132)   Normal sinus rhythm   Nonspecific T wave abnormality   Abnormal ECG   No previous ECGs available   Confirmed by Joel Michael ([id number]) on 7/3/2025 11:31:58 AM        Results from last 7 days   Lab Units 07/05/25  0506 07/04/25  0606 07/03/25  0619 07/02/25  1119   WBC Thousand/uL 5.54 4.85 4.96 6.40   HEMOGLOBIN g/dL 15.6 16.3 15.8 15.1   HEMATOCRIT % 46.1 47.9 48.0 44.3   PLATELETS Thousands/uL 202 216 190 196   TOTAL NEUT ABS Thousands/µL 3.61 3.52 3.17 4.38         Results from last 7 days   Lab Units 07/05/25  0506 07/04/25  0606 07/03/25  0619  07/02/25  1119   SODIUM mmol/L 139 139 140 137   POTASSIUM mmol/L 4.0 4.0 4.2 4.1   CHLORIDE mmol/L 104 103 104 103   CO2 mmol/L 27 30 29 27   ANION GAP mmol/L 8 6 7 7   BUN mg/dL 12 12 15 19   CREATININE mg/dL 0.70 0.76 0.68 0.75   EGFR ml/min/1.73sq m 122 118 123 118   CALCIUM mg/dL 8.9 9.2 9.1 9.3   MAGNESIUM mg/dL  --   --   --  1.9     Results from last 7 days   Lab Units 07/02/25  1119   AST U/L 11*   ALT U/L 16   ALK PHOS U/L 40   TOTAL PROTEIN g/dL 6.8   ALBUMIN g/dL 4.2   TOTAL BILIRUBIN mg/dL 0.47   BILIRUBIN DIRECT mg/dL 0.06     Results from last 7 days   Lab Units 07/05/25  0857 07/03/25  1827 07/02/25  1635 07/02/25  1232   POC GLUCOSE mg/dl 144* 107 242* 129     Results from last 7 days   Lab Units 07/05/25  0506 07/04/25  2100 07/04/25  0606 07/03/25  1245 07/03/25  0619 07/02/25  1119   GLUCOSE RANDOM mg/dL 132 87 125 114 130 134     Results from last 7 days   Lab Units 07/03/25  0619   PROTIME seconds 12.6   INR  0.92   PTT seconds 30     Results from last 7 days   Lab Units 07/02/25  1119   LACTIC ACID mmol/L 0.7     Results from last 7 days   Lab Units 07/02/25  1119   CRP mg/L 20.0*   SED RATE mm/hour 8         Results from last 7 days   Lab Units 07/02/25  1127 07/01/25  1028   BLOOD CULTURE  No Growth at 48 hrs.  No Growth at 48 hrs.  --    GRAM STAIN RESULT   --  4+ Gram positive cocci in pairs*  2+ Polys*   WOUND CULTURE   --  3+ Growth of Staphylococcus pseudintermedius*       Network Utilization Review Department  ATTENTION: Please call with any questions or concerns to [phone] and carefully listen to the prompts so that you are directed to the right person. All voicemails are confidential.   For Discharge needs, contact Care Management DC Support Team at [phone] opt. 2  Send all requests for admission clinical reviews, approved or denied determinations and any other requests to dedicated fax number below belonging to the campus where the patient is receiving treatment. List of  dedicated fax numbers for the Facilities:  FACILITY NAME UR FAX NUMBER   ADMISSION DENIALS (Administrative/Medical Necessity) [phone]   DISCHARGE SUPPORT TEAM (NETWORK) [phone]   PARENT CHILD HEALTH (Maternity/NICU/Pediatrics) [phone]   [facility] [phone]   [facility] [phone]   [facility] [phone]   [facility] [phone]   [facility] [phone]   [facility] [phone]   [facility] [phone]   [facility] [phone]   [facility] [phone]   [facility] [phone]   [facility] [phone]   [facility] [phone]

## 2025-07-06 VITALS
RESPIRATION RATE: 16 BRPM | SYSTOLIC BLOOD PRESSURE: 137 MMHG | OXYGEN SATURATION: 98 % | DIASTOLIC BLOOD PRESSURE: 90 MMHG | BODY MASS INDEX: 29.4 KG/M2 | TEMPERATURE: 97.4 F | HEART RATE: 87 BPM | WEIGHT: 193.34 LBS

## 2025-07-06 PROBLEM — L03.031 CELLULITIS OF GREAT TOE OF RIGHT FOOT: Status: RESOLVED | Noted: 2025-07-02 | Resolved: 2025-07-06

## 2025-07-06 PROBLEM — E11.621 DIABETIC ULCER OF TOE OF RIGHT FOOT ASSOCIATED WITH TYPE 2 DIABETES MELLITUS, WITH NECROSIS OF BONE (HCC): Status: RESOLVED | Noted: 2025-07-02 | Resolved: 2025-07-06

## 2025-07-06 PROBLEM — L97.514 DIABETIC ULCER OF TOE OF RIGHT FOOT ASSOCIATED WITH TYPE 2 DIABETES MELLITUS, WITH NECROSIS OF BONE (HCC): Status: RESOLVED | Noted: 2025-07-02 | Resolved: 2025-07-06

## 2025-07-06 PROBLEM — Z89.411 STATUS POST AMPUTATION OF RIGHT GREAT TOE (HCC): Status: ACTIVE | Noted: 2025-07-06

## 2025-07-06 LAB
ANION GAP SERPL CALCULATED.3IONS-SCNC: 9 MMOL/L (ref 4–13)
BASOPHILS # BLD AUTO: 0.04 THOUSANDS/ÂΜL (ref 0–0.1)
BASOPHILS NFR BLD AUTO: 1 % (ref 0–1)
BUN SERPL-MCNC: 11 MG/DL (ref 5–25)
CALCIUM SERPL-MCNC: 9.1 MG/DL (ref 8.4–10.2)
CHLORIDE SERPL-SCNC: 106 MMOL/L (ref 96–108)
CO2 SERPL-SCNC: 25 MMOL/L (ref 21–32)
CREAT SERPL-MCNC: 0.58 MG/DL (ref 0.6–1.3)
EOSINOPHIL # BLD AUTO: 0.07 THOUSAND/ÂΜL (ref 0–0.61)
EOSINOPHIL NFR BLD AUTO: 1 % (ref 0–6)
ERYTHROCYTE [DISTWIDTH] IN BLOOD BY AUTOMATED COUNT: 13.1 % (ref 11.6–15.1)
GFR SERPL CREATININE-BSD FRML MDRD: 132 ML/MIN/1.73SQ M
GLUCOSE SERPL-MCNC: 119 MG/DL (ref 65–140)
HCT VFR BLD AUTO: 46.4 % (ref 36.5–49.3)
HGB BLD-MCNC: 15.7 G/DL (ref 12–17)
IMM GRANULOCYTES # BLD AUTO: 0.02 THOUSAND/UL (ref 0–0.2)
IMM GRANULOCYTES NFR BLD AUTO: 0 % (ref 0–2)
LYMPHOCYTES # BLD AUTO: 1.82 THOUSANDS/ÂΜL (ref 0.6–4.47)
LYMPHOCYTES NFR BLD AUTO: 23 % (ref 14–44)
MCH RBC QN AUTO: 29.7 PG (ref 26.8–34.3)
MCHC RBC AUTO-ENTMCNC: 33.8 G/DL (ref 31.4–37.4)
MCV RBC AUTO: 88 FL (ref 82–98)
MONOCYTES # BLD AUTO: 0.63 THOUSAND/ÂΜL (ref 0.17–1.22)
MONOCYTES NFR BLD AUTO: 8 % (ref 4–12)
NEUTROPHILS # BLD AUTO: 5.48 THOUSANDS/ÂΜL (ref 1.85–7.62)
NEUTS SEG NFR BLD AUTO: 67 % (ref 43–75)
NRBC BLD AUTO-RTO: 0 /100 WBCS
PLATELET # BLD AUTO: 242 THOUSANDS/UL (ref 149–390)
PMV BLD AUTO: 10.8 FL (ref 8.9–12.7)
POTASSIUM SERPL-SCNC: 3.5 MMOL/L (ref 3.5–5.3)
RBC # BLD AUTO: 5.29 MILLION/UL (ref 3.88–5.62)
SODIUM SERPL-SCNC: 140 MMOL/L (ref 135–147)
WBC # BLD AUTO: 8.06 THOUSAND/UL (ref 4.31–10.16)

## 2025-07-06 PROCEDURE — 97116 GAIT TRAINING THERAPY: CPT

## 2025-07-06 PROCEDURE — 99232 SBSQ HOSP IP/OBS MODERATE 35: CPT

## 2025-07-06 PROCEDURE — 97163 PT EVAL HIGH COMPLEX 45 MIN: CPT

## 2025-07-06 PROCEDURE — 99238 HOSP IP/OBS DSCHRG MGMT 30/<: CPT | Performed by: PODIATRIST

## 2025-07-06 PROCEDURE — 80048 BASIC METABOLIC PNL TOTAL CA: CPT

## 2025-07-06 PROCEDURE — 85025 COMPLETE CBC W/AUTO DIFF WBC: CPT

## 2025-07-06 RX ORDER — ASPIRIN 81 MG/1
81 TABLET ORAL DAILY
Qty: 30 TABLET | Refills: 0 | Status: SHIPPED | OUTPATIENT
Start: 2025-07-07 | End: 2025-07-06

## 2025-07-06 RX ORDER — ACETAMINOPHEN 325 MG/1
975 TABLET ORAL EVERY 8 HOURS SCHEDULED
Status: DISCONTINUED | OUTPATIENT
Start: 2025-07-06 | End: 2025-07-06 | Stop reason: HOSPADM

## 2025-07-06 RX ORDER — METHOCARBAMOL 500 MG/1
500 TABLET, FILM COATED ORAL EVERY 8 HOURS SCHEDULED
Qty: 15 TABLET | Refills: 0 | Status: SHIPPED | OUTPATIENT
Start: 2025-07-06 | End: 2025-07-17 | Stop reason: SDUPTHER

## 2025-07-06 RX ORDER — METHOCARBAMOL 500 MG/1
500 TABLET, FILM COATED ORAL EVERY 8 HOURS SCHEDULED
Qty: 15 TABLET | Refills: 0 | Status: SHIPPED | OUTPATIENT
Start: 2025-07-06 | End: 2025-07-06

## 2025-07-06 RX ORDER — ASPIRIN 81 MG/1
81 TABLET ORAL DAILY
Qty: 30 TABLET | Refills: 0 | Status: SHIPPED | OUTPATIENT
Start: 2025-07-07 | End: 2025-08-06

## 2025-07-06 RX ORDER — OXYCODONE HYDROCHLORIDE 5 MG/1
5 TABLET ORAL EVERY 6 HOURS PRN
Refills: 0 | Status: DISCONTINUED | OUTPATIENT
Start: 2025-07-06 | End: 2025-07-06 | Stop reason: HOSPADM

## 2025-07-06 RX ORDER — IBUPROFEN 600 MG/1
600 TABLET, FILM COATED ORAL EVERY 6 HOURS PRN
Status: DISCONTINUED | OUTPATIENT
Start: 2025-07-06 | End: 2025-07-06 | Stop reason: HOSPADM

## 2025-07-06 RX ORDER — CLOPIDOGREL BISULFATE 75 MG/1
75 TABLET ORAL DAILY
Qty: 30 TABLET | Refills: 0 | Status: SHIPPED | OUTPATIENT
Start: 2025-07-07 | End: 2025-07-06

## 2025-07-06 RX ORDER — METHOCARBAMOL 500 MG/1
500 TABLET, FILM COATED ORAL EVERY 8 HOURS SCHEDULED
Status: DISCONTINUED | OUTPATIENT
Start: 2025-07-06 | End: 2025-07-06 | Stop reason: HOSPADM

## 2025-07-06 RX ORDER — CEPHALEXIN 500 MG/1
500 CAPSULE ORAL EVERY 6 HOURS SCHEDULED
Qty: 20 CAPSULE | Refills: 0 | Status: SHIPPED | OUTPATIENT
Start: 2025-07-06 | End: 2025-07-11

## 2025-07-06 RX ORDER — CEPHALEXIN 500 MG/1
500 CAPSULE ORAL EVERY 6 HOURS SCHEDULED
Qty: 20 CAPSULE | Refills: 0 | Status: SHIPPED | OUTPATIENT
Start: 2025-07-06 | End: 2025-07-06

## 2025-07-06 RX ORDER — CLOPIDOGREL BISULFATE 75 MG/1
75 TABLET ORAL DAILY
Qty: 30 TABLET | Refills: 0 | Status: SHIPPED | OUTPATIENT
Start: 2025-07-07 | End: 2025-08-06

## 2025-07-06 RX ADMIN — METHOCARBAMOL 500 MG: 500 TABLET ORAL at 09:21

## 2025-07-06 RX ADMIN — HEPARIN SODIUM 5000 UNITS: 5000 INJECTION INTRAVENOUS; SUBCUTANEOUS at 04:25

## 2025-07-06 RX ADMIN — ACETAMINOPHEN 975 MG: 325 TABLET ORAL at 13:06

## 2025-07-06 RX ADMIN — DEXTROAMPHETAMINE SACCHARATE, AMPHETAMINE ASPARTATE, DEXTROAMPHETAMINE SULFATE AND AMPHETAMINE SULFATE 10 MG: 2.5; 2.5; 2.5; 2.5 TABLET ORAL at 09:21

## 2025-07-06 RX ADMIN — ACETAMINOPHEN 650 MG: 325 TABLET ORAL at 07:26

## 2025-07-06 RX ADMIN — CLOPIDOGREL 75 MG: 75 TABLET ORAL at 09:21

## 2025-07-06 RX ADMIN — GABAPENTIN 600 MG: 300 CAPSULE ORAL at 09:21

## 2025-07-06 RX ADMIN — METHOCARBAMOL 500 MG: 500 TABLET ORAL at 13:06

## 2025-07-06 RX ADMIN — CEFAZOLIN SODIUM 2000 MG: 2 SOLUTION INTRAVENOUS at 04:32

## 2025-07-06 RX ADMIN — IBUPROFEN 600 MG: 600 TABLET ORAL at 09:21

## 2025-07-06 RX ADMIN — ASPIRIN 81 MG: 81 TABLET, DELAYED RELEASE ORAL at 09:21

## 2025-07-06 RX ADMIN — GABAPENTIN 600 MG: 300 CAPSULE ORAL at 13:06

## 2025-07-06 RX ADMIN — BUPROPION HYDROCHLORIDE 150 MG: 150 TABLET, EXTENDED RELEASE ORAL at 09:21

## 2025-07-06 NOTE — DISCHARGE INSTR - AVS FIRST PAGE
Discharge Instructions - Podiatry    Weight Bearing Status: Weight bearing as tolerated to right foot in surgical shoe                   Pain: Continue analgesics as needed     Follow-up appointment instructions: Please make an appointment within one week of discharge with Dr. Pisano / Dr. White/ [facility]. Contact sooner if any increase in pain, or signs of infection occur    Wound Care: Leave dressings clean, dry, and intact until first post operative visit    Wound Care Instructions (if dressings get soiled): Please apply Betadine soaked adaptic to right foot incision. Then cover with 4x4 dry gauze and secure with rolled gauze and tape.Then apply ACE wrap with minimal compression. Please change dressing 3x a week .

## 2025-07-06 NOTE — PLAN OF CARE
Problem: PAIN - ADULT  Goal: Verbalizes/displays adequate comfort level or baseline comfort level  Description: Interventions:  - Encourage patient to monitor pain and request assistance  - Assess pain using appropriate pain scale  - Administer analgesics as ordered based on type and severity of pain and evaluate response  - Implement non-pharmacological measures as appropriate and evaluate response  - Consider cultural and social influences on pain and pain management  - Notify physician/advanced practitioner if interventions unsuccessful or patient reports new pain  - Educate patient/family on pain management process including their role and importance of  reporting pain   - Provide non-pharmacologic/complimentary pain relief interventions  Outcome: Progressing     Problem: INFECTION - ADULT  Goal: Absence or prevention of progression during hospitalization  Description: INTERVENTIONS:  - Assess and monitor for signs and symptoms of infection  - Monitor lab/diagnostic results  - Monitor all insertion sites, i.e. indwelling lines, tubes, and drains  - Monitor endotracheal if appropriate and nasal secretions for changes in amount and color  - [place] appropriate cooling/warming therapies per order  - Administer medications as ordered  - Instruct and encourage patient and family to use good hand hygiene technique  - Identify and instruct in appropriate isolation precautions for identified infection/condition  Outcome: Progressing  Goal: Absence of fever/infection during neutropenic period  Description: INTERVENTIONS:  - Monitor WBC  - Perform strict hand hygiene  - Limit to healthy visitors only  - No plants, dried, fresh or silk flowers with ely in patient room  Outcome: Progressing     Problem: DISCHARGE PLANNING  Goal: Discharge to home or other facility with appropriate resources  Description: INTERVENTIONS:  - Identify barriers to discharge w/patient and caregiver  - Arrange for needed discharge resources  and transportation as appropriate  - Identify discharge learning needs (meds, wound care, etc.)  - Arrange for interpretive services to assist at discharge as needed  - Refer to Case Management Department for coordinating discharge planning if the patient needs post-hospital services based on physician/advanced practitioner order or complex needs related to functional status, cognitive ability, or social support system  Outcome: Progressing     Problem: SAFETY ADULT  Goal: Patient will remain free of falls  Description: INTERVENTIONS:  - Educate patient/family on patient safety including physical limitations  - Instruct patient to call for assistance with activity   - Consider consulting OT/PT to assist with strengthening/mobility based on AM PAC & JH-HLM score  - Consult OT/PT to assist with strengthening/mobility   - Keep Call bell within reach  - Keep bed low and locked with side rails adjusted as appropriate  - Keep care items and personal belongings within reach  - Initiate and maintain comfort rounds  - Make Fall Risk Sign visible to staff  - Apply yellow socks and bracelet for high fall risk patients  - Consider moving patient to room near nurses station  Outcome: Progressing  Goal: Maintain or return to baseline ADL function  Description: INTERVENTIONS:  -  Assess patient's ability to carry out ADLs; assess patient's baseline for ADL function and identify physical deficits which impact ability to perform ADLs (bathing, care of mouth/teeth, toileting, grooming, dressing, etc.)  - Assess/evaluate cause of self-care deficits   - Assess range of motion  - Assess patient's mobility; develop plan if impaired  - Assess patient's need for assistive devices and provide as appropriate  - Encourage maximum independence but intervene and supervise when necessary  - Involve family in performance of ADLs  - Assess for home care needs following discharge   - Consider OT consult to assist with ADL evaluation and planning  for discharge  - Provide patient education as appropriate  - Monitor functional capacity and physical performance, use of AM PAC & -HLM   - Monitor gait, balance and fatigue with ambulation    Outcome: Progressing  Goal: Maintains/Returns to pre admission functional level  Description: INTERVENTIONS:  - Perform AM-PAC 6 Click Basic Mobility/ Daily Activity assessment daily.  - Set and communicate daily mobility goal to care team and patient/family/caregiver.   - Collaborate with rehabilitation services on mobility goals if consulted  - Out of bed for toileting  - Record patient progress and toleration of activity level   Outcome: Progressing     Problem: Knowledge Deficit  Goal: Patient/family/caregiver demonstrates understanding of disease process, treatment plan, medications, and discharge instructions  Description: Complete learning assessment and assess knowledge base.  Interventions:  - Provide teaching at level of understanding  - Provide teaching via preferred learning methods  Outcome: Progressing     Problem: METABOLIC, FLUID AND ELECTROLYTES - ADULT  Goal: Electrolytes maintained within normal limits  Description: INTERVENTIONS:  - Monitor labs and assess patient for signs and symptoms of electrolyte imbalances  - Administer electrolyte replacement as ordered  - Monitor response to electrolyte replacements, including repeat lab results as appropriate  - Instruct patient on fluid and nutrition as appropriate  Outcome: Progressing  Goal: Fluid balance maintained  Description: INTERVENTIONS:  - Monitor labs   - Monitor I/O and WT  - Instruct patient on fluid and nutrition as appropriate  - Assess for signs & symptoms of volume excess or deficit  Outcome: Progressing  Goal: Glucose maintained within target range  Description: INTERVENTIONS:  - Monitor Blood Glucose as ordered  - Assess for signs and symptoms of hyperglycemia and hypoglycemia  - Administer ordered medications to maintain glucose within target  range  - Assess nutritional intake and initiate nutrition service referral as needed  Outcome: Progressing

## 2025-07-06 NOTE — ASSESSMENT & PLAN NOTE
Lab Results   Component Value Date    HGBA1C 6.9 (H) 06/06/2025       Recent Labs     07/03/25  1827 07/05/25  0857   POCGLU 107 144*       Blood Sugar Average: Last 72 hrs:  (P) 125.5

## 2025-07-06 NOTE — ASSESSMENT & PLAN NOTE
Jason Blanca is a 34 y.o. male admitted 7/2/2025 for right medial hallux wound with underlying osteomyelitis and cellulitis extending to the first MTPJ.  PMH of type 2 diabetes mellitus, PAD, ADHD, CKD stage I.      Diagnostics:  5/16: Right foot MRI -small ulcer in the medial aspect of the great toe with mild bone marrow edema in the head of the proximal phalanx and distal phalanx, which may be related to reactive changes or early osteomyelitis  7/2: Right foot X-Ray - Per my personal read, there is clear erosion to the medial aspect of the proximal phalanx head and distal phalanx base at the level of the ulceration consistent with osteomyelitis, however final read negative  7/2: Leads -RLE: Diffuse disease noted throughout the fem-pop arteries without significant focal stenosis.  There is a greater than 75% stenosis noted in the distal anterior tibial artery.  OCTAVIA of 1.58, metatarsal pressure of 147 mmHg, great toe pressure of 65 mmHg.  PVR/PPG tracings are normal.    Procedures:   7/3: Right lower extremity angiogram: PT, AT angioplasty.  Multiphasic dopplerable DP/PT pulses  7/5: Right hallux amputation    Plan:    Patient is POD1 s/p right hallux amputation. Dressings changed at bedside. Incision site is stable, no acute clinical signs of infection.  Patient is stable for discharge from a podiatry standpoint. Patient will follow up outpatient for post-operative care, instructions placed with discharge information.  Dressings applied consisting of Betadine soaked adaptic, DSD.  Vitals signs stable. Patient afebrile with no leukocytosis. No erythema, edema, or lymphangitis noted either proximal or distal to the incision site.  Prescription written for Keflex x 5 days postoperatively for any residual skin and soft tissue infection  Cleared for discharge to home by PT/OT  Elevation and offloading on green foam wedges or pillows when non-ambulatory       Weightbearing Status: Weightbearing as tolerated to  right lower extremity

## 2025-07-06 NOTE — DISCHARGE SUMMARY
Discharge Summary - Podiatry   Name: Jason Blanca 34 y.o. male I MRN: [id number]  Unit/Bed#: [place] [address] -01 I Date of Admission: 7/2/2025   Date of Service: 7/6/2025 I Hospital Day: 4    Assessment & Plan  Cellulitis of great toe of right foot (Resolved: 7/6/2025)  Status post amputation of right great toe (HCC)  Jason Blanca is a 34 y.o. male admitted 7/2/2025 for right medial hallux wound with underlying osteomyelitis and cellulitis extending to the first MTPJ.  PMH of type 2 diabetes mellitus, PAD, ADHD, CKD stage I.      Diagnostics:  5/16: Right foot MRI -small ulcer in the medial aspect of the great toe with mild bone marrow edema in the head of the proximal phalanx and distal phalanx, which may be related to reactive changes or early osteomyelitis  7/2: Right foot X-Ray - Per my personal read, there is clear erosion to the medial aspect of the proximal phalanx head and distal phalanx base at the level of the ulceration consistent with osteomyelitis, however final read negative  7/2: Leads -RLE: Diffuse disease noted throughout the fem-pop arteries without significant focal stenosis.  There is a greater than 75% stenosis noted in the distal anterior tibial artery.  OCTAVIA of 1.58, metatarsal pressure of 147 mmHg, great toe pressure of 65 mmHg.  PVR/PPG tracings are normal.    Procedures:   7/3: Right lower extremity angiogram: PT, AT angioplasty.  Multiphasic dopplerable DP/PT pulses  7/5: Right hallux amputation    Plan:    Patient is POD1 s/p right hallux amputation. Dressings changed at bedside. Incision site is stable, no acute clinical signs of infection.  Patient is stable for discharge from a podiatry standpoint. Patient will follow up outpatient for post-operative care, instructions placed with discharge information.  Dressings applied consisting of Betadine soaked adaptic, DSD.  Vitals signs stable. Patient afebrile with no leukocytosis. No erythema, edema, or lymphangitis noted either  proximal or distal to the incision site.  Prescription written for Keflex x 5 days postoperatively for any residual skin and soft tissue infection  Cleared for discharge to home by PT/OT  Elevation and offloading on green foam wedges or pillows when non-ambulatory       Weightbearing Status: Weightbearing as tolerated to right lower extremity    Type 2 diabetes mellitus with hyperglycemia, with neuropathic pain  Lab Results   Component Value Date    HGBA1C 6.9 (H) 06/06/2025       Recent Labs     07/03/25  1827 07/05/25  0857   POCGLU 107 144*       Blood Sugar Average: Last 72 hrs:  (P) 125.5    Recurrent major depressive disorder, in remission (HCC)  Per internal medicine  PAD (peripheral artery disease) ([facility])  Arterial studies show diffuse disease in right lower extremity with greater than 75% stenosis in distal anterior tibial artery.  OCTAVIA 1.58,  mmHg, GTP 65 mmHg    S/p right lower extremity angiogram on 7/3 with adequate PT/AT runoff  Continue aspirin, statin, Plavix    ADHD, predominantly inattentive type  Managed on Adderall as needed  Mixed dyslipidemia  Per internal medicine    Admission Date: 7/2/2025 1100  Discharge Date: 07/06/25  Admitting Diagnosis: Toe pain [M79.676]  Toe infection [L08.9]  Diabetic ulcer of toe of right foot associated with type 2 diabetes mellitus, with necrosis of bone (HCC) [E11.621, L97.514]  Discharge Diagnosis:   Medical Problems       Resolved Problems  Date Reviewed: 7/2/2025          Resolved    * (Principal) Diabetic ulcer of toe of right foot associated with type 2 diabetes mellitus, with necrosis of bone ([facility]) 7/6/2025     Resolved by  Yamileth Garibay DPM    Cellulitis of great toe of right foot 7/6/2025     Resolved by  Yamileth Garibay DPM          HPI: The patient was seen, evaluated, and assessed at bedside today. The patient was awake, alert, and in no acute distress. The patient reports moderate pain at this time. However pain is well controlled with current pain  management regimen. Patient reports normal appetite and using the restroom postoperatively. Patient denies N/V/F/chills/SOB/CP.      Procedures Performed: No orders of the defined types were placed in this encounter.      Summary of Hospital Course: No notes on file     Significant Findings, Care, Treatment and Services Provided: Patient sent in from outpatient wound care provider due to purulent right great toe diabetic foot ulceration.  Initially seen in the emergency room and admitted by the podiatry team.  Internal medicine consult was placed for medication management as well as preoperative clearance.  Obtained a right foot x-ray with concern for slight erosion to the medial aspect of the right hallux interphalangeal joint.  Also obtain noninvasive arterial studies which revealed a stenosis within the right distal AT artery.  Evaluated by the vascular surgery team, with recommendations for angiogram.  Patient underwent a right lower extremity angiogram on 7/3 with adequate PT/AT runoff at the end of the procedure and optimized for podiatric intervention.  On 7/5 patient underwent a right great toe amputation with podiatry for surgical cure of bone infection.  Postoperatively, patient's pain is managed and he is cleared for discharge from all teams.    Complications: None    Discharge Day Visit / Exam:   /90 (BP Location: Right arm)   Pulse 87   Temp (!) 97.4 °F (36.3 °C) (Oral)   Resp 16   Wt 87.7 kg (193 lb 5.5 oz)   SpO2 98%   BMI 29.40 kg/m²     Physical Exam:     General:  Alert, cooperative, and in no distress.  Lower extremity exam:  Cardiovascular status at baseline.  Neurological status at baseline.  No calf tenderness noted.     S/p right hallux amputation. Incision site stable with sutures intact and skin edges well aligned. Capillary refill to skin edges < 3 seconds. Skin temperature within normal limits. No evidence of dehiscence. No signs of active infection: no purulence, no malodor,  no ascending erythema, no crepitus, no fluctuance. Residual erythema and edema consistent with post-operative healing course      Lower extremity wound(s) as noted below:  Wound #: 1  Location: right medial lower leg  Length 2cm: Width 1cm: Depth 0.1cm:   Deepest Tissue Noted in Base: subcutaneous  Probe to Bone: No  Peripheral Skin Description: Attached  Granulation: 20% Fibrotic Tissue: 80% Necrotic Tissue: 0%   Drainage Amount: None  Signs of Infection: none    Clinical Images 07/06/25:            Additional Data:     Labs:    Results from last 7 days   Lab Units 07/06/25  0425   WBC Thousand/uL 8.06   HEMOGLOBIN g/dL 15.7   HEMATOCRIT % 46.4   PLATELETS Thousands/uL 242   SEGS PCT % 67   LYMPHO PCT % 23   MONO PCT % 8   EOS PCT % 1     Results from last 7 days   Lab Units 07/06/25  0425 07/03/25  0619 07/02/25  1119   POTASSIUM mmol/L 3.5   < > 4.1   CHLORIDE mmol/L 106   < > 103   CO2 mmol/L 25   < > 27   BUN mg/dL 11   < > 19   CREATININE mg/dL 0.58*   < > 0.75   CALCIUM mg/dL 9.1   < > 9.3   ALK PHOS U/L  --   --  40   ALT U/L  --   --  16   AST U/L  --   --  11*    < > = values in this interval not displayed.     Results from last 7 days   Lab Units 07/03/25  0619   INR  0.92       * I Have Reviewed All Lab Data Listed Above.    Recent Cultures (last 7 days):     Results from last 7 days   Lab Units 07/02/25  1127 07/01/25  1028   BLOOD CULTURE  No Growth at 72 hrs.  No Growth at 72 hrs.  --    GRAM STAIN RESULT   --  4+ Gram positive cocci in pairs*  2+ Polys*   WOUND CULTURE   --  3+ Growth of Staphylococcus pseudintermedius*               Discussion with Family: Updated  (mother) at bedside.    Condition at Discharge: stable       Discharge instructions/Information to patient and family:   See After Visit Summary (AVS) for information provided to patient and family.      Provisions for Follow-Up Care:  See after visit summary for information related to follow-up care and any pertinent home  health orders.    - Vascular surgery follow up  - Podiatry follow up    PCP: Jaren Marquez MD    Disposition: Home    Planned Readmission: No     Discharge Medications:  See after visit summary for reconciled discharge medications provided to patient and family.      Discharge Statement:  I have spent a total time of 60 minutes in caring for this patient on the day of the visit/encounter. >30 minutes of time was spent on: Diagnostic results, Risks and benefits of tx options, Patient and family education, Importance of tx compliance, Counseling / Coordination of care, Documenting in the medical record, and Communicating with other healthcare professionals .

## 2025-07-06 NOTE — PLAN OF CARE
Problem: PAIN - ADULT  Goal: Verbalizes/displays adequate comfort level or baseline comfort level  Description: Interventions:  - Encourage patient to monitor pain and request assistance  - Assess pain using appropriate pain scale  - Administer analgesics as ordered based on type and severity of pain and evaluate response  - Implement non-pharmacological measures as appropriate and evaluate response  - Consider cultural and social influences on pain and pain management  - Notify physician/advanced practitioner if interventions unsuccessful or patient reports new pain  - Educate patient/family on pain management process including their role and importance of  reporting pain   - Provide non-pharmacologic/complimentary pain relief interventions  Outcome: Progressing     Problem: INFECTION - ADULT  Goal: Absence or prevention of progression during hospitalization  Description: INTERVENTIONS:  - Assess and monitor for signs and symptoms of infection  - Monitor lab/diagnostic results  - Monitor all insertion sites, i.e. indwelling lines, tubes, and drains  - Monitor endotracheal if appropriate and nasal secretions for changes in amount and color  - [place] appropriate cooling/warming therapies per order  - Administer medications as ordered  - Instruct and encourage patient and family to use good hand hygiene technique  - Identify and instruct in appropriate isolation precautions for identified infection/condition  Outcome: Progressing  Goal: Absence of fever/infection during neutropenic period  Description: INTERVENTIONS:  - Monitor WBC  - Perform strict hand hygiene  - Limit to healthy visitors only  - No plants, dried, fresh or silk flowers with ely in patient room  Outcome: Progressing     Problem: DISCHARGE PLANNING  Goal: Discharge to home or other facility with appropriate resources  Description: INTERVENTIONS:  - Identify barriers to discharge w/patient and caregiver  - Arrange for needed discharge resources  and transportation as appropriate  - Identify discharge learning needs (meds, wound care, etc.)  - Arrange for interpretive services to assist at discharge as needed  - Refer to Case Management Department for coordinating discharge planning if the patient needs post-hospital services based on physician/advanced practitioner order or complex needs related to functional status, cognitive ability, or social support system  Outcome: Progressing     Problem: SAFETY ADULT  Goal: Patient will remain free of falls  Description: INTERVENTIONS:  - Educate patient/family on patient safety including physical limitations  - Instruct patient to call for assistance with activity   - Consider consulting OT/PT to assist with strengthening/mobility based on AM PAC & JH-HLM score  - Consult OT/PT to assist with strengthening/mobility   - Keep Call bell within reach  - Keep bed low and locked with side rails adjusted as appropriate  - Keep care items and personal belongings within reach  - Initiate and maintain comfort rounds  - Make Fall Risk Sign visible to staff  - Apply yellow socks and bracelet for high fall risk patients  - Consider moving patient to room near nurses station  Outcome: Progressing  Goal: Maintain or return to baseline ADL function  Description: INTERVENTIONS:  -  Assess patient's ability to carry out ADLs; assess patient's baseline for ADL function and identify physical deficits which impact ability to perform ADLs (bathing, care of mouth/teeth, toileting, grooming, dressing, etc.)  - Assess/evaluate cause of self-care deficits   - Assess range of motion  - Assess patient's mobility; develop plan if impaired  - Assess patient's need for assistive devices and provide as appropriate  - Encourage maximum independence but intervene and supervise when necessary  - Involve family in performance of ADLs  - Assess for home care needs following discharge   - Consider OT consult to assist with ADL evaluation and planning  for discharge  - Provide patient education as appropriate  - Monitor functional capacity and physical performance, use of AM PAC & -HLM   - Monitor gait, balance and fatigue with ambulation    Outcome: Progressing  Goal: Maintains/Returns to pre admission functional level  Description: INTERVENTIONS:  - Perform AM-PAC 6 Click Basic Mobility/ Daily Activity assessment daily.  - Set and communicate daily mobility goal to care team and patient/family/caregiver.   - Collaborate with rehabilitation services on mobility goals if consulted  - Out of bed for toileting  - Record patient progress and toleration of activity level   Outcome: Progressing     Problem: Knowledge Deficit  Goal: Patient/family/caregiver demonstrates understanding of disease process, treatment plan, medications, and discharge instructions  Description: Complete learning assessment and assess knowledge base.  Interventions:  - Provide teaching at level of understanding  - Provide teaching via preferred learning methods  Outcome: Progressing     Problem: METABOLIC, FLUID AND ELECTROLYTES - ADULT  Goal: Electrolytes maintained within normal limits  Description: INTERVENTIONS:  - Monitor labs and assess patient for signs and symptoms of electrolyte imbalances  - Administer electrolyte replacement as ordered  - Monitor response to electrolyte replacements, including repeat lab results as appropriate  - Instruct patient on fluid and nutrition as appropriate  Outcome: Progressing  Goal: Fluid balance maintained  Description: INTERVENTIONS:  - Monitor labs   - Monitor I/O and WT  - Instruct patient on fluid and nutrition as appropriate  - Assess for signs & symptoms of volume excess or deficit  Outcome: Progressing  Goal: Glucose maintained within target range  Description: INTERVENTIONS:  - Monitor Blood Glucose as ordered  - Assess for signs and symptoms of hyperglycemia and hypoglycemia  - Administer ordered medications to maintain glucose within target  range  - Assess nutritional intake and initiate nutrition service referral as needed  Outcome: Progressing

## 2025-07-06 NOTE — PROGRESS NOTES
Progress Note - Hospitalist   Name: Jason Blanca 34 y.o. male I MRN: [id number]  Unit/Bed#: [place] [address] -01 I Date of Admission: 7/2/2025   Date of Service: 7/6/2025 I Hospital Day: 4    Assessment & Plan  Diabetic ulcer of toe of right foot associated with type 2 diabetes mellitus, with necrosis of bone (Formerly [facility])  Admitted by podiatry for right medial hallux wound with underling osteomyelitis and cellulitis extending to first MTPJ  Patient is status post right hallux amputation with Dr. Fitch 7/5  Reviewed operative note, believed to have obtained a surgical cure no sinus tract or purulence noted.  Continue analgesia - with increasing pain, will schedule Tylenol, Robaxin. Ibuprofen/oxy prn  Weightbearing as tolerated in surgical shoe, PT OT evaluations pending  Post op wound care check per primary - will follow up findings  Cellulitis of great toe of right foot  Placed on IV Ancef - now off   Wound cultures on 7/1 with Staph pseudintermedius  No evidence of SIRS or septic criteria  Blood cultures obtained, follow --> negative at 72 hours  Surgical cure of osteo believed to be obtained per operative note  Foot to be evaluated once postoperative dressing change occurs  PAD (peripheral artery disease) (Formerly [facility])  Vascular following, patient is status post balloon angioplasty with vascular 7/3/2025 to anterior and posterior tibial artery  Continue newly added DAPT with aspirin and Plavix per vascular, statin  Type 2 diabetes mellitus with hyperglycemia, with neuropathic pain  Lab Results   Component Value Date    HGBA1C 6.9 (H) 06/06/2025     Patient has his CGM and pump.  He feels comfortable managing this and does not want to transition over to our sliding scale.  Holding home medications Synjardy, Ozempic  Sugar has been well-controlled, monitor via Accu-Cheks  With neuropathy - also on gabapentin 600 mg tid  Recurrent major depressive disorder, in remission (Formerly [facility])  Mood stable, continue Wellbutrin  ADHD,  "predominantly inattentive type  On Adderall 10 mg twice daily, PDMP reviewed  Mixed dyslipidemia  Continue statin    VTE Pharmacologic Prophylaxis:   heparin    Mobility:   Basic Mobility Inpatient Raw Score: 21  JH-HLM Goal: 6: Walk 10 steps or more  JH-HLM Achieved: 8: Walk 250 feet ot more  JH-HLM Goal achieved. Continue to encourage appropriate mobility.    Patient Centered Rounds: I performed bedside rounds with nursing staff today.   Discussions with Specialists or Other Care Team Provider: Podiatry    Education and Discussions with Family / Patient: Pt    Current Length of Stay: 4 day(s)  Current Patient Status: Inpatient   Certification Statement: The patient will continue to require additional inpatient hospital stay due to post op wound check, pain control, pt/ot evals  Discharge Plan: per primary.     Code Status: Level 1 - Full Code    Subjective   Patient seen and examined lying in bed.  States he is having more pain in his foot today.  He is agreeable to schedule some Tylenol and muscle relaxer.  He denies any fevers, chills, chest pain, shortness of breath abdominal pain or nausea.  Tolerating diet and using the bathroom without concerns or difficulty. Says he has been \"hopping around on his foot\".    Objective :  Temp:  [97.4 °F (36.3 °C)-98.1 °F (36.7 °C)] 97.4 °F (36.3 °C)  HR:  [78-87] 87  BP: (122-137)/(66-90) 137/90  Resp:  [16-20] 16  SpO2:  [97 %-99 %] 98 %  O2 Device: None (Room air)    Body mass index is 29.4 kg/m².     Input and Output Summary (last 24 hours):     Intake/Output Summary (Last 24 hours) at 7/6/2025 0907  Last data filed at 7/6/2025 0701  Gross per 24 hour   Intake 1320 ml   Output --   Net 1320 ml       Physical Exam  Vitals and nursing note reviewed.   Constitutional:       General: He is not in acute distress.     Appearance: Normal appearance. He is well-developed.     Cardiovascular:      Rate and Rhythm: Normal rate and regular rhythm.   Pulmonary:      Effort: Pulmonary " effort is normal. No respiratory distress.      Breath sounds: Normal breath sounds.   Abdominal:      General: Bowel sounds are normal.      Palpations: Abdomen is soft.      Tenderness: There is no abdominal tenderness.     Musculoskeletal:         General: Deformity (right foot wrapped CDI) present.      Right lower leg: No edema.      Left lower leg: No edema.      Comments: Calves soft non tender symmetrical     Skin:     General: Skin is warm and dry.      Findings: No bruising.     Neurological:      Mental Status: He is alert and oriented to person, place, and time. Mental status is at baseline.     Psychiatric:         Mood and Affect: Mood normal.         Behavior: Behavior normal.         Lab Results: I have reviewed the following results:   Results from last 7 days   Lab Units 07/06/25  0425   WBC Thousand/uL 8.06   HEMOGLOBIN g/dL 15.7   HEMATOCRIT % 46.4   PLATELETS Thousands/uL 242   SEGS PCT % 67   LYMPHO PCT % 23   MONO PCT % 8   EOS PCT % 1     Results from last 7 days   Lab Units 07/06/25  0425 07/03/25  0619 07/02/25  1119   SODIUM mmol/L 140   < > 137   POTASSIUM mmol/L 3.5   < > 4.1   CHLORIDE mmol/L 106   < > 103   CO2 mmol/L 25   < > 27   BUN mg/dL 11   < > 19   CREATININE mg/dL 0.58*   < > 0.75   ANION GAP mmol/L 9   < > 7   CALCIUM mg/dL 9.1   < > 9.3   ALBUMIN g/dL  --   --  4.2   TOTAL BILIRUBIN mg/dL  --   --  0.47   ALK PHOS U/L  --   --  40   ALT U/L  --   --  16   AST U/L  --   --  11*   GLUCOSE RANDOM mg/dL 119   < > 134    < > = values in this interval not displayed.     Results from last 7 days   Lab Units 07/03/25  0619   INR  0.92     Results from last 7 days   Lab Units 07/05/25  0857 07/03/25  1827 07/02/25  1635 07/02/25  1232   POC GLUCOSE mg/dl 144* 107 242* 129     Results from last 7 days   Lab Units 07/02/25  1119   LACTIC ACID mmol/L 0.7     Recent Cultures (last 7 days):   Results from last 7 days   Lab Units 07/02/25  1127 07/01/25  1028   BLOOD CULTURE  No Growth at  72 hrs.  No Growth at 72 hrs.  --    GRAM STAIN RESULT   --  4+ Gram positive cocci in pairs*  2+ Polys*   WOUND CULTURE   --  3+ Growth of Staphylococcus pseudintermedius*     Last 24 Hours Medication List:     Current Facility-Administered Medications:     acetaminophen (TYLENOL) tablet 975 mg, Q8H KIA    amphetamine-dextroamphetamine (ADDERALL) tablet 10 mg, BID (AM & Afternoon)    aspirin (ECOTRIN LOW STRENGTH) EC tablet 81 mg, Daily    buPROPion (WELLBUTRIN XL) 24 hr tablet 150 mg, Daily    ceFAZolin (ANCEF) IVPB (premix in dextrose) 2,000 mg 50 mL, Q8H, Last Rate: 2,000 mg (07/06/25 0432)    clopidogrel (PLAVIX) tablet 75 mg, Daily    gabapentin (NEURONTIN) capsule 600 mg, TID    heparin (porcine) subcutaneous injection 5,000 Units, Q8H KIA **AND** [CANCELED] Platelet count, Once    ibuprofen (MOTRIN) tablet 600 mg, Q6H PRN    insulin lispro (HumALOG/ADMELOG) 100 units/mL subcutaneous injection 1-5 Units, TID AC **AND** Fingerstick Glucose (POCT), TID AC    insulin lispro (HumALOG/ADMELOG) 100 units/mL subcutaneous injection 1-5 Units, HS    lidocaine (LIDODERM) 5 % patch 2 patch, Daily PRN    methocarbamol (ROBAXIN) tablet 500 mg, Q8H KIA    ondansetron (ZOFRAN) injection 4 mg, Q4H PRN    oxyCODONE (ROXICODONE) IR tablet 5 mg, Q6H PRN    polyethylene glycol (MIRALAX) packet 17 g, Daily PRN    pravastatin (PRAVACHOL) tablet 40 mg, Daily With Dinner    promethazine (PHENERGAN) injection 12.5 mg, Q6H PRN    Administrative Statements   Today, Patient Was Seen By: Ayanna Leary PA-C    **Please Note: This note may have been constructed using a voice recognition system.**

## 2025-07-06 NOTE — ASSESSMENT & PLAN NOTE
Lab Results   Component Value Date    HGBA1C 6.9 (H) 06/06/2025     Patient has his CGM and pump.  He feels comfortable managing this and does not want to transition over to our sliding scale.  Holding home medications Synjardy, Ozempic  Sugar has been well-controlled, monitor via Accu-Cheks  With neuropathy - also on gabapentin 600 mg tid

## 2025-07-06 NOTE — ASSESSMENT & PLAN NOTE
Admitted by podiatry for right medial hallux wound with underling osteomyelitis and cellulitis extending to first MTPJ  Patient is status post right hallux amputation with Dr. Fitch 7/5  Reviewed operative note, believed to have obtained a surgical cure no sinus tract or purulence noted.  Continue analgesia - with increasing pain, will schedule Tylenol, Robaxin. Ibuprofen/oxy prn  Weightbearing as tolerated in surgical shoe, PT OT evaluations pending  Post op wound care check per primary - will follow up findings

## 2025-07-06 NOTE — ASSESSMENT & PLAN NOTE
On Adderall 10 mg twice daily, PDMP reviewed   Show Applicator Variable?: Yes Post-Care Instructions: I reviewed with the patient in detail post-care instructions. Patient is to wear sunprotection, and avoid picking at any of the treated lesions. Pt may apply Vaseline to crusted or scabbing areas. Duration Of Freeze Thaw-Cycle (Seconds): 3 Number Of Freeze-Thaw Cycles: 3 freeze-thaw cycles Application Tool (Optional): Liquid Nitrogen Sprayer Consent: The patient's consent was obtained including but not limited to risks of crusting, scabbing, blistering, scarring, darker or lighter pigmentary change, recurrence, incomplete removal and infection. Detail Level: Detailed Aperture Size (Optional): C Render Note In Bullet Format When Appropriate: No Number Of Freeze-Thaw Cycles: 2 freeze-thaw cycles Medical Necessity Clause: This procedure was medically necessary because the lesions that were treated were: Medical Necessity Information: It is in your best interest to select a reason for this procedure from the list below. All of these items fulfill various CMS LCD requirements except the new and changing color options. Spray Paint Text: The liquid nitrogen was applied to the skin utilizing a spray paint frosting technique. Aperture Size (Optional): B

## 2025-07-06 NOTE — PHYSICAL THERAPY NOTE
Physical Therapy Evaluation:    2 forms of pt ID verified:name,birthdate and pt ID helen    Patient's Name: Jason Blanca    Admitting Diagnosis  Toe pain [M79.676]  Toe infection [L08.9]  Diabetic ulcer of toe of right foot associated with type 2 diabetes mellitus, with necrosis of bone (HCC) [E11.621, L97.514]    Problem List  Problem List[1]    Past Medical History  Past Medical History[2]    Past Surgical History  Past Surgical History[3]       07/06/25 1147   PT Last Visit   PT Visit Date 07/06/25   Note Type   Note type Evaluation  (plus PT tx session following PT IE)   Pain Assessment   Pain Assessment Tool 0-10   Pain Score 3   Pain Location/Orientation Orientation: Right;Location: Foot   Hospital Pain Intervention(s) Repositioned;Elevated;Emotional support;Rest  (application of surgical shoe for mobility and gait)   Restrictions/Precautions   Weight Bearing Precautions Per Order Yes   RLE Weight Bearing Per Order WBAT  (with surgical shoe)   Braces or Orthoses Other (Comment)  (R foot surgical shoe)   Other Precautions WBS;Telemetry;Fall Risk;Pain   Home Living   Type of Home House  (three story home)   Home Layout Multi-level;Bed/bath upstairs  ((+)TRELL)   Home Equipment Walker;Crutches   Additional Comments Pt lives with sister in 3 SH with (+)TRELL,pt reports use of B axillary crutches PTA,reports no recent falls,(I) with ADLs and occasional A for IADLs PTA,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   Prior Function   Level of [place] Independent with ADLs;Independent with functional mobility;Needs assistance with IADLS   Lives With Family   Receives Help From Family   IADLs Family/Friend/Other provides transportation;Independent with meal prep;Independent with medication management   Falls in the last 6 months 0   Vocational Full time employment  (software tech, can work remotely from home)   General   Additional Pertinent History cellultis R great toe, s/p R  "hallux amputation performed 07/05/25, diabetic ulcer of R foot toe   Family/Caregiver Present No   Cognition   Overall Cognitive Status WFL   Arousal/Participation Arousable   Orientation Level Oriented X4   Following Commands Follows all commands and directions without difficulty   Subjective   Subjective pt supine in bed resting comfortably; pt willing and agreeable to work with PT and to participate in therapy intervention; \"I just dont know how this is going to work. It hurts now, so I am assuming it will hurt when I walk\".   RLE Assessment   RLE Assessment   (at least 3/5 grossly throughout, no resistance given during PT IE MMT)   LLE Assessment   LLE Assessment   (5/5 grossly throughout)   Coordination   Movements are Fluid and Coordinated 0   Coordination and Movement Description impulsivity at times   Sensation WFL   Light Touch   RLE Light Touch Grossly intact   LLE Light Touch Grossly intact   Bed Mobility   Supine to Sit 6  Modified independent   Sit to Supine 6  Modified independent   Transfers   Sit to Stand 5  Supervision   Additional items Assist x 1;Bedrails;Impulsive;Verbal cues   Stand to Sit 5  Supervision   Additional items Assist x 1;Bedrails;Impulsive;Verbal cues   Additional Comments application of surgical shoe R foot at bedside (I)ly by pt prior to transfers and gait assessment   Ambulation/Elevation   Gait pattern Improper Weight shift;Antalgic;Narrow HERI;Forward Flexion;Decreased R stance;Foward flexed;Short stride   Gait Assistance 5  Supervision   Additional items Assist x 1;Verbal cues   Assistive Device Axillary crutches  (B axillary crutches)   Distance 120 feet with use of B axillary crutches on tile and hardwood elle; no LOB noted and/or observed during upright mobility; pt able to perform WBAT RLE with surgical shoe 100% of the time   Balance   Static Sitting Fair   Dynamic Sitting Fair   Static Standing Fair   Dynamic Standing Fair   Ambulatory Fair   Endurance Deficit "   Endurance Deficit Yes   Endurance Deficit Description pain, recent surgical srocedure   Activity Tolerance   Activity Tolerance Patient limited by fatigue;Patient limited by pain  (fair->good)   Medical Staff Made Aware EPIC secure chat with podiatry service   Nurse Made Aware yes   Assessment   Prognosis Fair   Problem List Decreased strength;Decreased endurance;Impaired balance;Decreased mobility;Decreased safety awareness;Decreased skin integrity;Pain   Assessment pt is a 35 yo male admitted to [facility] 2* R toe pain, diabetic ulcer of R toe, cellulitis R great toe and s/p R hallux amputation performed 07/05/25. Pt lives with sister in [place] SH,(+)TRELL, use of personal DME PTA for mobility,reports no recent falls and (I) with ADLs and mobility and occasional A for IADLs. Pt is able to perform  feet with use of B axillary crutches needing S level of A, BM and TT mod (I), able to don and doff R foot surgical shoe (I)ly at bedside and maintain WBAT % of the time. Pt would cont to benefit from skilled inpt PT services for stair training assessment and to maximize functional independence.   Barriers to Discharge Inaccessible home environment   Goals   Patient Goals to get home and to get back to [facility] better   STG Expiration Date 07/09/25   Short Term Goal #1 in 1-3 days: pt will be able to perform 1 FF of steps with use of HR and B axillary crutches needing S level of A in order to navigate home environment   PT Treatment Day 1  (additional PT Tx session following PT IE)   Plan   Treatment/Interventions Elevations;Gait training;Continued evaluation;Spoke to nursing;Spoke to MD   PT Frequency   (performance of stair training in 1-3 days)   Discharge Recommendation   Rehab Resource Intensity Level, PT No post-acute rehabilitation needs   Equipment Recommended Crutches  (pt reports owning B axillary crutches at home for use)   AM-PAC Basic Mobility Inpatient   Turning in Flat Bed Without Bedrails 4   Lying on  "Back to Sitting on Edge of Flat Bed Without Bedrails 4   Moving Bed to Chair 4   Standing Up From Chair Using Arms 3   Walk in Room 3   Climb 3-5 Stairs With Railing 3   Basic Mobility Inpatient Raw Score 21   Basic Mobility Standardized Score 45.55   [facility] Highest Level Of Mobility   -HL Goal 6: Walk 10 steps or more   -HL Achieved 7: Walk 25 feet or more     Time In:1147  Time Out:1202  Total Time: 15 mins      S:  Pt willing and agreeable to perform stair training and additional gait training with use of B axillary crutches. \"I can walk a little more, no problem\".  O:  Pt able to perform 2 FF of steps with use of B axillary crutches and single HR needing S level of A. Nonreciprical gait pattern with stair training. Pt able to ambulate an additional 200 feet with use of B axillary crutches needing mod (I) level of A. No LOB noted and/or observed during upright mobility.  A:  Pt able to perform 2 FF of steps needing S level of A and able to ambulate 200 additional feet with use of B axillary crutches needing mod (I) level of A. Pt reports minimal pain R foot during activity and gait. Pt is able to return home with cont support and care from family, cont use of B axillary crutches during gait and mobility. No further skilled inpt PT services needed at this time, DC pt from skilled inpt PT. Recommend cont mobility and activity via RTA and no PT services at this time, pt in agreement.  P:  DC pt from skilled inpt PT.    Ginger Fajardo, PT       @Ginger Fajardo PT, DPT@        [1]   Patient Active Problem List  Diagnosis    Type 2 diabetes mellitus with hyperglycemia, with neuropathic pain    Overweight (BMI 25.0-29.9)    Recurrent major depressive disorder, in remission (HCC)    Hypogonadism in male    Painful and cold lower extremity    PAD (peripheral artery disease) (HCC)    Excessive sweating    ADHD, predominantly inattentive type    Erectile dysfunction of nonorganic origin    Fatty liver "    Mixed dyslipidemia    Vitamin D deficiency    Axonal sensorimotor neuropathy    Witnessed episode of apnea    Right thyroid nodule    Nonhealing wound of right great toe    CKD (chronic kidney disease) stage 1, GFR 90 ml/min or greater    Positive for microalbuminuria    Former smoker    Status post amputation of right great toe (HCC)   [2]   Past Medical History:  Diagnosis Date    Anxiety     Depression     Diabetes mellitus (HCC)     Fatty liver     Obesity    [3]   Past Surgical History:  Procedure Laterality Date    HERNIA REPAIR

## 2025-07-06 NOTE — ASSESSMENT & PLAN NOTE
Placed on IV Ancef - now off   Wound cultures on 7/1 with Staph pseudintermedius  No evidence of SIRS or septic criteria  Blood cultures obtained, follow --> negative at 72 hours  Surgical cure of osteo believed to be obtained per operative note  Foot to be evaluated once postoperative dressing change occurs

## 2025-07-06 NOTE — ASSESSMENT & PLAN NOTE
Arterial studies show diffuse disease in right lower extremity with greater than 75% stenosis in distal anterior tibial artery.  OCTAVIA 1.58,  mmHg, GTP 65 mmHg    S/p right lower extremity angiogram on 7/3 with adequate PT/AT runoff  Continue aspirin, statin, Plavix

## 2025-07-07 ENCOUNTER — TRANSITIONAL CARE MANAGEMENT (OUTPATIENT)
Dept: FAMILY MEDICINE CLINIC | Facility: CLINIC | Age: 35
End: 2025-07-07

## 2025-07-07 ENCOUNTER — PATIENT OUTREACH (OUTPATIENT)
Dept: CASE MANAGEMENT | Facility: OTHER | Age: 35
End: 2025-07-07

## 2025-07-07 ENCOUNTER — TELEPHONE (OUTPATIENT)
Age: 35
End: 2025-07-07

## 2025-07-07 LAB
BACTERIA BLD CULT: NORMAL
BACTERIA BLD CULT: NORMAL

## 2025-07-07 NOTE — PROGRESS NOTES
Outpatient Care Management Note    HRR referral received. Chart review completed.  Patient was hospitalized at [facility] 7/2/25-7/6/25 for diabetic ulcer of toe, right foot associated with type 2 diabetes, with necrosis of bone.  Status pos right hallux amputation    RN CM will place outreach call to patient for follow up to this hospitalization and to assess for care management needs.  Call placed. No patient answer received. Left VM message and included RN CM contact information and request for return call.    RN CM will schedule a second outreach attempt for later this week if no patient response received prior.

## 2025-07-07 NOTE — UTILIZATION REVIEW
Marcelina Nunez, RN   Registered Nurse  Specialty: Utilization Review     Utilization Review     Addendum     Date of Service: 7/5/2025 11:15 AM     Expand All Collapse All    Continued Stay Review     Date: 7/5/25                           Current Patient Class: Inpatient                   Current Level of Care: MS COLON:34 y.o. male initially admitted on 7/2/25   Current Diagnosis: Diabetic ulcer of toe of R foot w/ cellulitis.      7/5/25 Assessment/Plan:   Pt report feels well post op, denies pain at this time. On exam, RLE wrapped. BC neg at 48hrs.Labs WNL.   Plan:  Pain control-add oxycodone. PT/OT evals. Continue Ancef IV,      7/5/25 Operative note from Podiatry.  Procedure(s) (LRB):  Right hallux amputation (Right)  Anesthesia Type:   Choice with 17 mL of 1% Lidocaine and 0.5% Bupivacaine in a 1:1 mixture  Operative Findings:  - We believe to have obtained surgical cure of bone infection with removal of right hallux at first metatarsal phalangeal joint  - No sinus tracts or purulence appreciated  - Healthy bleeding to tissue margins  - Primary closure achieved     Certification Statement: The patient will continue to require additional inpatient hospital stay due to pt/ot eval, pain management      Medications:   Scheduled Medications:  amphetamine-dextroamphetamine, 10 mg, Oral, BID (AM & Afternoon)  aspirin, 81 mg, Oral, Daily  buPROPion, 150 mg, Oral, Daily  cefazolin, 2,000 mg, Intravenous, Q8H  clopidogrel, 75 mg, Oral, Daily  gabapentin, 600 mg, Oral, TID  heparin (porcine), 5,000 Units, Subcutaneous, Q8H KIA  insulin lispro, 1-5 Units, Subcutaneous, TID AC  insulin lispro, 1-5 Units, Subcutaneous, HS  pravastatin, 40 mg, Oral, Daily With Dinner     Continuous IV Infusions: NONE  Infusions Meds - Displays dose, route, & frequency only         PRN Meds:  acetaminophen, 650 mg, Oral, Q6H PRN  ibuprofen, 600 mg, Oral, Q6H PRN  lidocaine, 2 patch, Topical, Daily PRN  methocarbamol, 500 mg, Oral,  Q6H PRN- given x1 7/5  ondansetron, 4 mg, Intravenous, Q4H PRN  oxyCODONE, 5 mg, Oral, Q6H PRN  polyethylene glycol, 17 g, Oral, Daily PRN  promethazine, 12.5 mg, Intramuscular, Q6H PRN- given x1 7/5        Discharge Plan: TBD     Vital Signs (last 3 days)         Date/Time Temp Pulse Resp BP MAP (mmHg) SpO2 O2 Flow Rate (L/min) O2 Device Patient Position - Orthostatic VS Pain     07/05/25 0945 -- -- -- -- -- -- -- None (Room air) -- No Pain     07/05/25 09:29:14 97.7 °F (36.5 °C) 83 -- 122/78 93 99 % -- -- -- --     07/05/25 0915 -- 82 17 122/66 88 99 % -- None (Room air) -- No Pain     07/05/25 0900 -- 92 16 118/64 84 99 % -- None (Room air) -- No Pain     07/05/25 0845 97.3 °F (36.3 °C) 81 16 106/51 73 99 % 5 L/min Simple mask -- No Pain     07/05/25 07:29:27 97.2 °F (36.2 °C) 79 16 123/83 96 97 % -- -- -- --     07/04/25 2000 -- -- -- -- -- -- -- -- -- 4 07/04/25 19:41:58 -- -- 18 128/79 95 -- -- -- -- --     07/04/25 1612 -- -- -- -- -- -- -- -- -- 3     07/04/25 15:17:35 98 °F (36.7 °C) 77 16 128/79 95 97 % -- -- -- --     07/04/25 0937 -- -- -- -- -- -- -- -- -- 3     07/04/25 0900 -- -- -- -- -- -- -- None (Room air) -- --     07/04/25 07:38:50 97.5 °F (36.4 °C) 78 16 123/77 92 99 % -- -- -- --     07/04/25 03:32:23 -- 74 18 103/65 78 100 % -- -- -- --     07/04/25 00:27:51 -- 86 18 117/74 88 99 % -- -- -- --     07/03/25 23:35:10 -- 84 18 114/67 83 99 % -- -- -- --     07/03/25 2238 -- -- -- -- -- -- -- -- -- 5 07/03/25 2215 -- 100 -- 131/84 100 -- -- -- -- --     07/03/25 2115 -- 73 -- 131/88 102 -- -- -- -- --     07/03/25 20:17:03 97.3 °F (36.3 °C) 75 16 137/90 106 98 % -- -- -- --     07/03/25 2000 -- -- -- -- -- -- -- None (Room air) -- 4 07/03/25 19:20:10 97.4 °F (36.3 °C) 69 16 132/89 103 96 % -- None (Room air) Lying --     07/03/25 1855 97.4 °F (36.3 °C) 72 16 144/96 116 99 % -- None (Room air) -- 4 07/03/25 1823 97.8 °F (36.6 °C) 82 16 142/90 111 99 % -- None (Room air) -- No Pain      07/03/25 0838 -- 68 16 152/87 119 99 % -- None (Room air) -- No Pain     07/03/25 0828 -- -- -- -- -- -- -- -- -- 3     07/03/25 07:42:07 97.5 °F (36.4 °C) 74 16 117/70 86 100 % -- -- Lying --     07/02/25 2112 -- -- -- -- -- -- -- -- -- 6     07/02/25 21:01:26 97.3 °F (36.3 °C) -- -- 115/68 84 -- -- -- -- --     07/02/25 2048 -- -- -- -- -- -- -- -- -- 6     07/02/25 15:24:30 97.7 °F (36.5 °C) 82 16 121/74 90 94 % -- -- -- --     07/02/25 1330 -- -- -- -- -- -- -- None (Room air) -- No Pain     07/02/25 12:37:32 97.3 °F (36.3 °C) 77 16 123/74 90 99 % -- -- -- --     07/02/25 1059 98.3 °F (36.8 °C) 81 16 159/70 101 100 % -- None (Room air) Lying --             Weight (last 2 days)         None                Pertinent Labs/Diagnostic Results:   Radiology:  VAS ARTERIAL DUPLEX-LOWER LIMB UNILATERAL   Final Interpretation by Ramirez Doyle MD (07/02 1533)       XR foot 3+ views RIGHT   Final Interpretation by Pippa Zimmerman MD (07/02 1506)   No acute osseous abnormality.               Computerized Assisted Algorithm (CAA) may have been used to analyze all applicable images.           Workstation performed: [id number]           IR lower extremity angiogram    (Results Pending)   XR foot right 3+ views    (Results Pending)      Cardiology:  ECG 12 lead   Final Result by Graciela Neely RN (07/05 0647)       ECG 12 lead   Final Result by Joel Michael DO (07/03 1211)   Normal sinus rhythm   Normal ECG   When compared with ECG of 02-Jul-2025 18:25,   Nonspecific T wave abnormality no longer evident in Lateral leads   Confirmed by Joel Michael ([id number]) on 7/3/2025 12:11:28 PM       ECG 12 lead   Final Result by Joel Michael DO (07/03 1132)   Normal sinus rhythm   Nonspecific T wave abnormality   Abnormal ECG   No previous ECGs available   Confirmed by Joel Michael ([id number]) on 7/3/2025 11:31:58 AM                  Results from last 7 days   Lab Units 07/05/25  0506 07/04/25  0606  07/03/25  0619 07/02/25  1119   WBC Thousand/uL 5.54 4.85 4.96 6.40   HEMOGLOBIN g/dL 15.6 16.3 15.8 15.1   HEMATOCRIT % 46.1 47.9 48.0 44.3   PLATELETS Thousands/uL 202 216 190 196   TOTAL NEUT ABS Thousands/µL 3.61 3.52 3.17 4.38                  Results from last 7 days   Lab Units 07/05/25  0506 07/04/25  0606 07/03/25  0619 07/02/25  1119   SODIUM mmol/L 139 139 140 137   POTASSIUM mmol/L 4.0 4.0 4.2 4.1   CHLORIDE mmol/L 104 103 104 103   CO2 mmol/L 27 30 29 27   ANION GAP mmol/L 8 6 7 7   BUN mg/dL 12 12 15 19   CREATININE mg/dL 0.70 0.76 0.68 0.75   EGFR ml/min/1.73sq m 122 118 123 118   CALCIUM mg/dL 8.9 9.2 9.1 9.3   MAGNESIUM mg/dL  --   --   --  1.9           Results from last 7 days   Lab Units 07/02/25  1119   AST U/L 11*   ALT U/L 16   ALK PHOS U/L 40   TOTAL PROTEIN g/dL 6.8   ALBUMIN g/dL 4.2   TOTAL BILIRUBIN mg/dL 0.47   BILIRUBIN DIRECT mg/dL 0.06              Results from last 7 days   Lab Units 07/05/25  0857 07/03/25  1827 07/02/25  1635 07/02/25  1232   POC GLUCOSE mg/dl 144* 107 242* 129                Results from last 7 days   Lab Units 07/05/25  0506 07/04/25  2100 07/04/25  0606 07/03/25  1245 07/03/25  0619 07/02/25  1119   GLUCOSE RANDOM mg/dL 132 87 125 114 130 134           Results from last 7 days   Lab Units 07/03/25  0619   PROTIME seconds 12.6   INR   0.92   PTT seconds 30           Results from last 7 days   Lab Units 07/02/25  1119   LACTIC ACID mmol/L 0.7           Results from last 7 days   Lab Units 07/02/25  1119   CRP mg/L 20.0*   SED RATE mm/hour 8                Results from last 7 days   Lab Units 07/02/25  1127 07/01/25  1028   BLOOD CULTURE   No Growth at 48 hrs.  No Growth at 48 hrs.  --    GRAM STAIN RESULT    --  4+ Gram positive cocci in pairs*  2+ Polys*   WOUND CULTURE    --  3+ Growth of Staphylococcus pseudintermedius*         Network Utilization Review Department  ATTENTION: Please call with any questions or concerns to [phone] and carefully listen to  the prompts so that you are directed to the right person. All voicemails are confidential.   For Discharge needs, contact Care Management DC Support Team at [phone] opt. 2  Send all requests for admission clinical reviews, approved or denied determinations and any other requests to dedicated fax number below belonging to the campus where the patient is receiving treatment. List of dedicated fax numbers for the Facilities:  FACILITY NAME UR FAX NUMBER   ADMISSION DENIALS (Administrative/Medical Necessity) [phone]   DISCHARGE SUPPORT TEAM (NETWORK) [phone]   PARENT CHILD HEALTH (Maternity/NICU/Pediatrics) [phone]   [facility] [phone]   [facility] [phone]   [facility] [phone]   [facility] [phone]   [facility] [phone]   [facility] [phone]   [facility] [phone]   [facility] [phone]   [facility] [phone]   [facility] [phone]   [facility] [phone]   [facility] [phone]            Revision History

## 2025-07-07 NOTE — TELEPHONE ENCOUNTER
Caller: Jason Blanca    Doctor: Dr. Pisano    Reason for call: Needs a follow up to surgery appointment.   He had his surgery July 5.  Can he be seen within the week?  Thank you.     Call back#: [phone]

## 2025-07-07 NOTE — UTILIZATION REVIEW
NOTIFICATION OF ADMISSION DISCHARGE   This is a Notification of Discharge from [facility]. Please be advised that this patient has been discharge from our facility. Below you will find the admission and discharge date and time including the patient’s disposition.   UTILIZATION REVIEW CONTACT:  Utilization Review Assistants  Network Utilization Review Department  Phone: [phone] x carefully listen to the prompts. All voicemails are confidential.  Email: [email or web address]     ADMISSION INFORMATION  PRESENTATION DATE: 7/2/2025 11:00 AM  OBERVATION ADMISSION DATE: N/A  INPATIENT ADMISSION DATE: 7/2/25 11:19 AM   DISCHARGE DATE: 7/6/2025  1:35 PM   DISPOSITION:Home/Self Care    Network Utilization Review Department  ATTENTION: Please call with any questions or concerns to [phone] and carefully listen to the prompts so that you are directed to the right person. All voicemails are confidential.   For Discharge needs, contact Care Management DC Support Team at [phone] opt. 2  Send all requests for admission clinical reviews, approved or denied determinations and any other requests to dedicated fax number below belonging to the campus where the patient is receiving treatment. List of dedicated fax numbers for the Facilities:  FACILITY NAME UR FAX NUMBER   ADMISSION DENIALS (Administrative/Medical Necessity) [phone]   DISCHARGE SUPPORT TEAM ([facility]) [phone]   PARENT CHILD HEALTH (Maternity/NICU/Pediatrics) [phone]   [facility] [phone]   [facility] [phone]   [facility] [phone]   [facility] [phone]   [facility] [phone]   [facility] [phone]   [facility] [phone]   [facility] [phone]   [facility] [phone]   [facility] [phone]   [facility] [phone]   [facility] [phone]

## 2025-07-08 DIAGNOSIS — Z89.419 HISTORY OF AMPUTATION OF GREAT TOE (HCC): ICD-10-CM

## 2025-07-08 DIAGNOSIS — G89.18 ACUTE POST-OPERATIVE PAIN: Primary | ICD-10-CM

## 2025-07-08 RX ORDER — OXYCODONE AND ACETAMINOPHEN 5; 325 MG/1; MG/1
1 TABLET ORAL EVERY 8 HOURS PRN
Qty: 12 TABLET | Refills: 0 | Status: SHIPPED | OUTPATIENT
Start: 2025-07-08 | End: 2025-07-17 | Stop reason: SDUPTHER

## 2025-07-09 LAB
KCT BLD-ACNC: 293 SEC (ref 89–137)
SPECIMEN SOURCE: ABNORMAL

## 2025-07-10 ENCOUNTER — OFFICE VISIT (OUTPATIENT)
Dept: PODIATRY | Facility: CLINIC | Age: 35
End: 2025-07-10
Payer: COMMERCIAL

## 2025-07-10 VITALS — HEART RATE: 80 BPM | BODY MASS INDEX: 29.4 KG/M2 | OXYGEN SATURATION: 100 % | HEIGHT: 68 IN

## 2025-07-10 DIAGNOSIS — Z89.411 STATUS POST AMPUTATION OF RIGHT GREAT TOE (HCC): ICD-10-CM

## 2025-07-10 DIAGNOSIS — Z98.890 POST-OPERATIVE STATE: Primary | ICD-10-CM

## 2025-07-10 PROCEDURE — 99213 OFFICE O/P EST LOW 20 MIN: CPT | Performed by: PODIATRIST

## 2025-07-10 NOTE — PROGRESS NOTES
":  Assessment & Plan  Status post amputation of right great toe ([facility])    Orders:    Ambulatory Referral to Podiatry    Darkco Shoe    Post-operative state    Orders:    Darkco Shoe    The patient's clinical examination today significant for well coapted incision line where he is status post right great toe amputation secondary osteomyelitis.  He does still have some mild to moderate postoperative pain that is currently controlled with Percocet.  There are no signs of infection.  Sutures are intact with viable skin edges.    The incision line was cleansed with Betadine and redressed with a bulky dry sterile dressing with Xeroform as a contact layer.  Continue guarded weightbearing in a surgical shoe.  He was fitted for a new shoe today as the one dispensed to him after surgery was much too large and was preventing a fall risk.    Follow-up in 1 week for his next postoperative dressing change.  He will dressing clean dry and intact until follow-up.    History of Present Illness     Jason Blanca is a 34 y.o. male   The patient presents today for follow-up status post right great toe amputation secondary osteomyelitis.  He notes that he had been doing well but then over the last couple of days began to feel increased tenderness at the amputation site.  A position for Percocet was sent to his pharmacy and he does note that it is working well to control his pain.  He notes no other acute pedal issues today.      Review of Systems   Constitutional: Negative.    Respiratory: Negative.     Cardiovascular: Negative.    Psychiatric/Behavioral: Negative.       Objective   Pulse 80   Ht 5' 8\" (1.727 m)   SpO2 100%   BMI 29.40 kg/m²      Physical Exam  Constitutional:       Appearance: Normal appearance.   HENT:      Head: Normocephalic and atraumatic.     Cardiovascular:      Pulses:           Dorsalis pedis pulses are 2+ on the right side.        Posterior tibial pulses are 2+ on the right side.   Pulmonary:      " Effort: Pulmonary effort is normal.   Feet:      Comments: The patient's clinical examination today significant for well coapted incision line where he is status post right great toe amputation secondary osteomyelitis.  He does still have some mild to moderate postoperative pain that is currently controlled with Percocet.  There are no signs of infection.  Sutures are intact with viable skin edges.    Skin:     General: Skin is warm and dry.      Capillary Refill: Capillary refill takes less than 2 seconds.     Neurological:      General: No focal deficit present.      Mental Status: He is alert and oriented to person, place, and time.     Psychiatric:         Mood and Affect: Mood normal.

## 2025-07-11 ENCOUNTER — PATIENT OUTREACH (OUTPATIENT)
Dept: CASE MANAGEMENT | Facility: OTHER | Age: 35
End: 2025-07-11

## 2025-07-11 NOTE — LETTER
Date: 07/11/25    Dear Jason Blanca,   My name is Francine; I am a registered nurse care manager working with   [facility] [place] [id number] [facility] [address]  I have not been able to reach you and would like to set a time that I can talk with you over the phone.  My work is to help patients that have complex medical conditions get the care they need. This includes patients who may have been in the hospital or emergency room.    Please call me with any questions you may have. I look forward to speaking with you.  Sincerely,  Francine MILTON, BSN, RN  [phone]  Outpatient Care Manager

## 2025-07-11 NOTE — PROGRESS NOTES
Outpatient Care Management Note    CM outreach due reminder: HRR referral- second outreach attempt.  Chart reviewed.  Noted that patient had an appointment with Podiatry on 7/10/25.    Patient was hospitalized at [facility] 7/2/25-7/6/25 for diabetic ulcer of toe, right foot associated with type 2 diabetes, with necrosis of bone.  Status post right hallux amputation.    RN CM was unable to reach patient during initial outreach attempt. Second call attempt made now. No patient answer received. Left VM message and included RN CM contact information.    UTR letter sent to patient now via [employer]    RN CM will remove self from care team and close care management episode/referral at this time.

## 2025-07-16 NOTE — ANESTHESIA POSTPROCEDURE EVALUATION
Post-Op Assessment Note    CV Status:  Stable    Pain management: adequate       Mental Status:  Alert and awake   Hydration Status:  Euvolemic   PONV Controlled:  Controlled   Airway Patency:  Patent     Post Op Vitals Reviewed: Yes    No anethesia notable event occurred.    Staff: Anesthesiologist           Last Filed PACU Vitals:  Vitals Value Taken Time   Temp 97.4 °F (36.3 °C) 07/03/25 18:55   Pulse 72 07/03/25 18:55   /96 07/03/25 18:55   Resp 16 07/03/25 18:55   SpO2 99 % 07/03/25 18:55       Modified Flako:     Vitals Value Taken Time   Activity 2 07/03/25 18:55   Respiration 2 07/03/25 18:55   Circulation 2 07/03/25 18:55   Consciousness 2 07/03/25 18:55   Oxygen Saturation 2 07/03/25 18:55     Modified Flako Score: 10

## 2025-07-17 ENCOUNTER — TELEMEDICINE (OUTPATIENT)
Dept: FAMILY MEDICINE CLINIC | Facility: CLINIC | Age: 35
End: 2025-07-17
Payer: COMMERCIAL

## 2025-07-17 DIAGNOSIS — Z76.89 ENCOUNTER FOR SUPPORT AND COORDINATION OF TRANSITION OF CARE: Primary | ICD-10-CM

## 2025-07-17 DIAGNOSIS — Z89.411 STATUS POST AMPUTATION OF RIGHT GREAT TOE (HCC): ICD-10-CM

## 2025-07-17 PROCEDURE — 99495 TRANSJ CARE MGMT MOD F2F 14D: CPT | Performed by: FAMILY MEDICINE

## 2025-07-17 RX ORDER — METHOCARBAMOL 500 MG/1
500 TABLET, FILM COATED ORAL EVERY 8 HOURS SCHEDULED
Qty: 30 TABLET | Refills: 0 | Status: SHIPPED | OUTPATIENT
Start: 2025-07-17 | End: 2025-08-01

## 2025-07-17 NOTE — PROGRESS NOTES
Transition of Care Visit:  Name: Jason Blanca      : 1990      MRN: [id number]  Encounter Provider: Jaren Marquez MD  Encounter Date: 2025   Encounter department: [facility] PRIMARY CARE [facility]    Assessment & Plan  Encounter for support and coordination of transition of care  Follow-up Visit for Post-Amputation Care and Medication Reconciliation    Assessment and Plan:  1. Diabetes Mellitus Type 2: Patient's diabetes appears well-controlled with current HbA1c of 6.8%. Continue Synjardy and Ozempic as prescribed. Patient reports some GI side effects from Ozempic but is tolerating the medication. Continue use of Simplicity glucose monitoring device. Encouraged continued adherence to medication regimen and regular glucose monitoring. Will reassess glycemic control at next visit.  2. Status Post Right Hallux Amputation: Patient is recovering from recent right hallux amputation performed during hospitalization from -2025 for diabetic ulcer complicated by osteomyelitis and cellulitis. Patient reports continued pain requiring oxycodone for management. Wound appears to be healing appropriately based on patient's report. No current signs of infection noted. Continue current wound care regimen. Will consider tapering pain medication at next visit if healing progresses well.  3. Cardiovascular Risk Management: Continue aspirin and clopidogrel for antiplatelet therapy. Continue rosuvastatin for hyperlipidemia management. Encouraged continued adherence to medication regimen. Discussed importance of regular follow-up for cardiovascular risk assessment.  4. ADHD: Patient confirms continued use of Adderall (amphetamine). No issues reported with current dosage. Continue as prescribed with regular monitoring for efficacy and side effects.  5. Depression: Continue bupropion as prescribed. No mood-related concerns reported during today's visit.  6. Hypogonadism: Patient reports  continued testosterone replacement therapy. Will need to follow up with endocrinology for monitoring of testosterone levels.  7. Neuropathy: Continue alpha lipoic acid for management of diabetic neuropathy. No specific neuropathic complaints reported today.  8. Social: Patient reports starting a new job, which is a positive development. Encouraged continued engagement in work activities as tolerated during recovery period.                   Status post amputation of right great toe (HCC)    Orders:    methocarbamol (ROBAXIN) 500 mg tablet; Take 1 tablet (500 mg total) by mouth every 8 (eight) hours for 15 days         History of Present Illness     Transitional Care Management Review:   Jason Blanca is a 34 y.o. male here for TCM follow up.     During the TCM phone call patient stated:  TCM Call (since 7/3/2025)       Date and time call was made  7/7/2025  2:45 PM    Patient was hospitialized at  [facility]    Date of Admission  07/02/25    Date of discharge  07/06/25    Disposition  Home    Were the patients medications reviewed and updated  Yes    Current Symptoms  None          TCM Call (since 7/3/2025)       Post hospital issues  None    Scheduled for follow up?  Yes    Did you obtain your prescribed medications  Yes    Do you need help managing your prescriptions or medications  No    Is transportation to your appointment needed  No    I have advised the patient to call PCP with any new or worsening symptoms  Minal Felix MA    Living Arrangements  Family members    Are you recieving home care services  No          HPI  Subjective:  Mr. Jason Blanca is a 34-year-old male presenting for follow-up after recent hospitalization and right hallux amputation. He was hospitalized from July 2-6, 2025 for a diabetic ulcer on his right toe, complicated by osteomyelitis and cellulitis. Patient reports continued pain at the amputation site and is currently taking oxycodone for pain management. He  denies any signs of infection at the surgical site. No one is currently assisting with wound care. Patient reports having [employer] insurance with good medication coverage. His diabetes appears well-controlled with a reported HbA1c of 6.8%. He is using a Simplicity glucose monitoring device. Medication reconciliation was performed during the visit. Patient confirms taking alpha lipoic acid, Adderall (amphetamine), aspirin, bupropion, clopidogrel, Synjardy, oxycodone, rosuvastatin, and Ozempic. He reports some gastrointestinal side effects from Ozempic. Patient is not currently using insulin. He is on testosterone replacement therapy and will need to follow up with his provider for this. Patient reports starting a new job recently.  Review of Systems  Objective   There were no vitals taken for this visit.    Physical Exam  He looks in no distress, alert and oriented. He is moving without difficulty while virtual visit, I did not see any face trauma, His neck had wide range of motion.   Medications have been reviewed by provider in current encounter    Administrative Statements   I have spent a total time of 25 minutes in caring for this patient on the day of the visit/encounter including Patient and family education, Importance of tx compliance, and Risk factor reductions.

## 2025-07-17 NOTE — PROGRESS NOTES
Transition of Care Visit:  Name: Jason Blanca      : 1990      MRN: [id number]  Encounter Provider: Jaren Marquez MD  Encounter Date: 2025   Encounter department: [facility] CARE [facility]

## 2025-07-17 NOTE — ASSESSMENT & PLAN NOTE
Orders:    methocarbamol (ROBAXIN) 500 mg tablet; Take 1 tablet (500 mg total) by mouth every 8 (eight) hours for 15 days

## 2025-07-18 ENCOUNTER — OFFICE VISIT (OUTPATIENT)
Dept: PODIATRY | Facility: CLINIC | Age: 35
End: 2025-07-18
Payer: COMMERCIAL

## 2025-07-18 VITALS — HEART RATE: 68 BPM | BODY MASS INDEX: 29.4 KG/M2 | OXYGEN SATURATION: 100 % | HEIGHT: 68 IN

## 2025-07-18 DIAGNOSIS — E11.42 DIABETIC POLYNEUROPATHY ASSOCIATED WITH TYPE 2 DIABETES MELLITUS (HCC): ICD-10-CM

## 2025-07-18 DIAGNOSIS — Z98.890 POST-OPERATIVE STATE: Primary | ICD-10-CM

## 2025-07-18 DIAGNOSIS — Z89.419 HISTORY OF AMPUTATION OF GREAT TOE (HCC): ICD-10-CM

## 2025-07-18 PROCEDURE — 99213 OFFICE O/P EST LOW 20 MIN: CPT | Performed by: PODIATRIST

## 2025-07-18 NOTE — ASSESSMENT & PLAN NOTE
Lab Results   Component Value Date    HGBA1C 6.9 (H) 06/06/2025       Orders:    Diabetic Shoe

## 2025-07-18 NOTE — ASSESSMENT & PLAN NOTE
The patient's clinical examination today is significant for a well coapted incision line status post right great toe amputation.  There are no signs of infection.  Postoperative pain is much improved.    The incision line was cleansed with Betadine and redressed with a dry sterile dressing with Xeroform as a contact layer.  Continue guarded weightbearing in a surgical shoe.  A prescription was entered into his chart for diabetic shoes with toe filler.    Follow-up in 1 week for probable suture removal.

## 2025-07-18 NOTE — PROGRESS NOTES
"Name: Jason Blanca      : 1990      MRN: [id number]  Encounter Provider: Aleks Pisano DPM  Encounter Date: 2025   Encounter department: [facility] PODIATRY [facility]  :  Assessment & Plan  History of amputation of great toe (HCC)    Orders:    Diabetic Shoe    Diabetic polyneuropathy associated with type 2 diabetes mellitus (HCC)    Lab Results   Component Value Date    HGBA1C 6.9 (H) 2025       Orders:    Diabetic Shoe    Post-operative state       The patient's clinical examination today is significant for a well coapted incision line status post right great toe amputation.  There are no signs of infection.  Postoperative pain is much improved.    The incision line was cleansed with Betadine and redressed with a dry sterile dressing with Xeroform as a contact layer.  Continue guarded weightbearing in a surgical shoe.  A prescription was entered into his chart for diabetic shoes with toe filler.    Follow-up in 1 week for probable suture removal.      History of Present Illness   HPI  Jason Blanca is a 34 y.o. male who presents today for follow-up status post right great toe amputation secondary osteomyelitis.  He has been doing well.  He states that his pain is much improved.  He is now controllable with Tylenol.  He is tolerating his surgical shoe well.      Review of Systems   Constitutional: Negative.    Respiratory: Negative.     Cardiovascular: Negative.    Psychiatric/Behavioral: Negative.       Pertinent Medical History     PAST MEDICAL HISTORY:  Past Medical History[1]    PAST SURGICAL HISTORY:  Past Surgical History[2]     ALLERGIES:  Amoxicillin, Augmentin [amoxicillin-pot clavulanate], and Sulfa antibiotics    MEDICATIONS:  Current Medications[3]             Objective   Pulse 68   Ht 5' 8\" (1.727 m)   SpO2 100%   BMI 29.40 kg/m²      Physical Exam  Constitutional:       Appearance: Normal appearance.   HENT:      Head: Normocephalic and atraumatic. "   Pulmonary:      Effort: Pulmonary effort is normal.     Musculoskeletal:      Amputation Right Lower Extremity: (History of right great toe amputation)    Skin:     General: Skin is warm and dry.      Capillary Refill: Capillary refill takes less than 2 seconds.     Neurological:      General: No focal deficit present.      Mental Status: He is alert and oriented to person, place, and time.     Psychiatric:         Mood and Affect: Mood normal.                [1]   Past Medical History:  Diagnosis Date    Anxiety     Depression     Diabetes mellitus (HCC)     Fatty liver     Obesity    [2]   Past Surgical History:  Procedure Laterality Date    ARTERIOGRAM Right 7/3/2025    Procedure: RLE ARTERIOGRAM;  Surgeon: Matt Arellano MD;  [facility];  Service: Vascular    HERNIA REPAIR      IR LOWER EXTREMITY ANGIOGRAM  7/3/2025    TOE AMPUTATION Right 7/5/2025    Procedure: Right hallux amputation;  Surgeon: Aleks Pisano DPM;  Location: [facility];  Service: Podiatry   [3]   Current Outpatient Medications   Medication Sig Dispense Refill    Alpha-Lipoic Acid 600 MG CAPS Take 1 capsule by mouth 2 (two) times a day      amphetamine-dextroamphetamine (ADDERALL, 10MG,) 10 mg tablet Take 1 tablet (10 mg total) by mouth 2 (two) times a day Max Daily Amount: 20 mg 60 tablet 0    aspirin (ECOTRIN LOW STRENGTH) 81 mg EC tablet Take 1 tablet (81 mg total) by mouth daily Do not start before July 7, 2025. 30 tablet 0    Blood Glucose Monitoring Suppl (OneTouch Verio Reflect) w/Device KIT Check blood sugars once daily. Please substitute with appropriate alternative as covered by patient's insurance. Dx: E11.65 1 kit 0    buPROPion (WELLBUTRIN SR) 100 mg 12 hr tablet Take 1 tablet (100 mg total) by mouth 2 (two) times a day 60 tablet 5    [place] Choice Comfort EZ 33G X 4 MM MISC       clopidogrel (PLAVIX) 75 mg tablet Take 1 tablet (75 mg total) by mouth daily Do not start before July 7, 2025. 30 tablet 0     "Continuous Blood Gluc Sensor (FreeStyle Amber 3 Sensor) MISC       Empagliflozin-metFORMIN HCl ER (Synjardy XR) 12.5-1000 MG TB24 Take 2 tablets by mouth daily 180 tablet 3    gabapentin (Neurontin) 600 MG tablet Take 1 tablet (600 mg total) by mouth 3 (three) times a day 90 tablet 0    glucose blood (OneTouch Verio) test strip Check blood sugars once daily. Please substitute with appropriate alternative as covered by patient's insurance. Dx: E11.65 100 each 3    Injection Device for Insulin (CeQur Simplicity 2U) LAUREN Use 1 each every 3 (three) days      Injection Device for Insulin (CeQur Simplicity 2U) LAUREN Use 1 each every 3 (three) days      Insulin Pen Needle (BD Pen Needle Ratna U/F) 32G X 4 MM MISC Use daily as directed with insulin pen 100 each 3    Isopropyl Alcohol (Pharmacist Choice Alcohol) 70 % MISC Apply 200 Pads topically 3 (three) times a day 200 each 5    methocarbamol (ROBAXIN) 500 mg tablet Take 1 tablet (500 mg total) by mouth every 8 (eight) hours for 15 days 30 tablet 0    NovoLOG 100 UNIT/ML injection Inject 100 Units under the skin in the morning Via insuline pump 30 mL 11    OneTouch Delica Lancets 33G MISC Check blood sugars once daily. Please substitute with appropriate alternative as covered by patient's insurance. Dx: E11.65 100 each 3    rosuvastatin (CRESTOR) 20 MG tablet Take 1 tablet (20 mg total) by mouth daily 30 tablet 0    tadalafil (CIALIS) 20 MG tablet Take 1 tablet (20 mg total) by mouth daily as needed for erectile dysfunction 30 tablet 3    Vitamin D, Ergocalciferol, 07326 units CAPS Take 1 capsule by mouth once a week      famotidine (PEPCID) 20 mg tablet Take 1 tablet (20 mg total) by mouth 2 (two) times a day (Patient taking differently: Take 20 mg by mouth 2 (two) times a day as needed for heartburn or indigestion) 180 tablet 1    SYRINGE-NEEDLE, DISP, 3 ML (Safety Syringes/Needle) 20G X 1-1/2\" 3 ML MISC Use once a week for 12 doses 12 each 1     No current " facility-administered medications for this visit.

## 2025-07-18 NOTE — PATIENT INSTRUCTIONS
[facility] (for diabetic shoes)  [address], [address]  [place] [address] [phone]

## 2025-07-24 ENCOUNTER — OFFICE VISIT (OUTPATIENT)
Dept: VASCULAR SURGERY | Facility: CLINIC | Age: 35
End: 2025-07-24
Payer: COMMERCIAL

## 2025-07-24 ENCOUNTER — OFFICE VISIT (OUTPATIENT)
Dept: PODIATRY | Facility: CLINIC | Age: 35
End: 2025-07-24
Payer: COMMERCIAL

## 2025-07-24 VITALS — HEART RATE: 85 BPM | HEIGHT: 68 IN | BODY MASS INDEX: 29.4 KG/M2 | OXYGEN SATURATION: 99 %

## 2025-07-24 VITALS
BODY MASS INDEX: 28.74 KG/M2 | OXYGEN SATURATION: 98 % | SYSTOLIC BLOOD PRESSURE: 102 MMHG | WEIGHT: 189 LBS | HEART RATE: 99 BPM | DIASTOLIC BLOOD PRESSURE: 64 MMHG

## 2025-07-24 DIAGNOSIS — E11.42 DIABETIC POLYNEUROPATHY ASSOCIATED WITH TYPE 2 DIABETES MELLITUS (HCC): ICD-10-CM

## 2025-07-24 DIAGNOSIS — Z98.890 POST-OPERATIVE STATE: ICD-10-CM

## 2025-07-24 DIAGNOSIS — I73.9 PAD (PERIPHERAL ARTERY DISEASE) (HCC): Primary | ICD-10-CM

## 2025-07-24 DIAGNOSIS — T81.30XA WOUND DEHISCENCE: Primary | ICD-10-CM

## 2025-07-24 PROCEDURE — 99213 OFFICE O/P EST LOW 20 MIN: CPT | Performed by: PODIATRIST

## 2025-07-24 PROCEDURE — 99214 OFFICE O/P EST MOD 30 MIN: CPT | Performed by: STUDENT IN AN ORGANIZED HEALTH CARE EDUCATION/TRAINING PROGRAM

## 2025-07-24 RX ORDER — DOXYCYCLINE 100 MG/1
100 TABLET ORAL 2 TIMES DAILY
Qty: 14 TABLET | Refills: 0 | Status: SHIPPED | OUTPATIENT
Start: 2025-07-24 | End: 2025-07-31

## 2025-07-24 NOTE — PROGRESS NOTES
Vascular Surgery Return Patient Visit  Date: 07/24/25      Assessment:  Jason Blanca is a 34 y.o. male s/p RLE angio and R AT/PT angioplasty for non-healing foot wound. Underwent R great toe amp on 7/5/25 w/ podiatry. His sutures were removed today but unfortunately it appears his incision has dehisced since the time of removal earlier today. He does fortunately have a faint R DP pulse and is at this point maximally revascularized. I recommended he contact his podiatrist again to notify them of the change in his wound.       Plan:  -return in 3mo w/ LEAD     Diagnoses and all orders for this visit:    PAD (peripheral artery disease) (HCC)  -     VAS ARTERIAL DUPLEX- LOWER LIMB BILATERAL; Future           Subjective:     HPI:  Jason Blanca is a 34 y.o. male w/ T2DM (1/2025 A1c 8.3) who is s/p RLE angio and R AT/PT angioplasty for non-healing foot wound. He has been taking plavix. Underwent R great toe amp on 7/5/25 w/ podiatry. His sutures were removed today by his podiatrist. He has a picture following removal showing the incision was in tact earlier today. However, on my exam it appears his incision has dehisced (see media).       Objective:    ROS:  All systems were reviewed and are negative except those mentioned in HPI and below.      Vitals: /64 (BP Location: Left arm, Patient Position: Sitting, Cuff Size: Standard)   Pulse 99   Wt 85.7 kg (189 lb)   SpO2 98%   BMI 28.74 kg/m²      General: male appears stated age, no apparent distress, alert and oriented   HEENT: normocephalic, atraumatic   Cardiovascular: hemodynamically stable   Chest/Lungs: no increased work of breathing, chest rise equal bilaterally   Abdomen: Soft, ND, NT, no pulsatile masses   Extremities: BL femoral pulses  Faint R DP pulse, non-palpable R PT    Non-palpable L DP/PT   Skin: warm and dry   Neuro: no gross deficits      Medications:  Current Outpatient Medications   Medication Sig Dispense Refill    Alpha-Lipoic  Acid 600 MG CAPS Take 1 capsule by mouth 2 (two) times a day      amphetamine-dextroamphetamine (ADDERALL, 10MG,) 10 mg tablet Take 1 tablet (10 mg total) by mouth 2 (two) times a day Max Daily Amount: 20 mg 60 tablet 0    aspirin (ECOTRIN LOW STRENGTH) 81 mg EC tablet Take 1 tablet (81 mg total) by mouth daily Do not start before July 7, 2025. 30 tablet 0    Blood Glucose Monitoring Suppl (OneTouch Verio Reflect) w/Device KIT Check blood sugars once daily. Please substitute with appropriate alternative as covered by patient's insurance. Dx: E11.65 1 kit 0    buPROPion (WELLBUTRIN SR) 100 mg 12 hr tablet Take 1 tablet (100 mg total) by mouth 2 (two) times a day 60 tablet 5    [place] Choice Comfort EZ 33G X 4 MM MISC       clopidogrel (PLAVIX) 75 mg tablet Take 1 tablet (75 mg total) by mouth daily Do not start before July 7, 2025. 30 tablet 0    Continuous Blood Gluc Sensor (FreeStyle Amber 3 Sensor) MISC       Empagliflozin-metFORMIN HCl ER (Synjardy XR) 12.5-1000 MG TB24 Take 2 tablets by mouth daily 180 tablet 3    gabapentin (Neurontin) 600 MG tablet Take 1 tablet (600 mg total) by mouth 3 (three) times a day 90 tablet 0    glucose blood (OneTouch Verio) test strip Check blood sugars once daily. Please substitute with appropriate alternative as covered by patient's insurance. Dx: E11.65 100 each 3    Injection Device for Insulin (CeQur Simplicity 2U) LAUREN Use 1 each every 3 (three) days      Injection Device for Insulin (CeQur Simplicity 2U) LAUREN Use 1 each every 3 (three) days      Insulin Pen Needle (BD Pen Needle Ratna U/F) 32G X 4 MM MISC Use daily as directed with insulin pen 100 each 3    Isopropyl Alcohol (Pharmacist Choice Alcohol) 70 % MISC Apply 200 Pads topically 3 (three) times a day 200 each 5    methocarbamol (ROBAXIN) 500 mg tablet Take 1 tablet (500 mg total) by mouth every 8 (eight) hours for 15 days 30 tablet 0    NovoLOG 100 UNIT/ML injection Inject 100 Units under the skin in the morning Via  "insuline pump 30 mL 11    OneTouch Delica Lancets 33G MISC Check blood sugars once daily. Please substitute with appropriate alternative as covered by patient's insurance. Dx: E11.65 100 each 3    rosuvastatin (CRESTOR) 20 MG tablet Take 1 tablet (20 mg total) by mouth daily 30 tablet 0    tadalafil (CIALIS) 20 MG tablet Take 1 tablet (20 mg total) by mouth daily as needed for erectile dysfunction 30 tablet 3    Vitamin D, Ergocalciferol, 74777 units CAPS Take 1 capsule by mouth once a week      famotidine (PEPCID) 20 mg tablet Take 1 tablet (20 mg total) by mouth 2 (two) times a day (Patient not taking: Reported on 2025) 180 tablet 1    SYRINGE-NEEDLE, DISP, 3 ML (Safety Syringes/Needle) 20G X 1-1/2\" 3 ML MISC Use once a week for 12 doses 12 each 1     No current facility-administered medications for this visit.       Allergies:  Allergies[1]     PMH:  Past Medical History[2]     PSH:  Past Surgical History[3]     FHx:  Family History[4]     SHx:  Social History     Socioeconomic History    Marital status: Single     Spouse name: Not on file    Number of children: Not on file    Years of education: Not on file    Highest education level: Not on file   Occupational History    Not on file   Tobacco Use    Smoking status: Former     Current packs/day: 0.00     Average packs/day: 0.5 packs/day for 18.1 years (9.1 ttl pk-yrs)     Types: Cigarettes     Start date:      Quit date: 2025     Years since quittin.4    Smokeless tobacco: Never    Tobacco comments:     Uses vape   Vaping Use    Vaping status: Every Day    Substances: Nicotine   Substance and Sexual Activity    Alcohol use: Not Currently     Comment: social    Drug use: Yes     Types: Marijuana     Comment: medical card    Sexual activity: Yes     Partners: Female   Other Topics Concern    Not on file   Social History Narrative    Not on file     Social Drivers of Health     Financial Resource Strain: Not on file   Food Insecurity: No Food " Insecurity (7/3/2025)    Nursing - Inadequate Food Risk Classification     Worried About Running Out of Food in the Last Year: Not on file     Ran Out of Food in the Last Year: Not on file     Ran Out of Food in the Last Year: Never true   Transportation Needs: No Transportation Needs (7/3/2025)    Nursing - Transportation Risk Classification     Lack of Transportation: Not on file     Lack of Transportation: No   Physical Activity: Not on file   Stress: Not on file   Social Connections: Not on file   Intimate Partner Violence: Unknown (7/3/2025)    Nursing IPS     Feels Physically and Emotionally Safe: Not on file     Physically Hurt by Someone: Not on file     Humiliated or Emotionally Abused by Someone: Not on file     Physically Hurt by Someone: No     Hurt or Threatened by Someone: No   Housing Stability: Unknown (7/3/2025)    Nursing: Inadequate Housing Risk Classification     Has Housing: Not on file     Worried About Losing Housing: Not on file     Unable to Get Utilities: Not on file     Unable to Pay for Housing in the Last Year: No     Has Housing: No          [1]   Allergies  Allergen Reactions    Amoxicillin Other (See Comments)     Other reaction(s): augmentin    Augmentin [Amoxicillin-Pot Clavulanate] Hives    Sulfa Antibiotics Swelling   [2]   Past Medical History:  Diagnosis Date    Anxiety     Depression     Diabetes mellitus (HCC)     Fatty liver     Obesity    [3]   Past Surgical History:  Procedure Laterality Date    ARTERIOGRAM Right 7/3/2025    Procedure: RLE ARTERIOGRAM;  Surgeon: Matt Arellano MD;  [facility];  Service: Vascular    HERNIA REPAIR      IR LOWER EXTREMITY ANGIOGRAM  7/3/2025    TOE AMPUTATION Right 7/5/2025    Procedure: Right hallux amputation;  Surgeon: Aleks Pisano DPM;  Location: [facility];  Service: Podiatry   [4]   Family History  Problem Relation Name Age of Onset    Diabetes Mother      Prostate cancer Paternal Grandfather

## 2025-07-24 NOTE — ASSESSMENT & PLAN NOTE
The patient's clinical examination today is significant for dehiscence along the central portion of the incision line after removal of sutures.  He is currently 19 days postop.  The incision line was reapproximated with Steri-Strips.    The wound was then dressed with silver alginate and a dry sterile dressing.  Unna boot compression was then applied to the right lower extremity for edema management over the postsurgical site.  Continue guarded weightbearing in a surgical shoe.  Keep the right foot elevated when at rest.    He does have some tenderness at the site of the dehiscence.  Out of an abundance of caution, I will start him on a 7-day course of doxycycline 100 mg twice daily.    Follow-up in 1 week.

## 2025-07-24 NOTE — PROGRESS NOTES
Name: Jason Blanca      : 1990      MRN: [id number]  Encounter Provider: Aleks Pisano DPM  Encounter Date: 2025   Encounter department: [facility] PODIAT[facility]  :  Assessment & Plan  Wound dehiscence    Orders:    doxycycline (ADOXA) 100 MG tablet; Take 1 tablet (100 mg total) by mouth 2 (two) times a day for 7 days    Post-operative state       The patient's clinical examination today is significant for dehiscence along the central portion of the incision line after removal of sutures.  He is currently 19 days postop.  The incision line was reapproximated with Steri-Strips.    The wound was then dressed with silver alginate and a dry sterile dressing.  Unna boot compression was then applied to the right lower extremity for edema management over the postsurgical site.  Continue guarded weightbearing in a surgical shoe.  Keep the right foot elevated when at rest.    He does have some tenderness at the site of the dehiscence.  Out of an abundance of caution, I will start him on a 7-day course of doxycycline 100 mg twice daily.    Follow-up in 1 week.  Diabetic polyneuropathy associated with type 2 diabetes mellitus (HCC)    Lab Results   Component Value Date    HGBA1C 6.9 (H) 2025                History of Present Illness   HPI  Jason Blanca is a 34 y.o. male who presents for follow-up status post right great toe amputation secondary osteomyelitis.  He is 19 days postop.  Sutures were removed earlier today however he returns later in the afternoon with dehiscence along the central aspect the incision line.  He does note some mild tenderness associated with the dehiscence site.      Review of Systems   Constitutional: Negative.    Respiratory: Negative.     Cardiovascular: Negative.    Skin:  Positive for wound.   Psychiatric/Behavioral: Negative.       Pertinent Medical History     PAST MEDICAL HISTORY:  Past Medical History[1]    PAST SURGICAL HISTORY:  Past  "Surgical History[2]     ALLERGIES:  Amoxicillin, Augmentin [amoxicillin-pot clavulanate], and Sulfa antibiotics    MEDICATIONS:  Current Medications[3]                Objective   Pulse 85   Ht 5' 8\" (1.727 m)   SpO2 99%   BMI 29.40 kg/m²      Physical Exam  Constitutional:       Appearance: Normal appearance.   HENT:      Head: Normocephalic and atraumatic.   Pulmonary:      Effort: Pulmonary effort is normal.   Feet:      Right foot:      Skin integrity: Skin breakdown present. No erythema.      Comments: The patient's clinical examination today is significant for dehiscence along the central portion of the incision line after removal of sutures.  He is currently 19 days postop.  The incision line was reapproximated with Steri-Strips.    Skin:     General: Skin is warm and dry.      Capillary Refill: Capillary refill takes less than 2 seconds.     Neurological:      General: No focal deficit present.      Mental Status: He is alert and oriented to person, place, and time.     Psychiatric:         Mood and Affect: Mood normal.                [1]   Past Medical History:  Diagnosis Date    Anxiety     Depression     Diabetes mellitus (HCC)     Fatty liver     Obesity    [2]   Past Surgical History:  Procedure Laterality Date    ARTERIOGRAM Right 7/3/2025    Procedure: RLE ARTERIOGRAM;  Surgeon: Matt Arellano MD;  [facility];  Service: Vascular    HERNIA REPAIR      IR LOWER EXTREMITY ANGIOGRAM  7/3/2025    TOE AMPUTATION Right 7/5/2025    Procedure: Right hallux amputation;  Surgeon: Aleks Pisano DPM;  [facility];  Service: Podiatry   [3]   Current Outpatient Medications   Medication Sig Dispense Refill    Alpha-Lipoic Acid 600 MG CAPS Take 1 capsule by mouth 2 (two) times a day      amphetamine-dextroamphetamine (ADDERALL, 10MG,) 10 mg tablet Take 1 tablet (10 mg total) by mouth 2 (two) times a day Max Daily Amount: 20 mg 60 tablet 0    aspirin (ECOTRIN LOW STRENGTH) 81 mg EC tablet " Take 1 tablet (81 mg total) by mouth daily Do not start before July 7, 2025. 30 tablet 0    Blood Glucose Monitoring Suppl (OneTouch Verio Reflect) w/Device KIT Check blood sugars once daily. Please substitute with appropriate alternative as covered by patient's insurance. Dx: E11.65 1 kit 0    buPROPion (WELLBUTRIN SR) 100 mg 12 hr tablet Take 1 tablet (100 mg total) by mouth 2 (two) times a day 60 tablet 5    [place] Choice Comfort EZ 33G X 4 MM MISC       clopidogrel (PLAVIX) 75 mg tablet Take 1 tablet (75 mg total) by mouth daily Do not start before July 7, 2025. 30 tablet 0    Continuous Blood Gluc Sensor (FreeStyle Amber 3 Sensor) MISC       Empagliflozin-metFORMIN HCl ER (Synjardy XR) 12.5-1000 MG TB24 Take 2 tablets by mouth daily 180 tablet 3    gabapentin (Neurontin) 600 MG tablet Take 1 tablet (600 mg total) by mouth 3 (three) times a day 90 tablet 0    glucose blood (OneTouch Verio) test strip Check blood sugars once daily. Please substitute with appropriate alternative as covered by patient's insurance. Dx: E11.65 100 each 3    Injection Device for Insulin (CeQur Simplicity 2U) LAUREN Use 1 each every 3 (three) days      Injection Device for Insulin (CeQur Simplicity 2U) LAUREN Use 1 each every 3 (three) days      Insulin Pen Needle (BD Pen Needle Ratna U/F) 32G X 4 MM MISC Use daily as directed with insulin pen 100 each 3    Isopropyl Alcohol (Pharmacist Choice Alcohol) 70 % MISC Apply 200 Pads topically 3 (three) times a day 200 each 5    methocarbamol (ROBAXIN) 500 mg tablet Take 1 tablet (500 mg total) by mouth every 8 (eight) hours for 15 days 30 tablet 0    NovoLOG 100 UNIT/ML injection Inject 100 Units under the skin in the morning Via insuline pump 30 mL 11    OneTouch Delica Lancets 33G MISC Check blood sugars once daily. Please substitute with appropriate alternative as covered by patient's insurance. Dx: E11.65 100 each 3    rosuvastatin (CRESTOR) 20 MG tablet Take 1 tablet (20 mg total) by mouth  "daily 30 tablet 0    tadalafil (CIALIS) 20 MG tablet Take 1 tablet (20 mg total) by mouth daily as needed for erectile dysfunction 30 tablet 3    Vitamin D, Ergocalciferol, 16121 units CAPS Take 1 capsule by mouth once a week      famotidine (PEPCID) 20 mg tablet Take 1 tablet (20 mg total) by mouth 2 (two) times a day (Patient taking differently: Take 20 mg by mouth 2 (two) times a day as needed for heartburn or indigestion) 180 tablet 1    SYRINGE-NEEDLE, DISP, 3 ML (Safety Syringes/Needle) 20G X 1-1/2\" 3 ML MISC Use once a week for 12 doses 12 each 1     No current facility-administered medications for this visit.     "

## 2025-07-25 DIAGNOSIS — N52.02 CORPORO-VENOUS OCCLUSIVE ERECTILE DYSFUNCTION: ICD-10-CM

## 2025-07-25 DIAGNOSIS — Z79.4 TYPE 2 DIABETES MELLITUS WITH DIABETIC POLYNEUROPATHY, WITH LONG-TERM CURRENT USE OF INSULIN (HCC): ICD-10-CM

## 2025-07-25 DIAGNOSIS — E29.1 HYPOGONADISM IN MALE: ICD-10-CM

## 2025-07-25 DIAGNOSIS — E11.42 DIABETIC POLYNEUROPATHY ASSOCIATED WITH TYPE 2 DIABETES MELLITUS (HCC): ICD-10-CM

## 2025-07-25 DIAGNOSIS — E11.42 TYPE 2 DIABETES MELLITUS WITH DIABETIC POLYNEUROPATHY, WITH LONG-TERM CURRENT USE OF INSULIN (HCC): ICD-10-CM

## 2025-07-25 DIAGNOSIS — F90.0 ADHD, PREDOMINANTLY INATTENTIVE TYPE: ICD-10-CM

## 2025-07-28 RX ORDER — GABAPENTIN 600 MG/1
600 TABLET ORAL 3 TIMES DAILY
Qty: 90 TABLET | Refills: 5 | Status: SHIPPED | OUTPATIENT
Start: 2025-07-28

## 2025-07-28 RX ORDER — TADALAFIL 20 MG/1
20 TABLET ORAL DAILY PRN
Qty: 30 TABLET | Refills: 1 | Status: SHIPPED | OUTPATIENT
Start: 2025-07-28

## 2025-07-28 RX ORDER — ROSUVASTATIN CALCIUM 20 MG/1
20 TABLET, COATED ORAL DAILY
Qty: 30 TABLET | Refills: 5 | Status: SHIPPED | OUTPATIENT
Start: 2025-07-28 | End: 2026-07-28

## 2025-07-29 RX ORDER — DEXTROAMPHETAMINE SACCHARATE, AMPHETAMINE ASPARTATE, DEXTROAMPHETAMINE SULFATE AND AMPHETAMINE SULFATE 2.5; 2.5; 2.5; 2.5 MG/1; MG/1; MG/1; MG/1
10 TABLET ORAL
Qty: 60 TABLET | Refills: 0 | Status: SHIPPED | OUTPATIENT
Start: 2025-07-29

## 2025-07-29 RX ORDER — BLOOD-GLUCOSE METER
KIT MISCELLANEOUS
Qty: 1 KIT | Refills: 0 | Status: SHIPPED | OUTPATIENT
Start: 2025-07-29

## 2025-07-29 RX ORDER — BLOOD SUGAR DIAGNOSTIC
STRIP MISCELLANEOUS
Qty: 100 EACH | Refills: 0 | Status: SHIPPED | OUTPATIENT
Start: 2025-07-29

## 2025-08-01 ENCOUNTER — TELEPHONE (OUTPATIENT)
Age: 35
End: 2025-08-01

## 2025-08-01 ENCOUNTER — OFFICE VISIT (OUTPATIENT)
Dept: PODIATRY | Facility: CLINIC | Age: 35
End: 2025-08-01

## 2025-08-01 VITALS — HEART RATE: 92 BPM | HEIGHT: 68 IN | BODY MASS INDEX: 28.74 KG/M2 | OXYGEN SATURATION: 98 %

## 2025-08-01 DIAGNOSIS — Z89.419 HISTORY OF AMPUTATION OF GREAT TOE (HCC): ICD-10-CM

## 2025-08-01 DIAGNOSIS — E11.42 DIABETIC POLYNEUROPATHY ASSOCIATED WITH TYPE 2 DIABETES MELLITUS (HCC): ICD-10-CM

## 2025-08-01 DIAGNOSIS — T81.30XA WOUND DEHISCENCE: ICD-10-CM

## 2025-08-01 DIAGNOSIS — Z98.890 POST-OPERATIVE STATE: Primary | ICD-10-CM

## 2025-08-01 PROCEDURE — 99024 POSTOP FOLLOW-UP VISIT: CPT | Performed by: PODIATRIST

## 2025-08-07 ENCOUNTER — OFFICE VISIT (OUTPATIENT)
Dept: PODIATRY | Facility: CLINIC | Age: 35
End: 2025-08-07
Payer: COMMERCIAL

## 2025-08-07 VITALS — HEART RATE: 88 BPM | HEIGHT: 68 IN | OXYGEN SATURATION: 99 % | BODY MASS INDEX: 28.74 KG/M2

## 2025-08-07 DIAGNOSIS — E11.621 DIABETIC ULCER OF TOE OF RIGHT FOOT ASSOCIATED WITH TYPE 2 DIABETES MELLITUS, WITH FAT LAYER EXPOSED (HCC): ICD-10-CM

## 2025-08-07 DIAGNOSIS — L97.512 DIABETIC ULCER OF TOE OF RIGHT FOOT ASSOCIATED WITH TYPE 2 DIABETES MELLITUS, WITH FAT LAYER EXPOSED (HCC): ICD-10-CM

## 2025-08-07 DIAGNOSIS — T81.30XA WOUND DEHISCENCE: Primary | ICD-10-CM

## 2025-08-07 DIAGNOSIS — Z89.411 STATUS POST AMPUTATION OF RIGHT GREAT TOE (HCC): ICD-10-CM

## 2025-08-07 PROCEDURE — 99213 OFFICE O/P EST LOW 20 MIN: CPT | Performed by: PODIATRIST

## (undated) DEVICE — INTENDED FOR TISSUE SEPARATION, AND OTHER PROCEDURES THAT REQUIRE A SHARP SURGICAL BLADE TO PUNCTURE OR CUT.: Brand: BARD-PARKER ® CARBON RIB-BACK BLADES

## (undated) DEVICE — PRESTO™ INFLATION DEVICE: Brand: PRESTO

## (undated) DEVICE — Device

## (undated) DEVICE — DESTINATION CAROTID GUIDING SHEATH: Brand: DESTINATION

## (undated) DEVICE — ASTOUND STANDARD SURGICAL GOWN, XL: Brand: CONVERTORS

## (undated) DEVICE — FLUID MANAGEMENT KIT - IR

## (undated) DEVICE — NEPTUNE E-SEP SMOKE EVACUATION PENCIL, COATED, 70MM BLADE, PUSH BUTTON SWITCH: Brand: NEPTUNE E-SEP

## (undated) DEVICE — CATH DIAG 5FR .035 65CM 6S OMMI-FLUSH

## (undated) DEVICE — ABDOMINAL PAD: Brand: DERMACEA

## (undated) DEVICE — PACK CUSTOM CARDIOVASCULAR

## (undated) DEVICE — PINNACLE R/O II INTRODUCER SHEATH WITH RADIOPAQUE MARKER: Brand: PINNACLE

## (undated) DEVICE — REM POLYHESIVE ADULT PATIENT RETURN ELECTRODE: Brand: VALLEYLAB

## (undated) DEVICE — SLIM BODY SKIN STAPLER: Brand: APPOSE ULC

## (undated) DEVICE — DECANTER: Brand: UNBRANDED

## (undated) DEVICE — 10FR FRAZIER SUCTION HANDLE: Brand: CARDINAL HEALTH

## (undated) DEVICE — PROBE COVER: Brand: STERILE PROBE COVER

## (undated) DEVICE — KENDALL SCD SEQUENTIAL COMPRESSION COMFORT SLEEVES, KNEE LENGTH, MEDIUM: Brand: KENDALL SCD

## (undated) DEVICE — NAVICROSS SUPPORT CATHETER: Brand: NAVICROSS

## (undated) DEVICE — RADIFOCUS GLIDEWIRE: Brand: GLIDEWIRE

## (undated) DEVICE — PREP PAD BNS: Brand: CONVERTORS

## (undated) DEVICE — WET SKIN PREP TRAY: Brand: MEDLINE INDUSTRIES, INC.

## (undated) DEVICE — BETHLEHEM UNIVERSAL  MIONR EXT: Brand: CARDINAL HEALTH

## (undated) DEVICE — EXOFIN PRECISION PEN HIGH VISCOSITY TOPICAL SKIN ADHESIVE: Brand: EXOFIN PRECISION PEN, 1G

## (undated) DEVICE — 4-PORT MANIFOLD: Brand: NEPTUNE 2

## (undated) DEVICE — 40529 DERMAPROX PAD 11'' X 15'' X 1'': Brand: 40529 DERMAPROX PAD 11'' X 15'' X 1''

## (undated) DEVICE — GUIDEWIRE WITH ICE™ HYDROPHILIC COATING: Brand: V-18™ CONTROL WIRE™

## (undated) DEVICE — RADIFOCUS TORQUE DEVICE MULTI-TORQUE VISE: Brand: RADIFOCUS TORQUE DEVICE

## (undated) DEVICE — OCCLUSIVE GAUZE STRIP,3% BISMUTH TRIBROMOPHENATE IN PETROLATUM BLEND: Brand: XEROFORM